# Patient Record
Sex: MALE | Race: WHITE | Employment: OTHER | URBAN - METROPOLITAN AREA
[De-identification: names, ages, dates, MRNs, and addresses within clinical notes are randomized per-mention and may not be internally consistent; named-entity substitution may affect disease eponyms.]

---

## 2017-01-04 ENCOUNTER — ALLIED HEALTH/NURSE VISIT (OUTPATIENT)
Dept: NURSING | Facility: CLINIC | Age: 77
End: 2017-01-04
Payer: MEDICARE

## 2017-01-04 DIAGNOSIS — L57.0 ACTINIC KERATOSIS: ICD-10-CM

## 2017-01-04 PROCEDURE — 96567 PDT DSTR PRMLG LES SKN: CPT

## 2017-01-04 NOTE — NURSING NOTE
Photodynamic Therapy Intake:    1.Close monitoring for more significant reaction, and avoid saran wrap if YES to any of the following:  New medications since seen by physician? No (If yes, contact supervising physician)  NSAIDs, ibuprofen, aspirin, naproxen, piroxicam: No  Antiarrhythmics (amiodarone, quinidine): No  Hydrochlorothiazide:  No    2.Will hold PDT for YES to any of the following:  Pregnancy/breastfeeding/unknown pregnancy: No  Sun burn: No  Tetracyclines such as doxycycline: No  Ciprofloxacin: No   Methotrexate: No    3.Will hold LEVULAN for YES to any of the following:   Treatment today is for acne:No     The sites to be treated were verified and discussed with patient, sites verified were scalp and forehead.   Expected symptoms and/or complications of redness, peeling, stinging, and burning were reviewed.  Comfort measures including moisturizer, cool compresses, and sun protection measures were also reviewed with patient.  After review, patient verbalized understanding of procedure and consent form signed by patient(as per MD documentation) and patient agrees to proceed with treatment.     Applied 1 Levulan stick to the site/s.  Yes saran wrap applied to forehead scalp    MEDICATION: Levulan  DOSE: 1.5 mL  ROUTE: Topical  : [employer]  LOCATION GIVEN: forehead and scalp  LOT NO: [id number]  EXP. DATE: 09/19  NDC: 80662-639-67    TREATMENT NUMBER(30 maximum treatments per site): 1      Photodynamic Therapy(PDT) Post Op Note    Patient successfully completed PDT treatment today. Patient tolerated treatment without complication.  Sites were cleansed with mild soap and water and SPF was applied after treatment.     Sun protection was reviewed with patient:  Patient brought personal hat    After care instructions were reviewed with patient. AVS printed with clinic contact information and given to patient. Patient verbalized understanding and had no further questions or concerns at  this time. Patient left clinic in good condition.

## 2017-01-04 NOTE — MR AVS SNAPSHOT
After Visit Summary   1/4/2017    Rigoberto Holguin    MRN: [id number]           Patient Information     Date Of Birth          1940        Visit Information        Provider Department      1/4/2017 10:00 AM NURSE ONLY formerly [facility]        Care Instructions    Photodynamic Therapy (PDT) Home Care Instructions  I will experience redness, swelling, pain and discomfort which will last 5-10 days. I may experience pinkness or redness that persists for 2-3 weeks but longer duration is possible in some individuals. Risks are infection, eye injury, blistering, reactivation of the cold sore virus, skin lightening, skin darkening or scarring. Multiple treatments may be required.   DAY OF TREATMENT  1) Wash treated area with mild cleanser.  Redness of the treated skin is expected and can vary significantly from person to person and treatment to treatment.  2) Apply SPF 50 before leaving your home/office and while driving to and from work.  3) Remain indoors if possible and avoid direct as well as indirect sunlight.  If your head or face were treated, wear a broad brimmed hat wile going to and from your car.  If your hands/arms were treated, were long sleeves and gloves.  4) Ice packs every 1-2 hours may be helpful as well.  5) In the evening, cleanse treated area gently.  Your skin will feel dry; keep treated area moisturized with a moisturizer.  DAY TWO  1) You may take a shower.  Men should NOT shave if their face was treated.  2) Cleanse treated area gently.  3) Apply SPF 50  4) Most discomfort usually subsides by Day 3.  5) You should avoid direct sunlight and try to remain indoors on Day 2.  The sensitivity to sunlight will significantly decrease after 48 hours.  6) You should soak the treated areas with as soft washcloth with cold water for 20 minutes every 4-6 hours.  Ice may be applied after the cold-water soaks, if this feels good.  The area should be patted dry and moisturized  following the cold-water soaks.  7) Follow evening routine as you did on Day 1    DAYS 3- 7   1) Try to avoid direct sunlight and minimize incidental exposure for one week.  NO BEACHES!  Continue using SPF 50.  This is very important in protecting your newly rejuvenated skin.  2) You may begin applying make-up once any crusting has healed.  The area may be slightly red for a few weeks.  3) The skin will feel dry and tightened.  Moisturize once to twice daily.  Please plan to be at your appointment for approximately 90 minutes.  Your treatments are scheduled as follows.  Your next PDT treatment  is scheduled for February 15, at 10 AM      Who should I call with questions?    [facility]: [phone]     [facility]: [phone]    For urgent needs outside of business hours call the [facility] at [phone] and ask for the dermatology resident on call                Follow-ups after your visit        Your next 10 appointments already scheduled     Jan 23, 2017  8:30 AM   Anticoagulation Visit with ALANA ANTI COAG   [facility]    [address] [facility] [address] [id number] [phone]            Feb 15, 2017 10:00 AM   Nurse Only with NURSE ONLY MG DERM   [facility])    [address] [place] [address]   [phone]            Mar 21, 2017 11:00 AM   Return Visit with Vickie Whalen MD   [facility])    [address] [place] [address]   [phone]            Mar 29, 2017 10:00 AM   Nurse Only with NURSE ONLY MG DERM   [facility])    [address] [place] [address]   [phone]            Jun 28, 2017  9:00 AM   Return Visit with Humble Cantu MD, SAMUEL CYSTO [facility]  [place]) [address]  [place] [address] [phone]              Who to contact     If you have questions or need follow up information about today's clinic visit or your schedule please contact [facility] directly at [phone].  Normal or non-critical lab and imaging results will be communicated to you by MyChart, letter or phone within 4 business days after the clinic has received the results. If you do not hear from us within 7 days, please contact the clinic through MyChart or phone. If you have a critical or abnormal lab result, we will notify you by phone as soon as possible.  Submit refill requests through [employer] or call your pharmacy and they will forward the refill request to us. Please allow 3 business days for your refill to be completed.          Additional Information About Your Visit        MyChart Information     [employer] gives you secure access to your electronic health record. If you see a primary care provider, you can also send messages to your care team and make appointments. If you have questions, please call your primary care clinic.  If you do not have a primary care provider, please call [phone] and they will assist you.      [employer] is an electronic gateway that provides easy, online access to your medical records. With [employer], you can request a clinic appointment, read your test results, renew a prescription or communicate with your care team.     To access your existing account, please contact your [facility] or call [phone] for assistance.        Care EveryWhere ID     This is your Care EveryWhere ID. This could be used by other organizations to access your [facility] medical records  [id number]         Blood Pressure from Last 3 Encounters:   11/15/16 121/75   08/16/16 122/67   08/09/16 136/70    Weight from Last 3 Encounters:   11/15/16 96.707 kg (213 lb 3.2 oz)   08/16/16 97.07 kg (214 lb)   08/09/16  97.523 kg (215 lb)              Today, you had the following     No orders found for display       Primary Care Provider Office Phone # Fax #    Paulo Jodi Vargas [phone]       [facility] [place] [address]  [place] [address]        Thank you!     Thank you for choosing [facility]  for your care. Our goal is always to provide you with excellent care. Hearing back from our patients is one way we can continue to improve our services. Please take a few minutes to complete the written survey that you may receive in the mail after your visit with us. Thank you!             Your Updated Medication List - Protect others around you: Learn how to safely use, store and throw away your medicines at www.disposemymeds.org.          This list is accurate as of: 1/4/17 11:33 AM.  Always use your most recent med list.                   Brand Name Dispense Instructions for use    cholecalciferol 1000 UNIT tablet    vitamin D    100 tablet    Take 1 tablet (1,000 Units) by mouth daily       DAILY MULTIVITAMIN PO      Take  by mouth daily.       JUICE PLUS FIBRE PO          levothyroxine 100 MCG tablet    SYNTHROID/LEVOTHROID    90 tablet    Take 1 tablet (100 mcg) by mouth every morning (before breakfast)       melatonin 5 MG tablet      Take 5 mg by mouth nightly as needed for sleep       metroNIDAZOLE 0.75 % cream    METROCREAM    60 g    every day, once daily to face       TUMS 500 MG chewable tablet   Generic drug:  calcium carbonate      Take 1 chew tab by mouth daily.       * warfarin 10 MG tablet    COUMADIN     Take by mouth daily 4 days/ week on Sun, Tues, Thur, Sat       * warfarin 5 MG tablet    COUMADIN    180 tablet    Take 1 tablet (5 mg) by mouth daily Or as directed by INR clinic. Current dose is 7.5 mg Mon,Wed, and Fri, 10 mg daily rest of week       * Notice:  This list has 2 medication(s) that are the same as other medications prescribed for you. Read the directions  carefully, and ask your doctor or other care provider to review them with you.

## 2017-01-04 NOTE — PATIENT INSTRUCTIONS
Photodynamic Therapy (PDT) Home Care Instructions  I will experience redness, swelling, pain and discomfort which will last 5-10 days. I may experience pinkness or redness that persists for 2-3 weeks but longer duration is possible in some individuals. Risks are infection, eye injury, blistering, reactivation of the cold sore virus, skin lightening, skin darkening or scarring. Multiple treatments may be required.   DAY OF TREATMENT  1) Wash treated area with mild cleanser.  Redness of the treated skin is expected and can vary significantly from person to person and treatment to treatment.  2) Apply SPF 50 before leaving your home/office and while driving to and from work.  3) Remain indoors if possible and avoid direct as well as indirect sunlight.  If your head or face were treated, wear a broad brimmed hat wile going to and from your car.  If your hands/arms were treated, were long sleeves and gloves.  4) Ice packs every 1-2 hours may be helpful as well.  5) In the evening, cleanse treated area gently.  Your skin will feel dry; keep treated area moisturized with a moisturizer.  DAY TWO  1) You may take a shower.  Men should NOT shave if their face was treated.  2) Cleanse treated area gently.  3) Apply SPF 50  4) Most discomfort usually subsides by Day 3.  5) You should avoid direct sunlight and try to remain indoors on Day 2.  The sensitivity to sunlight will significantly decrease after 48 hours.  6) You should soak the treated areas with as soft washcloth with cold water for 20 minutes every 4-6 hours.  Ice may be applied after the cold-water soaks, if this feels good.  The area should be patted dry and moisturized following the cold-water soaks.  7) Follow evening routine as you did on Day 1    DAYS 3- 7   1) Try to avoid direct sunlight and minimize incidental exposure for one week.  NO BEACHES!  Continue using SPF 50.  This is very important in protecting your newly rejuvenated skin.  2) You may begin  applying make-up once any crusting has healed.  The area may be slightly red for a few weeks.  3) The skin will feel dry and tightened.  Moisturize once to twice daily.  Please plan to be at your appointment for approximately 90 minutes.  Your treatments are scheduled as follows.  Your next PDT treatment  is scheduled for February 15, at 10 AM      Who should I call with questions?    [facility]: [phone]     [facility]: [phone]    For urgent needs outside of business hours call the [facility] at [phone] and ask for the dermatology resident on call

## 2017-01-23 ENCOUNTER — ANTICOAGULATION THERAPY VISIT (OUTPATIENT)
Dept: NURSING | Facility: CLINIC | Age: 77
End: 2017-01-23
Payer: MEDICARE

## 2017-01-23 DIAGNOSIS — M54.16 LUMBAR RADICULAR PAIN: Primary | ICD-10-CM

## 2017-01-23 DIAGNOSIS — Z79.01 LONG-TERM (CURRENT) USE OF ANTICOAGULANTS: Primary | ICD-10-CM

## 2017-01-23 LAB — INR POINT OF CARE: 2.6 (ref 0.86–1.14)

## 2017-01-23 PROCEDURE — 99207 ZZC NO CHARGE NURSE ONLY: CPT

## 2017-01-23 PROCEDURE — 85610 PROTHROMBIN TIME: CPT | Mod: QW

## 2017-01-23 PROCEDURE — 36416 COLLJ CAPILLARY BLOOD SPEC: CPT

## 2017-01-23 NOTE — PROGRESS NOTES
ANTICOAGULATION FOLLOW-UP CLINIC VISIT    Patient Name:  Rigoberto Holguin  Date:  1/23/2017  Contact Type:  Face to Face    SUBJECTIVE:     Patient Findings     Positives No Problem Findings           OBJECTIVE    INR PROTIME   Date Value Ref Range Status   01/23/2017 2.6* 0.86 - 1.14 Final       ASSESSMENT / PLAN  INR assessment THER    Recheck INR In: 6 WEEKS    INR Location Clinic      Anticoagulation Summary as of 1/23/2017     INR goal 2.0-3.0   Selected INR 2.6 (1/23/2017)   Maintenance plan 7.5 mg (5 mg x 1.5) on Mon, Wed, Fri; 10 mg (5 mg x 2) all other days   Full instructions 7.5 mg on Mon, Wed, Fri; 10 mg all other days   Weekly total 62.5 mg   No change documented Aparna Robison   Plan last modified Aaprna Robison (1/25/2016)   Next INR check 3/6/2017   Target end date     Indications   Long-term (current) use of anticoagulants [Z79.01] [Z79.01]         Anticoagulation Episode Summary     INR check location     Preferred lab     Send INR reminders to [facility]    Comments       Anticoagulation Care Providers     Provider Role Specialty Phone number    Paulo Vargas MD Responsible Internal Medicine [phone]            See the Encounter Report to view Anticoagulation Flowsheet and Dosing Calendar (Go to Encounters tab in chart review, and find the Anticoagulation Therapy Visit)    Patient aware will need to establish with new provider    APARNA ROBISON

## 2017-01-23 NOTE — MR AVS SNAPSHOT
Rigoberto LORENZO Holguin   1/23/2017 8:30 AM   Anticoagulation Therapy Visit    Description:  76 year old male   Provider:  ALANA ANTI COAG   Department:  [facility] Nurse           INR as of 1/23/2017     Selected INR 2.6 (1/23/2017)      Anticoagulation Summary as of 1/23/2017     INR goal 2.0-3.0   Selected INR 2.6 (1/23/2017)   Full instructions 7.5 mg on Mon, Wed, Fri; 10 mg all other days   Next INR check 3/6/2017    Indications   Long-term (current) use of anticoagulants [Z79.01] [Z79.01]         Your next Anticoagulation Clinic appointment(s)     Mar 06, 2017  8:30 AM   Anticoagulation Visit with ALANA ANTI GARRISON   [facility] [place] [facility])    [address]  [place] [address] [phone]              Contact Numbers     [facility]  Please call [phone] with any problems or questions regarding your therapy, or to cancel or reschedule your appointment.          January 2017 Details    Sun Mon Tue Wed Thu Fri Sat     1               2               3               4               5               6               7                 8               9               10               11               12               13               14                 15               16               17               18               19               20               21                 22               23      7.5 mg   See details      24      10 mg         25      7.5 mg         26      10 mg         27      7.5 mg         28      10 mg           29      10 mg         30      7.5 mg         31      10 mg              Date Details   01/23 This INR check               How to take your warfarin dose     To take:  7.5 mg Take 1.5 of the 5 mg tablets.    To take:  10 mg Take 2 of the 5 mg tablets.           February 2017 Details    Sun Mon Tue Wed Thu Fri Sat        1      7.5 mg         2      10 mg         3      7.5 mg         4      10 mg           5      10 mg         6      7.5 mg         7      10 mg          8      7.5 mg         9      10 mg         10      7.5 mg         11      10 mg           12      10 mg         13      7.5 mg         14      10 mg         15      7.5 mg         16      10 mg         17      7.5 mg         18      10 mg           19      10 mg         20      7.5 mg         21      10 mg         22      7.5 mg         23      10 mg         24      7.5 mg         25      10 mg           26      10 mg         27      7.5 mg         28      10 mg              Date Details   No additional details            How to take your warfarin dose     To take:  7.5 mg Take 1.5 of the 5 mg tablets.    To take:  10 mg Take 2 of the 5 mg tablets.           March 2017 Details    Sun Mon Tue Wed Thu Fri Sat        1      7.5 mg         2      10 mg         3      7.5 mg         4      10 mg           5      10 mg         6            7               8               9               10               11                 12               13               14               15               16               17               18                 19               20               21               22               23               24               25                 26               27               28               29               30               31                 Date Details   No additional details    Date of next INR:  3/6/2017         How to take your warfarin dose     To take:  7.5 mg Take 1.5 of the 5 mg tablets.    To take:  10 mg Take 2 of the 5 mg tablets.

## 2017-01-24 DIAGNOSIS — M54.16 LUMBAR RADICULOPATHY: Primary | ICD-10-CM

## 2017-02-09 ENCOUNTER — OFFICE VISIT (OUTPATIENT)
Dept: FAMILY MEDICINE | Facility: CLINIC | Age: 77
End: 2017-02-09
Payer: MEDICARE

## 2017-02-09 VITALS
SYSTOLIC BLOOD PRESSURE: 121 MMHG | TEMPERATURE: 97.7 F | DIASTOLIC BLOOD PRESSURE: 68 MMHG | BODY MASS INDEX: 30.54 KG/M2 | OXYGEN SATURATION: 98 % | WEIGHT: 200.8 LBS | HEART RATE: 60 BPM

## 2017-02-09 DIAGNOSIS — D68.51 FACTOR V LEIDEN MUTATION (H): ICD-10-CM

## 2017-02-09 DIAGNOSIS — Z79.01 LONG-TERM (CURRENT) USE OF ANTICOAGULANTS: ICD-10-CM

## 2017-02-09 DIAGNOSIS — E06.3 HYPOTHYROIDISM DUE TO HASHIMOTO'S THYROIDITIS: Primary | ICD-10-CM

## 2017-02-09 DIAGNOSIS — I87.009 POSTPHLEBITIC SYNDROME: ICD-10-CM

## 2017-02-09 PROCEDURE — 99214 OFFICE O/P EST MOD 30 MIN: CPT | Performed by: PHYSICIAN ASSISTANT

## 2017-02-09 NOTE — NURSING NOTE
"Chief Complaint   Patient presents with     Recheck Medication       Initial /68 mmHg  Pulse 60  Temp(Src) 97.7  F (36.5  C) (Oral)  Wt 200 lb 12.8 oz (91.082 kg)  SpO2 98% Estimated body mass index is 30.54 kg/(m^2) as calculated from the following:    Height as of 8/9/16: 5' 8\" (1.727 m).    Weight as of this encounter: 200 lb 12.8 oz (91.082 kg).  Medication Reconciliation: complete    "

## 2017-02-09 NOTE — MR AVS SNAPSHOT
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's thyroiditis    -  1     Factor V Leiden mutation (H)         Postphlebitic syndrome         Long-term (current) use of anticoagulants [Z79.01]            Follow-ups after your visit        Your next 10 appointments already scheduled     Feb 15, 2017 10:00 AM   Nurse Only with NURSE ONLY MG DERM   [facility]    [address] [place] [id number] [phone]            Feb 21, 2017 10:00 AM   (Arrive by 9:45 AM)   PAC Pharmacist with  Pac Pharmacist   [facility] [place] ([facility])    [address] [place] [address]  4th [facility] [address] [phone]            Feb 21, 2017 10:30 AM   (Arrive by 10:15 AM)   PAC RN ASSESSMENT with  Pac Rn   [facility] ([facility] [place])    [address] [address]   [phone]            Feb 21, 2017 11:00 AM   (Arrive by 10:45 AM)   PAC EVALUATION with  Pac Parish 1   [facility] [place] ([facility] [place])    [address] [facility]  4th [facility] [address]   [phone]            Feb 21, 2017 12:10 PM   (Arrive by 11:55 AM)   PAC Anesthesia Consult with  Pac Anesthesiologist   [facility] ([facility] [place])    [address] [place] [address]  4th [facility] [address]   [phone]            Feb 27, 2017  8:45 AM   Anticoagulation Visit with ALANA ANTI COAG   [place] [facility] ([facility] [place])    [address] [facility]  [place] [address]    [phone]            Mar 02, 2017   Procedure with Ricky Mclaughlin MD   [facility] [place], Same Day Surgery (--)    [address]  [place] [address] [phone]            Mar 06, 2017  8:30 AM   Anticoagulation Visit with BE ANTI COAG   [facility] [place] [facility]    [address] [facility]  [place] [address] [phone]            Mar 21, 2017 11:00 AM   Return Visit with Vickie Whalen MD   [facility] ([facility]    [address] [facility] [address] [phone]            Mar 29, 2017 10:00 AM   Nurse Only with NURSE ONLY MG DERM   [facility] ([facility])    [address] [facility] [address] [facility] [address] [phone]              Who to contact     Normal or non-critical lab and imaging results will be communicated to you by [employer], letter or phone within 4 business days after the clinic has received the results. If you do not hear from us within 7 days, please contact the clinic through [employer] or phone. If you have a critical or abnormal lab result, we will notify you by phone as soon as possible.  Submit refill requests through [employer] or call your pharmacy and they will forward the refill request to us. Please allow 3 business days for your refill to be completed.          If you need to speak with a  for additional information , please call: [phone]             Additional Information About Your Visit        [employer] Information     [employer] gives you secure access to your electronic health record. If you see a primary care provider, you can also send messages to your care team and make appointments. If you have questions, please call your primary care clinic.  If you do not have a primary care provider, please call [phone] and they will assist you.        Care EveryWhere ID     This is your Care EveryWhere ID. This could be used by other organizations to  access your [facility] medical records  [id number]        Your Vitals Were     Pulse Temperature Pulse Oximetry             60 97.7  F (36.5  C) (Oral) 98%          Blood Pressure from Last 3 Encounters:   02/09/17 121/68   11/15/16 121/75   08/16/16 122/67    Weight from Last 3 Encounters:   02/09/17 200 lb 12.8 oz (91.082 kg)   11/15/16 213 lb 3.2 oz (96.707 kg)   08/16/16 214 lb (97.07 kg)              Today, you had the following     No orders found for display       Primary Care Provider Office Phone # Fax #    Paulo Jodi Vargas [phone]       [facility] [place] [address] [place] [address]        Thank you!     Thank you for choosing [facility]  for your care. Our goal is always to provide you with excellent care. Hearing back from our patients is one way we can continue to improve our services. Please take a few minutes to complete the written survey that you may receive in the mail after your visit with us. Thank you!             Your Updated Medication List - Protect others around you: Learn how to safely use, store and throw away your medicines at www.disposemymeds.org.          This list is accurate as of: 2/9/17 10:16 AM.  Always use your most recent med list.                   Brand Name Dispense Instructions for use    cholecalciferol 1000 UNIT tablet    vitamin D    100 tablet    Take 1 tablet (1,000 Units) by mouth daily       DAILY MULTIVITAMIN PO      Take  by mouth daily.       JUICE PLUS FIBRE PO          levothyroxine 100 MCG tablet    SYNTHROID/LEVOTHROID    90 tablet    Take 1 tablet (100 mcg) by mouth every morning (before breakfast)       melatonin 5 MG tablet      Take 5 mg by mouth nightly as needed for sleep       metroNIDAZOLE 0.75 % cream    METROCREAM    60 g    every day, once daily to face       TUMS 500 MG chewable tablet   Generic drug:  calcium carbonate      Take 1 chew tab by mouth daily.       * warfarin 10 MG tablet    COUMADIN      Take by mouth daily 4 days/ week on Sun, Tues, Thur, Sat       * warfarin 5 MG tablet    COUMADIN    180 tablet    Take 1 tablet (5 mg) by mouth daily Or as directed by INR clinic. Current dose is 7.5 mg Mon,Wed, and Fri, 10 mg daily rest of week       * Notice:  This list has 2 medication(s) that are the same as other medications prescribed for you. Read the directions carefully, and ask your doctor or other care provider to review them with you.

## 2017-02-09 NOTE — PROGRESS NOTES
SUBJECTIVE:                                                    Rigoberto Holguin is a 76 year old male who presents to clinic today for the following health issues:      Medication Followup     Taking Medication as prescribed: yes    Side Effects:  None    Medication Helping Symptoms:  yes           Problem list and histories reviewed & adjusted, as indicated.  Additional history: as documented    Patient Active Problem List   Diagnosis     Encounter for long-term current use of medication     Malignant neoplasm of other specified sites of bladder     Hypothyroidism     Other and unspecified coagulation defects     Nonallopathic lesion of lumbar region     Stenosis, spinal, lumbar     CARDIOVASCULAR SCREENING; LDL GOAL LESS THAN 160     Advanced directives, counseling/discussion     Factor V Leiden mutation (H)     History of DVT of lower extremity     Postphlebitic syndrome     Chronic anticoagulation     Peripheral vascular disease (H)     Personal history of malignant neoplasm of bladder     Vitamin D deficiency     Long-term (current) use of anticoagulants [Z79.01]     Lumbar radicular pain     Past Surgical History   Procedure Laterality Date     Hc repair rotator cuff,acute Left 2007     Photodynamic therapy - (pdt)  2/13/2015     Surgical history of -   2003     Urinary Bladder Cancer treatment     Appendectomy       Surgical history of -        Gall Bladder       Social History   Substance Use Topics     Smoking status: Former Smoker     Quit date: 10/01/1970     Smokeless tobacco: Never Used     Alcohol Use: 0.0 oz/week     0 Standard drinks or equivalent per week      Comment: Rarely drink 1-2 beers per week     Family History   Problem Relation Age of Onset     CANCER Father      CANCER Brother      Neurologic Disorder Sister          Recent Labs   Lab Test  08/02/16   0740  10/20/15   0803   08/15/14   0632   10/04/12   1001   09/30/09   1056   LDL  99  110   --    --    --   155*   < >  157*   HDL  51  50    --    --    --   40   < >  46   TRIG  97  82   --    --    --   118   < >  110   ALT   --   24   --   48   --    --    --   23   CR  0.81  1.01   --    --    < >   --    --   0.98   GFRESTIMATED  >90  Non  GFR Calc    72   --    --    < >   --    --   76   GFRESTBLACK  >90   GFR Calc    87   --    --    < >   --    --   >90   POTASSIUM  4.4  4.3   --    --    < >   --    --   5.0   TSH  3.46  2.88   < >   --    < >  14.60*   < >  5.47*    < > = values in this interval not displayed.      BP Readings from Last 3 Encounters:   02/09/17 121/68   11/15/16 121/75   08/16/16 122/67    Wt Readings from Last 3 Encounters:   02/09/17 200 lb 12.8 oz (91.082 kg)   11/15/16 213 lb 3.2 oz (96.707 kg)   08/16/16 214 lb (97.07 kg)                    Social History   Substance Use Topics     Smoking status: Former Smoker     Quit date: 10/01/1970     Smokeless tobacco: Never Used     Alcohol Use: 0.0 oz/week     0 Standard drinks or equivalent per week      Comment: Rarely drink 1-2 beers per week     All other systems negative except as outline above  OBJECTIVE:  Eye exam - right eye normal lid, conjunctiva, cornea, pupil and fundus, left eye normal lid, conjunctiva, cornea, pupil and fundus.  ENT exam reveals - ENT exam normal, no neck nodes or sinus tenderness.  Thyroid not palpable, not enlarged, no nodules detected.  CHEST:chest clear to IPPA, no tachypnea, retractions or cyanosis and S1, S2 normal, no murmur, no gallop, rate regular.  Pulses slightly diminished at 1/4 bilaterally.        Rigoberto was seen today for recheck medication and establish care.    Diagnoses and all orders for this visit:    Hypothyroidism due to Hashimoto's thyroiditis    Factor V Leiden mutation (H)    Postphlebitic syndrome    Long-term (current) use of anticoagulants [Z79.01]      work on lifestyle modification

## 2017-02-15 ENCOUNTER — ALLIED HEALTH/NURSE VISIT (OUTPATIENT)
Dept: NURSING | Facility: CLINIC | Age: 77
End: 2017-02-15
Payer: MEDICARE

## 2017-02-15 DIAGNOSIS — L57.0 ACTINIC KERATOSIS: ICD-10-CM

## 2017-02-15 PROCEDURE — 96567 PDT DSTR PRMLG LES SKN: CPT

## 2017-02-15 NOTE — PATIENT INSTRUCTIONS
Photodynamic Therapy (PDT) Home Care Instructions  I will experience redness, swelling, pain and discomfort which will last 5-10 days. I may experience pinkness or redness that persists for 2-3 weeks but longer duration is possible in some individuals. Risks are infection, eye injury, blistering, reactivation of the cold sore virus, skin lightening, skin darkening or scarring. Multiple treatments may be required.   DAY OF TREATMENT  1) Wash treated area with mild cleanser.  Redness of the treated skin is expected and can vary significantly from person to person and treatment to treatment.  2) Apply SPF 50 before leaving your home/office and while driving to and from work.  3) Remain indoors if possible and avoid direct as well as indirect sunlight.  If your head or face were treated, wear a broad brimmed hat wile going to and from your car.  If your hands/arms were treated, were long sleeves and gloves.  4) Ice packs every 1-2 hours may be helpful as well.  5) In the evening, cleanse treated area gently.  Your skin will feel dry; keep treated area moisturized with a moisturizer.  DAY TWO  1) You may take a shower.  Men should NOT shave if their face was treated.  2) Cleanse treated area gently.  3) Apply SPF 50  4) Most discomfort usually subsides by Day 3.  5) You should avoid direct sunlight and try to remain indoors on Day 2.  The sensitivity to sunlight will significantly decrease after 48 hours.  6) You should soak the treated areas with as soft washcloth with cold water for 20 minutes every 4-6 hours.  Ice may be applied after the cold-water soaks, if this feels good.  The area should be patted dry and moisturized following the cold-water soaks.  7) Follow evening routine as you did on Day 1    DAYS 3- 7   1) Try to avoid direct sunlight and minimize incidental exposure for one week.  NO BEACHES!  Continue using SPF 50.  This is very important in protecting your newly rejuvenated skin.  2) You may begin  applying make-up once any crusting has healed.  The area may be slightly red for a few weeks.  3) The skin will feel dry and tightened.  Moisturize once to twice daily.  Please plan to be at your appointment for approximately 90 minutes.  Your treatments are scheduled as follows.      Who should I call with questions?    [facility]: [phone]     [facility]: [phone]    For urgent needs outside of business hours call the [facility] at [phone] and ask for the dermatology resident on call

## 2017-02-15 NOTE — NURSING NOTE
Photodynamic Therapy Intake:    1.Close monitoring for more significant reaction, and avoid saran wrap if YES to any of the following:  New medications since seen by physician? No (If yes, contact supervising physician)  NSAIDs, ibuprofen, aspirin, naproxen, piroxicam: No  Antiarrhythmics (amiodarone, quinidine): No  Hydrochlorothiazide:  No    2.Will hold PDT for YES to any of the following:  Pregnancy/breastfeeding/unknown pregnancy: No  Sun burn: No  Tetracyclines such as doxycycline: No  Ciprofloxacin: No   Methotrexate: No    3.Will hold LEVULAN for YES to any of the following:   Treatment today is for acne:No     The sites to be treated were verified and discussed with patient, sites verified were forehead and top of scalp.   Expected symptoms and/or complications of redness, peeling, stinging, and burning were reviewed.  Comfort measures including moisturizer, cool compresses, and sun protection measures were also reviewed with patient.  After review, patient verbalized understanding of procedure and consent form signed by patient(as per MD documentation) and patient agrees to proceed with treatment.     Applied 1 Levulan stick to the site/s.  Yes saran wrap applied to forehead and scalp    MEDICATION: Levulan  DOSE: 1.5 mL  ROUTE: Topical  : [employer]  LOCATION GIVEN: forehead and scalp  LOT NO: [id number]  EXP. DATE: 11/19  NDC: 79830-401-84    TREATMENT NUMBER(30 maximum treatments per site): 2      Photodynamic Therapy(PDT) Post Op Note    Patient successfully completed PDT treatment today. Patient tolerated treatment without complication.  Sites were cleansed with mild soap and water and SPF was applied after treatment.     Sun protection was reviewed with patient:  Patient brought personal hat    After care instructions were reviewed with patient. AVS printed with clinic contact information and given to patient. Patient verbalized understanding and had no further questions or  concerns at this time. Patient left clinic in good condition.

## 2017-02-15 NOTE — MR AVS SNAPSHOT
After Visit Summary   2/15/2017    Rigoberto Holguin    MRN: [id number]           Patient Information     Date Of Birth          1940        Visit Information        Provider Department      2/15/2017 10:00 AM NURSE ONLY [facility]        Care Instructions    Photodynamic Therapy (PDT) Home Care Instructions  I will experience redness, swelling, pain and discomfort which will last 5-10 days. I may experience pinkness or redness that persists for 2-3 weeks but longer duration is possible in some individuals. Risks are infection, eye injury, blistering, reactivation of the cold sore virus, skin lightening, skin darkening or scarring. Multiple treatments may be required.   DAY OF TREATMENT  1) Wash treated area with mild cleanser.  Redness of the treated skin is expected and can vary significantly from person to person and treatment to treatment.  2) Apply SPF 50 before leaving your home/office and while driving to and from work.  3) Remain indoors if possible and avoid direct as well as indirect sunlight.  If your head or face were treated, wear a broad brimmed hat wile going to and from your car.  If your hands/arms were treated, were long sleeves and gloves.  4) Ice packs every 1-2 hours may be helpful as well.  5) In the evening, cleanse treated area gently.  Your skin will feel dry; keep treated area moisturized with a moisturizer.  DAY TWO  1) You may take a shower.  Men should NOT shave if their face was treated.  2) Cleanse treated area gently.  3) Apply SPF 50  4) Most discomfort usually subsides by Day 3.  5) You should avoid direct sunlight and try to remain indoors on Day 2.  The sensitivity to sunlight will significantly decrease after 48 hours.  6) You should soak the treated areas with as soft washcloth with cold water for 20 minutes every 4-6 hours.  Ice may be applied after the cold-water soaks, if this feels good.  The area should be patted dry and moisturized  following the cold-water soaks.  7) Follow evening routine as you did on Day 1    DAYS 3- 7   1) Try to avoid direct sunlight and minimize incidental exposure for one week.  NO BEACHES!  Continue using SPF 50.  This is very important in protecting your newly rejuvenated skin.  2) You may begin applying make-up once any crusting has healed.  The area may be slightly red for a few weeks.  3) The skin will feel dry and tightened.  Moisturize once to twice daily.  Please plan to be at your appointment for approximately 90 minutes.  Your treatments are scheduled as follows.      Who should I call with questions?    [facility]: [phone]     [facility]: [phone]    For urgent needs outside of business hours call the [facility] at [phone] and ask for the dermatology resident on call                Follow-ups after your visit        Your next 10 appointments already scheduled     Feb 21, 2017 10:00 AM CST   (Arrive by 9:45 AM)   PAC Pharmacist with  Pac Pharmacist   [facility] ([facility])    [address] [place] [address]   [phone]            Feb 21, 2017 10:30 AM CST   (Arrive by 10:15 AM)   PAC RN ASSESSMENT with Francoise Pac Rn   [facility] ([facility])    [address] [place] [address]   [phone]            Feb 21, 2017 11:00 AM CST   (Arrive by 10:45 AM)   PAC EVALUATION with  Pac Parish 1   [facility] [place] ([facility])    [address] [place] [address]   [phone]            Feb 21, 2017 12:10 PM CST   (Arrive by 11:55 AM)   PAC Anesthesia Consult with Francoise Pac Anesthesiologist   [facility] [place] ([facility])    [address] [place] [address]  Floor  [facility] [address]   [phone]            Mar 01, 2017 10:30 AM CST   Anticoagulation Visit with BE ANTI COAG   [facility] [place] ([facility])    [address]  [place] [address]   [phone]            Mar 02, 2017   Procedure with Ricky Mclaughlin MD   [facility] [place], Same Day Surgery (--)    [address]  [place] [address]   [phone]            Mar 06, 2017  8:30 AM CST   Anticoagulation Visit with BE ANTI COAG   [facility] [place] ([facility] [place])    [address]  [place] [address]   [phone]            Mar 21, 2017 11:00 AM CDT   Return Visit with Vickie Whalen MD   [facility]    [address] [place] [id number] [phone]            Mar 29, 2017 10:00 AM CDT   Nurse Only with NURSE ONLY MG DERM   [facility] ([facility])    [address] [place] [id number] [phone]            Jun 28, 2017  9:00 AM CDT   Return Visit with Humble Cantu MD   [facility]    [address] [place] [address] [phone]              Who to contact     If you have questions or need follow up information about today's clinic visit or your schedule please contact [facility] directly at [phone].  Normal or non-critical lab and imaging results will be communicated to you by MyChart, letter or phone within 4 business days after the clinic has received the results. If you do not hear from us within 7 days, please contact the clinic through MyChart or phone. If you have a critical or abnormal lab result, we will notify you by phone as soon as possible.  Submit refill requests through [employer] or call your pharmacy and they will forward the refill request to us. Please allow 3 business days for your refill to be completed.          Additional  Information About Your Visit        [employer] Information     [employer] gives you secure access to your electronic health record. If you see a primary care provider, you can also send messages to your care team and make appointments. If you have questions, please call your primary care clinic.  If you do not have a primary care provider, please call [phone] and they will assist you.      [employer] is an electronic gateway that provides easy, online access to your medical records. With [employer], you can request a clinic appointment, read your test results, renew a prescription or communicate with your care team.     To access your existing account, please contact your [facility] or call [phone] for assistance.        Care EveryWhere ID     This is your Care EveryWhere ID. This could be used by other organizations to access your [facility] medical records  [id number]         Blood Pressure from Last 3 Encounters:   02/09/17 121/68   11/15/16 121/75   08/16/16 122/67    Weight from Last 3 Encounters:   02/09/17 91.1 kg (200 lb 12.8 oz)   11/15/16 96.7 kg (213 lb 3.2 oz)   08/16/16 97.1 kg (214 lb)              Today, you had the following     No orders found for display       Primary Care Provider Office Phone # Fax #    Paulo Jodi Vargas [phone]       [facility] [place] [address] [facility] [address]        Thank you!     Thank you for choosing [facility]  for your care. Our goal is always to provide you with excellent care. Hearing back from our patients is one way we can continue to improve our services. Please take a few minutes to complete the written survey that you may receive in the mail after your visit with us. Thank you!             Your Updated Medication List - Protect others around you: Learn how to safely use, store and throw away your medicines at www.disposemymeds.org.          This list is accurate as of: 2/15/17 11:36  AM.  Always use your most recent med list.                   Brand Name Dispense Instructions for use    cholecalciferol 1000 UNIT tablet    vitamin D    100 tablet    Take 1 tablet (1,000 Units) by mouth daily       DAILY MULTIVITAMIN PO      Take  by mouth daily.       JUICE PLUS FIBRE PO          levothyroxine 100 MCG tablet    SYNTHROID/LEVOTHROID    90 tablet    Take 1 tablet (100 mcg) by mouth every morning (before breakfast)       melatonin 5 MG tablet      Take 5 mg by mouth nightly as needed for sleep       metroNIDAZOLE 0.75 % cream    METROCREAM    60 g    every day, once daily to face       TUMS 500 MG chewable tablet   Generic drug:  calcium carbonate      Take 1 chew tab by mouth daily.       * warfarin 10 MG tablet    COUMADIN     Take by mouth daily 4 days/ week on Sun, Tues, Thur, Sat       * warfarin 5 MG tablet    COUMADIN    180 tablet    Take 1 tablet (5 mg) by mouth daily Or as directed by INR clinic. Current dose is 7.5 mg Mon,Wed, and Fri, 10 mg daily rest of week       * Notice:  This list has 2 medication(s) that are the same as other medications prescribed for you. Read the directions carefully, and ask your doctor or other care provider to review them with you.

## 2017-02-21 ENCOUNTER — ALLIED HEALTH/NURSE VISIT (OUTPATIENT)
Dept: SURGERY | Facility: CLINIC | Age: 77
End: 2017-02-21

## 2017-02-21 ENCOUNTER — ANESTHESIA EVENT (OUTPATIENT)
Dept: SURGERY | Facility: CLINIC | Age: 77
End: 2017-02-21
Payer: MEDICARE

## 2017-02-21 ENCOUNTER — OFFICE VISIT (OUTPATIENT)
Dept: SURGERY | Facility: CLINIC | Age: 77
End: 2017-02-21

## 2017-02-21 VITALS
SYSTOLIC BLOOD PRESSURE: 111 MMHG | TEMPERATURE: 98.4 F | HEART RATE: 54 BPM | DIASTOLIC BLOOD PRESSURE: 68 MMHG | OXYGEN SATURATION: 96 % | RESPIRATION RATE: 14 BRPM | HEIGHT: 68 IN | BODY MASS INDEX: 29.75 KG/M2 | WEIGHT: 196.3 LBS

## 2017-02-21 DIAGNOSIS — Z01.818 PREOP EXAMINATION: Primary | ICD-10-CM

## 2017-02-21 DIAGNOSIS — M54.16 LEFT LUMBAR RADICULOPATHY: ICD-10-CM

## 2017-02-21 LAB
ANION GAP SERPL CALCULATED.3IONS-SCNC: 9 MMOL/L (ref 3–14)
BUN SERPL-MCNC: 17 MG/DL (ref 7–30)
CALCIUM SERPL-MCNC: 9.1 MG/DL (ref 8.5–10.1)
CHLORIDE SERPL-SCNC: 105 MMOL/L (ref 94–109)
CO2 SERPL-SCNC: 26 MMOL/L (ref 20–32)
CREAT SERPL-MCNC: 1.02 MG/DL (ref 0.66–1.25)
ERYTHROCYTE [DISTWIDTH] IN BLOOD BY AUTOMATED COUNT: 13.6 % (ref 10–15)
GFR SERPL CREATININE-BSD FRML MDRD: 71 ML/MIN/1.7M2
GLUCOSE SERPL-MCNC: 90 MG/DL (ref 70–99)
HCT VFR BLD AUTO: 42.3 % (ref 40–53)
HGB BLD-MCNC: 14.3 G/DL (ref 13.3–17.7)
INR PPP: 3.09 (ref 0.86–1.14)
MCH RBC QN AUTO: 33.9 PG (ref 26.5–33)
MCHC RBC AUTO-ENTMCNC: 33.8 G/DL (ref 31.5–36.5)
MCV RBC AUTO: 100 FL (ref 78–100)
PLATELET # BLD AUTO: 145 10E9/L (ref 150–450)
POTASSIUM SERPL-SCNC: 4.4 MMOL/L (ref 3.4–5.3)
RBC # BLD AUTO: 4.22 10E12/L (ref 4.4–5.9)
SODIUM SERPL-SCNC: 140 MMOL/L (ref 133–144)
WBC # BLD AUTO: 6.3 10E9/L (ref 4–11)

## 2017-02-21 ASSESSMENT — LIFESTYLE VARIABLES: TOBACCO_USE: 1

## 2017-02-21 NOTE — ANESTHESIA PREPROCEDURE EVALUATION
Anesthesia Evaluation     . Pt has had prior anesthetic. Type: General    No history of anesthetic complications     ROS/MED HX    ENT/Pulmonary:     (+)ALEXA risk factors observed stopped breathing tobacco use, Past use , . .    Neurologic: Comment: Spinal stenosis, left radiculopathy    (+)other neuro Past history of polio, no residual    Cardiovascular:     (+) ----. Taking blood thinners Pt has received instructions: Instructions Given to patient: Planned hold for Coumadin, no bridge. . . :. . Previous cardiac testing date:results:date: results:ECG reviewed date:2/21/17 results:SB rate 55 date: results:          METS/Exercise Tolerance:  3 - Able to walk 1-2 blocks without stopping   Hematologic:     (+) History of blood clots pt is anticoagulated, History of Transfusion -      Musculoskeletal:   (+) , , other musculoskeletal- right hip pain      GI/Hepatic:  - neg GI/hepatic ROS   (+) GERD       Renal/Genitourinary:  - ROS Renal section negative       Endo:     (+) thyroid problem hypothyroidism, .      Psychiatric:  - neg psychiatric ROS       Infectious Disease:  - neg infectious disease ROS       Malignancy:   (+) Malignancy History of Skin and Other  Skin CA Remission status post Surgery, Other CA bladder Remission status post Surgery         Other:    (+) No chance of pregnancy C-spine cleared: N/A, H/O Chronic Pain,no other significant disability              Physical Exam      Airway   Mallampati: II  TM distance: >3 FB  Neck ROM: full    Dental   (+) partials, caps and implants  Comment: Left upper permanent bridge, right upper implant    Cardiovascular   Rhythm and rate: regular and normal      Pulmonary    breath sounds clear to auscultation    Other findings: For further details of assessment, testing, and physical exam please see H and P completed on same date.           PAC Discussion and Assessment    ASA Classification: 3  Case is suitable for: [place]  Anesthetic techniques and relevant risks  discussed: GA  Invasive monitoring and risk discussed: No  Types:   Possibility and Risk of blood transfusion discussed: No  NPO instructions given:   Additional anesthetic preparation and risks discussed:   Needs early admission to pre-op area:   Other:     PAC Resident/NP Anesthesia Assessment:  Rigoberto Holguin is a 76 year old male scheduled to undergo posterior lumbar foraminotomy on 3/2/17 by Dr. Mclaughlin. He has the following specific operative considerations:   - RCRI : No serious cardiac risks.    - Risk of PONV score =1.  If > 2, anti-emetic intervention recommended.    BMI 30. 89 kg. Last GA for fred 2 years ago. No history of problems with anesthesia.       --Lumbago with central canal stenosis and left radiculopathy. Above procedure now planned.   --Factor V Leiden with history of bilateral DVTs Anticoagulated on Coumadin. Last INR 3.09 today. Patient received instructions from PCP to hold Coumadin for 5 days prior to surgery with no bridging needed.      --No significant cardiac history, symptoms or meds. EKG above. Generally good activity tolerance.   --Former smoker. Quit in the 70s. No pulmonary symptoms. ALEXA RF 3/8. Intermediate risk.   --Hypothyroidism. Will take Synthroid on DOS.    --Past history of polio with no significant residual.      Patient was discussed with Dr Maldonado.        Reviewed and Signed by PAC Mid-Level Provider/Resident  Mid-Level Provider/Resident: TENA Hale, SOCORRO  Date: 2/21/17  Time: 4:48pm    Attending Anesthesiologist Anesthesia Assessment:        Anesthesiologist:   Date:   Time:   Pass/Fail:   Disposition:     PAC Pharmacist Assessment:        Pharmacist:   Date:   Time:      Anesthesia Plan      History & Physical Review  History and physical reviewed and following examination; no interval change.    ASA Status:  2 .    NPO Status:  > 8 hours    Plan for General and ETT with Intravenous and Propofol induction. Maintenance will be Inhalation and Balanced.    PONV  prophylaxis:  Ondansetron (or other 5HT-3) and Dexamethasone or Solumedrol  Additional equipment: 2nd IV I discussed the risks and benefits of general anesthesia with the patient.  Questions were sought and answered.      Len Doyle MD  Attending Anesthesiologist        Postoperative Care  Postoperative pain management:  IV analgesics and Oral pain medications.      Consents  Anesthetic plan, risks, benefits and alternatives discussed with:  Patient.  Use of blood products discussed: Yes.   Use of blood products discussed with Patient.  Consented to blood products.  .        ANESTHESIA PREOP EVALUATION    Procedure: Procedure(s):  Left Lumbar 4-5, Lumbar 5-Sacral 1 Foraminotomy  - Wound Class: I-Clean    HPI: Rigoberto Holguin is a 76 year old male with factor 5 Leiden on chronic warfarin for multiple DVTs, hypothyroidism, spinal stenosis, and history of Polio without residual deficits presenting for above procedure.    PMHx/PSHx/ROS:  Past Medical History   Diagnosis Date     Actinic keratosis      Basal cell carcinoma      Bladder cancer (H) 2003     DJD (degenerative joint disease), lumbar      S/P epidural x 2     DVT (deep venous thrombosis) (H) 2007     Factor V ( bilateral legs)      GERD (gastroesophageal reflux disease)      Hypothyroidism      Polio      Age 4, weakness of right arm and leg      S/P appendectomy        Past Surgical History   Procedure Laterality Date     Hc repair rotator cuff,acute Left 2007     Photodynamic therapy - (pdt)  2/13/2015     Surgical history of -   2003     Urinary Bladder Cancer treatment     Appendectomy       Surgical history of -        Gall Bladder         Past Anes Hx: No personal or family h/o anesthesia problems    Soc Hx:   Social History   Substance Use Topics     Smoking status: Former Smoker     Quit date: 10/1/1970     Smokeless tobacco: Never Used     Alcohol use 0.0 oz/week     0 Standard drinks or equivalent per week      Comment: Rarely drink 1-2 beers per  "week       Allergies: No Known Allergies    Meds:   Prescriptions Prior to Admission   Medication Sig Dispense Refill Last Dose     Multiple Vitamins-Minerals (EYE VITAMINS PO) Take 1 tablet by mouth daily   3/1/2017 at Unknown time     melatonin 5 MG tablet Take 10 mg by mouth At Bedtime Reported on 2/21/2017   3/1/2017 at 2200     levothyroxine (SYNTHROID,LEVOTHROID) 100 MCG tablet Take 1 tablet (100 mcg) by mouth every morning (before breakfast) 90 tablet 2 3/1/2017 at 0600     cholecalciferol (VITAMIN D) 1000 UNIT tablet Take 1 tablet (1,000 Units) by mouth daily 100 tablet 0 3/1/2017 at Unknown time     Nutritional Supplements (JUICE PLUS FIBRE PO)    3/1/2017 at Unknown time     calcium carbonate (TUMS) 500 MG chewable tablet Take 1 chew tab by mouth daily as needed    Past Month at Unknown time     Multiple Vitamin (DAILY MULTIVITAMIN PO) Take 1 chew tab by mouth daily    3/1/2017 at Unknown time     warfarin (COUMADIN) 5 MG tablet Take 1 tablet (5 mg) by mouth daily Or as directed by INR clinic. Current dose is 7.5 mg Mon,Wed, and Fri, 10 mg daily rest of week 180 tablet 0 2/24/2017     metroNIDAZOLE (METROCREAM) 0.75 % cream every day, once daily to face 60 g 3 2/26/2017       No current outpatient prescriptions on file.       Physical Exam:  Vitals: /71  Temp 36.9  C (98.4  F) (Oral)  Resp 14  Ht 1.727 m (5' 8\")  Wt 88.5 kg (195 lb 1.7 oz)  SpO2 99%  BMI 29.67 kg/m2  BMI= Body mass index is 29.67 kg/(m^2).      Labs:  UPT: No results found for: HCGQUANT      BMP:  Recent Labs   Lab Test  02/21/17   1211   NA  140   POTASSIUM  4.4   CHLORIDE  105   CO2  26   BUN  17   CR  1.02   GLC  90   NICK  9.1     CBC:   Recent Labs   Lab Test  02/21/17   1211   WBC  6.3   RBC  4.22*   HGB  14.3   HCT  42.3   MCV  100   MCH  33.9*   MCHC  33.8   RDW  13.6   PLT  145*     Coags:  Recent Labs   Lab Test 03/01/17   10/07/09   1250   INR  1.2*   < >  1.36*   PTT   --    --   28    < > = values in this interval not " displayed.       Assessment/Plan:  - ASA 2  - GETA with standard ASA monitors, IV induction, balanced anesthetic  - Pre-induction:   - Versed 0-1 mg   - PIVx2   - No arterial line   - No central line   - Antibiotics per surgeon  - Induction:   - Mac 4 blade   - ETT 7.5   - Propofol, Rocuronium, Fentanyl, Lidocaine  - Maintenance:   - Sevoflurane   - Analgesia: Fentanyl   - Fluids: LR 1 mL/kg/hr maint  - Emergence:   - Reversal: Sugammadex   - PONV prophylaxis: Eliseo Alston Jr., MD  Anesthesia Resident - [facility]    3/1/2017  3:34 PM

## 2017-02-21 NOTE — PROGRESS NOTES
Preoperative Assessment Center medication history for February 21, 2017 is complete.  See Epic admission navigator for allergy information, pharmacy, prior to admission medications and immunization status.    Operating room staff will still need to confirm medications and last dose information on day of surgery.     Medication history interview sources:  patient    Changes made to PTA medication list (reason)  Added: eye vitamin - per patient  Deleted: duplicate of warfarin.   Changed: tums changed to PRN per pt.   Melatonin changed to 10 mg qHS per pt.     Additional medication history information (including reliability of information, actions taken by pharmacist):None      Prior to Admission medications    Medication Sig Last Dose Taking? Auth Provider   warfarin (COUMADIN) 5 MG tablet Take 1 tablet (5 mg) by mouth daily Or as directed by INR clinic. Current dose is 7.5 mg Mon,Wed, and Fri, 10 mg daily rest of week Taking  Paulo Vargas MD   melatonin 5 MG tablet Take 5 mg by mouth nightly as needed for sleep Taking  Reported, Patient   levothyroxine (SYNTHROID,LEVOTHROID) 100 MCG tablet Take 1 tablet (100 mcg) by mouth every morning (before breakfast) Taking  Paulo Vargas MD   metroNIDAZOLE (METROCREAM) 0.75 % cream every day, once daily to face Taking  Vickie Whalen MD   cholecalciferol (VITAMIN D) 1000 UNIT tablet Take 1 tablet (1,000 Units) by mouth daily Taking  Paulo Vargas MD   Nutritional Supplements (JUICE PLUS FIBRE PO)  Taking  Reported, Patient   warfarin (COUMADIN) 10 MG tablet Take by mouth daily 4 days/ week on Sun, Tues, Thur, Sat Taking  Reported, Patient   calcium carbonate (TUMS) 500 MG chewable tablet Take 1 chew tab by mouth daily. Taking  Reported, Patient   Multiple Vitamin (DAILY MULTIVITAMIN PO) Take  by mouth daily. Taking  Reported, Patient         Medication history completed by: Adolph Yu [facility]

## 2017-02-21 NOTE — MR AVS SNAPSHOT
After Visit Summary   2017    Rigoberto Holguin    MRN: [id number]           Patient Information     Date Of Birth          1940        Visit Information        Provider Department      2017 10:30 AM Rn, [facility]        Care Instructions    Preparing for Your Surgery      Name:  Rigoberto Holguin   MRN:  [id number]   :  1940   Today's Date:  2017     Arriving for surgery:  Surgery date:  3/2  Surgery time:  7:30AM  Arrival time:  5:30AM  Please come to:       [facility] [address]  [address] [place]  [address]    -   parking is available in front of the hospital from 5:15 am to 8:00 pm    -  Stop at the Information Desk in the lobby    -   Inform the information person that you are here for surgery. An escort to 3c will be provided. If you would not like an escort, please proceed to 3C on the 3rd floor. [phone]     What can I eat or drink?  -  You may have solid food or milk products until 8 hours prior to your surgery--so not eat after 11:30PM on the night before surgery   -  You may have water, apple juice or 7up/Sprite until 2 hours prior to your surgery--OK to have CLEAR liquids until 5:30AM on the day of surgery     Which medicines can I take?  -  Do NOT take these medications in the morning, the day of surgery:    Per PCP coumadin will be stopped 5 days pre op      -  Please take these medications the day of surgery:     levothyroxine        How do I prepare myself?  -  Take two showers: one the night before surgery; and one the morning of surgery.         Use Scrubcare or Hibiclens to wash from neck down.  You may use your own shampoo and conditioner. No other hair products.   -  Do NOT use lotion, powder, deodorant, or antiperspirant the day of your surgery.  -  Do NOT wear any makeup, fingernail polish or jewelry.  -Do not bring your own medications to the hospital, except for  inhalers and eye drops.  -  Bring your ID and insurance card.    Questions or Concerns:  If you have questions or concerns, please call the  Preoperative Assessment Center, Monday-Friday 7AM-7PM:  [phone]            AFTER YOUR SURGERY  Breathing exercises   Breathing exercises help you recover faster. Take deep breaths and let the air out slowly. This will:     Help you wake up after surgery.    Help prevent complications like pneumonia.  Preventing complications will help you go home sooner.   Nausea and vomiting   You may feel sick to your stomach after surgery; if so, let your nurse know.    Pain control:  After surgery, you may have pain. Our goal is to help you manage your pain. Pain medicine will help you feel comfortable enough to do activities that will help you heal.  These activities may include breathing exercises, walking and physical therapy.   To help your health care team treat your pain we will ask: 1) If you have pain  2) where it is located 3) describe your pain in your words  Methods of pain control include medications given by mouth, vein or by nerve block for some surgeries.  We may give you a pain control pump that will:  1) Deliver the medicine through a tube placed in your vein  2) Control the amount of medicine you receive  3) Allow you to push a button to deliver a dose of pain medicine  Sequential Compression Device (SCD) or Pneumo Boots:  You may need to wear SCD S on your legs or feet. These are wraps connected to a machine that pumps in air and releases it. The repeated pumping helps prevent blood clots from forming.         Follow-ups after your visit        Your next 10 appointments already scheduled     Feb 21, 2017 10:30 AM CST   (Arrive by 10:15 AM)   PAC RN ASSESSMENT with Francoise Pac Rn   [facility] Preoperative Assessment [facility] ([facility])    [address] [place] [address] [phone]            Feb 21, 2017 11:00 AM CST    (Arrive by 10:45 AM)   PAC EVALUATION with  Pac [place] 1   [facility] Preoperative Assessment [facility] ([facility])    [address]  4th [address]  [place] [address]   [phone]            Feb 21, 2017 12:10 PM CST   (Arrive by 11:55 AM)   PAC Anesthesia Consult with  Pac Anesthesiologist   [facility] Preoperative Assessment Center ([facility])    [address]  [address] [facility] [address]   [phone]            Feb 21, 2017 12:30 PM CST   LAB with  LAB   [facility] Lab ([facility])    [address]  [address] [facility] [address]   [phone]           Patient must bring picture ID.  Patient should be prepared to give a urine specimen  Please do not eat 10-12 hours before your appointment if you are coming in fasting for labs on lipids, cholesterol, or glucose (sugar).  Pregnant women should follow their Care Team instructions. Water with medications is okay. Do not drink coffee or other fluids.   If you have concerns about taking  your medications, please ask at office or if scheduling via [employer], send a message by clicking on Secure Messaging, Message Your Care Team.            Mar 01, 2017 10:30 AM CST   Anticoagulation Visit with BE ANTI COAG   [facility] ([facility] [place])    [address]  [place] [address]   [phone]            Mar 02, 2017   Procedure with Ricky Mclaughlin MD   [facility] [place] Same Day [facility] (--)    [address] [address]   [phone]            Mar 06, 2017  8:30 AM CST   Anticoagulation Visit with BE ANTI COAG   [facility] ([facility] [place])    [address]  [place] [address]   [phone]            Mar 21, 2017 11:00 AM CDT   Return Visit with Vickie Whalen MD   [facility] ([facility])    [address] [place] [address]    [phone]            Mar 29, 2017 10:00 AM CDT   Nurse Only with NURSE ONLY MG DERM   [facility] ([facility])    [address] [place] [address] [phone]            Jun 28, 2017  9:00 AM CDT   Return Visit with Humble Cantu MD   [facility] ([facility])    [address] [place] [address] [phone]              Future tests that were ordered for you today     Open Future Orders        Priority Expected Expires Ordered    ABO/Rh type and screen Routine 2/21/2017 3/23/2017 2/21/2017    Basic metabolic panel Routine 2/21/2017 3/23/2017 2/21/2017    CBC with platelets Routine 2/21/2017 3/23/2017 2/21/2017    INR Routine 2/21/2017 3/23/2017 2/21/2017            Who to contact     Please call your clinic at [phone] to:    Ask questions about your health    Make or cancel appointments    Discuss your medicines    Learn about your test results    Speak to your doctor   If you have compliments or concerns about an experience at your clinic, or if you wish to file a complaint, please contact [facility] Patient Relations at [phone] or email us at [email or web address]         Additional Information About Your Visit        [employer] Information     [employer] gives you secure access to your electronic health record. If you see a primary care provider, you can also send messages to your care team and make appointments. If you have questions, please call your primary care clinic.  If you do not have a primary care provider, please call [phone] and they will assist you.      [employer] is an electronic gateway that provides easy, online access to your medical records. With [employer], you can request a clinic appointment, read your test results, renew a prescription or communicate with your care team.     To access your existing account, please contact your [facility] or call [phone] for assistance.        Care EveryWhere ID     This is your Care EveryWhere ID. This could be used by other organizations to access your [place] medical records  [id number]         Blood Pressure from Last 3 Encounters:   02/09/17 121/68   11/15/16 121/75   08/16/16 122/67    Weight from Last 3 Encounters:   02/09/17 91.1 kg (200 lb 12.8 oz)   11/15/16 96.7 kg (213 lb 3.2 oz)   08/16/16 97.1 kg (214 lb)              Today, you had the following     No orders found for display       Primary Care Provider Office Phone # Fax #    Paulo Jodi Vargas [phone]       [facility] [place] [address] [place] [address]        Thank you!     Thank you for choosing [facility] PREOPERATIVE ASSESSMENT [place]  for your care. Our goal is always to provide you with excellent care. Hearing back from our patients is one way we can continue to improve our services. Please take a few minutes to complete the written survey that you may receive in the mail after your visit with us. Thank you!             Your Updated Medication List - Protect others around you: Learn how to safely use, store and throw away your medicines at www.disposemymeds.org.          This list is accurate as of: 2/21/17 10:20 AM.  Always use your most recent med list.                   Brand Name Dispense Instructions for use    cholecalciferol 1000 UNIT tablet    vitamin D    100 tablet    Take 1 tablet (1,000 Units) by mouth daily       DAILY MULTIVITAMIN PO      Take  by mouth daily.       JUICE PLUS FIBRE PO          levothyroxine 100 MCG tablet    SYNTHROID/LEVOTHROID    90 tablet    Take 1 tablet (100 mcg) by mouth every morning (before breakfast)       melatonin 5 MG tablet      Take 5 mg by mouth nightly as needed for sleep       metroNIDAZOLE 0.75 % cream    METROCREAM    60 g    every day, once daily to face       TUMS 500 MG chewable tablet   Generic drug:  calcium carbonate      Take 1 chew  tab by mouth daily.       * warfarin 10 MG tablet    COUMADIN     Take by mouth daily 4 days/ week on Sun, Tues, Thur, Sat       * warfarin 5 MG tablet    COUMADIN    180 tablet    Take 1 tablet (5 mg) by mouth daily Or as directed by INR clinic. Current dose is 7.5 mg Mon,Wed, and Fri, 10 mg daily rest of week       * Notice:  This list has 2 medication(s) that are the same as other medications prescribed for you. Read the directions carefully, and ask your doctor or other care provider to review them with you.

## 2017-02-21 NOTE — MR AVS SNAPSHOT
After Visit Summary   2/21/2017    Rigoberto Holguin    MRN: [id number]           Patient Information     Date Of Birth          1940        Visit Information        Provider Department      2/21/2017 10:00 AM Pharmacist, Francoise Vallejo [facility] Assessment [place]        Today's Diagnoses     Preop examination    -  1       Follow-ups after your visit        Your next 10 appointments already scheduled     Feb 21, 2017 12:10 PM CST   (Arrive by 11:55 AM)   PAC Anesthesia Consult with  Pac Anesthesiologist   [facility] Preoperative Assessment Center ([facility])    [address]  4th Floor  [facility] [address] [phone]            Feb 21, 2017 12:30 PM CST   LAB with  LAB   [facility] Lab ([facility])    [address] [place] [address]  [address] [facility] [id number] [phone]           Patient must bring picture ID.  Patient should be prepared to give a urine specimen  Please do not eat 10-12 hours before your appointment if you are coming in fasting for labs on lipids, cholesterol, or glucose (sugar).  Pregnant women should follow their Care Team instructions. Water with medications is okay. Do not drink coffee or other fluids.   If you have concerns about taking  your medications, please ask at office or if scheduling via Fork[employer], send a message by clicking on Secure Messaging, Message Your Care Team.            Mar 01, 2017 10:30 AM CST   Anticoagulation Visit with BE ANTI COAG   [facility]    [address] [facility] [place] [address]   [phone]            Mar 02, 2017   Procedure with Ricky Mclaughlin MD   [facility] [place], Same Day Surgery (--)    [address] [address]   [phone]            Mar 06, 2017  8:30 AM CST   Anticoagulation Visit with BE ANTI COAG   [facility] ([facility] [place])    [address] [facility] [place] [address]    [phone]            Mar 21, 2017 11:00 AM CDT   Return Visit with Vickie Whalen MD   [facility]    [address] [facility] [id number] [phone]            Mar 29, 2017 10:00 AM CDT   Nurse Only with NURSE ONLY MG DERM   [facility] ([facility])    [address] [facility] [address] [phone]            Jun 28, 2017  9:00 AM CDT   Return Visit with Humble Cantu MD, SAMARIAY CYSTO PROC ROOM   [facility] ([address] [address] [phone]              Future tests that were ordered for you today     Open Future Orders        Priority Expected Expires Ordered    ABO/Rh type and screen Routine 2/21/2017 3/23/2017 2/21/2017    Basic metabolic panel Routine 2/21/2017 3/23/2017 2/21/2017    CBC with platelets Routine 2/21/2017 3/23/2017 2/21/2017    INR Routine 2/21/2017 3/23/2017 2/21/2017            Who to contact     Please call your clinic at [phone] to:    Ask questions about your health    Make or cancel appointments    Discuss your medicines    Learn about your test results    Speak to your doctor   If you have compliments or concerns about an experience at your clinic, or if you wish to file a complaint, please contact [facility] Patient Relations at [phone] or email us at [email or web address]         Additional Information About Your Visit        BlackDuckhart Information     Purfresht gives you secure access to your electronic health record. If you see a primary care provider, you can also send messages to your care team and make appointments. If you have questions, please call your primary care clinic.  If you do not have a primary care provider, please call [phone] and they will assist you.      [employer]way is an electronic gateway that provides easy, online access to your medical  records. With Conduit, you can request a clinic appointment, read your test results, renew a prescription or communicate with your care team.     To access your existing account, please contact your [facility] or call [phone] for assistance.        Care EveryWhere ID     This is your Care EveryWhere ID. This could be used by other organizations to access your [place] medical records  [id number]         Blood Pressure from Last 3 Encounters:   02/21/17 111/68   02/09/17 121/68   11/15/16 121/75    Weight from Last 3 Encounters:   02/21/17 89 kg (196 lb 4.8 oz)   02/09/17 91.1 kg (200 lb 12.8 oz)   11/15/16 96.7 kg (213 lb 3.2 oz)              Today, you had the following     No orders found for display         Today's Medication Changes          These changes are accurate as of: 2/21/17 11:26 AM.  If you have any questions, ask your nurse or doctor.               These medicines have changed or have updated prescriptions.        Dose/Directions    warfarin 5 MG tablet   Commonly known as:  COUMADIN   This may have changed:  Another medication with the same name was removed. Continue taking this medication, and follow the directions you see here.   Used for:  History of DVT of lower extremity, Chronic anticoagulation   Changed by:  Paulo Vargas MD        Dose:  5 mg   Take 1 tablet (5 mg) by mouth daily Or as directed by INR clinic. Current dose is 7.5 mg Mon,Wed, and Fri, 10 mg daily rest of week   Quantity:  180 tablet   Refills:  0                Primary Care Provider Office Phone # Fax #    Paulo Vargas [phone]       [facility] [place] [address]  [place] [address]        Thank you!     Thank you for choosing [facility] PREOPERATIVE ASSESSMENT CENTER  for your care. Our goal is always to provide you with excellent care. Hearing back from our patients is one way we can continue to improve our services. Please take a few minutes to complete  the written survey that you may receive in the mail after your visit with us. Thank you!             Your Updated Medication List - Protect others around you: Learn how to safely use, store and throw away your medicines at www.disposemymeds.org.          This list is accurate as of: 2/21/17 11:26 AM.  Always use your most recent med list.                   Brand Name Dispense Instructions for use    cholecalciferol 1000 UNIT tablet    vitamin D    100 tablet    Take 1 tablet (1,000 Units) by mouth daily       EYE VITAMINS PO      Take 1 tablet by mouth daily       DAILY MULTIVITAMIN PO      Take 1 chew tab by mouth daily       JUICE PLUS FIBRE PO          levothyroxine 100 MCG tablet    SYNTHROID/LEVOTHROID    90 tablet    Take 1 tablet (100 mcg) by mouth every morning (before breakfast)       melatonin 5 MG tablet      Take 10 mg by mouth At Bedtime Reported on 2/21/2017       metroNIDAZOLE 0.75 % cream    METROCREAM    60 g    every day, once daily to face       TUMS 500 MG chewable tablet   Generic drug:  calcium carbonate      Take 1 chew tab by mouth daily as needed       warfarin 5 MG tablet    COUMADIN    180 tablet    Take 1 tablet (5 mg) by mouth daily Or as directed by INR clinic. Current dose is 7.5 mg Mon,Wed, and Fri, 10 mg daily rest of week

## 2017-02-21 NOTE — PHARMACY - PREOPERATIVE ASSESSMENT CENTER
ANTICOAGULATION DOCUMENTATION - PAC Pharmacist   Patient seen and interviewed during time of PAC Clinic appt February 21, 2017.     Based on profile review and patient interview Rigoberto Holguin has been on warfarin for treatment of DVT and history of factor V leiden since 1996. His current dose is 7.5 mg Monday/Wednesday/Friday and 10 mg on Tuesday/Thursday/Saturday/Sunday. He has been on this stable dose for years with goal INR 2-3.     It is prescribed by SHANELL Rausch.  The expected duration of therapy is lifelong.    Current medications that may interact with this include levothyroxine, MVI.  There has not been a recent change in oral intake/nutrition.      Rigoberto Holguin is scheduled for surgery on 3/2/17 and the perioperative anticoagulation plan outlined by SHANELL Rausch (per verbal report from the patient) is hold warfarin 5 days before the procedure with no need for bridging therapy.  Patient has been off warfarin for multiple procedures in the past without bridging and has had no issues.  Resumption will be per his surgeon after the procedure.   This plan may require re-assessment and modification by his primary team in the perioperative setting depending on patients clinical situation.        Adolph Yu RPH  February 21, 2017  11:27 AM

## 2017-02-21 NOTE — H&P
Pre-Operative H & P     CC:  Preoperative exam to assess for increased cardiopulmonary risk while undergoing surgery and anesthesia.    Date of Encounter: 2/21/2017  Primary Care Physician:  Paulo Vargas  Rigoberto Holguin is a 76 year old male who presents for pre-operative H & P in preparation for posterior lumbar foraminotomy, 2 levels  with Dr. Mclaughlin on 3/2/17 at [facility]. History is obtained from the patient.     Patient who presented to Dr. Mclaughlin with complaints of lower back pain and left calf weakness. His back pain has been present for many years and has been managed with injections, but has started getting worse with long distance walking. Most concerning is that he has started to experience left foot weakness. Imaging was reviewed by Dr. Mclaughlin with findings of diffuse degenerative disease with multiple areas of herniated disks and stenosis of the spinal canal. Above procedure now planned.  Patient's history is otherwise complicated by Factor V Leiden with bilateral DVTs, anticoagulated on Warfarin. He is followed by Dr. Paulo Vargas MD.  Past Medical History  Past Medical History   Diagnosis Date     Actinic keratosis      Basal cell carcinoma      Bladder cancer (H) 2003     DJD (degenerative joint disease), lumbar      S/P epidural x 2     DVT (deep venous thrombosis) (H) 2007     Factor V ( bilateral legs)      GERD (gastroesophageal reflux disease)      Hypothyroidism      Polio      Age 4, weakness of right arm and leg      S/P appendectomy        Past Surgical History  Past Surgical History   Procedure Laterality Date     Hc repair rotator cuff,acute Left 2007     Photodynamic therapy - (pdt)  2/13/2015     Surgical history of -   2003     Urinary Bladder Cancer treatment     Appendectomy       Surgical history of -        Gall Bladder       Hx of Blood transfusions/reactions: Denies.     Hx of abnormal bleeding or anti-platelet use: Anticoagulated  with Coumadin    Menstrual history: No LMP for male patient.    Steroid use in the last year: Denies.    Personal or FH with difficulty with Anesthesia:  Denies.    Prior to Admission Medications  Current Outpatient Prescriptions   Medication Sig Dispense Refill     Multiple Vitamins-Minerals (EYE VITAMINS PO) Take 1 tablet by mouth daily       warfarin (COUMADIN) 5 MG tablet Take 1 tablet (5 mg) by mouth daily Or as directed by INR clinic. Current dose is 7.5 mg Mon,Wed, and Fri, 10 mg daily rest of week 180 tablet 0     melatonin 5 MG tablet Take 10 mg by mouth At Bedtime Reported on 2/21/2017       levothyroxine (SYNTHROID,LEVOTHROID) 100 MCG tablet Take 1 tablet (100 mcg) by mouth every morning (before breakfast) 90 tablet 2     metroNIDAZOLE (METROCREAM) 0.75 % cream every day, once daily to face 60 g 3     cholecalciferol (VITAMIN D) 1000 UNIT tablet Take 1 tablet (1,000 Units) by mouth daily 100 tablet 0     Nutritional Supplements (JUICE PLUS FIBRE PO)        [DISCONTINUED] warfarin (COUMADIN) 10 MG tablet Take by mouth daily 4 days/ week on Sun, Tues, Thur, Sat       calcium carbonate (TUMS) 500 MG chewable tablet Take 1 chew tab by mouth daily as needed        Multiple Vitamin (DAILY MULTIVITAMIN PO) Take 1 chew tab by mouth daily          Allergies  No Known Allergies    Social History  Social History     Social History     Marital status:      Spouse name: Nakia     Number of children: 3     Years of education: 16     Occupational History     Retired [employer]     Ex , retired in 2000     Social History Main Topics     Smoking status: Former Smoker     Quit date: 10/1/1970     Smokeless tobacco: Never Used     Alcohol use 0.0 oz/week     0 Standard drinks or equivalent per week      Comment: Rarely drink 1-2 beers per week     Drug use: No     Sexual activity: Yes     Partners: Female     Other Topics Concern     Parent/Sibling W/ Cabg, Mi Or Angioplasty Before 65f 55m? No     Social  "History Narrative       Family History  Family History   Problem Relation Age of Onset     CANCER Father      lung cancer     CANCER Brother      lymphoma     Neurologic Disorder Sister      parkinsonism       Review of Systems  The complete review of systems is negative other than noted in the HPI or here.   Constitutional: Denies fever, chills. Recent intentional weight loss 15# since first of year.  HEENT: Wears glasses for vision.  Respiratory: Denies cough or shortness of breath. Sleeps with HOB elevated slightly.  CV: Denies chest pain or irregular HR. Activity somewhat limited. Back pain starts after ~2 blocks.  GI: Denies abdominal pain or bowel issues.  : Denies dysuria.  M/S: Right hip pain with some \"catching\" at times.  Neuro: Past history of polio. Denies residual.    Temp: 98.4  F (36.9  C) Temp src: Oral BP: 111/68 Pulse: 54   Resp: 14 SpO2: 96 %         196 lbs 4.8 oz  5' 8\"   Body mass index is 29.85 kg/(m^2).       Physical Exam  Constitutional: Awake, alert, cooperative, no apparent distress, and appears stated age.  Eyes: Pupils equal, round and reactive to light, extra ocular muscles intact, sclera clear, conjunctiva normal. Glasses on.  HENT: Normocephalic, oral pharynx with moist mucus membranes, good dentition. No goiter appreciated.   Respiratory: Clear to auscultation bilaterally, no crackles or wheezing.  Cardiovascular: Regular rate and rhythm, normal S1 and S2, and no murmur noted. Carotids, no bruits. No edema. Palpable pulses to radial  DP and PT arteries.   GI: Normal bowel sounds, soft, non-distended, non-tender, no masses palpated.  Lymph/Hematologic: Right cervical slightly enlarged, mobile LN, nontender. Had patient feels so that he could self monitor and report to PCP if needed. No supraclavicular lymphadenopathy.  Genitourinary: Deferred.  Skin: Warm and dry.  No rashes at anticipated surgical site.   Musculoskeletal: Full ROM of neck. There is no redness, warmth, or swelling " of the joints. Gross motor strength is normal.    Neurologic: Awake, alert, oriented to name, place and time. Cranial nerves II-XII are grossly intact. Gait is normal.   Neuropsychiatric: Calm, cooperative. Normal affect.     Labs: (personally reviewed)  Lab Results   Component Value Date    WBC 6.3 02/21/2017     Lab Results   Component Value Date    RBC 4.22 02/21/2017     Lab Results   Component Value Date    HGB 14.3 02/21/2017     Lab Results   Component Value Date    HCT 42.3 02/21/2017     Lab Results   Component Value Date     02/21/2017     Lab Results   Component Value Date    MCH 33.9 02/21/2017     Lab Results   Component Value Date    MCHC 33.8 02/21/2017     Lab Results   Component Value Date    RDW 13.6 02/21/2017     Lab Results   Component Value Date     02/21/2017     Last Basic Metabolic Panel:  Lab Results   Component Value Date     02/21/2017      Lab Results   Component Value Date    POTASSIUM 4.4 02/21/2017     Lab Results   Component Value Date    CHLORIDE 105 02/21/2017     Lab Results   Component Value Date    NICK 9.1 02/21/2017     Lab Results   Component Value Date    CO2 26 02/21/2017     Lab Results   Component Value Date    BUN 17 02/21/2017     Lab Results   Component Value Date    CR 1.02 02/21/2017     Lab Results   Component Value Date    GLC 90 02/21/2017 2/21/17 INR 3.09  8/2/16 TSH 3.46  EKG: Personally reviewed but formal cardiology read pending: Sinus bradycardia, rate 55.  MR Cervical spine 8/4/16  IMPRESSION:  1. Multilevel degenerative change as described resulting in mild  central stenosis at several levels.  2. Foraminal stenosis at several levels bilaterally.  3. No intrinsic cord abnormality through T2.  MRI brain 7/21/16  IMPRESSION:   1. No acute abnormality.  2. Mild atrophy of the brain.  3. Minimal periventricular white matter T2 hyperintensities consistent  with sequelae of small vessel ischemic disease.   MR Lumbar spine  7/21/16  IMPRESSION:   1. Multilevel degenerative disc disease and degenerative facet  arthropathy as described above.  2. L4-L5 severe spinal canal stenosis.  3. Reversal of lordosis and scoliosis.  4. No change.    ASSESSMENT and PLAN  Rigoberto Holguin is a 76 year old male scheduled to undergo posterior lumbar foraminotomy on 3/2/17 by Dr. Mclaughlin. He has the following specific operative considerations:   - RCRI : No serious cardiac risks.    - Anesthesia considerations:  Refer to PAC assessment in anesthesia records  - Risk of PONV score =1.  If > 2, anti-emetic intervention recommended.     --Lumbago with central canal stenosis and left radiculopathy. Above procedure now planned.   --Factor V Leiden with history of bilateral DVTs Anticoagulated on Coumadin. Last INR 3.09 today. Patient received instructions from PCP to hold Coumadin for 5 days prior to surgery with no bridging needed.      --No significant cardiac history, symptoms or meds. EKG above. Generally good activity tolerance.   --Former smoker. Quit in the 70s. No pulmonary symptoms. ALEXA RF 3/8. Intermediate risk.   --Hypothyroidism. Will take Synthroid on DOS.    --Past history of polio with no significant residual.  Arrival time, NPO, shower and medication instructions provided by nursing staff today. Preparing For Your Surgery handout given.    Patient was discussed with Dr Maldonado.    TENA Lewis CNS  Preoperative Assessment Center  [facility]  Clinic and Surgery Center  Phone: [phone]  Fax: [phone]

## 2017-02-21 NOTE — PATIENT INSTRUCTIONS
Preparing for Your Surgery      Name:  Rigoberto Holguin   MRN:  [id number]   :  1940   Today's Date:  2017     Arriving for surgery:  Surgery date:  3/2  Surgery time:  7:30AM  Arrival time:  5:30AM  Please come to:       [facility] [address]  [address] [place]  [address]    -   parking is available in front of the hospital from 5:15 am to 8:00 pm    -  Stop at the Information Desk in the lobby    -   Inform the information person that you are here for surgery. An escort to 3c will be provided. If you would not like an escort, please proceed to 3C on the 3rd floor. [phone]     What can I eat or drink?  -  You may have solid food or milk products until 8 hours prior to your surgery--so not eat after 11:30PM on the night before surgery   -  You may have water, apple juice or 7up/Sprite until 2 hours prior to your surgery--OK to have CLEAR liquids until 5:30AM on the day of surgery     Which medicines can I take?  -  Do NOT take these medications in the morning, the day of surgery:    Per PCP coumadin will be stopped 5 days pre op      -  Please take these medications the day of surgery:     levothyroxine        How do I prepare myself?  -  Take two showers: one the night before surgery; and one the morning of surgery.         Use Scrubcare or Hibiclens to wash from neck down.  You may use your own shampoo and conditioner. No other hair products.   -  Do NOT use lotion, powder, deodorant, or antiperspirant the day of your surgery.  -  Do NOT wear any makeup, fingernail polish or jewelry.  -Do not bring your own medications to the hospital, except for inhalers and eye drops.  -  Bring your ID and insurance card.    Questions or Concerns:  If you have questions or concerns, please call the  Preoperative Assessment Center, Monday-Friday 7AM-7PM:  [phone]            AFTER YOUR SURGERY  Breathing exercises   Breathing exercises help you recover faster.  Take deep breaths and let the air out slowly. This will:     Help you wake up after surgery.    Help prevent complications like pneumonia.  Preventing complications will help you go home sooner.   Nausea and vomiting   You may feel sick to your stomach after surgery; if so, let your nurse know.    Pain control:  After surgery, you may have pain. Our goal is to help you manage your pain. Pain medicine will help you feel comfortable enough to do activities that will help you heal.  These activities may include breathing exercises, walking and physical therapy.   To help your health care team treat your pain we will ask: 1) If you have pain  2) where it is located 3) describe your pain in your words  Methods of pain control include medications given by mouth, vein or by nerve block for some surgeries.  We may give you a pain control pump that will:  1) Deliver the medicine through a tube placed in your vein  2) Control the amount of medicine you receive  3) Allow you to push a button to deliver a dose of pain medicine  Sequential Compression Device (SCD) or Pneumo Boots:  You may need to wear SCD S on your legs or feet. These are wraps connected to a machine that pumps in air and releases it. The repeated pumping helps prevent blood clots from forming.

## 2017-02-22 LAB — INTERPRETATION ECG - MUSE: NORMAL

## 2017-03-01 ENCOUNTER — ANTICOAGULATION THERAPY VISIT (OUTPATIENT)
Dept: NURSING | Facility: CLINIC | Age: 77
End: 2017-03-01
Payer: MEDICARE

## 2017-03-01 DIAGNOSIS — Z79.01 LONG-TERM (CURRENT) USE OF ANTICOAGULANTS: ICD-10-CM

## 2017-03-01 LAB — INR POINT OF CARE: 1.2 (ref 0.86–1.14)

## 2017-03-01 PROCEDURE — 85610 PROTHROMBIN TIME: CPT | Mod: QW

## 2017-03-01 PROCEDURE — 36416 COLLJ CAPILLARY BLOOD SPEC: CPT

## 2017-03-01 PROCEDURE — 99207 ZZC NO CHARGE NURSE ONLY: CPT

## 2017-03-01 NOTE — MR AVS SNAPSHOT
Rigoberto Holguin   3/1/2017 10:30 AM   Anticoagulation Therapy Visit    Description:  76 year old male   Provider:  ALANA ANTI COAG   Department:  Be Nurse           INR as of 3/1/2017     Today's INR 1.2!      Anticoagulation Summary as of 3/1/2017     INR goal 2.0-3.0   Today's INR 1.2!   Full instructions 7.5 mg on Mon, Wed, Fri; 10 mg all other days   Next INR check 3/13/2017    Indications   Long-term (current) use of anticoagulants [Z79.01] [Z79.01]         Your next Anticoagulation Clinic appointment(s)     Mar 13, 2017  9:00 AM CDT   Anticoagulation Visit with BE ANTI COAG   [facility] [place] ([facility])    [address]  [place] [address] [phone]              Contact Numbers     [facility]  Please call [phone] with any problems or questions regarding your therapy, or to cancel or reschedule your appointment.          March 2017 Details    Sun Mon Tue Wed Thu Fri Sat        1      7.5 mg   See details      2      10 mg         3      7.5 mg         4      10 mg           5      10 mg         6      7.5 mg         7      10 mg         8      7.5 mg         9      10 mg         10      7.5 mg         11      10 mg           12      10 mg         13            14               15               16               17               18                 19               20               21               22               23               24               25                 26               27               28               29               30               31                 Date Details   03/01 This INR check       Date of next INR:  3/13/2017         How to take your warfarin dose     To take:  7.5 mg Take 1.5 of the 5 mg tablets.    To take:  10 mg Take 2 of the 5 mg tablets.

## 2017-03-01 NOTE — PROGRESS NOTES
ANTICOAGULATION FOLLOW-UP CLINIC VISIT    Patient Name:  Rigoberto Holguin  Date:  3/1/2017  Contact Type:  Face to Face    SUBJECTIVE:     Patient Findings     Positives Intentional hold of therapy    Comments Pt coumadin on hold last 5 days due to having surgery tomorrow.            OBJECTIVE    INR Protime   Date Value Ref Range Status   03/01/2017 1.2 (A) 0.86 - 1.14 Final       ASSESSMENT / PLAN  INR assessment SUB Coumadin on hold- surgery   Recheck INR In: 10 DAYS    INR Location Clinic      Anticoagulation Summary as of 3/1/2017     INR goal 2.0-3.0   Today's INR 1.2!   Maintenance plan 7.5 mg (5 mg x 1.5) on Mon, Wed, Fri; 10 mg (5 mg x 2) all other days   Full instructions 7.5 mg on Mon, Wed, Fri; 10 mg all other days   Weekly total 62.5 mg   No change documented Shanna Mancia RN   Plan last modified Aparna Vicente (1/25/2016)   Next INR check 3/13/2017   Target end date     Indications   Long-term (current) use of anticoagulants [Z79.01] [Z79.01]         Anticoagulation Episode Summary     INR check location     Preferred lab     Send INR reminders to [facility]    Comments       Anticoagulation Care Providers     Provider Role Specialty Phone number    Paulo Vargas MD [facility] Internal Medicine [phone]            See the Encounter Report to view Anticoagulation Flowsheet and Dosing Calendar (Go to Encounters tab in chart review, and find the Anticoagulation Therapy Visit)        Shanna Mancia RN

## 2017-03-02 ENCOUNTER — APPOINTMENT (OUTPATIENT)
Dept: GENERAL RADIOLOGY | Facility: CLINIC | Age: 77
End: 2017-03-02
Attending: NEUROLOGICAL SURGERY
Payer: MEDICARE

## 2017-03-02 ENCOUNTER — SURGERY (OUTPATIENT)
Age: 77
End: 2017-03-02

## 2017-03-02 ENCOUNTER — ANESTHESIA (OUTPATIENT)
Dept: SURGERY | Facility: CLINIC | Age: 77
End: 2017-03-02
Payer: MEDICARE

## 2017-03-02 ENCOUNTER — HOSPITAL ENCOUNTER (OUTPATIENT)
Facility: CLINIC | Age: 77
Discharge: HOME | End: 2017-03-02
Attending: NEUROLOGICAL SURGERY | Admitting: NEUROLOGICAL SURGERY
Payer: MEDICARE

## 2017-03-02 VITALS
OXYGEN SATURATION: 98 % | TEMPERATURE: 97.9 F | BODY MASS INDEX: 29.57 KG/M2 | RESPIRATION RATE: 16 BRPM | DIASTOLIC BLOOD PRESSURE: 80 MMHG | SYSTOLIC BLOOD PRESSURE: 117 MMHG | WEIGHT: 195.11 LBS | HEIGHT: 68 IN

## 2017-03-02 DIAGNOSIS — M54.16 LUMBAR RADICULAR PAIN: Primary | ICD-10-CM

## 2017-03-02 DIAGNOSIS — Z86.718 HISTORY OF DVT OF LOWER EXTREMITY: ICD-10-CM

## 2017-03-02 DIAGNOSIS — Z79.01 CHRONIC ANTICOAGULATION: ICD-10-CM

## 2017-03-02 LAB
ABO + RH BLD: NORMAL
ABO + RH BLD: NORMAL
BLD GP AB SCN SERPL QL: NORMAL
BLOOD BANK CMNT PATIENT-IMP: NORMAL
SPECIMEN EXP DATE BLD: NORMAL

## 2017-03-02 PROCEDURE — 25000125 ZZHC RX 250: Performed by: NEUROLOGICAL SURGERY

## 2017-03-02 PROCEDURE — 86850 RBC ANTIBODY SCREEN: CPT | Performed by: ANESTHESIOLOGY

## 2017-03-02 PROCEDURE — 27210794 ZZH OR GENERAL SUPPLY STERILE: Performed by: NEUROLOGICAL SURGERY

## 2017-03-02 PROCEDURE — 40000277 XR SURGERY CARM FLUORO LESS THAN 5 MIN W STILLS: Mod: TC

## 2017-03-02 PROCEDURE — 36415 COLL VENOUS BLD VENIPUNCTURE: CPT | Performed by: ANESTHESIOLOGY

## 2017-03-02 PROCEDURE — 25000128 H RX IP 250 OP 636: Performed by: NEUROLOGICAL SURGERY

## 2017-03-02 PROCEDURE — 40000170 ZZH STATISTIC PRE-PROCEDURE ASSESSMENT II: Performed by: NEUROLOGICAL SURGERY

## 2017-03-02 PROCEDURE — 71000027 ZZH RECOVERY PHASE 2 EACH 15 MINS: Performed by: NEUROLOGICAL SURGERY

## 2017-03-02 PROCEDURE — 36000066 ZZH SURGERY LEVEL 4 W FLUORO 1ST 30 MIN - UMMC: Performed by: NEUROLOGICAL SURGERY

## 2017-03-02 PROCEDURE — 25000128 H RX IP 250 OP 636: Performed by: STUDENT IN AN ORGANIZED HEALTH CARE EDUCATION/TRAINING PROGRAM

## 2017-03-02 PROCEDURE — 71000014 ZZH RECOVERY PHASE 1 LEVEL 2 FIRST HR: Performed by: NEUROLOGICAL SURGERY

## 2017-03-02 PROCEDURE — 36000064 ZZH SURGERY LEVEL 4 EA 15 ADDTL MIN - UMMC: Performed by: NEUROLOGICAL SURGERY

## 2017-03-02 PROCEDURE — 25800025 ZZH RX 258: Performed by: STUDENT IN AN ORGANIZED HEALTH CARE EDUCATION/TRAINING PROGRAM

## 2017-03-02 PROCEDURE — 37000008 ZZH ANESTHESIA TECHNICAL FEE, 1ST 30 MIN: Performed by: NEUROLOGICAL SURGERY

## 2017-03-02 PROCEDURE — 27210995 ZZH RX 272: Performed by: NEUROLOGICAL SURGERY

## 2017-03-02 PROCEDURE — C9399 UNCLASSIFIED DRUGS OR BIOLOG: HCPCS | Performed by: STUDENT IN AN ORGANIZED HEALTH CARE EDUCATION/TRAINING PROGRAM

## 2017-03-02 PROCEDURE — 25000566 ZZH SEVOFLURANE, EA 15 MIN: Performed by: NEUROLOGICAL SURGERY

## 2017-03-02 PROCEDURE — 86900 BLOOD TYPING SEROLOGIC ABO: CPT | Performed by: ANESTHESIOLOGY

## 2017-03-02 PROCEDURE — 37000009 ZZH ANESTHESIA TECHNICAL FEE, EACH ADDTL 15 MIN: Performed by: NEUROLOGICAL SURGERY

## 2017-03-02 PROCEDURE — 25000125 ZZHC RX 250: Performed by: STUDENT IN AN ORGANIZED HEALTH CARE EDUCATION/TRAINING PROGRAM

## 2017-03-02 PROCEDURE — 86901 BLOOD TYPING SEROLOGIC RH(D): CPT | Performed by: ANESTHESIOLOGY

## 2017-03-02 RX ORDER — GLYCOPYRROLATE 0.2 MG/ML
INJECTION, SOLUTION INTRAMUSCULAR; INTRAVENOUS PRN
Status: DISCONTINUED | OUTPATIENT
Start: 2017-03-02 | End: 2017-03-02

## 2017-03-02 RX ORDER — OXYCODONE HYDROCHLORIDE 5 MG/1
5 TABLET ORAL EVERY 4 HOURS PRN
Qty: 80 TABLET | Refills: 0 | Status: SHIPPED | OUTPATIENT
Start: 2017-03-02 | End: 2017-03-21

## 2017-03-02 RX ORDER — FENTANYL CITRATE 50 UG/ML
INJECTION, SOLUTION INTRAMUSCULAR; INTRAVENOUS PRN
Status: DISCONTINUED | OUTPATIENT
Start: 2017-03-02 | End: 2017-03-02

## 2017-03-02 RX ORDER — ONDANSETRON 4 MG/1
4 TABLET, ORALLY DISINTEGRATING ORAL EVERY 30 MIN PRN
Status: DISCONTINUED | OUTPATIENT
Start: 2017-03-02 | End: 2017-03-02 | Stop reason: HOSPADM

## 2017-03-02 RX ORDER — WARFARIN SODIUM 5 MG/1
5 TABLET ORAL DAILY
Qty: 180 TABLET | Refills: 0
Start: 2017-03-05 | End: 2017-04-13

## 2017-03-02 RX ORDER — DEXAMETHASONE SODIUM PHOSPHATE 4 MG/ML
INJECTION, SOLUTION INTRA-ARTICULAR; INTRALESIONAL; INTRAMUSCULAR; INTRAVENOUS; SOFT TISSUE PRN
Status: DISCONTINUED | OUTPATIENT
Start: 2017-03-02 | End: 2017-03-02

## 2017-03-02 RX ORDER — NALOXONE HYDROCHLORIDE 0.4 MG/ML
.1-.4 INJECTION, SOLUTION INTRAMUSCULAR; INTRAVENOUS; SUBCUTANEOUS
Status: DISCONTINUED | OUTPATIENT
Start: 2017-03-02 | End: 2017-03-02 | Stop reason: HOSPADM

## 2017-03-02 RX ORDER — FENTANYL CITRATE 50 UG/ML
25-50 INJECTION, SOLUTION INTRAMUSCULAR; INTRAVENOUS
Status: DISCONTINUED | OUTPATIENT
Start: 2017-03-02 | End: 2017-03-02 | Stop reason: HOSPADM

## 2017-03-02 RX ORDER — LIDOCAINE HYDROCHLORIDE 20 MG/ML
INJECTION, SOLUTION INFILTRATION; PERINEURAL PRN
Status: DISCONTINUED | OUTPATIENT
Start: 2017-03-02 | End: 2017-03-02

## 2017-03-02 RX ORDER — ALBUTEROL SULFATE 0.83 MG/ML
2.5 SOLUTION RESPIRATORY (INHALATION) EVERY 4 HOURS PRN
Status: DISCONTINUED | OUTPATIENT
Start: 2017-03-02 | End: 2017-03-02 | Stop reason: HOSPADM

## 2017-03-02 RX ORDER — HYDROMORPHONE HYDROCHLORIDE 1 MG/ML
.3-.5 INJECTION, SOLUTION INTRAMUSCULAR; INTRAVENOUS; SUBCUTANEOUS EVERY 10 MIN PRN
Status: DISCONTINUED | OUTPATIENT
Start: 2017-03-02 | End: 2017-03-02 | Stop reason: HOSPADM

## 2017-03-02 RX ORDER — BUPIVACAINE HYDROCHLORIDE AND EPINEPHRINE 2.5; 5 MG/ML; UG/ML
INJECTION, SOLUTION INFILTRATION; PERINEURAL PRN
Status: DISCONTINUED | OUTPATIENT
Start: 2017-03-02 | End: 2017-03-02 | Stop reason: HOSPADM

## 2017-03-02 RX ORDER — EPHEDRINE SULFATE 50 MG/ML
INJECTION, SOLUTION INTRAMUSCULAR; INTRAVENOUS; SUBCUTANEOUS PRN
Status: DISCONTINUED | OUTPATIENT
Start: 2017-03-02 | End: 2017-03-02

## 2017-03-02 RX ORDER — LABETALOL HYDROCHLORIDE 5 MG/ML
10 INJECTION, SOLUTION INTRAVENOUS
Status: DISCONTINUED | OUTPATIENT
Start: 2017-03-02 | End: 2017-03-02 | Stop reason: HOSPADM

## 2017-03-02 RX ORDER — PROPOFOL 10 MG/ML
INJECTION, EMULSION INTRAVENOUS PRN
Status: DISCONTINUED | OUTPATIENT
Start: 2017-03-02 | End: 2017-03-02

## 2017-03-02 RX ORDER — SODIUM CHLORIDE, SODIUM LACTATE, POTASSIUM CHLORIDE, CALCIUM CHLORIDE 600; 310; 30; 20 MG/100ML; MG/100ML; MG/100ML; MG/100ML
INJECTION, SOLUTION INTRAVENOUS CONTINUOUS
Status: DISCONTINUED | OUTPATIENT
Start: 2017-03-02 | End: 2017-03-02 | Stop reason: HOSPADM

## 2017-03-02 RX ORDER — ONDANSETRON 2 MG/ML
4 INJECTION INTRAMUSCULAR; INTRAVENOUS EVERY 30 MIN PRN
Status: DISCONTINUED | OUTPATIENT
Start: 2017-03-02 | End: 2017-03-02 | Stop reason: HOSPADM

## 2017-03-02 RX ORDER — MEPERIDINE HYDROCHLORIDE 25 MG/ML
12.5 INJECTION INTRAMUSCULAR; INTRAVENOUS; SUBCUTANEOUS
Status: DISCONTINUED | OUTPATIENT
Start: 2017-03-02 | End: 2017-03-02 | Stop reason: HOSPADM

## 2017-03-02 RX ORDER — CYCLOBENZAPRINE HCL 5 MG
5 TABLET ORAL 3 TIMES DAILY PRN
Qty: 80 TABLET | Refills: 0 | Status: SHIPPED | OUTPATIENT
Start: 2017-03-02 | End: 2017-03-21

## 2017-03-02 RX ORDER — CEFAZOLIN SODIUM 2 G/100ML
2 INJECTION, SOLUTION INTRAVENOUS
Status: COMPLETED | OUTPATIENT
Start: 2017-03-02 | End: 2017-03-02

## 2017-03-02 RX ORDER — CEFAZOLIN SODIUM 1 G/3ML
1 INJECTION, POWDER, FOR SOLUTION INTRAMUSCULAR; INTRAVENOUS SEE ADMIN INSTRUCTIONS
Status: DISCONTINUED | OUTPATIENT
Start: 2017-03-02 | End: 2017-03-02 | Stop reason: HOSPADM

## 2017-03-02 RX ORDER — ONDANSETRON 2 MG/ML
INJECTION INTRAMUSCULAR; INTRAVENOUS PRN
Status: DISCONTINUED | OUTPATIENT
Start: 2017-03-02 | End: 2017-03-02

## 2017-03-02 RX ORDER — SODIUM CHLORIDE, SODIUM LACTATE, POTASSIUM CHLORIDE, CALCIUM CHLORIDE 600; 310; 30; 20 MG/100ML; MG/100ML; MG/100ML; MG/100ML
INJECTION, SOLUTION INTRAVENOUS CONTINUOUS PRN
Status: DISCONTINUED | OUTPATIENT
Start: 2017-03-02 | End: 2017-03-02

## 2017-03-02 RX ADMIN — Medication 5 MG: at 08:31

## 2017-03-02 RX ADMIN — CEFAZOLIN SODIUM 2 G: 2 INJECTION, SOLUTION INTRAVENOUS at 07:53

## 2017-03-02 RX ADMIN — BUPIVACAINE HYDROCHLORIDE AND EPINEPHRINE BITARTRATE 10 ML: 2.5; .005 INJECTION, SOLUTION INFILTRATION; PERINEURAL at 08:30

## 2017-03-02 RX ADMIN — GLYCOPYRROLATE 0.2 MG: 0.2 INJECTION, SOLUTION INTRAMUSCULAR; INTRAVENOUS at 08:24

## 2017-03-02 RX ADMIN — DEXAMETHASONE SODIUM PHOSPHATE 4 MG: 4 INJECTION, SOLUTION INTRAMUSCULAR; INTRAVENOUS at 07:34

## 2017-03-02 RX ADMIN — THROMBIN, TOPICAL (BOVINE) 5000 UNITS: KIT at 08:30

## 2017-03-02 RX ADMIN — CEFAZOLIN 1 G: 1 INJECTION, POWDER, FOR SOLUTION INTRAMUSCULAR; INTRAVENOUS at 09:53

## 2017-03-02 RX ADMIN — SUGAMMADEX 200 MG: 100 INJECTION, SOLUTION INTRAVENOUS at 10:07

## 2017-03-02 RX ADMIN — FENTANYL CITRATE 100 MCG: 50 INJECTION, SOLUTION INTRAMUSCULAR; INTRAVENOUS at 07:34

## 2017-03-02 RX ADMIN — PROPOFOL 150 MG: 10 INJECTION, EMULSION INTRAVENOUS at 07:37

## 2017-03-02 RX ADMIN — Medication 1 TABLET: at 12:13

## 2017-03-02 RX ADMIN — HYDROMORPHONE HYDROCHLORIDE 0.3 MG: 10 INJECTION, SOLUTION INTRAMUSCULAR; INTRAVENOUS; SUBCUTANEOUS at 10:57

## 2017-03-02 RX ADMIN — FENTANYL CITRATE 100 MCG: 50 INJECTION, SOLUTION INTRAMUSCULAR; INTRAVENOUS at 08:08

## 2017-03-02 RX ADMIN — SODIUM CHLORIDE, POTASSIUM CHLORIDE, SODIUM LACTATE AND CALCIUM CHLORIDE: 600; 310; 30; 20 INJECTION, SOLUTION INTRAVENOUS at 06:42

## 2017-03-02 RX ADMIN — Medication 1 KIT: at 08:30

## 2017-03-02 RX ADMIN — HYDROMORPHONE HYDROCHLORIDE 0.5 MG: 10 INJECTION, SOLUTION INTRAMUSCULAR; INTRAVENOUS; SUBCUTANEOUS at 10:45

## 2017-03-02 RX ADMIN — BUPIVACAINE HYDROCHLORIDE AND EPINEPHRINE BITARTRATE 30 ML: 2.5; .005 INJECTION, SOLUTION INFILTRATION; PERINEURAL at 09:47

## 2017-03-02 RX ADMIN — HYDROMORPHONE HYDROCHLORIDE 0.4 MG: 1 INJECTION, SOLUTION INTRAMUSCULAR; INTRAVENOUS; SUBCUTANEOUS at 09:22

## 2017-03-02 RX ADMIN — SODIUM CHLORIDE 400 ML: 900 IRRIGANT IRRIGATION at 09:51

## 2017-03-02 RX ADMIN — LIDOCAINE HYDROCHLORIDE 100 MG: 20 INJECTION, SOLUTION INFILTRATION; PERINEURAL at 07:34

## 2017-03-02 RX ADMIN — ONDANSETRON 4 MG: 2 INJECTION INTRAMUSCULAR; INTRAVENOUS at 09:48

## 2017-03-02 RX ADMIN — ROCURONIUM BROMIDE 50 MG: 10 INJECTION INTRAVENOUS at 07:38

## 2017-03-02 RX ADMIN — ROCURONIUM BROMIDE 20 MG: 10 INJECTION INTRAVENOUS at 09:08

## 2017-03-02 ASSESSMENT — PAIN DESCRIPTION - DESCRIPTORS
DESCRIPTORS: SORE

## 2017-03-02 NOTE — ANESTHESIA CARE TRANSFER NOTE
Patient: Rigoberto Holguin    Procedure(s):  Left Lumbar 4-5, Lumbar 5-Sacral 1 Foraminotomy  - Wound Class: I-Clean    Diagnosis: Lumbar Radicular Pain   Diagnosis Additional Information: No value filed.    Anesthesia Type:   General, ETT     Note:  Airway :Face Mask  Patient transferred to:PACU  Comments: Airway :Face Mask  Patient transferred to:PACU  Comments: Prior to extubation, patient was breathing spontaneously with appropriate respiratory rate and tidal volume. The patient was following commands, warm and demonstrated adequate strength. Patient was suctioned and extubated without complication.   Transported to PACU on 6L O2 via face mask.   VSS upon arrival to PACU.  Patient denies nausea or pain at this time.   Care transfer plan communicated and patient care transferred to PACU RN     Robb Treviño Jr., MD  Anesthesia Resident - [facility]    3/2/2017  12:17 PM        Vitals: (Last set prior to Anesthesia Care Transfer)    CRNA VITALS  3/2/2017 0944 - 3/2/2017 1044      3/2/2017             Pulse: 87    SpO2: 99 %    Resp Rate (observed): 10                Electronically Signed By: Robb Treviño MD  March 2, 2017  12:16 PM

## 2017-03-02 NOTE — IP AVS SNAPSHOT
MRN:[id number]                      After Visit Summary   3/2/2017    Rigoberto Holguin    MRN: [id number]           Thank you!     Thank you for choosing [facility] for your care. Our goal is always to provide you with excellent care. Hearing back from our patients is one way we can continue to improve our services. Please take a few minutes to complete the written survey that you may receive in the mail after you visit with us. Thank you!        Patient Information     Date Of Birth          1940        About your hospital stay     You were admitted on:  March 2, 2017 You last received care in the:  Post Anesthesia Care Unit [facility]    You were discharged on:  March 2, 2017        Reason for your hospital stay       Left L4-5, 5S1 framinotomy                  Who to Call     For medical emergencies, please call 911.  For non-urgent questions about your medical care, please call your primary care provider or clinic, [phone]  For questions related to your surgery, please call your surgery clinic        Attending Provider     Provider Specialty    Ricky Mclaughlin MD Neurosurgery       Primary Care Provider Office Phone # Fax #    Paulo Jodi Vargas [phone]       [facility] [place] [address]  [place] [address]        After Care Instructions     Activity       As described above            Diet       Follow this diet upon discharge: None                  Follow-up Appointments     Adult [facility] Follow-up and recommended labs and tests       You underwent surgery on your lumbar spine by Dr. Mclaughlin You will have follow in 2 weeks with either our nurse practitioner or your  primary care provider for a wound check, then at 6 weeks with your surgeon.    Your sutures are absorbable; OK to restart coumadin on Sunday 03/05.     -please call our clinic at [phone] to schedule an appointment with the Neurosurgery teams.     After discharge, your activity  restrictions are:   -We encourage short frequent walks, increasing as tolerated.  - Avoid housework, vacuuming, laundry, leaf raking, lawn mowing and snow removal.   - No driving until you are seen in clinic and cleared by your neurosurgeon. You should not drive while on narcotic pain medication.   - No strenuous activity.  - No lifting more than 10 pounds until you are seen in clinic (a gallon of milk weighs approximately 8 pounds)  - You are ok to shower, but do not soak your incisions. Pat them dry if they get damp.   - Avoid coloring your hair or permanent styling until cleared by your surgeon  - No baths, hot tubs or pools for 4-6 weeks after surgery.     Wound cares after surgery  - You are ok to shower, but do not soak your incisions. Pat them dry if they get damp.   - Avoid coloring your hair or permanent styling until cleared by your surgeon  - No baths, hot tubs or pools for 4-6 weeks after surgery.       Call if you have any of the followin. Temperature greater than 101.5 F.   2. Any redness, swelling or discharge from the wound.   3. Any new weakness, numbness or altered mental status.  4. Worsening pain that is not improving with the pain medications you were prescribed.     Call [phone] or after 5:00 pm or on weekends call [phone] and ask for the neurosurgery resident on call. Thank You.                  Your next 10 appointments already scheduled     Mar 13, 2017  9:00 AM CDT   Anticoagulation Visit with BE ANTI COAG   [facility]    [address] [address] [phone]            Mar 21, 2017 11:00 AM CDT   Return Visit with Vickie Whalen MD   [facility])    [id number] [place] [address] [phone]            Mar 29, 2017 10:00 AM CDT   Nurse Only with NURSE ONLY MG MARTINEZ   [facility]    [address] [place] [address]  [address]  [place] [address]   [phone]            Jun 28, 2017  9:00 AM CDT   Return Visit with Humble Cantu MD, [place] [facility]   [facility] ([address]  [place] [address]   [phone]              Further instructions from your care team              Tips for taking pain medications  To get the best pain relief possible , remember these points:      Take pain medications as directed, before pain becomes severe      Pain medication can upset your stomach: taking it with food may help      Constipation is a common side effect of pain medication. Drink plenty of  Fluids      Eat foods high in fiber. Take a stool softener  if recommended by your doctor or  Pharmacist.        Do not drink alcohol, drive or operate machinery while taking pain medications.      Ask about other ways to control pain, such as with heat, ice or relaxation.    [facility]  Same-Day Surgery   Adult Discharge Orders & Instructions     For 24 hours after surgery    1. Get plenty of rest.  A responsible adult must stay with you for at least 24 hours after you leave the hospital.   2. Do not drive or use heavy equipment.  If you have weakness or tingling, don't drive or use heavy equipment until this feeling goes away.  3. Do not drink alcohol.  4. Avoid strenuous or risky activities.  Ask for help when climbing stairs.   5. You may feel lightheaded.  IF so, sit for a few minutes before standing.  Have someone help you get up.   6. If you have nausea (feel sick to your stomach): Drink only clear liquids such as apple juice, ginger ale, broth or 7-Up.  Rest may also help.  Be sure to drink enough fluids.  Move to a regular diet as you feel able.  7. You may have a slight fever. Call the doctor if your fever is over 100 F (37.7 C) (taken under the tongue) or lasts longer than 24 hours.  8. You may have a dry mouth, a sore throat, muscle aches  or trouble sleeping.  These should go away after 24 hours.  9. Do not make important or legal decisions.   Call your doctor for any of the followin.  Signs of infection (fever, growing tenderness at the surgery site, a large amount of drainage or bleeding, severe pain, foul-smelling drainage, redness, swelling).    2. It has been over 8 to 10 hours since surgery and you are still not able to urinate (pass water).    3.  Headache for over 24 hours.    4.  Numbness, tingling or weakness the day after surgery (if you had spinal anesthesia).  To contact a doctor, call Dr Mclaughlin's office @ [phone] or:        [phone] and ask for the resident on call for          Neurosurgery  (answered 24 hours a day)- at night or on the weekend      Emergency Department:    [facility]: [phone]       (TTY for hearing impaired: [phone])            Warfarin Instruction     You have started taking a medicine called warfarin. This is a blood-thinning medicine (anticoagulant). It helps prevent and treat blood clots.      Before leaving the hospital, make sure you know how much to take and how long to take it.      You will need regular blood tests to make sure your blood is clotting safely. It is very important to see your doctor for regular blood tests.    Talk to your doctor before taking any new medicine (this includes over-the-counter drugs and herbal products). Many medicines can interact with warfarin. This may cause more bleeding or too much clotting.     Eating a lot of vitamin K--found in green, leafy vegetables--can change the way warfarin works in your body. Do NOT avoid these foods. Instead, try to eat the same amount each day.     Bleeding is the most common side-effect of warfarin. You may notice bleeding gums, a bloody nose, bruises and bleeding longer when you cut yourself. See a doctor at once if:   o You cough up blood  o You find blood in your stool (poop)  o You have a deep cut, or a cut that  "bleeds longer than 10 minutes   o You have a bad cut, hard fall, accident or hit your head (go to urgent care or the emergency room).    For women who can get pregnant: This medicine can harm an unborn baby. Be very careful not to get pregnant while taking this medicine. If you think you might be pregnant, call your doctor right away.    For more information, read \"Guide to Warfarin Therapy,  the booklet you received in the hospital.        Pending Results     No orders found from 2/28/2017 to 3/3/2017.            Statement of Approval     Ordered          03/02/17 1130  I have reviewed and agree with all the recommendations and orders detailed in this document.  EFFECTIVE NOW     Approved and electronically signed by:  Leschke, John M, MD             Admission Information     Date & Time Provider Department Dept. Phone    3/2/2017 Ricky Mclaughlin MD Post Anesthesia Care Unit [facility] [phone]      Your Vitals Were     Blood Pressure Temperature Respirations Height Weight Pulse Oximetry    102/87 97.8  F (36.6  C) (Axillary) 14 1.727 m (5' 8\") 88.5 kg (195 lb 1.7 oz) 94%    BMI (Body Mass Index)                   29.67 kg/m2           [employer] Information     [employer] gives you secure access to your electronic health record. If you see a primary care provider, you can also send messages to your care team and make appointments. If you have questions, please call your primary care clinic.  If you do not have a primary care provider, please call [phone] and they will assist you.        Care EveryWhere ID     This is your Care EveryWhere ID. This could be used by other organizations to access your [facility] medical records  [id number]           Review of your medicines      START taking        Dose / Directions    cyclobenzaprine 5 MG tablet   Commonly known as:  FLEXERIL   Used for:  Lumbar radicular pain        Dose:  5 mg   Take 1 tablet (5 mg) by mouth 3 times daily as needed for muscle spasms "   Quantity:  80 tablet   Refills:  0       oxyCODONE 5 MG IR tablet   Commonly known as:  ROXICODONE   Used for:  Lumbar radicular pain        Dose:  5 mg   Take 1 tablet (5 mg) by mouth every 4 hours as needed for pain maximum 6 tablet(s) per day   Quantity:  80 tablet   Refills:  0         CONTINUE these medicines which have NOT CHANGED        Dose / Directions    cholecalciferol 1000 UNIT tablet   Commonly known as:  vitamin D   Used for:  Vitamin D deficiency        Dose:  1000 Units   Take 1 tablet (1,000 Units) by mouth daily   Quantity:  100 tablet   Refills:  0       EYE VITAMINS PO        Dose:  1 tablet   Take 1 tablet by mouth daily   Refills:  0       DAILY MULTIVITAMIN PO        Dose:  1 chew tab   Take 1 chew tab by mouth daily   Refills:  0       JUICE PLUS FIBRE PO        Refills:  0       levothyroxine 100 MCG tablet   Commonly known as:  SYNTHROID/LEVOTHROID   Used for:  Hypothyroidism        Dose:  100 mcg   Take 1 tablet (100 mcg) by mouth every morning (before breakfast)   Quantity:  90 tablet   Refills:  2       melatonin 5 MG tablet        Dose:  10 mg   Take 10 mg by mouth At Bedtime Reported on 2/21/2017   Refills:  0       metroNIDAZOLE 0.75 % cream   Commonly known as:  METROCREAM   Used for:  Rosacea        every day, once daily to face   Quantity:  60 g   Refills:  3       TUMS 500 MG chewable tablet   Generic drug:  calcium carbonate        Dose:  1 chew tab   Take 1 chew tab by mouth daily as needed   Refills:  0       warfarin 5 MG tablet   Commonly known as:  COUMADIN   Used for:  History of DVT of lower extremity, Chronic anticoagulation        Dose:  5 mg   Start taking on:  3/5/2017   Take 1 tablet (5 mg) by mouth daily Or as directed by INR clinic. Current dose is 7.5 mg Mon,Wed, and Fri, 10 mg daily rest of week   Quantity:  180 tablet   Refills:  0            Where to get your medicines      These medications were sent to [facility] - [place], [place] - [address]  [address] [address]     Phone:  [phone]     cyclobenzaprine 5 MG tablet         Some of these will need a paper prescription and others can be bought over the counter. Ask your nurse if you have questions.     Bring a paper prescription for each of these medications     oxyCODONE 5 MG IR tablet       You don't need a prescription for these medications     warfarin 5 MG tablet                Protect others around you: Learn how to safely use, store and throw away your medicines at www.disposemymeds.org.             Medication List: This is a list of all your medications and when to take them. Check marks below indicate your daily home schedule. Keep this list as a reference.      Medications           Morning Afternoon Evening Bedtime As Needed    cholecalciferol 1000 UNIT tablet   Commonly known as:  vitamin D   Take 1 tablet (1,000 Units) by mouth daily                                cyclobenzaprine 5 MG tablet   Commonly known as:  FLEXERIL   Take 1 tablet (5 mg) by mouth 3 times daily as needed for muscle spasms                                EYE VITAMINS PO   Take 1 tablet by mouth daily                                DAILY MULTIVITAMIN PO   Take 1 chew tab by mouth daily                                JUICE PLUS FIBRE PO                                levothyroxine 100 MCG tablet   Commonly known as:  SYNTHROID/LEVOTHROID   Take 1 tablet (100 mcg) by mouth every morning (before breakfast)                                melatonin 5 MG tablet   Take 10 mg by mouth At Bedtime Reported on 2/21/2017                                metroNIDAZOLE 0.75 % cream   Commonly known as:  METROCREAM   every day, once daily to face                                oxyCODONE 5 MG IR tablet   Commonly known as:  ROXICODONE   Take 1 tablet (5 mg) by mouth every 4 hours as needed for pain maximum 6 tablet(s) per day                                TUMS 500 MG chewable tablet   Take 1 chew tab by  mouth daily as needed   Generic drug:  calcium carbonate                                warfarin 5 MG tablet   Commonly known as:  COUMADIN   Take 1 tablet (5 mg) by mouth daily Or as directed by INR clinic. Current dose is 7.5 mg Mon,Wed, and Fri, 10 mg daily rest of week   Start taking on:  3/5/2017

## 2017-03-02 NOTE — DISCHARGE INSTRUCTIONS
Tips for taking pain medications  To get the best pain relief possible , remember these points:      Take pain medications as directed, before pain becomes severe      Pain medication can upset your stomach: taking it with food may help      Constipation is a common side effect of pain medication. Drink plenty of  Fluids      Eat foods high in fiber. Take a stool softener  if recommended by your doctor or  Pharmacist.        Do not drink alcohol, drive or operate machinery while taking pain medications.      Ask about other ways to control pain, such as with heat, ice or relaxation.    [facility]  Same-Day Surgery   Adult Discharge Orders & Instructions     For 24 hours after surgery    1. Get plenty of rest.  A responsible adult must stay with you for at least 24 hours after you leave the hospital.   2. Do not drive or use heavy equipment.  If you have weakness or tingling, don't drive or use heavy equipment until this feeling goes away.  3. Do not drink alcohol.  4. Avoid strenuous or risky activities.  Ask for help when climbing stairs.   5. You may feel lightheaded.  IF so, sit for a few minutes before standing.  Have someone help you get up.   6. If you have nausea (feel sick to your stomach): Drink only clear liquids such as apple juice, ginger ale, broth or 7-Up.  Rest may also help.  Be sure to drink enough fluids.  Move to a regular diet as you feel able.  7. You may have a slight fever. Call the doctor if your fever is over 100 F (37.7 C) (taken under the tongue) or lasts longer than 24 hours.  8. You may have a dry mouth, a sore throat, muscle aches or trouble sleeping.  These should go away after 24 hours.  9. Do not make important or legal decisions.   Call your doctor for any of the followin.  Signs of infection (fever, growing tenderness at the surgery site, a large amount of drainage or bleeding, severe pain, foul-smelling drainage, redness,  swelling).    2. It has been over 8 to 10 hours since surgery and you are still not able to urinate (pass water).    3.  Headache for over 24 hours.    4.  Numbness, tingling or weakness the day after surgery (if you had spinal anesthesia).  To contact a doctor, call Dr Mclaughlin's office @ [phone] or:        [phone] and ask for the resident on call for          Neurosurgery  (answered 24 hours a day)- at night or on the weekend      Emergency Department:    [facility]: [phone]       (TTY for hearing impaired: [phone])

## 2017-03-02 NOTE — ANESTHESIA POSTPROCEDURE EVALUATION
Patient: Rigoberto Holguin    Procedure(s):  Left Lumbar 4-5, Lumbar 5-Sacral 1 Foraminotomy  - Wound Class: I-Clean    Diagnosis:Lumbar Radicular Pain   Diagnosis Additional Information: No value filed.    Anesthesia Type:  General, ETT    Note:  Anesthesia Post Evaluation    Patient location during evaluation: PACU  Patient participation: Able to fully participate in evaluation  Level of consciousness: awake  Pain management: satisfactory to patient  Airway patency: patent  Cardiovascular status: acceptable  Respiratory status: acceptable  Hydration status: acceptable  PONV: none     Anesthetic complications: None    Comments: No noted anesthetic complications.  Patient satisfied with anesthetic.          Last vitals:  Vitals:    03/02/17 1215 03/02/17 1230 03/02/17 1235   BP: 127/71 120/72 120/72   Resp: 16 17 17   Temp:   36.6  C (97.9  F)   SpO2: 95%  99%         Electronically Signed By: Len Doyle MD  March 2, 2017  12:42 PM

## 2017-03-02 NOTE — BRIEF OP NOTE
[facility] [place]    Brief Operative Note    Pre-operative diagnosis: Lumbar Radicular Pain L5 left side   Post-operative diagnosis Same  Procedure: Procedure(s):  Left Lumbar 4-5, Lumbar 5-Sacral 1 Foraminotomy  - Wound Class: I-Clean  Surgeon: Surgeon(s) and Role:     * Ricky Mclaughlin MD - Primary     * Deep Cheung MD - Resident - Assisting  Anesthesia: General   Estimated blood loss: 20 ml  Drains: None  Specimens: * No specimens in log *  Findings:   Signficant stenosis of left L5 nerve. Decompressed well..  Complications: None.  Implants: None.  Plan:  PACU --> D/c to home today

## 2017-03-02 NOTE — OP NOTE
PREOPERATIVE DIAGNOSIS:  Left-sided L5 radiculopathy.      POSTOPERATIVE DIAGNOSIS:  Left-sided L5 radiculopathy.      PROCEDURES PERFORMED:   1.  Left-sided L4-5 hemilaminectomy and foraminotomy.  2.  Left-sided L5-S1 hemilaminectomy and foraminotomy.   3.  Use of intraoperative C-arm.     4.  Use of intraoperative microscopy.      SURGEON:  Ricky Mclaughlin MD      ASSISTANT:  Deep Cheung.      ANESTHESIA:  General endotracheal anesthesia.      INDICATIONS  FOR PROCEDURE:  Mr. Rigoberto Holguin is a 76-year-old gentleman who presented to the Neurosurgery Clinic at the [facility] with concerns of low back pain with radicular symptoms primarily which was thought to be an L5 distribution.  On imaging, he was found to have significant stenosis at the L4-5 and L5-S1 foramen, for which the above-mentioned procedure was recommended.  He gave informed written consent for the same after understanding the risks, benefits and alternatives of the procedure in detail.      SUMMARY:  Mr. Holguin was brought to the operating room where Anesthesia placed.  He underwent general endotracheal anesthesia and was then positioned prone on the operating room bed on a Alex frame.  All the pressure points were appropriately padded and his midline of the lumbar spine was marked.  Sterile prepping and draping was completed and intraoperative C-arm images taken were brought to the surgical field.  The C-arm was used to plan the level of the incision and 10 mL of local anesthetic was injected along the planned incision site which measured about 3 cm in length.      After about 3 minutes, a #10 blade was used to open the midline incision.  Dissection was performed with the above monopolar cautery.  The spinous process of what were thought to be the L4 and L5 were identified and dissection through the fascia was performed with the monopolar cautery.  Dissection along the spinous processes as well as the lamina of L4 and L5 was performed on  the left side in subperiosteal manner and the paraspinal muscles were mobilized laterally.  Oren retractors were placed.  An intraoperative x-ray was once again taken by placing an instrument  Each at the L4-L5 and L5-S1 foramen.  Once spinal timeout was complete, then an intraoperative microscope which was sterilely draped was brought into the surgical field. Under microscopic vision a Midas drill was used to perform hemilaminectomy on the left side between inferior portion of L4 and medial facet of L4-L5.  The ligamentum flavum was identified here.  Similarly hemilaminectomy and identification of the ligamentum flavum was performed at L5-S1 region.  Then, using a combination of Kerrison 3 and 4 punches along with foraminotomy Kerrison punches, foraminotomy was performed at L4-L5, left side as well as L5-S1.  Good decompression was achieved from pedicle to pedicle at both places. The left L5 nerve root was decompressed at the traversing site near the thecal sac as well as at the L5-S1 foramen.  The S1 nerve root was also noted to be decompressed well near the thecal sac. The L4-5 fopramen was also decompressed and confirmed with a blunt needle instrument.      Then irrigation was performed and the retractors removed.  The fascia was brought together with 4-0 Vicryl stitches in an interrupted inverted manner.  The subcutaneous tissue was closed with 2-0 Vicryl in an inverted manner and the skin was closed with 4-0  Monocryl running subcuticular manner.  The wet and dry dressing was applied.  ChloraPrep was used to clean the incisions.  Dermabond was placed on top along with island dressing.  Needle and sponge count was correct at the end of the procedure.      Dr. Evelyn Clinton was present and scrubbed in for the critical portion of the procedure and immediately available for the rest of it.         EVELYN CLINTON MD       As dictated by ALEX THOMPSON MD            D: 03/02/2017 10:19   T: 03/02/2017  10:52   MT:       Name:     SAVANNAH GARZA   MRN:      [id number]        Account:        [id number]   :      1940           Procedure Date: 2017      Document: [id number]

## 2017-03-06 ENCOUNTER — TELEPHONE (OUTPATIENT)
Dept: FAMILY MEDICINE | Facility: CLINIC | Age: 77
End: 2017-03-06

## 2017-03-06 NOTE — TELEPHONE ENCOUNTER
Patient is calling to speak with nurse or pcp about the conspitation he is having since taking medication for back surgery stated that all the OTC medication hasn't been effected please call to discuss  Thank you

## 2017-03-06 NOTE — TELEPHONE ENCOUNTER
Spoke with patient.  Patient had surgery on 3-2-17 and is taking oxycodone for pain.  Last BM per patient was 6 days ago.  Patient has tried an enema, stool softeners, prune juice, Miralax and metamucil. No BM.  Abdomin uncomfortable and tight.  Will send to PCP and Provider Pool (PCP out of office today.)

## 2017-03-07 NOTE — TELEPHONE ENCOUNTER
Terrific. As long as he continues taking his narcotic pain meds, have him take a stool softener like colace and take miralax every other day to prevent this becoming and issue again.

## 2017-03-07 NOTE — TELEPHONE ENCOUNTER
Spoke with patient and informed him per Jamie Meraz to take a stool softener/colace and take miralax every other day while on narcotic pain med's to prevent constipation again.  Patient verbalized understanding.

## 2017-03-13 ENCOUNTER — ANTICOAGULATION THERAPY VISIT (OUTPATIENT)
Dept: NURSING | Facility: CLINIC | Age: 77
End: 2017-03-13
Payer: MEDICARE

## 2017-03-13 DIAGNOSIS — Z79.01 LONG-TERM (CURRENT) USE OF ANTICOAGULANTS: ICD-10-CM

## 2017-03-13 LAB — INR POINT OF CARE: 1.9 (ref 0.86–1.14)

## 2017-03-13 PROCEDURE — 36416 COLLJ CAPILLARY BLOOD SPEC: CPT

## 2017-03-13 PROCEDURE — 85610 PROTHROMBIN TIME: CPT | Mod: QW

## 2017-03-13 PROCEDURE — 99207 ZZC NO CHARGE NURSE ONLY: CPT

## 2017-03-13 NOTE — PROGRESS NOTES
ANTICOAGULATION FOLLOW-UP CLINIC VISIT    Patient Name:  Rigoberto Holguin  Date:  3/13/2017  Contact Type:  Face to Face    SUBJECTIVE:     Patient Findings     Positives Intentional hold of therapy, No Problem Findings           OBJECTIVE    INR Protime   Date Value Ref Range Status   03/13/2017 1.9 (A) 0.86 - 1.14 Final       ASSESSMENT / PLAN  INR assessment THER    Recheck INR In: 2 WEEKS intentional hold back on coumadin x 8 days   INR Location Clinic      Anticoagulation Summary as of 3/13/2017     INR goal 2.0-3.0   Today's INR 1.9!   Maintenance plan 7.5 mg (5 mg x 1.5) on Mon, Wed, Fri; 10 mg (5 mg x 2) all other days   Full instructions 7.5 mg on Mon, Wed, Fri; 10 mg all other days   Weekly total 62.5 mg   No change documented Aparna Robison   Plan last modified Aparna Robison (1/25/2016)   Next INR check 3/27/2017   Target end date     Indications   Long-term (current) use of anticoagulants [Z79.01] [Z79.01]         Anticoagulation Episode Summary     INR check location     Preferred lab     Send INR reminders to [facility]    Comments       Anticoagulation Care Providers     Provider Role Specialty Phone number    Paulo Vargas MD [facility] Internal Medicine [phone]            See the Encounter Report to view Anticoagulation Flowsheet and Dosing Calendar (Go to Encounters tab in chart review, and find the Anticoagulation Therapy Visit)        APARNA ROBISON

## 2017-03-13 NOTE — MR AVS SNAPSHOT
Rigoberto Holguin   3/13/2017 9:00 AM   Anticoagulation Therapy Visit    Description:  76 year old male   Provider:  ALANA ANTI COAG   Department:  [facility] Nurse           INR as of 3/13/2017     Today's INR 1.9!      Anticoagulation Summary as of 3/13/2017     INR goal 2.0-3.0   Today's INR 1.9!   Full instructions 7.5 mg on Mon, Wed, Fri; 10 mg all other days   Next INR check 3/27/2017    Indications   Long-term (current) use of anticoagulants [Z79.01] [Z79.01]         Your next Anticoagulation Clinic appointment(s)     Mar 27, 2017  8:30 AM CDT   Anticoagulation Visit with [facility]   [facility] [place] ([facility] [place])    [address]  [place] [address] [phone]              Contact Numbers     [facility]  Please call [phone] with any problems or questions regarding your therapy, or to cancel or reschedule your appointment.          March 2017 Details    Sun Mon Tue Wed Thu Fri Sat        1               2               3               4                 5               6               7               8               9               10               11                 12               13      7.5 mg   See details      14      10 mg         15      7.5 mg         16      10 mg         17      7.5 mg         18      10 mg           19      10 mg         20      7.5 mg         21      10 mg         22      7.5 mg         23      10 mg         24      7.5 mg         25      10 mg           26      10 mg         27            28               29               30               31                 Date Details   03/13 This INR check       Date of next INR:  3/27/2017         How to take your warfarin dose     To take:  7.5 mg Take 1.5 of the 5 mg tablets.    To take:  10 mg Take 2 of the 5 mg tablets.

## 2017-03-16 ENCOUNTER — OFFICE VISIT (OUTPATIENT)
Dept: NEUROSURGERY | Facility: CLINIC | Age: 77
End: 2017-03-16

## 2017-03-16 VITALS
WEIGHT: 197 LBS | HEART RATE: 70 BPM | OXYGEN SATURATION: 98 % | SYSTOLIC BLOOD PRESSURE: 110 MMHG | BODY MASS INDEX: 29.86 KG/M2 | DIASTOLIC BLOOD PRESSURE: 62 MMHG | HEIGHT: 68 IN | RESPIRATION RATE: 20 BRPM | TEMPERATURE: 98.6 F

## 2017-03-16 DIAGNOSIS — M47.26 OSTEOARTHRITIS OF SPINE WITH RADICULOPATHY, LUMBAR REGION: ICD-10-CM

## 2017-03-16 DIAGNOSIS — M54.16 LUMBAR RADICULOPATHY: Primary | ICD-10-CM

## 2017-03-16 ASSESSMENT — PAIN SCALES - GENERAL: PAINLEVEL: NO PAIN (0)

## 2017-03-16 NOTE — PROGRESS NOTES
NEUROSURGERY POST OP CHECK  DATE OF VISIT:  3/16/17      Date of Surgery 3/2/17  DIAGNOSIS: Left-sided L5 radiculopathy. .     PROCEDURES PERFORMED:   1. Left-sided L4-5 hemilaminectomy and foraminotomy.  2. Left-sided L5-S1 hemilaminectomy and foraminotomy..     SURGEON: Ricky Mclaughlin MD       INDICATIONS FOR PROCEDURE: Mr. Rigoberto Holguin is a 76-year-old gentleman who presented to the Neurosurgery Clinic at the [facility] with concerns of low back pain with radicular symptoms primarily which was thought to be an L5 distribution. On imaging, he was found to have significant stenosis at the L4-5 and L5-S1 foramen, for which the above-mentioned procedure was recommended. He gave informed written consent for the same after understanding the risks, benefits and alternatives of the procedure in detail.   The procedure itself was without incident.  He recovered well and was discharged home on in good condition.  Since then his  Back pain has diminished.  He never had much leg pain, only weakness, he hasn't noticed much improvement there..  SHe presents for routine wound check.    STATUS REPORT  Patient Supplied Answers To the [facility] Pain Questionnaire  [facility] Pain -  Patient Entered Questionnaire/Answers 3/16/2017   What number best describes your pain right now:  0 = No pain  to  10 = Worst pain imaginable 3   How would you describe the pain? cramping   Which of the following worsen your pain? standing, walking   Which of the following improve or reduce your pain?  lying down, sitting   What number best describes your average pain for the past week:  0 = No pain  to  10 = Worst pain imaginable 3   What number best describes your LOWEST pain in past 24 hours:  0 = No pain  to  10 = Worst pain imaginable 0   What number best describes your WORST pain in past 24 hours:  0 = No pain  to  10 = Worst pain imaginable 5   When is your pain worst? AM, PM   What non-medicine treatments have you already had for your pain?  "spine injections (shots), surgery   Have you tried treating your pain with medication?  No   Are you currently taking medications for your pain? No       Current Outpatient Prescriptions:      warfarin (COUMADIN) 5 MG tablet, Take 1 tablet (5 mg) by mouth daily Or as directed by INR clinic. Current dose is 7.5 mg Mon,Wed, and Fri, 10 mg daily rest of week, Disp: 180 tablet, Rfl: 0     oxyCODONE (ROXICODONE) 5 MG IR tablet, Take 1 tablet (5 mg) by mouth every 4 hours as needed for pain maximum 6 tablet(s) per day, Disp: 80 tablet, Rfl: 0     cyclobenzaprine (FLEXERIL) 5 MG tablet, Take 1 tablet (5 mg) by mouth 3 times daily as needed for muscle spasms, Disp: 80 tablet, Rfl: 0     Multiple Vitamins-Minerals (EYE VITAMINS PO), Take 1 tablet by mouth daily, Disp: , Rfl:      melatonin 5 MG tablet, Take 10 mg by mouth At Bedtime Reported on 2/21/2017, Disp: , Rfl:      levothyroxine (SYNTHROID,LEVOTHROID) 100 MCG tablet, Take 1 tablet (100 mcg) by mouth every morning (before breakfast), Disp: 90 tablet, Rfl: 2     metroNIDAZOLE (METROCREAM) 0.75 % cream, every day, once daily to face, Disp: 60 g, Rfl: 3     cholecalciferol (VITAMIN D) 1000 UNIT tablet, Take 1 tablet (1,000 Units) by mouth daily, Disp: 100 tablet, Rfl: 0     Nutritional Supplements (JUICE PLUS FIBRE PO), , Disp: , Rfl:      calcium carbonate (TUMS) 500 MG chewable tablet, Take 1 chew tab by mouth daily as needed , Disp: , Rfl:      Multiple Vitamin (DAILY MULTIVITAMIN PO), Take 1 chew tab by mouth daily , Disp: , Rfl:   No Known Allergies  PMH, SOC HIST, FAM HIST, PROBLEM LIST:  All reviewed in EPIC.    OBJECTIVE:  /62 (BP Location: Right arm, Patient Position: Chair, Cuff Size: Adult Large)  Pulse 70  Temp 98.6  F (37  C) (Oral)  Resp 20  Ht 1.727 m (5' 8\")  Wt 89.4 kg (197 lb)  SpO2 98%  BMI 29.95 kg/m2    Imaging:  None new.    EXAM:  Well developed well nourished male found seated comfortably in exam chair.  No apparent distress. He is " accompanied by wife..   A&O X3.  Mood and affect WNL. Language and fund of knowledge intact.  Is able to sit and rise independently. He is able to rise up to tip-toe with both feet, but unable to sustain with just the left. I'd grade his gastroc at 4/5 SHe has a nicely healed incision. .    Assessment:  1. Lumbar radiculopathy    2. Osteoarthritis of spine with radiculopathy, lumbar region      Rigoberto Holguin is doing well two weeks post decompression with objective improvement in left calf muscle.  The wound is healthy.     PLAN:  We discussed wound care..  *  May swim or bathe.  We discussed medication.    *  FYI: In general we prescribe narcotics for pain management in the post operative recovery phase generally 2-6 weeks post op, depending on the surgery performed.  Pre-op our patients are advised that by 6 weeks post op we anticipate they will no longer require narcotic.   *  Medications prescribed today:   none  We discussed activities and return to work.  *    May use the recumbent elliptical in two weeks  *   We recommend he return to normal activities as able in 2 weeks  *   He can return to driving if: he is off narcotic  We discussed follow up    *  All the patient's questions have been answered and they demonstrate good understanding of the above.    *  Return to clinic in 4-6 weeks.   Imaging for that visit: none  *   The patient has our contact information and is aware that he should call if he has questions comments or concerns.     We appreciate the opportunity to be of service in the care of this pleasant patient.  Please do call if there is anything more we can do    Anna Marie Cruz PA-C  [facility]  Department of Neurosurgery  Phone: [phone]  Fax: [phone]

## 2017-03-16 NOTE — NURSING NOTE
Chief Complaint   Patient presents with     RECHECK     UMP- 2 WEEKS POST-OP, SUTURE REMOVAL AND WOUND CHECK

## 2017-03-16 NOTE — LETTER
3/16/2017       RE: Rigoberto Holguin  [address] [address]     Dear Colleague,    Thank you for referring your patient, Rigoberto Holguin, to the [facility] NEUROSURGERY at [facility]. Please see a copy of my visit note below.      NEUROSURGERY POST OP CHECK  DATE OF VISIT:  3/16/17      Date of Surgery 3/2/17  DIAGNOSIS: Left-sided L5 radiculopathy. .     PROCEDURES PERFORMED:   1. Left-sided L4-5 hemilaminectomy and foraminotomy.  2. Left-sided L5-S1 hemilaminectomy and foraminotomy..     SURGEON: Ricky Mclaughlin MD       INDICATIONS FOR PROCEDURE: Mr. Rigoberto Holguin is a 76-year-old gentleman who presented to the Neurosurgery Clinic at the [facility] with concerns of low back pain with radicular symptoms primarily which was thought to be an L5 distribution. On imaging, he was found to have significant stenosis at the L4-5 and L5-S1 foramen, for which the above-mentioned procedure was recommended. He gave informed written consent for the same after understanding the risks, benefits and alternatives of the procedure in detail.   The procedure itself was without incident.  He recovered well and was discharged home on in good condition.  Since then his  Back pain has diminished.  He never had much leg pain, only weakness, he hasn't noticed much improvement there..  SHe presents for routine wound check.    STATUS REPORT  Patient Supplied Answers To the [facility] Pain Questionnaire  [facility] Pain -  Patient Entered Questionnaire/Answers 3/16/2017   What number best describes your pain right now:  0 = No pain  to  10 = Worst pain imaginable 3   How would you describe the pain? cramping   Which of the following worsen your pain? standing, walking   Which of the following improve or reduce your pain?  lying down, sitting   What number best describes your average pain for the past week:  0 = No pain  to  10 = Worst pain imaginable 3   What number best describes your LOWEST pain in  past 24 hours:  0 = No pain  to  10 = Worst pain imaginable 0   What number best describes your WORST pain in past 24 hours:  0 = No pain  to  10 = Worst pain imaginable 5   When is your pain worst? AM, PM   What non-medicine treatments have you already had for your pain? spine injections (shots), surgery   Have you tried treating your pain with medication?  No   Are you currently taking medications for your pain? No       Current Outpatient Prescriptions:      warfarin (COUMADIN) 5 MG tablet, Take 1 tablet (5 mg) by mouth daily Or as directed by INR clinic. Current dose is 7.5 mg Mon,Wed, and Fri, 10 mg daily rest of week, Disp: 180 tablet, Rfl: 0     oxyCODONE (ROXICODONE) 5 MG IR tablet, Take 1 tablet (5 mg) by mouth every 4 hours as needed for pain maximum 6 tablet(s) per day, Disp: 80 tablet, Rfl: 0     cyclobenzaprine (FLEXERIL) 5 MG tablet, Take 1 tablet (5 mg) by mouth 3 times daily as needed for muscle spasms, Disp: 80 tablet, Rfl: 0     Multiple Vitamins-Minerals (EYE VITAMINS PO), Take 1 tablet by mouth daily, Disp: , Rfl:      melatonin 5 MG tablet, Take 10 mg by mouth At Bedtime Reported on 2/21/2017, Disp: , Rfl:      levothyroxine (SYNTHROID,LEVOTHROID) 100 MCG tablet, Take 1 tablet (100 mcg) by mouth every morning (before breakfast), Disp: 90 tablet, Rfl: 2     metroNIDAZOLE (METROCREAM) 0.75 % cream, every day, once daily to face, Disp: 60 g, Rfl: 3     cholecalciferol (VITAMIN D) 1000 UNIT tablet, Take 1 tablet (1,000 Units) by mouth daily, Disp: 100 tablet, Rfl: 0     Nutritional Supplements (JUICE PLUS FIBRE PO), , Disp: , Rfl:      calcium carbonate (TUMS) 500 MG chewable tablet, Take 1 chew tab by mouth daily as needed , Disp: , Rfl:      Multiple Vitamin (DAILY MULTIVITAMIN PO), Take 1 chew tab by mouth daily , Disp: , Rfl:   No Known Allergies  PMH, SOC HIST, FAM HIST, PROBLEM LIST:  All reviewed in EPIC.    OBJECTIVE:  /62 (BP Location: Right arm, Patient Position: Chair, Cuff Size:  "Adult Large)  Pulse 70  Temp 98.6  F (37  C) (Oral)  Resp 20  Ht 1.727 m (5' 8\")  Wt 89.4 kg (197 lb)  SpO2 98%  BMI 29.95 kg/m2    Imaging:  None new.    EXAM:  Well developed well nourished male found seated comfortably in exam chair.  No apparent distress. He is accompanied by wife..   A&O X3.  Mood and affect WNL. Language and fund of knowledge intact.  Is able to sit and rise independently. He is able to rise up to tip-toe with both feet, but unable to sustain with just the left. I'd grade his gastroc at 4/5 SHe has a nicely healed incision. .    Assessment:  1. Lumbar radiculopathy    2. Osteoarthritis of spine with radiculopathy, lumbar region      Rigoberto Holguin is doing well two weeks post decompression with objective improvement in left calf muscle.  The wound is healthy.     PLAN:  We discussed wound care..  *  May swim or bathe.  We discussed medication.    *  FYI: In general we prescribe narcotics for pain management in the post operative recovery phase generally 2-6 weeks post op, depending on the surgery performed.  Pre-op our patients are advised that by 6 weeks post op we anticipate they will no longer require narcotic.   *  Medications prescribed today:   none  We discussed activities and return to work.  *    May use the recumbent elliptical in two weeks  *   We recommend he return to normal activities as able in 2 weeks  *   He can return to driving if: he is off narcotic  We discussed follow up    *  All the patient's questions have been answered and they demonstrate good understanding of the above.    *  Return to clinic in 4-6 weeks.   Imaging for that visit: none  *   The patient has our contact information and is aware that he should call if he has questions comments or concerns.     We appreciate the opportunity to be of service in the care of this pleasant patient.  Please do call if there is anything more we can do    Anna Marie Cruz PA-C  [facility]  Department of " Neurosurgery  Phone: [phone]  Fax: [phone]

## 2017-03-16 NOTE — MR AVS SNAPSHOT
After Visit Summary   3/16/2017    Rigoberto Holguin    MRN: [id number]           Patient Information     Date Of Birth          1940        Visit Information        Provider Department      3/16/2017 3:00 PM Anna Marie Cruz PA-C M [facility] Neurosurgery        Today's Diagnoses     Lumbar radiculopathy    -  1    Osteoarthritis of spine with radiculopathy, lumbar region           Follow-ups after your visit        Your next 10 appointments already scheduled     Mar 21, 2017 11:00 AM CDT   [facility] Visit with Vickie Whalen MD   [facility]    [id number] [place] [id number]   [phone]            Mar 27, 2017  8:30 AM CDT   Anticoagulation Visit with BE ANTI COAG   [facility] ([facility]    [address] [place] [id number] [phone]            Mar 29, 2017 10:00 AM CDT   Nurse Only with NURSE ONLY MG DERM   [facility]    [address] [place] [address]   [phone]            Apr 14, 2017  2:30 PM CDT   (Arrive by 2:15 PM)   Return Visit with DENAE Arnold [facility] Neurosurgery ([facility])    [address] [place] [address]   [phone]            Apr 20, 2017  2:30 PM CDT   (Arrive by 2:15 PM)   Return Visit with DENAE Arnold [facility] Neurosurgery ([facility])    [address] [place] [address]   [phone]            Jun 12, 2017 11:15 AM CDT   (Arrive by 11:00 AM)   Return Visit with Ricky Mclaughlin MD   [facility] Neurosurgery ([facility])    [address] [place] [address]   [phone]            Jun 28, 2017  9:00 AM CDT   Return Visit with Humble Cantu MD, [facility] CYSTO PROC ROOM   [facility] ([facility]    [address] [facility] [address] [facility] [address]  [place] [address] [phone]              Who to contact     Please call your clinic at [phone] to:    Ask questions about your health    Make or cancel appointments    Discuss your medicines    Learn about your test results    Speak to your doctor   If you have compliments or concerns about an experience at your clinic, or if you wish to file a complaint, please contact [facility] Patient Relations at [phone] or email us at [email or web address]         Additional Information About Your Visit        [facility] Information     [employer] gives you secure access to your electronic health record. If you see a primary care provider, you can also send messages to your care team and make appointments. If you have questions, please call your primary care clinic.  If you do not have a primary care provider, please call [phone] and they will assist you.      [employer] is an electronic gateway that provides easy, online access to your medical records. With [employer], you can request a clinic appointment, read your test results, renew a prescription or communicate with your care team.     To access your existing account, please contact your [facility] or call [phone] for assistance.        Care EveryWhere ID     This is your Care EveryWhere ID. This could be used by other organizations to access your [facility] medical records  [id number]        Your Vitals Were     Pulse Temperature Respirations Height Pulse Oximetry BMI (Body Mass Index)    70 98.6  F (37  C) (Oral) 20 1.727 m (5' 8\") 98% 29.95 kg/m2       Blood Pressure from Last 3 Encounters:   03/16/17 110/62   03/02/17 117/80   02/21/17 111/68    Weight from Last 3 Encounters:   03/16/17 89.4 kg (197 lb)   03/02/17 88.5 kg (195 lb 1.7 oz)   02/21/17 89 kg (196 lb 4.8 oz)              Today, you had the following     No orders found for display       Primary Care " Provider Office Phone # Fax #    Jamie Alice Meraz PA-C [phone]       [facility] [place] [address]  [place] [address]        Thank you!     Thank you for choosing [facility]  for your care. Our goal is always to provide you with excellent care. Hearing back from our patients is one way we can continue to improve our services. Please take a few minutes to complete the written survey that you may receive in the mail after your visit with us. Thank you!             Your Updated Medication List - Protect others around you: Learn how to safely use, store and throw away your medicines at www.disposemymeds.org.          This list is accurate as of: 3/16/17  3:48 PM.  Always use your most recent med list.                   Brand Name Dispense Instructions for use    cholecalciferol 1000 UNIT tablet    vitamin D    100 tablet    Take 1 tablet (1,000 Units) by mouth daily       cyclobenzaprine 5 MG tablet    FLEXERIL    80 tablet    Take 1 tablet (5 mg) by mouth 3 times daily as needed for muscle spasms       EYE VITAMINS PO      Take 1 tablet by mouth daily       DAILY MULTIVITAMIN PO      Take 1 chew tab by mouth daily       JUICE PLUS FIBRE PO          levothyroxine 100 MCG tablet    SYNTHROID/LEVOTHROID    90 tablet    Take 1 tablet (100 mcg) by mouth every morning (before breakfast)       melatonin 5 MG tablet      Take 10 mg by mouth At Bedtime Reported on 2/21/2017       metroNIDAZOLE 0.75 % cream    METROCREAM    60 g    every day, once daily to face       oxyCODONE 5 MG IR tablet    ROXICODONE    80 tablet    Take 1 tablet (5 mg) by mouth every 4 hours as needed for pain maximum 6 tablet(s) per day       TUMS 500 MG chewable tablet   Generic drug:  calcium carbonate      Take 1 chew tab by mouth daily as needed       warfarin 5 MG tablet    COUMADIN    180 tablet    Take 1 tablet (5 mg) by mouth daily Or as directed by INR clinic. Current dose is 7.5 mg Mon,Wed, and Fri, 10 mg  daily rest of week

## 2017-03-21 ENCOUNTER — OFFICE VISIT (OUTPATIENT)
Dept: DERMATOLOGY | Facility: CLINIC | Age: 77
End: 2017-03-21
Payer: MEDICARE

## 2017-03-21 DIAGNOSIS — L57.0 ACTINIC KERATOSIS: ICD-10-CM

## 2017-03-21 DIAGNOSIS — D48.5 NEOPLASM OF UNCERTAIN BEHAVIOR OF SKIN: ICD-10-CM

## 2017-03-21 DIAGNOSIS — L71.9 ROSACEA: ICD-10-CM

## 2017-03-21 DIAGNOSIS — Z85.828 HISTORY OF NONMELANOMA SKIN CANCER: Primary | ICD-10-CM

## 2017-03-21 PROCEDURE — 17003 DESTRUCT PREMALG LES 2-14: CPT | Performed by: DERMATOLOGY

## 2017-03-21 PROCEDURE — 88305 TISSUE EXAM BY PATHOLOGIST: CPT | Performed by: DERMATOLOGY

## 2017-03-21 PROCEDURE — 17000 DESTRUCT PREMALG LESION: CPT | Performed by: DERMATOLOGY

## 2017-03-21 PROCEDURE — 99213 OFFICE O/P EST LOW 20 MIN: CPT | Mod: 25 | Performed by: DERMATOLOGY

## 2017-03-21 PROCEDURE — 11100 HC DESTRUCT PREMALIGNANT LESION, FIRST: CPT | Mod: 59 | Performed by: DERMATOLOGY

## 2017-03-21 NOTE — NURSING NOTE
Dermatology Rooming Note    Rigoberto Holguin's goals for this visit include:   Chief Complaint   Patient presents with     Derm Problem      skin check hx of  AK and NMSC       Is a scribe okay for this visit:YES    Are records needed for this visit(If yes, obtain release of information): Not applicable     Vitals: There were no vitals taken for this visit.    Referring Provider:  No referring provider defined for this encounter.

## 2017-03-21 NOTE — PROGRESS NOTES
[facility] Dermatology Note      Dermatology Problem List:  1. MNSC  -SCC, left dorsal forearm, s/p ED&C 6/2/2016  -SCC, left nasal side wall, s/p Mohs 3/10/2016   -BCC left malar cheek, s/p Mohs 3/2013 per records from [facility]  2. Actinic keratoses  -Previous Tx: script for efudex initiated but cost prohibitive, cryotherapy, PDT (X2 forehead/scalp early 2017) (X2 sessions 2015 with improvement) (1-2 treatments at outside facility)  3.Lesion, left ear, records from [facility] were reviewed  -s/p biopsy 06/2014 demonstrating ulceration with inflammation  -s/p biopsy 07/2014 demonstrating erosion and ulcer with hemorrhagic scale crust, impetiginized, [facility] was queried  4. Rosacea  -Current Tx: Metrocream with improvement.     Encounter Date: Mar 21, 2017    CC:  Chief Complaint   Patient presents with     Derm Problem      skin check hx of  AK and NMSC         History of Present Illness:  Mr. Rigoberto Holguin is a 76 year old male presents as a follow up for history of NMSC. The patient was last seen 12/20/2016 when 15 AKs were treated with cryotherapy and PDT planned for AK on the forehead/scalp. Today, the patient reports history of lesion behind the right ear that has since resolved. The patient reports no other lesions of concern.    Past Medical History:   Patient Active Problem List   Diagnosis     Encounter for long-term current use of medication     Malignant neoplasm of other specified sites of bladder     Other and unspecified coagulation defects     Nonallopathic lesion of lumbar region     Stenosis, spinal, lumbar     CARDIOVASCULAR SCREENING; LDL GOAL LESS THAN 160     Advanced directives, counseling/discussion     Factor V Leiden mutation (H)     History of DVT of lower extremity     Postphlebitic syndrome     Chronic anticoagulation     Peripheral vascular disease (H)     Personal history of malignant neoplasm of bladder     Vitamin D deficiency      Long-term (current) use of anticoagulants [Z79.01]     Lumbar radicular pain     Hypothyroidism due to Hashimoto's thyroiditis     Past Medical History   Diagnosis Date     Actinic keratosis      Basal cell carcinoma      Bladder cancer (H) 2003     DJD (degenerative joint disease), lumbar      S/P epidural x 2     DVT (deep venous thrombosis) (H) 2007     Factor V ( bilateral legs)      GERD (gastroesophageal reflux disease)      Hypothyroidism      Polio      Age 4, weakness of right arm and leg      S/P appendectomy      Past Surgical History   Procedure Laterality Date     Hc repair rotator cuff,acute Left 2007     Photodynamic therapy - (pdt)  2/13/2015     Surgical history of -   2003     Urinary Bladder Cancer treatment     Appendectomy       Surgical history of -        Gall Bladder     Foraminotomy lumbar posterior two levels Left 3/2/2017     Procedure: FORAMINOTOMY LUMBAR POSTERIOR TWO LEVELS;  Surgeon: Ricky Mclaughlin MD;  Location:  OR     Social History:  The patient is retired. He was a . He drinks 1-2 glasses of alcohol per week.    Family History:  There is no family history of skin cancer.    Medications:  Current Outpatient Prescriptions   Medication Sig Dispense Refill     warfarin (COUMADIN) 5 MG tablet Take 1 tablet (5 mg) by mouth daily Or as directed by INR clinic. Current dose is 7.5 mg Mon,Wed, and Fri, 10 mg daily rest of week 180 tablet 0     Multiple Vitamins-Minerals (EYE VITAMINS PO) Take 1 tablet by mouth daily       melatonin 5 MG tablet Take 10 mg by mouth At Bedtime Reported on 2/21/2017       levothyroxine (SYNTHROID,LEVOTHROID) 100 MCG tablet Take 1 tablet (100 mcg) by mouth every morning (before breakfast) 90 tablet 2     metroNIDAZOLE (METROCREAM) 0.75 % cream every day, once daily to face 60 g 3     cholecalciferol (VITAMIN D) 1000 UNIT tablet Take 1 tablet (1,000 Units) by mouth daily 100 tablet 0     Nutritional Supplements (JUICE PLUS FIBRE PO)        calcium  carbonate (TUMS) 500 MG chewable tablet Take 1 chew tab by mouth daily as needed        Multiple Vitamin (DAILY MULTIVITAMIN PO) Take 1 chew tab by mouth daily         No Known Allergies    Review of Systems:  -Musck: Reports back surgery that he had 2 weeks ago, is doing well.   -Const: Denies fevers, chills or unintended changes in weight.   -Skin: As above in HPI. No additional skin concerns..     Physical exam:  There were no vitals taken for this visit.  -This is a well developed, well-nourished male in no acute distress, in a pleasant mood.    SKIN: Total skin excluding the undergarment areas was performed. The exam included the head/face, neck, both arms, chest, back, abdomen, both legs, digits and/or nails. Declines further exam.   -There are well healed surgical scars without erythema, nodularity or telangiectasias on the left dorsal forearm, left nasal side wall and left malar cheek.   -There are erythematous macules with overyling adherent scale on the scalp.   -Telangiectasias on the face.   -4mm cutaneous horn like lesion on the right upper arm.  -No other lesions of concern on areas examined.     Impression/Plan:  1. History of nonmelanoma skin cancer, no clincial evidence of recurrence:    Sun protection reviewed  2. Actinic keratosis, s/p PDT X2 with improvement, persistent lesions on the scalp    Cryotherapy procedure note: After verbal consent and discussion of risks and benefits including but no limited to dyspigmentation/scar, blister, and pain, 5 were treated with 1-2mm freeze border for 1 cycle with liquid nitrogen. Post cryotherapy instructions were provided.  3. Rosacea, face. Well controlled on Metrocream    Continue Metrocream 0.75%. Apply daily to the face. Patient will call if he needs refill of medication.   4. 4mm cutaneous horn like lesion, right upper arm. Neoplasm of uncertain behavior. Differential diagnosis includes SCC versus SK versus other    Biopsy:  After discussion of  benefits and risks including but not limited to bleeding/bruising, pain/swelling, infection, scar, incomplete removal, nerve damage/numbness, recurrence, and non-diagnostic biopsy, written consent, verbal consent and photographs were obtained. Time-out was performed. The area was cleaned with isopropyl alcohol and lesion fell off with prep and was placed in jar.  Vaseline and a sterile dressing were applied. The patient tolerated the procedure and no complications were noted. The patient was provided with verbal and written post care instructions.     Follow up in 9 months, earlier for new or changing lesions.     Staff Involved:  Scribe/Staff    Scribe Disclosure:   I, Janeth Means, am serving as a scribe to document services personally performed by Dr. Vickie Whalen, based on data collection and the provider's statements to me.     Provider Disclosure:   I agree with above History, Review of Systems, Physical exam and Plan. I have reviewed the content of the documentation and have edited it as needed. I have personally performed the services documented here and the documentation accurately represents those services and the decisions I have made.     Vickie Whalen MD    Department of Dermatology  [facility]: Phone: [phone], Fax:[phone]  [facility]: Phone: [phone], Fax: [phone]

## 2017-03-21 NOTE — PATIENT INSTRUCTIONS
Cryotherapy    What is it?    Use of a very cold liquid, such as liquid nitrogen, to freeze and destroy abnormal skin cells that need to be removed    What should I expect?    Tenderness and redness    A small blister that might grow and fill with dark purple blood. There may be crusting.    More than one treatment may be needed if the lesions do not go away.    How do I care for the treated area?    Gently wash the area with your hands when bathing.    Use a thin layer of Vaseline to help with healing. You may use a Band-Aid.     The area should heal within 7-10 days and may leave behind a pink or lighter color.     Do not use an antibiotic or Neosporin ointment.     You may take acetaminophen (Tylenol) for pain.     Call your Doctor if you have:    Severe pain    Signs of infection (warmth, redness, cloudy yellow drainage, and or a bad smell)    Questions or concerns    Who should I call with questions?       [facility]: [phone]       [facility]: [phone]       For urgent needs outside of business hours call the [facility] at [phone]        and ask for the dermatology resident on call      Wound Care After a Biopsy    What is a skin biopsy?  A skin biopsy allows the doctor to examine a very small piece of tissue under the microscope to determine the diagnosis and the best treatment for the skin condition. A local anesthetic (numbing medicine)  is injected with a very small needle into the skin area to be tested. A small piece of skin is taken from the area. Sometimes a suture (stitch) is used.     What are the risks of a skin biopsy?  I will experience scar, bleeding, swelling, pain, crusting and redness. I may experience incomplete removal or recurrence. Risks of this procedure are excessive bleeding, bruising, infection, nerve damage, numbness, thick (hypertrophic or keloidal) scar and non-diagnostic biopsy.    How should I care  for my wound for the first 24 hours?    Keep the wound dry and covered for 24 hours    If it bleeds, hold direct pressure on the area for 15 minutes. If bleeding does not stop then go to the emergency room    Avoid strenuous exercise the first 1-2 days or as your doctor instructs you    How should I care for the wound after 24 hours?    After 24 hours, remove the bandage    You may bathe or shower as normal    If you had a scalp biopsy, you can shampoo as usual and can use shower water to clean the biopsy site daily    Clean the wound twice a day with gentle soap and water    Do not scrub, be gentle    Apply white petroleum/Vaseline after cleaning the wound with a cotton swab or a clean finger, and keep the site covered with a Bandaid /bandage. Bandages are not necessary with a scalp biopsy    If you are unable to cover the site with a Bandaid /bandage, re-apply ointment 2-3 times a day to keep the site moist. Moisture will help with healing    Avoid strenuous activity for first 1-2 days    Avoid lakes, rivers, pools, and oceans until the stitches are removed or the site is healed    How do I clean my wound?    Wash hands for 15 minutes with soap or use hand  before all wound care    Clean the wound with gentle soap and water    Apply white petroleum/Vaseline  to wound after it is clean    Replace the Bandaid /bandage to keep the wound covered for the first few days or as instructed by your doctor    If you had a scalp biopsy, warm shower water to the area on a daily basis should suffice    What should I use to clean my wound?     Cotton-tipped applicators (Qtips )    White petroleum jelly (Vaseline ). Use a clean new container and use Q-tips to apply.    Bandaids   as needed    Gentle soap     How should I care for my wound long term?    Do not get your wound dirty    Keep up with wound care for one week or until the area is healed.    A small scab will form and fall off by itself when the area is  completely healed. The area will be red and will become pink in color as it heals. Sun protection is very important for how your scar will turn out. Sunscreen with an SPF 30 or greater is recommended once the area is healed.    You should have some soreness but it should be mild and slowly go away over several days. Talk to your doctor about using tylenol for pain,    When should I call my doctor?  If you have increased:     Pain or swelling    Pus or drainage (clear or slightly yellow drainage is ok)    Temperature over 100F    Spreading redness or warmth around wound    When will I hear about my results?  The biopsy results can take 2-3 weeks to come back. The clinic will call you with the results, send you a [employer] message, or have you schedule a follow-up clinic or phone time to discuss the results. Contact our clinics if you do not hear from us in 3 weeks.     Who should I call with questions?    [facility]: [phone]     [facility]: [phone]    For urgent needs outside of business hours call the [facility] at [phone] and ask for the dermatology resident on call

## 2017-03-21 NOTE — MR AVS SNAPSHOT
After Visit Summary   3/21/2017    Rigoberto Holguin    MRN: [id number]           Patient Information     Date Of Birth          1940        Visit Information        Provider Department      3/21/2017 11:00 AM Vickie Whalen MD [facility]        Today's Diagnoses     History of nonmelanoma skin cancer    -  1    Rosacea        Actinic keratosis        Neoplasm of uncertain behavior of skin          Care Instructions    Cryotherapy    What is it?    Use of a very cold liquid, such as liquid nitrogen, to freeze and destroy abnormal skin cells that need to be removed    What should I expect?    Tenderness and redness    A small blister that might grow and fill with dark purple blood. There may be crusting.    More than one treatment may be needed if the lesions do not go away.    How do I care for the treated area?    Gently wash the area with your hands when bathing.    Use a thin layer of Vaseline to help with healing. You may use a Band-Aid.     The area should heal within 7-10 days and may leave behind a pink or lighter color.     Do not use an antibiotic or Neosporin ointment.     You may take acetaminophen (Tylenol) for pain.     Call your Doctor if you have:    Severe pain    Signs of infection (warmth, redness, cloudy yellow drainage, and or a bad smell)    Questions or concerns    Who should I call with questions?       [facility]: [phone]       [facility]: [phone]       For urgent needs outside of business hours call the [facility] at [phone]        and ask for the dermatology resident on call      Wound Care After a Biopsy    What is a skin biopsy?  A skin biopsy allows the doctor to examine a very small piece of tissue under the microscope to determine the diagnosis and the best treatment for the skin condition. A local anesthetic (numbing medicine)  is injected with a very small needle into  the skin area to be tested. A small piece of skin is taken from the area. Sometimes a suture (stitch) is used.     What are the risks of a skin biopsy?  I will experience scar, bleeding, swelling, pain, crusting and redness. I may experience incomplete removal or recurrence. Risks of this procedure are excessive bleeding, bruising, infection, nerve damage, numbness, thick (hypertrophic or keloidal) scar and non-diagnostic biopsy.    How should I care for my wound for the first 24 hours?    Keep the wound dry and covered for 24 hours    If it bleeds, hold direct pressure on the area for 15 minutes. If bleeding does not stop then go to the emergency room    Avoid strenuous exercise the first 1-2 days or as your doctor instructs you    How should I care for the wound after 24 hours?    After 24 hours, remove the bandage    You may bathe or shower as normal    If you had a scalp biopsy, you can shampoo as usual and can use shower water to clean the biopsy site daily    Clean the wound twice a day with gentle soap and water    Do not scrub, be gentle    Apply white petroleum/Vaseline after cleaning the wound with a cotton swab or a clean finger, and keep the site covered with a Bandaid /bandage. Bandages are not necessary with a scalp biopsy    If you are unable to cover the site with a Bandaid /bandage, re-apply ointment 2-3 times a day to keep the site moist. Moisture will help with healing    Avoid strenuous activity for first 1-2 days    Avoid lakes, rivers, pools, and oceans until the stitches are removed or the site is healed    How do I clean my wound?    Wash hands for 15 minutes with soap or use hand  before all wound care    Clean the wound with gentle soap and water    Apply white petroleum/Vaseline  to wound after it is clean    Replace the Bandaid /bandage to keep the wound covered for the first few days or as instructed by your doctor    If you had a scalp biopsy, warm shower water to the area on a  daily basis should suffice    What should I use to clean my wound?     Cotton-tipped applicators (Qtips )    White petroleum jelly (Vaseline ). Use a clean new container and use Q-tips to apply.    Bandaids   as needed    Gentle soap     How should I care for my wound long term?    Do not get your wound dirty    Keep up with wound care for one week or until the area is healed.    A small scab will form and fall off by itself when the area is completely healed. The area will be red and will become pink in color as it heals. Sun protection is very important for how your scar will turn out. Sunscreen with an SPF 30 or greater is recommended once the area is healed.    You should have some soreness but it should be mild and slowly go away over several days. Talk to your doctor about using tylenol for pain,    When should I call my doctor?  If you have increased:     Pain or swelling    Pus or drainage (clear or slightly yellow drainage is ok)    Temperature over 100F    Spreading redness or warmth around wound    When will I hear about my results?  The biopsy results can take 2-3 weeks to come back. The clinic will call you with the results, send you a [employer] message, or have you schedule a follow-up clinic or phone time to discuss the results. Contact our clinics if you do not hear from us in 3 weeks.     Who should I call with questions?    [facility]: [phone]     [facility]: [phone]    For urgent needs outside of business hours call the [facility] at [phone] and ask for the dermatology resident on call            Follow-ups after your visit        Your next 10 appointments already scheduled     Mar 27, 2017  8:30 AM CDT   Anticoagulation Visit with BE ANTI COAG   [facility] [place] ([facility] [place])    [address]  [place] [id number] [phone]            Mar 29, 2017 10:00 AM CDT   Nurse Only with  NURSE ONLY MG DERM   [facility]    [address] [place] [id number] [phone]            Apr 20, 2017  2:30 PM CDT   (Arrive by 2:15 PM)   Return Visit with Anna Marie Cruz PA-C   [facility] ([facility])    [address]  [address] [facility] [id number] [phone]            Jun 12, 2017 11:15 AM CDT   (Arrive by 11:00 AM)   Return Visit with Ricky Mclaughlin MD   [facility] ([facility])    [address] [id number] [phone]            Jun 28, 2017  9:00 AM CDT   Return Visit with Humble Cantu MD, [facility] CYSTO PROC ROOM   [facility] ([address] [phone]              Who to contact     If you have questions or need follow up information about today's clinic visit or your schedule please contact [facility] directly at [phone].  Normal or non-critical lab and imaging results will be communicated to you by [employer], letter or phone within 4 business days after the clinic has received the results. If you do not hear from us within 7 days, please contact the clinic through [employer] or phone. If you have a critical or abnormal lab result, we will notify you by phone as soon as possible.  Submit refill requests through [employer] or call your pharmacy and they will forward the refill request to us. Please allow 3 business days for your refill to be completed.          Additional Information About Your Visit        [employer] Information     [employer] gives you secure access to your electronic health record. If you see a primary care provider, you can also send messages to your care team and make appointments. If you have questions, please call your primary care clinic.  If you do not have a primary care provider, please call  [phone] and they will assist you.      [employer] is an electronic gateway that provides easy, online access to your medical records. With [employer], you can request a clinic appointment, read your test results, renew a prescription or communicate with your care team.     To access your existing account, please contact your [facility] or call [phone] for assistance.        Care EveryWhere ID     This is your Care EveryWhere ID. This could be used by other organizations to access your [place] medical records  [id number]         Blood Pressure from Last 3 Encounters:   03/16/17 110/62   03/02/17 117/80   02/21/17 111/68    Weight from Last 3 Encounters:   03/16/17 89.4 kg (197 lb)   03/02/17 88.5 kg (195 lb 1.7 oz)   02/21/17 89 kg (196 lb 4.8 oz)              We Performed the Following     DESTRUCT PREMALIGNANT LESION, 2-14     DESTRUCT PREMALIGNANT LESION, FIRST     Surgical pathology exam          Where to get your medicines      These medications were sent to [facility]les - [address]  [address] [address]     Phone:  [phone]     metroNIDAZOLE 0.75 % cream          Primary Care Provider Office Phone # Fax #    Jamie Meraz PA-C [phone]       [facility] [place] [address]  [place] [address]        Thank you!     Thank you for choosing [facility]  for your care. Our goal is always to provide you with excellent care. Hearing back from our patients is one way we can continue to improve our services. Please take a few minutes to complete the written survey that you may receive in the mail after your visit with us. Thank you!             Your Updated Medication List - Protect others around you: Learn how to safely use, store and throw away your medicines at www.disposemymeds.org.          This list is accurate as of: 3/21/17 11:43 AM.  Always use your most recent med list.                    Brand Name Dispense Instructions for use    cholecalciferol 1000 UNIT tablet    vitamin D    100 tablet    Take 1 tablet (1,000 Units) by mouth daily       EYE VITAMINS PO      Take 1 tablet by mouth daily       DAILY MULTIVITAMIN PO      Take 1 chew tab by mouth daily       JUICE PLUS FIBRE PO          levothyroxine 100 MCG tablet    SYNTHROID/LEVOTHROID    90 tablet    Take 1 tablet (100 mcg) by mouth every morning (before breakfast)       melatonin 5 MG tablet      Take 10 mg by mouth At Bedtime Reported on 2/21/2017       metroNIDAZOLE 0.75 % cream    METROCREAM    60 g    every day, once daily to face       TUMS 500 MG chewable tablet   Generic drug:  calcium carbonate      Take 1 chew tab by mouth daily as needed       warfarin 5 MG tablet    COUMADIN    180 tablet    Take 1 tablet (5 mg) by mouth daily Or as directed by INR clinic. Current dose is 7.5 mg Mon,Wed, and Fri, 10 mg daily rest of week

## 2017-03-27 ENCOUNTER — ANTICOAGULATION THERAPY VISIT (OUTPATIENT)
Dept: NURSING | Facility: CLINIC | Age: 77
End: 2017-03-27
Payer: MEDICARE

## 2017-03-27 DIAGNOSIS — Z79.01 LONG-TERM (CURRENT) USE OF ANTICOAGULANTS: ICD-10-CM

## 2017-03-27 LAB — INR POINT OF CARE: 2.8 (ref 0.86–1.14)

## 2017-03-27 PROCEDURE — 99207 ZZC NO CHARGE NURSE ONLY: CPT

## 2017-03-27 PROCEDURE — 36416 COLLJ CAPILLARY BLOOD SPEC: CPT

## 2017-03-27 PROCEDURE — 85610 PROTHROMBIN TIME: CPT | Mod: QW

## 2017-03-27 NOTE — PROGRESS NOTES
ANTICOAGULATION FOLLOW-UP CLINIC VISIT    Patient Name:  Rigoberto Holguin  Date:  3/27/2017  Contact Type:  Face to Face    SUBJECTIVE:     Patient Findings     Positives No Problem Findings           OBJECTIVE    INR Protime   Date Value Ref Range Status   03/27/2017 2.8 (A) 0.86 - 1.14 Final       ASSESSMENT / PLAN  INR assessment THER    Recheck INR In: 6 WEEKS    INR Location Clinic      Anticoagulation Summary as of 3/27/2017     INR goal 2.0-3.0   Today's INR 2.8   Maintenance plan 7.5 mg (5 mg x 1.5) on Mon, Wed, Fri; 10 mg (5 mg x 2) all other days   Full instructions 7.5 mg on Mon, Wed, Fri; 10 mg all other days   Weekly total 62.5 mg   No change documented Aparna Robison   Plan last modified Aparna Robison (1/25/2016)   Next INR check 5/8/2017   Target end date     Indications   Long-term (current) use of anticoagulants [Z79.01] [Z79.01]         Anticoagulation Episode Summary     INR check location     Preferred lab     Send INR reminders to [facility]    Comments       Anticoagulation Care Providers     Provider Role Specialty Phone number    Jamie Meraz PA-C Responsible Physician Assistant [phone]            See the Encounter Report to view Anticoagulation Flowsheet and Dosing Calendar (Go to Encounters tab in chart review, and find the Anticoagulation Therapy Visit)        APARNA ROBISON

## 2017-03-27 NOTE — MR AVS SNAPSHOT
Rigoberto LORENZO Holguin   3/27/2017 8:30 AM   Anticoagulation Therapy Visit    Description:  76 year old male   Provider:  ALANA ANTI COAG   Department:  [facility] Nurse           INR as of 3/27/2017     Today's INR 2.8      Anticoagulation Summary as of 3/27/2017     INR goal 2.0-3.0   Today's INR 2.8   Full instructions 7.5 mg on Mon, Wed, Fri; 10 mg all other days   Next INR check 5/8/2017    Indications   Long-term (current) use of anticoagulants [Z79.01] [Z79.01]         Your next Anticoagulation Clinic appointment(s)     Mar 27, 2017  8:30 AM CDT   Anticoagulation Visit with BE ANTI COAG   [facility] ([facility])    [address]  [place] [phone]            May 08, 2017  8:30 AM CDT   Anticoagulation Visit with BE ANTI COAG   [facility] [place] ([facility])    [address]  [place] [phone]              Contact Numbers     [facility]  Please call [phone] with any problems or questions regarding your therapy, or to cancel or reschedule your appointment.          March 2017 Details    Sun Mon Tue Wed Thu Fri Sat        1               2               3               4                 5               6               7               8               9               10               11                 12               13               14               15               16               17               18                 19               20               21               22               23               24               25                 26               27      7.5 mg   See details      28      10 mg         29      7.5 mg         30      10 mg         31      7.5 mg           Date Details   03/27 This INR check               How to take your warfarin dose     To take:  7.5 mg Take 1.5 of the 5 mg tablets.    To take:  10 mg Take 2 of the 5 mg tablets.           April 2017 Details    Sun Mon Tue Wed Thu Fri Sat            1      10 mg           2      10 mg         3      7.5 mg         4      10 mg         5      7.5 mg         6      10 mg         7      7.5 mg         8      10 mg           9      10 mg         10      7.5 mg         11      10 mg         12      7.5 mg         13      10 mg         14      7.5 mg         15      10 mg           16      10 mg         17      7.5 mg         18      10 mg         19      7.5 mg         20      10 mg         21      7.5 mg         22      10 mg           23      10 mg         24      7.5 mg         25      10 mg         26      7.5 mg         27      10 mg         28      7.5 mg         29      10 mg           30      10 mg                Date Details   No additional details            How to take your warfarin dose     To take:  7.5 mg Take 1.5 of the 5 mg tablets.    To take:  10 mg Take 2 of the 5 mg tablets.           May 2017 Details    Sun Mon Tue Wed Thu Fri Sat      1      7.5 mg         2      10 mg         3      7.5 mg         4      10 mg         5      7.5 mg         6      10 mg           7      10 mg         8            9               10               11               12               13                 14               15               16               17               18               19               20                 21               22               23               24               25               26               27                 28               29               30               31                   Date Details   No additional details    Date of next INR:  5/8/2017         How to take your warfarin dose     To take:  7.5 mg Take 1.5 of the 5 mg tablets.    To take:  10 mg Take 2 of the 5 mg tablets.

## 2017-03-29 LAB — COPATH REPORT: NORMAL

## 2017-03-31 ENCOUNTER — TELEPHONE (OUTPATIENT)
Dept: DERMATOLOGY | Facility: CLINIC | Age: 77
End: 2017-03-31

## 2017-03-31 NOTE — TELEPHONE ENCOUNTER
Left message for patient to call  [facility] at [phone].    Message  Received: Today       Dereck Vanessa MD  [facility]                   Call patient. No skin cancer seen but we should see him in 3 months as I Reviewed photo and want to make sure this spot is gone              Surgical pathology exam   Status:  Final result   Visible to patient:  No (Not Released) Dx:  Neoplasm of uncertain behavior of skin Order: [id number]       Notes Recorded by Dereck Vanessa MD on 3/31/2017 at 8:41 AM  Call patient. No skin cancer seen but we should see him in 3 months as I Reviewed photo and want to make sure this spot is gone        10d ago       Copath Report Patient Name: SAVANNAH GARZA   MR#: [id number]   Specimen #: [id number]   Collected: 3/21/2017   Received: 3/22/2017   Reported: 3/29/2017 10:23   Ordering Phy(s): DERECK VANESSA     For improved result formatting, select 'View Enhanced Report Format'   under Linked Documents section.     SPECIMEN(S):   Skin, right upper arm     FINAL DIAGNOSIS:   Skin, arm, right upper:   - Abundant fragments of compact orthokeratosis - (see comment)                Aby Palma LPN

## 2017-04-03 ENCOUNTER — TELEPHONE (OUTPATIENT)
Dept: NEUROSURGERY | Facility: CLINIC | Age: 77
End: 2017-04-03

## 2017-04-03 NOTE — TELEPHONE ENCOUNTER
Pt called with question. I called him back.    A little over 4 weeks post lumbar decompression  Wants to know if he can golf.    Yes but should stop if he gets uncomfortable.  db

## 2017-04-04 NOTE — TELEPHONE ENCOUNTER
Pt called, No answer.  does not identify pt. Left general message for pt to call the [facility] clinic back at [phone]....Corina Duval RN

## 2017-04-05 NOTE — TELEPHONE ENCOUNTER
I left a message for patient to call [facility].  Rhonda Rodrigues RN

## 2017-04-07 NOTE — TELEPHONE ENCOUNTER
Results reviewed with patient.  He verbalized understanding and agreed with the plan.  3 month follow up scheduled.    Rhonda Rodrigues RN

## 2017-04-13 ENCOUNTER — TELEPHONE (OUTPATIENT)
Dept: FAMILY MEDICINE | Facility: CLINIC | Age: 77
End: 2017-04-13

## 2017-04-13 DIAGNOSIS — Z79.01 CHRONIC ANTICOAGULATION: ICD-10-CM

## 2017-04-13 DIAGNOSIS — Z86.718 HISTORY OF DVT OF LOWER EXTREMITY: ICD-10-CM

## 2017-04-13 RX ORDER — WARFARIN SODIUM 5 MG/1
5 TABLET ORAL DAILY
Qty: 180 TABLET | Refills: 0 | Status: SHIPPED | OUTPATIENT
Start: 2017-04-13 | End: 2017-04-20

## 2017-04-18 ENCOUNTER — ALLIED HEALTH/NURSE VISIT (OUTPATIENT)
Dept: NURSING | Facility: CLINIC | Age: 77
End: 2017-04-18
Payer: MEDICARE

## 2017-04-18 DIAGNOSIS — L57.0 ACTINIC KERATOSIS: ICD-10-CM

## 2017-04-18 PROCEDURE — 96567 PDT DSTR PRMLG LES SKN: CPT

## 2017-04-18 NOTE — MR AVS SNAPSHOT
After Visit Summary   4/18/2017    Rigoberto Holguin    MRN: [id number]           Patient Information     Date Of Birth          1940        Visit Information        Provider Department      4/18/2017 9:00 AM NURSE ONLY [facility]ER[facility] M [facility]        Today's Diagnoses     Actinic keratosis          Care Instructions    Photodynamic Therapy (PDT) Home Care Instructions  I will experience redness, swelling, pain and discomfort which will last 5-10 days. I may experience pinkness or redness that persists for 2-3 weeks but longer duration is possible in some individuals. Risks are infection, eye injury, blistering, reactivation of the cold sore virus, skin lightening, skin darkening or scarring. Multiple treatments may be required.   DAY OF TREATMENT  1) Wash treated area with mild cleanser.  Redness of the treated skin is expected and can vary significantly from person to person and treatment to treatment.  2) Apply SPF 50 before leaving your home/office and while driving to and from work.  3) Remain indoors if possible and avoid direct as well as indirect sunlight.  If your head or face were treated, wear a broad brimmed hat wile going to and from your car.  If your hands/arms were treated, were long sleeves and gloves.  4) Ice packs every 1-2 hours may be helpful as well.  5) In the evening, cleanse treated area gently.  Your skin will feel dry; keep treated area moisturized with a moisturizer.  6) If you have a history of cold sores, take (1) Valtrex 500mg tablet before bedtime, which will be prescribed by your physician.  DAY TWO  1) If you have a history of cold sores, take (1) Valtrex 500 mg tablets in the morning and in the evening on days 2 and 3.  2) You may take a shower.  Men should NOT shave if their face was treated.  3) Cleanse treated area gently.  4) Apply SPF 50  5) Most discomfort usually subsides by Day 3.  6) You should avoid direct sunlight and try to remain indoors on  Day 2.  The sensitivity to sunlight will significantly decrease after 48 hours.  7) You should soak the treated areas with as soft washcloth with cold water for 20 minutes every 4-6 hours.  Ice may be applied after the cold-water soaks, if this feels good.  The area should be patted dry and moisturized following the cold-water soaks.  8) Follow evening routine as you did on Day 1    DAYS 3- 7   1) Try to avoid direct sunlight and minimize incidental exposure for one week.  NO BEACHES!  Continue using SPF 50.  This is very important in protecting your newly rejuvenated skin.  2) You may begin applying make-up once any crusting has healed.  The area may be slightly red for a few weeks.  3) The skin will feel dry and tightened.  Moisturize once to twice daily.    Who should I call with questions?    [facility]: [phone]     [facility]: [phone]    For urgent needs outside of business hours call the [facility] at [phone] and ask for the dermatology resident on call                Follow-ups after your visit        Your next 10 appointments already scheduled     Apr 20, 2017  2:30 PM CDT   (Arrive by 2:15 PM)   Return Visit with Anna Marie Cruz PA-C   [facility] [place])    [address] [place] [id number] [phone]            May 08, 2017  8:30 AM CDT   Anticoagulation Visit with BE ANTI COAG   [facility] ([facility] [place])    [address] [facility] [facility] [id number] [phone]            Jun 12, 2017 11:15 AM CDT   (Arrive by 11:00 AM)   Return Visit with Ricky Mclaughlin MD   [facility] Neurosurgery ([facility] [place])    [address] [place] [id number] [phone]            Jun 27, 2017  9:00 AM CDT   [facility]  [place] ([facility])    [address]  [place] [address]   [phone]            Jul 11, 2017 10:30 AM CDT   Return Visit with Vickie Whalen MD   [facility] ([facility])    [address] [place] [address] [phone]            Dec 19, 2017 10:45 AM CST   Return Visit with Vickie Whalen MD   [facility] ([facility])    [address] [place] [address] [phone]              Who to contact     If you have questions or need follow up information about today's clinic visit or your schedule please contact [facility] directly at [phone].  Normal or non-critical lab and imaging results will be communicated to you by Kelanhart, letter or phone within 4 business days after the clinic has received the results. If you do not hear from us within 7 days, please contact the clinic through Kelanhart or phone. If you have a critical or abnormal lab result, we will notify you by phone as soon as possible.  Submit refill requests through Cardio control or call your pharmacy and they will forward the refill request to us. Please allow 3 business days for your refill to be completed.          Additional Information About Your Visit        MyChart Information     Cardio control gives you secure access to your electronic health record. If you see a primary care provider, you can also send messages to your care team and make appointments. If you have questions, please call your primary care clinic.  If you do not have a primary care provider, please call [phone] and they will assist you.      Cardio control is an electronic gateway that provides easy, online access to your medical records. With Cardio control, you can request a clinic appointment, read your test results, renew a prescription or communicate with your care team.     To access your existing account, please contact your [facility] or call [phone] for assistance.        Care EveryWhere ID     This is your Care EveryWhere ID. This could be used by other organizations to access your [facility] medical records  [id number]         Blood Pressure from Last 3 Encounters:   03/16/17 110/62   03/02/17 117/80   02/21/17 111/68    Weight from Last 3 Encounters:   03/16/17 89.4 kg (197 lb)   03/02/17 88.5 kg (195 lb 1.7 oz)   02/21/17 89 kg (196 lb 4.8 oz)              We Performed the Following     C AMINOLEVULINIC ACID HCL TOP, 354 MG     PHOTODYNAMIC THERAPY - (PDT)        Primary Care Provider Office Phone # Fax #    Jamie Meraz PA-C [phone]       [facility] [place] [address] [place] [address]        Thank you!     Thank you for choosing [facility]  for your care. Our goal is always to provide you with excellent care. Hearing back from our patients is one way we can continue to improve our services. Please take a few minutes to complete the written survey that you may receive in the mail after your visit with us. Thank you!             Your Updated Medication List - Protect others around you: Learn how to safely use, store and throw away your medicines at www.disposemymeds.org.          This list is accurate as of: 4/18/17 10:21 AM.  Always use your most recent med list.                   Brand Name Dispense Instructions for use    cholecalciferol 1000 UNIT tablet    vitamin D    100 tablet    Take 1 tablet (1,000 Units) by mouth daily       EYE VITAMINS PO      Take 1 tablet by mouth daily       DAILY MULTIVITAMIN PO      Take 1 chew tab by mouth daily       JUICE PLUS FIBRE PO          levothyroxine 100 MCG tablet    SYNTHROID/LEVOTHROID    90 tablet    Take 1 tablet (100 mcg) by mouth every morning (before breakfast)       melatonin 5 MG tablet      Take 10 mg by mouth At Bedtime Reported on 2/21/2017       metroNIDAZOLE 0.75 % cream    METROCREAM    60 g    every day, once  daily to face       TUMS 500 MG chewable tablet   Generic drug:  calcium carbonate      Take 1 chew tab by mouth daily as needed Reported on 4/18/2017       warfarin 5 MG tablet    COUMADIN    180 tablet    Take 1 tablet (5 mg) by mouth daily Or as directed by INR clinic. Current dose is 7.5 mg Mon,Wed, and Fri, 10 mg daily rest of week

## 2017-04-18 NOTE — NURSING NOTE
Photodynamic Therapy Intake:    1.Close monitoring for more significant reaction, and avoid saran wrap if YES to any of the following:  New medications since seen by physician? No (If yes, contact supervising physician)  NSAIDs, ibuprofen, aspirin, naproxen, piroxicam: No  Antiarrhythmics (amiodarone, quinidine): No  Hydrochlorothiazide:  No    2.Will hold PDT for YES to any of the following:  Pregnancy/breastfeeding/unknown pregnancy: N/A  Sun burn: No  Tetracyclines such as doxycycline: No  Ciprofloxacin: No   Methotrexate: No    3.Will hold LEVULAN for YES to any of the following:   Treatment today is for acne:No     The sites to be treated were verified and discussed with patient, sites verified were forehead and scalp.   Expected symptoms and/or complications of redness, peeling, stinging, and burning were reviewed.  Comfort measures including moisturizer, cool compresses, and sun protection measures were also reviewed with patient.  After review, patient verbalized understanding of procedure and consent form signed by patient(as per MD documentation) and patient agrees to proceed with treatment.   Treated area cleansed with medical grade acetone and Electrode Skin Prep Pad.  Applied 1 Levulan stick to the site/s.  Yes saran wrap applied to forehead and scalp    MEDICATION: Levulan  DOSE: 1.5 mL  ROUTE: Topical  : [employer]  LOCATION GIVEN: scalp and forehead  LOT NO: [id number]  EXP. DATE: 12/2019  NDC: 08145-580-47    TREATMENT NUMBER(30 maximum treatments per site): 3      Photodynamic Therapy(PDT) Post Op Note    Patient successfully completed PDT treatment today. Patient tolerated treatment without complication.  Sites were cleansed with mild soap and water and SPF was applied after treatment.     Sun protection was reviewed with patient:  Patient brought personal hat    After care instructions were reviewed with patient. AVS printed with clinic contact information and given to  patient. Patient verbalized understanding and had no further questions or concerns at this time. Patient left clinic in good condition.          Rigoberto Holguin comes into clinic today at the request of Dr. Whalen Ordering Provider for PDT.    This service provided today was under the supervising provider of the day Dr. Ferrell, who was available if needed.    Thao Moore LPN

## 2017-04-20 ENCOUNTER — TELEPHONE (OUTPATIENT)
Dept: FAMILY MEDICINE | Facility: CLINIC | Age: 77
End: 2017-04-20

## 2017-04-20 ENCOUNTER — OFFICE VISIT (OUTPATIENT)
Dept: NEUROSURGERY | Facility: CLINIC | Age: 77
End: 2017-04-20

## 2017-04-20 VITALS
BODY MASS INDEX: 30.1 KG/M2 | SYSTOLIC BLOOD PRESSURE: 116 MMHG | DIASTOLIC BLOOD PRESSURE: 51 MMHG | WEIGHT: 198.6 LBS | HEART RATE: 69 BPM | HEIGHT: 68 IN | TEMPERATURE: 98.5 F

## 2017-04-20 DIAGNOSIS — Z79.01 CHRONIC ANTICOAGULATION: ICD-10-CM

## 2017-04-20 DIAGNOSIS — Z86.718 HISTORY OF DVT OF LOWER EXTREMITY: ICD-10-CM

## 2017-04-20 DIAGNOSIS — M54.16 LUMBAR RADICULAR PAIN: ICD-10-CM

## 2017-04-20 DIAGNOSIS — M47.26 OSTEOARTHRITIS OF SPINE WITH RADICULOPATHY, LUMBAR REGION: Primary | ICD-10-CM

## 2017-04-20 DIAGNOSIS — Z98.890 POST-OPERATIVE STATE: ICD-10-CM

## 2017-04-20 RX ORDER — WARFARIN SODIUM 5 MG/1
5 TABLET ORAL DAILY
Qty: 180 TABLET | Refills: 0 | Status: SHIPPED | OUTPATIENT
Start: 2017-04-20 | End: 2017-04-24

## 2017-04-20 RX ORDER — WARFARIN SODIUM 5 MG/1
5 TABLET ORAL DAILY
Qty: 180 TABLET | Refills: 0 | Status: CANCELLED | OUTPATIENT
Start: 2017-04-20

## 2017-04-20 ASSESSMENT — PAIN SCALES - GENERAL: PAINLEVEL: NO PAIN (0)

## 2017-04-20 NOTE — TELEPHONE ENCOUNTER
Call to pt , explained to pt it was sent by me 4-13 , he states Jayne didn't receive it now he is out , will send to clinic pharmacy for him to  today

## 2017-04-20 NOTE — TELEPHONE ENCOUNTER
WARFARIN    Last Written Prescription Date: ?  Last Fill Qty: ?, # refills: 0  Last Office Visit with [facility], P or [facility] prescribing provider: 2-9-17  Next 5 appointments (look out 90 days)     Jun 27, 2017  9:00 AM CDT   Return Visit with Vincent Rucker MD, [facility] [address]   [phone]            Jul 11, 2017 10:30 AM CDT   Return Visit with Vickie Whalen MD   [facility] ([facility])    [address] [place] [address] [phone]                   Date and Result of Last PT/INR:   Lab Results   Component Value Date    INR 2.8 03/27/2017    INR 1.9 03/13/2017    INR 3.09 02/21/2017    INR 2.10 12/01/2009

## 2017-04-20 NOTE — LETTER
4/20/2017       RE: Rigoberto Holguin  [address] [address] [facility] [address]     Dear Colleague,    Thank you for referring your patient, Rigoberto Holguin, to the [facility] NEUROSURGERY at [facility]. Please see a copy of my visit note below.    NEUROSURGERY POST OP CHECK  DATE OF VISIT:  4/20/2017    Date of Surgery 3/2/17  Dr Mclaughlin  DIAGNOSIS: Left-sided L5 radiculopathy. .     PROCEDURES PERFORMED:   1. Left-sided L4-5 hemilaminectomy and foraminotomy.  2. Left-sided L5-S1 hemilaminectomy and foraminotomy.       INDICATIONS FOR PROCEDURE and HPI   Mr. Rigoberto Holguin is a 76-year-old gentleman who presented to the Neurosurgery Clinic at the [facility] with concerns of low back pain with radicular symptoms primarily which was thought to be an L5 distribution. On imaging, he was found to have significant stenosis at the L4-5 and L5-S1 foramen, for which the above-mentioned procedure was recommended. The procedure itself was without incident.  He recovered well and was discharged home on in good condition.  Since then his back pain has diminished.  He gets pretty sore if he's up doing activities.  Muscles tighten up if he's doing something slightly bent forward.  He's not exercising. He never had much leg pain, only weakness, and he hasn't noticed much improvement there, still can't stand on his toes.  He understands that may or may not improve.  He presents for routine 6 week visit.    STATUS REPORT  Patient Supplied Answers To the [facility] Pain Questionnaire  [facility] Pain -  Patient Entered Questionnaire/Answers 4/20/2017   What number best describes your pain right now:  0 = No pain  to  10 = Worst pain imaginable 0   How would you describe the pain? dull, aching   Which of the following worsen your pain? walking   Which of the following improve or reduce your pain?  sitting   What number best describes your average pain for the past week:  0 = No pain  to  10 = Worst pain imaginable 4    What number best describes your LOWEST pain in past 24 hours:  0 = No pain  to  10 = Worst pain imaginable 0   What number best describes your WORST pain in past 24 hours:  0 = No pain  to  10 = Worst pain imaginable 6   When is your pain worst? Constant   What non-medicine treatments have you already had for your pain? none   Have you tried treating your pain with medication?  No   Are you currently taking medications for your pain? No       Current Outpatient Prescriptions:      warfarin (COUMADIN) 5 MG tablet, Take 1 tablet (5 mg) by mouth daily Or as directed by INR clinic. Current dose is 7.5 mg Mon,Wed, and Fri, 10 mg daily rest of week, Disp: 180 tablet, Rfl: 0     metroNIDAZOLE (METROCREAM) 0.75 % cream, every day, once daily to face, Disp: 60 g, Rfl: 3     Multiple Vitamins-Minerals (EYE VITAMINS PO), Take 1 tablet by mouth daily, Disp: , Rfl:      melatonin 5 MG tablet, Take 10 mg by mouth At Bedtime Reported on 2/21/2017, Disp: , Rfl:      levothyroxine (SYNTHROID,LEVOTHROID) 100 MCG tablet, Take 1 tablet (100 mcg) by mouth every morning (before breakfast), Disp: 90 tablet, Rfl: 2     cholecalciferol (VITAMIN D) 1000 UNIT tablet, Take 1 tablet (1,000 Units) by mouth daily, Disp: 100 tablet, Rfl: 0     Nutritional Supplements (JUICE PLUS FIBRE PO), , Disp: , Rfl:      calcium carbonate (TUMS) 500 MG chewable tablet, Take 1 chew tab by mouth daily as needed Reported on 4/18/2017, Disp: , Rfl:      Multiple Vitamin (DAILY MULTIVITAMIN PO), Take 1 chew tab by mouth daily , Disp: , Rfl:      [DISCONTINUED] warfarin (COUMADIN) 5 MG tablet, Take 1 tablet (5 mg) by mouth daily Or as directed by INR clinic. Current dose is 7.5 mg Mon,Wed, and Fri, 10 mg daily rest of week, Disp: 180 tablet, Rfl: 0  No Known Allergies  PMH, SOC HIST, FAM HIST, PROBLEM LIST:  All reviewed in EPIC.    OBJECTIVE:  /51 (BP Location: Left arm, Patient Position: Chair, Cuff Size: Adult Large)  Pulse 69  Temp 98.5  F (36.9  C)  "(Oral)  Ht 1.727 m (5' 8\")  Wt 90.1 kg (198 lb 9.6 oz)  BMI 30.2 kg/m2    Imaging:  None new.    EXAM:  Well developed well nourished male found seated comfortably in exam chair.  No apparent distress. He is accompanied by wife.  A&O X3.  Mood and affect WNL. Language and fund of knowledge intact.  Is able to sit and rise independently. He is able to rise up to tip-toe with both feet, but unable to sustain with just the left.  I'd grade his gastroc at 4/5 (3/5 preop).  He has a nicely healed incision.     Assessment:  1. Osteoarthritis of spine with radiculopathy, lumbar region    2. Lumbar radicular pain    3. Post-operative state      Rigoberto Holguin is doing well 6 weeks post decompression with some improvement in left calf muscle.  The wound is healthy. He's ready to start getting back into shape.  We discussed how to do that.  See below.    PLAN:  We discussed medication.    *  Medications prescribed today:   none  We discussed activities and return to work.  *   Start PT 3-5 visits for Home Ex Program.  *   We recommend Yoga or Pilate's as regular exercise for joint strength and flexibility.  *   Avoid excessively heavy lifting and contact sports.  *    May use the recumbent elliptical and may golf.  *   We recommend he return to normal activities as able.  We discussed follow up    *  All the patient's questions have been answered and they demonstrate good understanding of the above.    *  Return to clinic in 6 weeks with Dr Mclaughlin, he already has that appt.  Imaging for that visit: none  *   The patient has our contact information and is aware that he should call if he has questions comments or concerns.     We appreciate the opportunity to be of service in the care of this pleasant patient.  Please do call if there is anything more we can do    Anna Marie Cruz PA-C  [facility]  Department of Neurosurgery  Phone: [phone]  Fax: [phone]  "

## 2017-04-20 NOTE — NURSING NOTE
Chief Complaint   Patient presents with     RECHECK     [facility] RETURN - 6 Weeks F/U     Esther Cote MA

## 2017-04-20 NOTE — MR AVS SNAPSHOT
After Visit Summary   4/20/2017    Rigoberto Holguin    MRN: [id number]           Patient Information     Date Of Birth          1940        Visit Information        Provider Department      4/20/2017 2:30 PM Anna Marie Cruz PA-C [facility] Neurosurgery        Today's Diagnoses     Osteoarthritis of spine with radiculopathy, lumbar region    -  1    Lumbar radicular pain        Post-operative state           Follow-ups after your visit        Additional Services     PT Evaluation and Treatment (Internal Referral) [2563]       Your provider has referred you to: ABRAHAM Garcia      Please be aware that coverage of these services is subject to the terms and limitations of your health insurance plan.  Call member services at your health plan with any benefit or coverage questions.      Treatment: Evaluation & Treatment  Special Instructions: 3-5 visits to learn a Home exercise program    Please bring the following to your appointment:  >>   Any x-rays, CTs or MRIs which have been performed.  Contact the facility where they were done to arrange for  prior to your scheduled appointment.  Any new CT, MRI or other procedures ordered by your specialist must be performed at a [place] facility or coordinated by your clinic's referral office.    >>   List of current medications   >>   This referral request   >>   Any documents/labs given to you for this referral                  Your next 10 appointments already scheduled     May 08, 2017  8:30 AM CDT   Anticoagulation Visit with BE ANTI COAG   [place] Hyun Garcia ([facility] [place])    [address]  [place] [address] [phone]            Jun 12, 2017 11:15 AM CDT   (Arrive by 11:00 AM)   Return Visit with MD LORENZO Chaudhari [facility] Neurosurgery ([facility])    [address]  [address]rd [facility] [address] [phone]            Jun 27, 2017  9:00 AM CDT   Return Visit with Vincent  Ezio Rucker MD, SAMUEL CYSTO PROC ROOM   [facility]    [address] [place] [address]   [phone]            Jul 11, 2017 10:30 AM CDT   Return Visit with Vickie Whalen MD   [facility]    [address] [facility] [address] [phone]            Dec 19, 2017 10:45 AM CST   Return Visit with Vickie Whalen MD   [facility] ([facility])    [address] [facility] [address] [phone]              Who to contact     Please call your clinic at [phone] to:    Ask questions about your health    Make or cancel appointments    Discuss your medicines    Learn about your test results    Speak to your doctor   If you have compliments or concerns about an experience at your clinic, or if you wish to file a complaint, please contact [facility] Patient Relations at [phone] or email us at [email or web address]         Additional Information About Your Visit        [employer] Information     Server[employer]ilot gives you secure access to your electronic health record. If you see a primary care provider, you can also send messages to your care team and make appointments. If you have questions, please call your primary care clinic.  If you do not have a primary care provider, please call [phone] and they will assist you.      ServerPilot is an electronic gateway that provides easy, online access to your medical records. With Server[employer], you can request a clinic appointment, read your test results, renew a prescription or communicate with your care team.     To access your existing account, please contact your [facility] or call [phone] for assistance.        Care EveryWhere ID     This is your Care EveryWhere ID. This could be used by other organizations to access your [facility]  "records  [id number]        Your Vitals Were     Pulse Temperature Height BMI (Body Mass Index)          69 98.5  F (36.9  C) (Oral) 1.727 m (5' 8\") 30.2 kg/m2         Blood Pressure from Last 3 Encounters:   04/20/17 116/51   03/16/17 110/62   03/02/17 117/80    Weight from Last 3 Encounters:   04/20/17 90.1 kg (198 lb 9.6 oz)   03/16/17 89.4 kg (197 lb)   03/02/17 88.5 kg (195 lb 1.7 oz)              We Performed the Following     PT Evaluation and Treatment (Internal Referral) [4413]          Where to get your medicines      These medications were sent to [facility] [address], [place] [address]     Phone:  [phone]     warfarin 5 MG tablet          Primary Care Provider Office Phone # Fax #    Jamie Meraz PA-C [phone]       [facility] [address]  [place] [address]        Thank you!     Thank you for choosing [facility]  for your care. Our goal is always to provide you with excellent care. Hearing back from our patients is one way we can continue to improve our services. Please take a few minutes to complete the written survey that you may receive in the mail after your visit with us. Thank you!             Your Updated Medication List - Protect others around you: Learn how to safely use, store and throw away your medicines at www.disposemymeds.org.          This list is accurate as of: 4/20/17  3:56 PM.  Always use your most recent med list.                   Brand Name Dispense Instructions for use    cholecalciferol 1000 UNIT tablet    vitamin D    100 tablet    Take 1 tablet (1,000 Units) by mouth daily       EYE VITAMINS PO      Take 1 tablet by mouth daily       DAILY MULTIVITAMIN PO      Take 1 chew tab by mouth daily       JUICE PLUS FIBRE PO          levothyroxine 100 MCG tablet    SYNTHROID/LEVOTHROID    90 tablet    Take 1 tablet (100 mcg) by mouth every morning (before breakfast)    "    melatonin 5 MG tablet      Take 10 mg by mouth At Bedtime Reported on 2/21/2017       metroNIDAZOLE 0.75 % cream    METROCREAM    60 g    every day, once daily to face       TUMS 500 MG chewable tablet   Generic drug:  calcium carbonate      Take 1 chew tab by mouth daily as needed Reported on 4/18/2017       warfarin 5 MG tablet    COUMADIN    180 tablet    Take 1 tablet (5 mg) by mouth daily Or as directed by INR clinic. Current dose is 7.5 mg Mon,Wed, and Fri, 10 mg daily rest of week

## 2017-04-20 NOTE — PROGRESS NOTES
NEUROSURGERY POST OP CHECK  DATE OF VISIT:  4/20/2017    Date of Surgery 3/2/17  Dr Mclaughlin  DIAGNOSIS: Left-sided L5 radiculopathy. .     PROCEDURES PERFORMED:   1. Left-sided L4-5 hemilaminectomy and foraminotomy.  2. Left-sided L5-S1 hemilaminectomy and foraminotomy.       INDICATIONS FOR PROCEDURE and HPI   Mr. Rigoberto Holguin is a 76-year-old gentleman who presented to the Neurosurgery Clinic at the [facility] with concerns of low back pain with radicular symptoms primarily which was thought to be an L5 distribution. On imaging, he was found to have significant stenosis at the L4-5 and L5-S1 foramen, for which the above-mentioned procedure was recommended. The procedure itself was without incident.  He recovered well and was discharged home on in good condition.  Since then his back pain has diminished.  He gets pretty sore if he's up doing activities.  Muscles tighten up if he's doing something slightly bent forward.  He's not exercising. He never had much leg pain, only weakness, and he hasn't noticed much improvement there, still can't stand on his toes.  He understands that may or may not improve.  He presents for routine 6 week visit.    STATUS REPORT  Patient Supplied Answers To the [facility] Pain Questionnaire  [facility] Pain -  Patient Entered Questionnaire/Answers 4/20/2017   What number best describes your pain right now:  0 = No pain  to  10 = Worst pain imaginable 0   How would you describe the pain? dull, aching   Which of the following worsen your pain? walking   Which of the following improve or reduce your pain?  sitting   What number best describes your average pain for the past week:  0 = No pain  to  10 = Worst pain imaginable 4   What number best describes your LOWEST pain in past 24 hours:  0 = No pain  to  10 = Worst pain imaginable 0   What number best describes your WORST pain in past 24 hours:  0 = No pain  to  10 = Worst pain imaginable 6   When is your pain worst? Constant   What non-medicine  "treatments have you already had for your pain? none   Have you tried treating your pain with medication?  No   Are you currently taking medications for your pain? No       Current Outpatient Prescriptions:      warfarin (COUMADIN) 5 MG tablet, Take 1 tablet (5 mg) by mouth daily Or as directed by INR clinic. Current dose is 7.5 mg Mon,Wed, and Fri, 10 mg daily rest of week, Disp: 180 tablet, Rfl: 0     metroNIDAZOLE (METROCREAM) 0.75 % cream, every day, once daily to face, Disp: 60 g, Rfl: 3     Multiple Vitamins-Minerals (EYE VITAMINS PO), Take 1 tablet by mouth daily, Disp: , Rfl:      melatonin 5 MG tablet, Take 10 mg by mouth At Bedtime Reported on 2/21/2017, Disp: , Rfl:      levothyroxine (SYNTHROID,LEVOTHROID) 100 MCG tablet, Take 1 tablet (100 mcg) by mouth every morning (before breakfast), Disp: 90 tablet, Rfl: 2     cholecalciferol (VITAMIN D) 1000 UNIT tablet, Take 1 tablet (1,000 Units) by mouth daily, Disp: 100 tablet, Rfl: 0     Nutritional Supplements (JUICE PLUS FIBRE PO), , Disp: , Rfl:      calcium carbonate (TUMS) 500 MG chewable tablet, Take 1 chew tab by mouth daily as needed Reported on 4/18/2017, Disp: , Rfl:      Multiple Vitamin (DAILY MULTIVITAMIN PO), Take 1 chew tab by mouth daily , Disp: , Rfl:      [DISCONTINUED] warfarin (COUMADIN) 5 MG tablet, Take 1 tablet (5 mg) by mouth daily Or as directed by INR clinic. Current dose is 7.5 mg Mon,Wed, and Fri, 10 mg daily rest of week, Disp: 180 tablet, Rfl: 0  No Known Allergies  PMH, SOC HIST, FAM HIST, PROBLEM LIST:  All reviewed in EPIC.    OBJECTIVE:  /51 (BP Location: Left arm, Patient Position: Chair, Cuff Size: Adult Large)  Pulse 69  Temp 98.5  F (36.9  C) (Oral)  Ht 1.727 m (5' 8\")  Wt 90.1 kg (198 lb 9.6 oz)  BMI 30.2 kg/m2    Imaging:  None new.    EXAM:  Well developed well nourished male found seated comfortably in exam chair.  No apparent distress. He is accompanied by wife.  A&O X3.  Mood and affect WNL. Language and " fund of knowledge intact.  Is able to sit and rise independently. He is able to rise up to tip-toe with both feet, but unable to sustain with just the left.  I'd grade his gastroc at 4/5 (3/5 preop).  He has a nicely healed incision.     Assessment:  1. Osteoarthritis of spine with radiculopathy, lumbar region    2. Lumbar radicular pain    3. Post-operative state      Rigoberto Holguin is doing well 6 weeks post decompression with some improvement in left calf muscle.  The wound is healthy. He's ready to start getting back into shape.  We discussed how to do that.  See below.    PLAN:  We discussed medication.    *  Medications prescribed today:   none  We discussed activities and return to work.  *   Start PT 3-5 visits for Home Ex Program.  *   We recommend Yoga or Pilate's as regular exercise for joint strength and flexibility.  *   Avoid excessively heavy lifting and contact sports.  *    May use the recumbent elliptical and may golf.  *   We recommend he return to normal activities as able.  We discussed follow up    *  All the patient's questions have been answered and they demonstrate good understanding of the above.    *  Return to clinic in 6 weeks with Dr Mclaughlin, he already has that appt.  Imaging for that visit: none  *   The patient has our contact information and is aware that he should call if he has questions comments or concerns.     We appreciate the opportunity to be of service in the care of this pleasant patient.  Please do call if there is anything more we can do    Anna Marie Cruz PA-C  [facility]  Department of Neurosurgery  Phone: [phone]  Fax: [phone]

## 2017-04-20 NOTE — TELEPHONE ENCOUNTER
Rigoberto is out of Warfarin.  He normally uses [facility] mail order pharmacy, but they say they did not get a refill request (which Rigoberto javed should have been done already?)  Rigoberto will need refill today from clinic pharmacy.  Call when done.

## 2017-04-24 DIAGNOSIS — Z86.718 HISTORY OF DVT OF LOWER EXTREMITY: ICD-10-CM

## 2017-04-24 DIAGNOSIS — Z79.01 CHRONIC ANTICOAGULATION: ICD-10-CM

## 2017-04-24 RX ORDER — WARFARIN SODIUM 5 MG/1
5 TABLET ORAL DAILY
Qty: 180 TABLET | Refills: 0 | Status: SHIPPED | OUTPATIENT
Start: 2017-04-24 | End: 2017-10-23

## 2017-04-24 NOTE — TELEPHONE ENCOUNTER
WARFARIN    Last Written Prescription Date: ?  Last Fill Qty: ?, # refills: 0  Last Office Visit with [facility], P or [facility] prescribing provider: 2-9-17  Next 5 appointments (look out 90 days)     Jun 27, 2017  9:00 AM CDT   Return Visit with Vincent Rucker MD, SAMUEL CYSTO PROC ROOM   [facility] ([address] [address]   [phone]            Jul 11, 2017 10:30 AM CDT   Return Visit with Vickie Whalen MD   [facility]    [address] [place] [address] [phone]                   Date and Result of Last PT/INR:   Lab Results   Component Value Date    INR 2.8 03/27/2017    INR 1.9 03/13/2017    INR 3.09 02/21/2017    INR 2.10 12/01/2009

## 2017-05-01 ENCOUNTER — OFFICE VISIT (OUTPATIENT)
Dept: FAMILY MEDICINE | Facility: CLINIC | Age: 77
End: 2017-05-01
Payer: MEDICARE

## 2017-05-01 VITALS
TEMPERATURE: 98.2 F | BODY MASS INDEX: 30.01 KG/M2 | HEART RATE: 68 BPM | OXYGEN SATURATION: 95 % | RESPIRATION RATE: 18 BRPM | WEIGHT: 198 LBS | SYSTOLIC BLOOD PRESSURE: 117 MMHG | DIASTOLIC BLOOD PRESSURE: 70 MMHG | HEIGHT: 68 IN

## 2017-05-01 DIAGNOSIS — R10.32 LLQ ABDOMINAL PAIN: ICD-10-CM

## 2017-05-01 DIAGNOSIS — Z79.01 LONG-TERM (CURRENT) USE OF ANTICOAGULANTS: Primary | ICD-10-CM

## 2017-05-01 LAB
ALBUMIN UR-MCNC: NEGATIVE MG/DL
APPEARANCE UR: CLEAR
BACTERIA #/AREA URNS HPF: ABNORMAL /HPF
BILIRUB UR QL STRIP: NEGATIVE
COLOR UR AUTO: YELLOW
ERYTHROCYTE [DISTWIDTH] IN BLOOD BY AUTOMATED COUNT: 14.4 % (ref 10–15)
GLUCOSE UR STRIP-MCNC: NEGATIVE MG/DL
HCT VFR BLD AUTO: 43 % (ref 40–53)
HGB BLD-MCNC: 14.2 G/DL (ref 13.3–17.7)
HGB UR QL STRIP: ABNORMAL
KETONES UR STRIP-MCNC: NEGATIVE MG/DL
LEUKOCYTE ESTERASE UR QL STRIP: NEGATIVE
MCH RBC QN AUTO: 33.6 PG (ref 26.5–33)
MCHC RBC AUTO-ENTMCNC: 33 G/DL (ref 31.5–36.5)
MCV RBC AUTO: 102 FL (ref 78–100)
MUCOUS THREADS #/AREA URNS LPF: PRESENT /LPF
NITRATE UR QL: NEGATIVE
PH UR STRIP: 5.5 PH (ref 5–7)
PLATELET # BLD AUTO: 169 10E9/L (ref 150–450)
RBC # BLD AUTO: 4.22 10E12/L (ref 4.4–5.9)
RBC #/AREA URNS AUTO: ABNORMAL /HPF (ref 0–2)
SP GR UR STRIP: 1.02 (ref 1–1.03)
URN SPEC COLLECT METH UR: ABNORMAL
UROBILINOGEN UR STRIP-ACNC: 0.2 EU/DL (ref 0.2–1)
WBC # BLD AUTO: 6.5 10E9/L (ref 4–11)
WBC #/AREA URNS AUTO: ABNORMAL /HPF (ref 0–2)

## 2017-05-01 PROCEDURE — 99214 OFFICE O/P EST MOD 30 MIN: CPT | Performed by: PHYSICIAN ASSISTANT

## 2017-05-01 PROCEDURE — 85027 COMPLETE CBC AUTOMATED: CPT | Performed by: PHYSICIAN ASSISTANT

## 2017-05-01 PROCEDURE — 36415 COLL VENOUS BLD VENIPUNCTURE: CPT | Performed by: PHYSICIAN ASSISTANT

## 2017-05-01 PROCEDURE — 81001 URINALYSIS AUTO W/SCOPE: CPT | Performed by: PHYSICIAN ASSISTANT

## 2017-05-01 NOTE — PROGRESS NOTES
SUBJECTIVE:                                                    Rigoberto Holguin is a 76 year old male who presents to clinic today for the following health issues:      FLANK PAIN     Onset: 3 days    Description:   Character: Sharp  Location: left flank  Radiation: None    Intensity: moderate    Progression of Symptoms:  waxing and waning    Accompanying Signs & Symptoms:  Fever/Chills?: no   Gas/Bloating: YES  Nausea: no   Vomitting: no   Diarrhea?: YES  Constipation:no   Dysuria or Hematuria: no    History:   Trauma: no   Previous similar pain: no    Previous tests done: none    Precipitating factors:   Does the pain change with:     Food: pain starts right after eating or after any physical activity     BM: no     Urination: no     Alleviating factors:      Therapies Tried and outcome:     LMP:  not applicable     Was doing some raking and shoveling and heavier work last week. Denies n/v/d/c. Meals occasionally influence it. Some pain with vacuuming this am. Symptoms x 2 days. No fevers. No low back pain currently . No dysuria. No acute urinary frequency. No hesitancy. Nocturia x 1-2 . No testicular pain .   No rashes.     Problem list and histories reviewed & adjusted, as indicated.  Additional history: as documented        Reviewed and updated as needed this visit by clinical staff  Tobacco  Allergies  Meds  Med Hx  Surg Hx  Fam Hx  Soc Hx      Reviewed and updated as needed this visit by Provider         All other systems negative except as outline above  OBJECTIVE:  Eye exam - right eye normal lid, conjunctiva, cornea, pupil and fundus, left eye normal lid, conjunctiva, cornea, pupil and fundus.  CHEST:chest clear to IPPA, no tachypnea, retractions or cyanosis and S1, S2 normal, no murmur, no gallop, rate regular.  The abdomen is soft without tenderness, guarding, mass, rebound or organomegaly. Bowel sounds are normal. No CVA tenderness or inguinal adenopathy noted.  Genitals normal; both testes normal  without tenderness, masses, hydroceles, varicoceles, erythema or swelling. Shaft normal, uncircumcised, meatus normal without discharge. No inguinal hernia noted. No inguinal lymphadenopathy.  No rash.    Rigoberto was seen today for flank pain.    Diagnoses and all orders for this visit:    Long-term (current) use of anticoagulants [Z79.01]  -     INR CLINIC REFERRAL  -     Urine Microscopic    LLQ abdominal pain  -     *UA reflex to Microscopic and Culture ([facility] (except [place] and [place])  -     CBC with platelets    query a abdominal muscle strain.   Advised supportive and symptomatic treatment.  Follow up with Provider - if condition persists or worsens.

## 2017-05-01 NOTE — MR AVS SNAPSHOT
After Visit Summary   5/1/2017    Rigoberto Holguin    MRN: [id number]           Patient Information     Date Of Birth          1940        Visit Information        Provider Department      5/1/2017 12:00 PM Jamie Meraz PA-C [facility]        Today's Diagnoses     Long-term (current) use of anticoagulants [Z79.01]    -  1    LLQ abdominal pain           Follow-ups after your visit        Additional Services     INR CLINIC REFERRAL       Your provider has referred you to INR Services.    Please be aware that coverage of these services is subject to the terms and limitations of your health insurance plan.  Call member services at your health plan with any benefit or coverage questions.    Indication for Anticoagulation: DVT (first time)  Other: factor V leiden  If nonstandard INR is desired, indicate goal range and explanation: 2-3  Expected Duration of Therapy: Lifetime                  Your next 10 appointments already scheduled     May 02, 2017  8:20 AM CDT   ROVERTO Spine with Nahomy Brizuela PT   [facility] [place] [facility] ([facility] [place])    [address]  [place] [address]   [phone]            May 08, 2017  8:30 AM CDT   Anticoagulation Visit with BE ANTI COAG   [facility] [place] ([facility])    [address]  [place] [address]   [phone]            May 09, 2017  8:20 AM CDT   ROVERTO Spine with Jaleel Castellon PTA   [facility] [place] [facility] ([facility] [place])    [address]  [address]  [place] [address]   [phone]            May 16, 2017  8:20 AM CDT   ROVERTO Spine with Jaleel Castellon PTA   [facility] [place] [facility] ([facility] [place])    [address]  [address]  [place] [address]   [phone]            Jun 12, 2017 11:15 AM CDT   (Arrive by 11:00 AM)   Return Visit with Ricky Mclaughlin MD   [facility] Neurosurgery [facility]    [address]  3rd Floor  [facility] [address]   [phone]            Jun 27, 2017  9:00 AM CDT   Return Visit with Vincent Rucker MD, SAMUEL CYSTO PROC ROOM   [facility] ([facility])    [address] [place] [address] [address]   [phone]            Jul 11, 2017 10:30 AM CDT   Return Visit with Vickie Whalen MD   [facility]    [address] [place] [address] [phone]            Dec 19, 2017 10:45 AM CST   Return Visit with Vickie Whalen MD   [facility])    [address] [place] [address] [phone]              Who to contact     Normal or non-critical lab and imaging results will be communicated to you by Sport[employer], letter or phone within 4 business days after the clinic has received the results. If you do not hear from us within 7 days, please contact the clinic through [employer] or phone. If you have a critical or abnormal lab result, we will notify you by phone as soon as possible.  Submit refill requests through [employer] or call your pharmacy and they will forward the refill request to us. Please allow 3 business days for your refill to be completed.          If you need to speak with a  for additional information , please call: [phone]             Additional Information About Your Visit        [employer] Information     [employer] gives you secure access to your electronic health record. If you see a primary care provider, you can also send messages to your care team and make appointments. If you have questions, please call your primary care clinic.  If you do not have a primary care provider, please call [phone] and they will assist you.        Care EveryWhere ID     This is your Care EveryWhere ID. This could be used by other organizations to access your [facility]  "records  [id number]        Your Vitals Were     Pulse Temperature Respirations Height Pulse Oximetry BMI (Body Mass Index)    68 98.2  F (36.8  C) (Tympanic) 18 5' 8\" (1.727 m) 95% 30.11 kg/m2       Blood Pressure from Last 3 Encounters:   05/01/17 117/70   04/20/17 116/51   03/16/17 110/62    Weight from Last 3 Encounters:   05/01/17 198 lb (89.8 kg)   04/20/17 198 lb 9.6 oz (90.1 kg)   03/16/17 197 lb (89.4 kg)              We Performed the Following     *UA reflex to Microscopic and Culture ([facility] (except [place] and [place])     CBC with platelets     INR CLINIC REFERRAL     Urine Microscopic        Primary Care Provider Office Phone # Fax #    Jamie Meraz PA-C [phone]       [facility] [place] [address]  [place] [address]        Thank you!     Thank you for choosing [facility]  for your care. Our goal is always to provide you with excellent care. Hearing back from our patients is one way we can continue to improve our services. Please take a few minutes to complete the written survey that you may receive in the mail after your visit with us. Thank you!             Your Updated Medication List - Protect others around you: Learn how to safely use, store and throw away your medicines at www.disposemymeds.org.          This list is accurate as of: 5/1/17  1:36 PM.  Always use your most recent med list.                   Brand Name Dispense Instructions for use    cholecalciferol 1000 UNIT tablet    vitamin D    100 tablet    Take 1 tablet (1,000 Units) by mouth daily       EYE VITAMINS PO      Take 1 tablet by mouth daily       DAILY MULTIVITAMIN PO      Take 1 chew tab by mouth daily       JUICE PLUS FIBRE PO          levothyroxine 100 MCG tablet    SYNTHROID/LEVOTHROID    90 tablet    Take 1 tablet (100 mcg) by mouth every morning (before breakfast)       melatonin 5 MG tablet      Take 10 mg by mouth At Bedtime Reported on 2/21/2017       " metroNIDAZOLE 0.75 % cream    METROCREAM    60 g    every day, once daily to face       TUMS 500 MG chewable tablet   Generic drug:  calcium carbonate      Take 1 chew tab by mouth daily as needed Reported on 4/18/2017       warfarin 5 MG tablet    COUMADIN    180 tablet    Take 1 tablet (5 mg) by mouth daily Or as directed by INR clinic. Current dose is 7.5 mg Mon,Wed, and Fri, 10 mg daily rest of week

## 2017-05-02 ENCOUNTER — THERAPY VISIT (OUTPATIENT)
Dept: PHYSICAL THERAPY | Facility: CLINIC | Age: 77
End: 2017-05-02
Payer: MEDICARE

## 2017-05-02 DIAGNOSIS — Z47.89 AFTERCARE FOLLOWING SURGERY OF THE MUSCULOSKELETAL SYSTEM: ICD-10-CM

## 2017-05-02 DIAGNOSIS — Z98.890 STATUS POST LUMBAR SURGERY: Primary | ICD-10-CM

## 2017-05-02 PROCEDURE — 97110 THERAPEUTIC EXERCISES: CPT | Mod: GP | Performed by: PHYSICAL THERAPIST

## 2017-05-02 PROCEDURE — G8978 MOBILITY CURRENT STATUS: HCPCS | Mod: GP | Performed by: PHYSICAL THERAPIST

## 2017-05-02 PROCEDURE — G8979 MOBILITY GOAL STATUS: HCPCS | Mod: GP | Performed by: PHYSICAL THERAPIST

## 2017-05-02 PROCEDURE — 97161 PT EVAL LOW COMPLEX 20 MIN: CPT | Mod: GP | Performed by: PHYSICAL THERAPIST

## 2017-05-02 NOTE — LETTER
DEPARTMENT OF HEALTH AND HUMAN SERVICES  CENTERS FOR MEDICARE & MEDICAID SERVICES    PLAN/UPDATED PLAN OF PROGRESS FOR OUTPATIENT REHABILITATION    PATIENTS NAME:  Rigoberto Holguin   : 1940  PROVIDER NUMBER:  [id number]  [id number]   PROVIDER NAME: [employer] [facility] [place] [facility]  MEDICAL RECORD NUMBER: [id number]   START OF CARE DATE:  SOC Date: 17   TYPE:  PT  PRIMARY/TREATMENT DIAGNOSIS: (Pertinent Medical Diagnosis)  Status post lumbar surgery  Aftercare following surgery of the musculoskeletal system  VISITS FROM START OF CARE:  Rxs Used: 1     [facility] Initial Evaluation    Subjective:  Patient is a 76 year old male presenting with rehab back hpi. The history is provided by the patient. No  was used. Rigoberto Holguin is a 76 year old male with a lumbar condition.  This is a new condition  Patient is s/p L45 hemilaminectomy and [id number] 3-2-17. Date of MD order for this episode was 17 to instruct in HEP. Patient states he is very weak in the legs and has LBP It is difficult to wash dishes, carry things, go up steps d/t leg weakness and desc steps due to L foot placement(heel slaps on step, no control). Patient pulled an abodminal mm(L side) last week when shoveling gravel for his [employer]. Patient reports pain:  Lumbar spine right and lumbar spine left.    Pain is described as aching and is intermittent and reported as 5/10 (with certain activities).  Associated symptoms:  Loss of strength and loss of motion/stiffness. Pain is the same all the time.  Symptoms are exacerbated by bending, twisting, lifting, carrying, standing and certain positions and relieved by rest.  Since onset symptoms are unchanged.  General health as reported by patient is good.  Pertinent medical history includes:  Osteoarthritis, cancer and thyroid problems.  Medical allergies: no.  Other surgeries include: Other (bladder).  Current medications:  Heparin/coumadin  and thyroid medication.  Current occupation is retired.    Primary job tasks include:  Other (home tasks).  Barriers include:  None as reported by the patient.  Red flags:  None as reported by the patient.    Gait:    Gait Type:  Antalgic     Flexibility/Screens:   Lower Extremity:  Decreased left lower extremity flexibility:Hip Flexors and Quadriceps  Decreased right lower extremity flexibility:  Hip Flexors and Quadriceps       Lumbar/SI Evaluation  ROM:  AROM Lumbar: normal  Strength: hip ext B 3+/5, hip abd R 3/5, L 3+/5    PATIENTS NAME:  Rigoberto Holguin   : 1940  Lumbar Myotomes:    T12-L3 (Hip Flex):  Left: 4    Right: 5  L2-4 (Quads):  Left:  5    Right:  5  L4 (Ankle DF):  Left:  5    Right:  3  L5 (Great Toe Ext): Left: 5    Right: 5   S1 (Toe Raise):  Left: 2    Right: 5  Lumbar Dermtomes:  Lumbar dermatomes: decreased to light touch L lateral thigh, tingling B toes.  Lumbar Palpation:  Palpation (lumbar): scar mobile and well healing.  Tenderness present at Left:    Erector Spinae  Tenderness not present at Left:    Piriformis; PSIS; ASIS; Greater Trochanter or Ischial Tuberosity  Tenderness present at Right: Erector Spinae  Tenderness not present at Right:  Piriformis; PSIS; ASIS; Greater Trochanter or Ischial Tuberosity  Assessment/Plan:    Patient is a 76 year old male with lumbar complaints.    Patient has the following significant findings with corresponding treatment plan.                Diagnosis 1:  S/p lumbar surgery, pain, weakness  Pain -  hot/cold therapy, self management, education and home program  Decreased ROM/flexibility - manual therapy, therapeutic exercise and home program  Decreased strength - therapeutic exercise, therapeutic activities and home program  Impaired balance - neuro re-education, gait training, therapeutic activities and home program  Decreased proprioception - neuro re-education, therapeutic activities and home program  Impaired gait - gait training and home  program    Therapy Evaluation Codes:   1) History comprised of:   Personal factors that impact the plan of care:      Age and Time since onset of symptoms.    Comorbidity factors that impact the plan of care are:      none.     Medications impacting care: None.  2) Examination of Body Systems comprised of:   Body structures and functions that impact the plan of care:      Hip and Lumbar spine.   Activity limitations that impact the plan of care are:      Bending, Lifting, Squatting/kneeling, Stairs, Standing and Walking.  3) Clinical presentation characteristics are:   Stable/Uncomplicated.  4) Decision-Making    Low complexity using standardized patient assessment instrument and/or measureable assessment of functional outcome.    Cumulative Therapy Evaluation is: Low complexity.  Previous and current functional limitations:  (See Goal Flow Sheet for this information)    Short term and Long term goals: (See Goal Flow Sheet for this information)   PATIENTS NAME:  Rigoberto Holguin   : 1940  Communication ability:  Patient appears to be able to clearly communicate and understand verbal and written communication and follow directions correctly.  Treatment Explanation - The following has been discussed with the patient:   RX ordered/plan of care  Anticipated outcomes  Possible risks and side effects  This patient would benefit from PT intervention to resume normal activities.   Rehab potential is good.  Frequency:  1 X week, once daily  Duration:  for 5 weeks  Discharge Plan:  Achieve all LTG.  Independent in home treatment program.  Reach maximal therapeutic benefit.          Caregiver Signature/Credentials _____________________________ Date ________      Treating Provider: Nahomy Brizuela, PT   I have reviewed and certified the need for these services and plan of treatment while under my care.        PHYSICIAN'S SIGNATURE:   _________________________________________  Date___________   Anna Marie Cruz    Certification  "period:  Beginning of Cert date period: 05/02/17 to  End of Cert period date: 07/30/17     Functional Level Progress Report: Please see attached \"Goal Flow sheet for Functional level.\"    ____X____ Continue Services or       ________ DC Services                Service dates: From  SOC Date: 05/02/17 date to present                         "

## 2017-05-02 NOTE — MR AVS SNAPSHOT
After Visit Summary   5/2/2017    Rigoberto Holguin    MRN: [id number]           Patient Information     Date Of Birth          1940        Visit Information        Provider Department      5/2/2017 8:20 AM Nahomy Brizuela, PT [facility] [place] [facility]        Today's Diagnoses     Status post lumbar surgery    -  1    Aftercare following surgery of the musculoskeletal system           Follow-ups after your visit        Your next 10 appointments already scheduled     May 08, 2017  8:30 AM CDT   Anticoagulation Visit with BE ANTI COAG   [facility] [place] ([facility] [place])    [address]  [place] [address]   [phone]            May 09, 2017  8:20 AM CDT   ROVERTO Spine with Jaleel Castellon PTA   [facility] [place] [facility] ([facility] [place])    [address]  [address]  [place] [address]   [phone]            May 16, 2017  8:20 AM CDT   ROVERTO Spine with Jaleel Castellon PTA   [facility] [place] [facility] ([facility] [place])    [address]  [address]  [place] [address]   [phone]            Jun 12, 2017 11:15 AM CDT   (Arrive by 11:00 AM)   Return Visit with Ricky Mclaughlin MD   [facility] ([facility])    [address] [place] [address]   [phone]            Jun 27, 2017  9:00 AM CDT   Return Visit with Vincent Rucker MD, FRIFERNANDEZ CYSTO PROC ROOM   [facility] ([address] [address]   [phone]            Jul 11, 2017 10:30 AM CDT   [facility] Visit with Vickie Whalen MD   [facility]    [address] [place] [address] [phone]            Dec 19, 2017 10:45 AM CST   Return Visit with Vickie Whalen MD   [facility]    [id number] [place] [address]  [place] [address] [phone]              Who to contact     If you have questions or need follow up information about today's clinic visit or your schedule please contact [facility] directly at [phone].  Normal or non-critical lab and imaging results will be communicated to you by MyChart, letter or phone within 4 business days after the clinic has received the results. If you do not hear from us within 7 days, please contact the clinic through [facility] or phone. If you have a critical or abnormal lab result, we will notify you by phone as soon as possible.  Submit refill requests through [employer] [facility] or call your pharmacy and they will forward the refill request to us. Please allow 3 business days for your refill to be completed.          Additional Information About Your Visit        [facility] Information     [employer] [facility] gives you secure access to your electronic health record. If you see a primary care provider, you can also send messages to your care team and make appointments. If you have questions, please call your primary care clinic.  If you do not have a primary care provider, please call [phone] and they will assist you.        Care EveryWhere ID     This is your Care EveryWhere ID. This could be used by other organizations to access your [facility] medical records  [id number]         Blood Pressure from Last 3 Encounters:   05/01/17 117/70   04/20/17 116/51   03/16/17 110/62    Weight from Last 3 Encounters:   05/01/17 89.8 kg (198 lb)   04/20/17 90.1 kg (198 lb 9.6 oz)   03/16/17 89.4 kg (197 lb)              We Performed the Following     [facility]O CERT REPORT     ROVERTO Inital Eval Report     PT Eval, Low Complexity (07791)     Therapeutic Exercises        Primary Care Provider Office Phone # Fax #    Jamie Meraz PA-C [phone]       [facility] [place] [address]  [place] [address]        Thank you!     Thank you for choosing [facility]  for your care. Our goal is always to provide you with excellent care. Hearing back from our patients is one way we can continue to improve our services. Please take a few minutes to complete the written survey that you may receive in the mail after your visit with us. Thank you!             Your Updated Medication List - Protect others around you: Learn how to safely use, store and throw away your medicines at www.disposemymeds.org.          This list is accurate as of: 5/2/17 10:03 AM.  Always use your most recent med list.                   Brand Name Dispense Instructions for use    cholecalciferol 1000 UNIT tablet    vitamin D    100 tablet    Take 1 tablet (1,000 Units) by mouth daily       EYE VITAMINS PO      Take 1 tablet by mouth daily       DAILY MULTIVITAMIN PO      Take 1 chew tab by mouth daily       JUICE PLUS FIBRE PO          levothyroxine 100 MCG tablet    SYNTHROID/LEVOTHROID    90 tablet    Take 1 tablet (100 mcg) by mouth every morning (before breakfast)       melatonin 5 MG tablet      Take 10 mg by mouth At Bedtime Reported on 2/21/2017       metroNIDAZOLE 0.75 % cream    METROCREAM    60 g    every day, once daily to face       TUMS 500 MG chewable tablet   Generic drug:  calcium carbonate      Take 1 chew tab by mouth daily as needed Reported on 4/18/2017       warfarin 5 MG tablet    COUMADIN    180 tablet    Take 1 tablet (5 mg) by mouth daily Or as directed by INR clinic. Current dose is 7.5 mg Mon,Wed, and Fri, 10 mg daily rest of week

## 2017-05-02 NOTE — PROGRESS NOTES
[facility] Initial Evaluation  Subjective:    Patient is a 76 year old male presenting with rehab back hpi. The history is provided by the patient. No  was used.   Rigoberto Holguin is a 76 year old male with a lumbar condition.      This is a new condition  Patient is s/p L45 hemilaminectomy and [id number] 3-2-17. Date of MD order for this episode was 4-20-17 to instruct in HEP. Patient states he is very weak in the legs and has LBP It is difficult to wash dishes, carry things, go up steps d/t leg weakness and desc steps due to L foot placement(heel slaps on step, no control).     Patient pulled an abodminal mm(L side) last week when shoveling gravel for his [employer]..    Patient reports pain:  Lumbar spine right and lumbar spine left.    Pain is described as aching and is intermittent and reported as 5/10 (with certain activities).  Associated symptoms:  Loss of strength and loss of motion/stiffness. Pain is the same all the time.  Symptoms are exacerbated by bending, twisting, lifting, carrying, standing and certain positions and relieved by rest.  Since onset symptoms are unchanged.        General health as reported by patient is good.  Pertinent medical history includes:  Osteoarthritis, cancer and thyroid problems.  Medical allergies: no.  Other surgeries include:  Other (bladder).  Current medications:  Heparin/coumadin and thyroid medication.  Current occupation is retired.    Primary job tasks include:  Other (home tasks).    Barriers include:  None as reported by the patient.    Red flags:  None as reported by the patient.                        Objective:      Gait:    Gait Type:  Antalgic         Flexibility/Screens:       Lower Extremity:  Decreased left lower extremity flexibility:Hip Flexors and Quadriceps    Decreased right lower extremity flexibility:  Hip Flexors and Quadriceps               Lumbar/SI Evaluation  ROM:  AROM Lumbar: normal    Strength: hip ext  B 3+/5, hip abd R 3/5, L 3+/5  Lumbar Myotomes:    T12-L3 (Hip Flex):  Left: 4    Right: 5  L2-4 (Quads):  Left:  5    Right:  5  L4 (Ankle DF):  Left:  5    Right:  3  L5 (Great Toe Ext): Left: 5    Right: 5   S1 (Toe Raise):  Left: 2    Right: 5      Lumbar Dermtomes:  Lumbar dermatomes: decreased to light touch L lateral thigh, tingling B toes.                  Lumbar Palpation:  Palpation (lumbar): scar mobile and well healing.  Tenderness present at Left:    Erector Spinae  Tenderness not present at Left:    Piriformis; PSIS; ASIS; Greater Trochanter or Ischial Tuberosity  Tenderness present at Right: Erector Spinae  Tenderness not present at Right:  Piriformis; PSIS; ASIS; Greater Trochanter or Ischial Tuberosity                                                     General     ROS    Assessment/Plan:      Patient is a 76 year old male with lumbar complaints.    Patient has the following significant findings with corresponding treatment plan.                Diagnosis 1:  S/p lumbar surgery, pain, weakness  Pain -  hot/cold therapy, self management, education and home program  Decreased ROM/flexibility - manual therapy, therapeutic exercise and home program  Decreased strength - therapeutic exercise, therapeutic activities and home program  Impaired balance - neuro re-education, gait training, therapeutic activities and home program  Decreased proprioception - neuro re-education, therapeutic activities and home program  Impaired gait - gait training and home program    Therapy Evaluation Codes:   1) History comprised of:   Personal factors that impact the plan of care:      Age and Time since onset of symptoms.    Comorbidity factors that impact the plan of care are:      none.     Medications impacting care: None.  2) Examination of Body Systems comprised of:   Body structures and functions that impact the plan of care:      Hip and Lumbar spine.   Activity limitations that impact the plan of care are:       Bending, Lifting, Squatting/kneeling, Stairs, Standing and Walking.  3) Clinical presentation characteristics are:   Stable/Uncomplicated.  4) Decision-Making    Low complexity using standardized patient assessment instrument and/or measureable assessment of functional outcome.  Cumulative Therapy Evaluation is: Low complexity.    Previous and current functional limitations:  (See Goal Flow Sheet for this information)    Short term and Long term goals: (See Goal Flow Sheet for this information)     Communication ability:  Patient appears to be able to clearly communicate and understand verbal and written communication and follow directions correctly.  Treatment Explanation - The following has been discussed with the patient:   RX ordered/plan of care  Anticipated outcomes  Possible risks and side effects  This patient would benefit from PT intervention to resume normal activities.   Rehab potential is good.    Frequency:  1 X week, once daily  Duration:  for 5 weeks  Discharge Plan:  Achieve all LTG.  Independent in home treatment program.  Reach maximal therapeutic benefit.    Please refer to the daily flowsheet for treatment today, total treatment time and time spent performing 1:1 timed codes.

## 2017-05-08 ENCOUNTER — ANTICOAGULATION THERAPY VISIT (OUTPATIENT)
Dept: NURSING | Facility: CLINIC | Age: 77
End: 2017-05-08
Payer: MEDICARE

## 2017-05-08 DIAGNOSIS — Z79.01 LONG-TERM (CURRENT) USE OF ANTICOAGULANTS: ICD-10-CM

## 2017-05-08 LAB — INR POINT OF CARE: 2.7 (ref 0.86–1.14)

## 2017-05-08 PROCEDURE — 85610 PROTHROMBIN TIME: CPT | Mod: QW

## 2017-05-08 PROCEDURE — 99207 ZZC NO CHARGE NURSE ONLY: CPT

## 2017-05-08 PROCEDURE — 36416 COLLJ CAPILLARY BLOOD SPEC: CPT

## 2017-05-08 NOTE — PROGRESS NOTES
ANTICOAGULATION FOLLOW-UP CLINIC VISIT    Patient Name:  Rigoberto Holguin  Date:  5/8/2017  Contact Type:  Face to Face    SUBJECTIVE:     Patient Findings     Positives No Problem Findings           OBJECTIVE    INR Protime   Date Value Ref Range Status   05/08/2017 2.7 (A) 0.86 - 1.14 Final       ASSESSMENT / PLAN  INR assessment THER    Recheck INR In: 6 WEEKS    INR Location Clinic      Anticoagulation Summary as of 5/8/2017     INR goal 2.0-3.0   Today's INR 2.7   Maintenance plan 7.5 mg (5 mg x 1.5) on Mon, Wed, Fri; 10 mg (5 mg x 2) all other days   Full instructions 7.5 mg on Mon, Wed, Fri; 10 mg all other days   Weekly total 62.5 mg   No change documented Aparna Robison   Plan last modified Aparna Robison (1/25/2016)   Next INR check 6/19/2017   Target end date     Indications   Long-term (current) use of anticoagulants [Z79.01] [Z79.01]         Anticoagulation Episode Summary     INR check location     Preferred lab     Send INR reminders to [facility]    Comments       Anticoagulation Care Providers     Provider Role Specialty Phone number    Jamie Meraz PA-C Responsible Physician Assistant [phone]            See the Encounter Report to view Anticoagulation Flowsheet and Dosing Calendar (Go to Encounters tab in chart review, and find the Anticoagulation Therapy Visit)        APARNA ROBISON

## 2017-05-08 NOTE — MR AVS SNAPSHOT
Rigoberto LORENZO Holguin   5/8/2017 8:30 AM   Anticoagulation Therapy Visit    Description:  76 year old male   Provider:  ALANA ANTI COAG   Department:  [facility] Nurse           INR as of 5/8/2017     Today's INR 2.7      Anticoagulation Summary as of 5/8/2017     INR goal 2.0-3.0   Today's INR 2.7   Full instructions 7.5 mg on Mon, Wed, Fri; 10 mg all other days   Next INR check 6/19/2017    Indications   Long-term (current) use of anticoagulants [Z79.01] [Z79.01]         Your next Anticoagulation Clinic appointment(s)     Jun 19, 2017  8:30 AM CDT   Anticoagulation Visit with ALANA ANTI COATONY   [facility] [place] ([facility] [place])    [address]  [place] [address] [phone]              Contact Numbers     [facility]  Please call [phone] with any problems or questions regarding your therapy, or to cancel or reschedule your appointment.          May 2017 Details    Sun Mon Tue Wed Thu Fri Sat      1               2               3               4               5               6                 7               8      7.5 mg   See details      9      10 mg         10      7.5 mg         11      10 mg         12      7.5 mg         13      10 mg           14      10 mg         15      7.5 mg         16      10 mg         17      7.5 mg         18      10 mg         19      7.5 mg         20      10 mg           21      10 mg         22      7.5 mg         23      10 mg         24      7.5 mg         25      10 mg         26      7.5 mg         27      10 mg           28      10 mg         29      7.5 mg         30      10 mg         31      7.5 mg             Date Details   05/08 This INR check               How to take your warfarin dose     To take:  7.5 mg Take 1.5 of the 5 mg tablets.    To take:  10 mg Take 2 of the 5 mg tablets.           June 2017 Details    Sun Mon Tue Wed Thu Fri Sat         1      10 mg         2      7.5 mg         3      10 mg           4      10 mg         5       7.5 mg         6      10 mg         7      7.5 mg         8      10 mg         9      7.5 mg         10      10 mg           11      10 mg         12      7.5 mg         13      10 mg         14      7.5 mg         15      10 mg         16      7.5 mg         17      10 mg           18      10 mg         19            20               21               22               23               24                 25               26               27               28               29               30                 Date Details   No additional details    Date of next INR:  6/19/2017         How to take your warfarin dose     To take:  7.5 mg Take 1.5 of the 5 mg tablets.    To take:  10 mg Take 2 of the 5 mg tablets.

## 2017-05-09 ENCOUNTER — THERAPY VISIT (OUTPATIENT)
Dept: PHYSICAL THERAPY | Facility: CLINIC | Age: 77
End: 2017-05-09
Payer: MEDICARE

## 2017-05-09 DIAGNOSIS — Z98.890 STATUS POST LUMBAR SURGERY: ICD-10-CM

## 2017-05-09 DIAGNOSIS — Z47.89 AFTERCARE FOLLOWING SURGERY OF THE MUSCULOSKELETAL SYSTEM: ICD-10-CM

## 2017-05-09 PROCEDURE — 97530 THERAPEUTIC ACTIVITIES: CPT | Mod: GP | Performed by: PHYSICAL THERAPY ASSISTANT

## 2017-05-09 PROCEDURE — 97110 THERAPEUTIC EXERCISES: CPT | Mod: GP | Performed by: PHYSICAL THERAPY ASSISTANT

## 2017-05-09 NOTE — PROGRESS NOTES
Subjective:    HPI                    Objective:    System    Physical Exam    General     ROS    Assessment/Plan:      SUBJECTIVE  Subjective changes as noted by pt:  Pt states the LB is feeling ok this week. Has a [employer] that he will be working on this summer and wants to get stronger overall.   Current pain level:  4/10 (standing at sink, working in a veggie garden)   Changes in function:  None     Adverse reaction to treatment or activity:  None    OBJECTIVE  Changes in objective findings: Red tube added for hip abd x15, green band for left ankle PF x15 today.      ASSESSMENT  Rigoberto continues to require intervention to meet STG and LTG's: PT  Patient is progressing as expected  Progress made towards STG/LTG?  None    PLAN  Continue current treatment plan until patient demonstrates readiness to progress to higher level exercises.    PTA/ATC plan:  Will continue with present plan of care.    Please refer to the daily flowsheet for treatment today, total treatment time and time spent performing 1:1 timed codes.

## 2017-05-09 NOTE — MR AVS SNAPSHOT
After Visit Summary   5/9/2017    Rigoberto Holguin    MRN: [id number]           Patient Information     Date Of Birth          1940        Visit Information        Provider Department      5/9/2017 8:20 AM Jaleel Castellon PTA [facility] [place] [facility]        Today's Diagnoses     Status post lumbar surgery        Aftercare following surgery of the musculoskeletal system           Follow-ups after your visit        Your next 10 appointments already scheduled     May 16, 2017  8:20 AM CDT   ROVERTO Spine with Jaleel Castellon PTA   [facility] [place] [facility] ([facility])    [address] [facility]  [address]  [place] [address]   [phone]            Jun 12, 2017 11:15 AM CDT   (Arrive by 11:00 AM)   Return Visit with Ricky Mclaughlin MD   [facility] ([facility])    [address] [address]   [phone]            Jun 19, 2017  8:30 AM CDT   Anticoagulation Visit with BE ANTI COAG   [facility] [place] ([facility] [place])    [address] [address] [facility]  [place] [address]   [phone]            Jun 27, 2017  9:00 AM CDT   Return Visit with Vincent Rucker MD, SAMUEL CYSTO PROC ROOM   [facility]    [address] [place] [address]   [phone]            Jul 11, 2017 10:30 AM CDT   Return Visit with Vickie Whalen MD   [facility])    [address] [place] [id number] [phone]            Dec 19, 2017 10:45 AM CST   Return Visit with Vickie Whalen MD   [facility])    [address] [place] [address] [phone]              Who to contact     If you have questions or need follow up information about today's clinic visit or your schedule please contact [facility]  directly at [phone].  Normal or non-critical lab and imaging results will be communicated to you by MyChart, letter or phone within 4 business days after the clinic has received the results. If you do not hear from us within 7 days, please contact the clinic through [employer] or phone. If you have a critical or abnormal lab result, we will notify you by phone as soon as possible.  Submit refill requests through [employer] or call your pharmacy and they will forward the refill request to us. Please allow 3 business days for your refill to be completed.          Additional Information About Your Visit        [employer] Information     [employer] gives you secure access to your electronic health record. If you see a primary care provider, you can also send messages to your care team and make appointments. If you have questions, please call your primary care clinic.  If you do not have a primary care provider, please call [phone] and they will assist you.        Care EveryWhere ID     This is your Care EveryWhere ID. This could be used by other organizations to access your [place] medical records  [id number]         Blood Pressure from Last 3 Encounters:   05/01/17 117/70   04/20/17 116/51   03/16/17 110/62    Weight from Last 3 Encounters:   05/01/17 89.8 kg (198 lb)   04/20/17 90.1 kg (198 lb 9.6 oz)   03/16/17 89.4 kg (197 lb)              We Performed the Following     Therapeutic Activities     Therapeutic Exercises        Primary Care Provider Office Phone # Fax #    Jamie Meraz PA-C [phone]       [facility] [place] [address]  [place] [address]        Thank you!     Thank you for choosing [facility] [place] [facility]  for your care. Our goal is always to provide you with excellent care. Hearing back from our patients is one way we can continue to improve our services. Please take a few minutes to complete the written survey that you may receive in the mail after your  visit with us. Thank you!             Your Updated Medication List - Protect others around you: Learn how to safely use, store and throw away your medicines at www.disposemymeds.org.          This list is accurate as of: 5/9/17  9:02 AM.  Always use your most recent med list.                   Brand Name Dispense Instructions for use    cholecalciferol 1000 UNIT tablet    vitamin D    100 tablet    Take 1 tablet (1,000 Units) by mouth daily       EYE VITAMINS PO      Take 1 tablet by mouth daily       DAILY MULTIVITAMIN PO      Take 1 chew tab by mouth daily       JUICE PLUS FIBRE PO          levothyroxine 100 MCG tablet    SYNTHROID/LEVOTHROID    90 tablet    Take 1 tablet (100 mcg) by mouth every morning (before breakfast)       melatonin 5 MG tablet      Take 10 mg by mouth At Bedtime Reported on 2/21/2017       metroNIDAZOLE 0.75 % cream    METROCREAM    60 g    every day, once daily to face       TUMS 500 MG chewable tablet   Generic drug:  calcium carbonate      Take 1 chew tab by mouth daily as needed Reported on 4/18/2017       warfarin 5 MG tablet    COUMADIN    180 tablet    Take 1 tablet (5 mg) by mouth daily Or as directed by INR clinic. Current dose is 7.5 mg Mon,Wed, and Fri, 10 mg daily rest of week

## 2017-05-16 ENCOUNTER — THERAPY VISIT (OUTPATIENT)
Dept: PHYSICAL THERAPY | Facility: CLINIC | Age: 77
End: 2017-05-16
Payer: MEDICARE

## 2017-05-16 DIAGNOSIS — Z47.89 AFTERCARE FOLLOWING SURGERY OF THE MUSCULOSKELETAL SYSTEM: ICD-10-CM

## 2017-05-16 DIAGNOSIS — Z98.890 STATUS POST LUMBAR SURGERY: ICD-10-CM

## 2017-05-16 PROCEDURE — 97110 THERAPEUTIC EXERCISES: CPT | Mod: GP | Performed by: PHYSICAL THERAPY ASSISTANT

## 2017-05-16 NOTE — PROGRESS NOTES
Subjective:    HPI                    Objective:    System    Physical Exam    General     ROS    Assessment/Plan:      SUBJECTIVE  Subjective changes as noted by pt: Pt doing much garden work this week even though much rain in forecast so, he is hoping to not work so fast just to not get wet and hurt his LB area.     Current pain level: 0/10 (in legs 0/10, LB area 2-3/10)   Changes in function:  None     Adverse reaction to treatment or activity:  None    OBJECTIVE  Changes in objective findings:  2+ months post-op.  Pt able to ascend and decend regular flight of steps but, uses 1 rail on the R side for down, L side for upwards otherwise, hip drop noted on the L at mid-stance phase.      ASSESSMENT  Rigoberto continues to require intervention to meet STG and LTG's: PT  Patient is not progressing as expected.  Response to therapy has shown progress in pain level  Progress made towards STG/LTG?  None    PLAN  Continue current treatment plan until patient demonstrates readiness to progress to higher level exercises.    PTA/ATC plan:  Will continue with present plan of care.    Please refer to the daily flowsheet for treatment today, total treatment time and time spent performing 1:1 timed codes.

## 2017-05-16 NOTE — MR AVS SNAPSHOT
After Visit Summary   5/16/2017    Rigoberto Holguin    MRN: [id number]           Patient Information     Date Of Birth          1940        Visit Information        Provider Department      5/16/2017 8:20 AM Jaleel Castellon PTA [facility] [place] [facility]        Today's Diagnoses     Status post lumbar surgery        Aftercare following surgery of the musculoskeletal system           Follow-ups after your visit        Your next 10 appointments already scheduled     Jun 12, 2017 11:15 AM CDT   (Arrive by 11:00 AM)   Return Visit with Ricky Mclaughlin MD   [facility] ([facility] [place])    [address] [address]   [phone]            Jun 19, 2017  8:30 AM CDT   Anticoagulation Visit with BE ANTI COAG   [facility] [place] ([facility] [place])    [address] [place] [address]  [place] [address] [phone]            Jun 27, 2017  9:00 AM CDT   Return Visit with Vincent Rucker MD, [facility] PROC ROOM   [facility] ([facility])    [address] [place] [address]   [phone]            Jul 11, 2017 10:30 AM CDT   Return Visit with Vickie Whalen MD   [facility])    [address] [place] [address] [phone]            Dec 19, 2017 10:45 AM CST   Return Visit with Vickie Whalen MD   [facility])    [address] [place] [address] [phone]              Who to contact     If you have questions or need follow up information about today's clinic visit or your schedule please contact [facility] [place] [facility] directly at [phone].  Normal or non-critical lab and imaging results will be communicated to you by MyChart, letter or phone within 4 business days after the clinic has received the results. If you do not  hear from us within 7 days, please contact the clinic through [employer] or phone. If you have a critical or abnormal lab result, we will notify you by phone as soon as possible.  Submit refill requests through [employer] or call your pharmacy and they will forward the refill request to us. Please allow 3 business days for your refill to be completed.          Additional Information About Your Visit        [employer] Information     [employer] gives you secure access to your electronic health record. If you see a primary care provider, you can also send messages to your care team and make appointments. If you have questions, please call your primary care clinic.  If you do not have a primary care provider, please call [phone] and they will assist you.        Care EveryWhere ID     This is your Care EveryWhere ID. This could be used by other organizations to access your [place] medical records  [id number]         Blood Pressure from Last 3 Encounters:   05/01/17 117/70   04/20/17 116/51   03/16/17 110/62    Weight from Last 3 Encounters:   05/01/17 89.8 kg (198 lb)   04/20/17 90.1 kg (198 lb 9.6 oz)   03/16/17 89.4 kg (197 lb)              We Performed the Following     Therapeutic Exercises        Primary Care Provider Office Phone # Fax #    Jamie Meraz PA-C [phone]       [facility] [place] [address]  [place] [address]        Thank you!     Thank you for choosing [facility] [place] [facility]  for your care. Our goal is always to provide you with excellent care. Hearing back from our patients is one way we can continue to improve our services. Please take a few minutes to complete the written survey that you may receive in the mail after your visit with us. Thank you!             Your Updated Medication List - Protect others around you: Learn how to safely use, store and throw away your medicines at www.disposemymeds.org.          This list is accurate as of: 5/16/17  9:25  AM.  Always use your most recent med list.                   Brand Name Dispense Instructions for use    cholecalciferol 1000 UNIT tablet    vitamin D    100 tablet    Take 1 tablet (1,000 Units) by mouth daily       EYE VITAMINS PO      Take 1 tablet by mouth daily       DAILY MULTIVITAMIN PO      Take 1 chew tab by mouth daily       JUICE PLUS FIBRE PO          levothyroxine 100 MCG tablet    SYNTHROID/LEVOTHROID    90 tablet    Take 1 tablet (100 mcg) by mouth every morning (before breakfast)       melatonin 5 MG tablet      Take 10 mg by mouth At Bedtime Reported on 2/21/2017       metroNIDAZOLE 0.75 % cream    METROCREAM    60 g    every day, once daily to face       TUMS 500 MG chewable tablet   Generic drug:  calcium carbonate      Take 1 chew tab by mouth daily as needed Reported on 4/18/2017       warfarin 5 MG tablet    COUMADIN    180 tablet    Take 1 tablet (5 mg) by mouth daily Or as directed by INR clinic. Current dose is 7.5 mg Mon,Wed, and Fri, 10 mg daily rest of week

## 2017-06-12 ENCOUNTER — OFFICE VISIT (OUTPATIENT)
Dept: NEUROSURGERY | Facility: CLINIC | Age: 77
End: 2017-06-12

## 2017-06-12 VITALS — HEIGHT: 68 IN | SYSTOLIC BLOOD PRESSURE: 118 MMHG | HEART RATE: 58 BPM | DIASTOLIC BLOOD PRESSURE: 62 MMHG

## 2017-06-12 DIAGNOSIS — M54.16 LUMBAR RADICULAR PAIN: Primary | ICD-10-CM

## 2017-06-12 ASSESSMENT — PAIN SCALES - GENERAL: PAINLEVEL: NO PAIN (0)

## 2017-06-12 NOTE — NURSING NOTE
Chief Complaint   Patient presents with     RECHECK     s/p Left L4-5-L5-S1 Foraminotomies/ DOS: 3/2/2017 Arnoldo Muniz, CMA

## 2017-06-12 NOTE — MR AVS SNAPSHOT
After Visit Summary   6/12/2017    Rigoberto Holguin    MRN: [id number]           Patient Information     Date Of Birth          1940        Visit Information        Provider Department      6/12/2017 11:15 AM Ricky Mclaughlin MD [facility] Neurosurgery        Today's Diagnoses     Lumbar radicular pain    -  1       Follow-ups after your visit        Your next 10 appointments already scheduled     Jun 27, 2017  9:00 AM CDT   Return Visit with Vincent Rucker MD, SAMUEL CYSTO PROC ROOM   [facility]    [address] [place] [address]   [phone]            Jul 26, 2017  9:15 AM CDT   Return Visit with Nickolas Ferrell MD   [facility])    [address] [place] [address] [phone]            Jul 31, 2017  8:30 AM CDT   Anticoagulation Visit with BE ANTI COAG   [facility] [place] ([facility])    [address] [place] [address]  [place] [address] [phone]            Dec 19, 2017 10:45 AM CST   Return Visit with Vickie Whalen MD   [facility])    [address] [place] [address] [phone]              Who to contact     Please call your clinic at [phone] to:    Ask questions about your health    Make or cancel appointments    Discuss your medicines    Learn about your test results    Speak to your doctor   If you have compliments or concerns about an experience at your clinic, or if you wish to file a complaint, please contact [facility] Patient Relations at [phone] or email us at [email or web address]         Additional Information About Your Visit        MyChart Information     MyChart gives you secure access to your electronic health record. If you see a primary care provider, you can also send messages to your care team and make appointments.  "If you have questions, please call your primary care clinic.  If you do not have a primary care provider, please call [phone] and they will assist you.      Sef[employer] is an electronic gateway that provides easy, online access to your medical records. With [employer], you can request a clinic appointment, read your test results, renew a prescription or communicate with your care team.     To access your existing account, please contact your [facility] or call [phone] for assistance.        Care EveryWhere ID     This is your Care EveryWhere ID. This could be used by other organizations to access your [place] medical records  [id number]        Your Vitals Were     Pulse Height                58 1.727 m (5' 8\")           Blood Pressure from Last 3 Encounters:   06/12/17 118/62   05/01/17 117/70   04/20/17 116/51    Weight from Last 3 Encounters:   05/01/17 89.8 kg (198 lb)   04/20/17 90.1 kg (198 lb 9.6 oz)   03/16/17 89.4 kg (197 lb)              Today, you had the following     No orders found for display       Primary Care Provider Office Phone # Fax #    Jamie Meraz PA-C [phone]       [facility] [place] [address]  [place] [address]        Equal Access to Services     [facility]: Hadii aad ku hadasho Soomaali, waaxda luqadaha, qaybta kaalmada adeegyada, waxay idiin hayaan alessandro khbernice garcia . So [facility] [phone].    ATENCIÓN: Si habla español, tiene a thomas disposición servicios gratuitos de asistencia lingüística. Llame al [phone].    We comply with applicable federal civil rights laws and [place] laws. We do not discriminate on the basis of race, color, national origin, age, disability sex, sexual orientation or gender identity.            Thank you!     Thank you for choosing [facility]  for your care. Our goal is always to provide you with excellent care. Hearing back from our patients is one way we can continue to " improve our services. Please take a few minutes to complete the written survey that you may receive in the mail after your visit with us. Thank you!             Your Updated Medication List - Protect others around you: Learn how to safely use, store and throw away your medicines at www.disposemymeds.org.          This list is accurate as of: 6/12/17 11:59 PM.  Always use your most recent med list.                   Brand Name Dispense Instructions for use Diagnosis    cholecalciferol 1000 UNIT tablet    vitamin D    100 tablet    Take 1 tablet (1,000 Units) by mouth daily    Vitamin D deficiency       EYE VITAMINS PO      Take 1 tablet by mouth daily        DAILY MULTIVITAMIN PO      Take 1 chew tab by mouth daily        JUICE PLUS FIBRE PO           levothyroxine 100 MCG tablet    SYNTHROID/LEVOTHROID    90 tablet    Take 1 tablet (100 mcg) by mouth every morning (before breakfast)    Hypothyroidism       melatonin 5 MG tablet      Take 10 mg by mouth At Bedtime Reported on 2/21/2017        metroNIDAZOLE 0.75 % cream    METROCREAM    60 g    every day, once daily to face    Rosacea       TUMS 500 MG chewable tablet   Generic drug:  calcium carbonate      Take 1 chew tab by mouth daily as needed Reported on 4/18/2017        warfarin 5 MG tablet    COUMADIN    180 tablet    Take 1 tablet (5 mg) by mouth daily Or as directed by INR clinic. Current dose is 7.5 mg Mon,Wed, and Fri, 10 mg daily rest of week    History of DVT of lower extremity, Chronic anticoagulation

## 2017-06-12 NOTE — LETTER
6/12/2017       RE: Rigoberto Holguin  [address] [address] [address]     Dear Colleague,    Thank you for referring your patient, Rigoberto Holguin, to the [facility] NEUROSURGERY at [facility]. Please see a copy of my visit note below.    HISTORY OF PRESENT ILLNESS:  Mr. Holguin is seen in Neurosurgery Clinic today for a 3-month followup of left sided L4-5, L5-S1 foraminotomies.  He reports no improvement since the operation in terms of low back pain and left lower extremity weakness.  He has completed physical therapy at this time; however, he continues to perform the exercises he learned in physical therapy at home.  He is somewhat disappointed in the lack of symptomatic improvement at this point following the operation.  However, he notes that he does not have worsened low back pain or any untoward side effects following the operation.      PHYSICAL EXAMINATION:   STRENGTH:  5/5 and symmetric in lower extremities with the exception of 4/5 dorsiflexion left foot.   REFLEXES:  2/4 and symmetric throughout.   Gait is somewhat antalgic with a mild foot drop noted in the left lower extremity.      IMAGING:  No new imaging was acquired prior to this visit.      ASSESSMENT:  Mr. Holguin is status post left-sided L4-5, L5-S1 foraminotomies.  He continues to have left lower extremity weakness and low back pain, which has not improved since the operation.  We discussed that given the degree and duration of stenosis associated symptoms in his lower extremity that it may take a long time for any improvement.  We also discussed that he may not experience any improvement given the chronic nature of his nerve root injury.        PLAN:  Followup as needed in Neurosurgery Clinic.      I saw the patient with the resident.  I have reviewed and edited the resident note and agree with the plan of care.    EVELYN CLINTON MD       As dictated by WHIT MATTHEWS MD, PHD, PGY1 neurosurgery resident.

## 2017-06-12 NOTE — PROGRESS NOTES
HISTORY OF PRESENT ILLNESS:  Mr. Holguin is seen in Neurosurgery Clinic today for a 3-month followup of left sided L4-5, L5-S1 foraminotomies.  He reports no improvement since the operation in terms of low back pain and left lower extremity weakness.  He has completed physical therapy at this time; however, he continues to perform the exercises he learned in physical therapy at home.  He is somewhat disappointed in the lack of symptomatic improvement at this point following the operation.  However, he notes that he does not have worsened low back pain or any untoward side effects following the operation.      PHYSICAL EXAMINATION:   STRENGTH:  5/5 and symmetric in lower extremities with the exception of 4/5 dorsiflexion left foot.   REFLEXES:  2/4 and symmetric throughout.   Gait is somewhat antalgic with a mild foot drop noted in the left lower extremity.      IMAGING:  No new imaging was acquired prior to this visit.      ASSESSMENT:  Mr. Holguin is status post left-sided L4-5, L5-S1 foraminotomies.  He continues to have left lower extremity weakness and low back pain, which has not improved since the operation.  We discussed that given the degree and duration of stenosis associated symptoms in his lower extremity that it may take a long time for any improvement.  We also discussed that he may not experience any improvement given the chronic nature of his nerve root injury.        PLAN:  Followup as needed in Neurosurgery Clinic.      I saw the patient with the resident.  I have reviewed and edited the resident note and agree with the plan of care.      MD EVELYN Marques MD       As dictated by WHIT MATTHEWS MD, PHD, PGY1 neurosurgery resident.             D: 2017 13:57   T: 2017 14:37   MT: LANI      Name:     SAVNANAH HOLGUIN   MRN:      [id number]        Account:      [id number]   :      1940           Service Date: 2017      Document: [id number]

## 2017-06-14 ENCOUNTER — DOCUMENTATION ONLY (OUTPATIENT)
Dept: OTHER | Facility: CLINIC | Age: 77
End: 2017-06-14

## 2017-06-14 DIAGNOSIS — Z71.89 ACP (ADVANCE CARE PLANNING): Chronic | ICD-10-CM

## 2017-06-19 ENCOUNTER — ANTICOAGULATION THERAPY VISIT (OUTPATIENT)
Dept: NURSING | Facility: CLINIC | Age: 77
End: 2017-06-19
Payer: MEDICARE

## 2017-06-19 DIAGNOSIS — Z79.01 LONG-TERM (CURRENT) USE OF ANTICOAGULANTS: ICD-10-CM

## 2017-06-19 LAB — INR POINT OF CARE: 3.1 (ref 0.86–1.14)

## 2017-06-19 PROCEDURE — 36416 COLLJ CAPILLARY BLOOD SPEC: CPT

## 2017-06-19 PROCEDURE — 99207 ZZC NO CHARGE NURSE ONLY: CPT

## 2017-06-19 PROCEDURE — 85610 PROTHROMBIN TIME: CPT | Mod: QW

## 2017-06-19 NOTE — PROGRESS NOTES
ANTICOAGULATION FOLLOW-UP CLINIC VISIT    Patient Name:  Rigoberto Holguin  Date:  6/19/2017  Contact Type:  Face to Face    SUBJECTIVE:     Patient Findings     Positives No Problem Findings           OBJECTIVE    INR Protime   Date Value Ref Range Status   06/19/2017 3.1 (A) 0.86 - 1.14 Final       ASSESSMENT / PLAN  INR assessment THER    Recheck INR In: 6 WEEKS    INR Location Clinic      Anticoagulation Summary as of 6/19/2017     INR goal 2.0-3.0   Today's INR 3.1!   Maintenance plan 7.5 mg (5 mg x 1.5) on Mon, Wed, Fri; 10 mg (5 mg x 2) all other days   Full instructions 7.5 mg on Mon, Wed, Fri; 10 mg all other days   Weekly total 62.5 mg   No change documented Aparna Robison   Plan last modified Aparna Robison (1/25/2016)   Next INR check 7/31/2017   Target end date     Indications   Long-term (current) use of anticoagulants [Z79.01] [Z79.01]         Anticoagulation Episode Summary     INR check location     Preferred lab     Send INR reminders to [facility]    Comments       Anticoagulation Care Providers     Provider Role Specialty Phone number    Jamie Meraz PA-C Responsible Physician Assistant [phone]            See the Encounter Report to view Anticoagulation Flowsheet and Dosing Calendar (Go to Encounters tab in chart review, and find the Anticoagulation Therapy Visit)        APARNA ROBISON

## 2017-06-19 NOTE — MR AVS SNAPSHOT
Rigoberto LORENZO Holguin   6/19/2017 8:30 AM   Anticoagulation Therapy Visit    Description:  76 year old male   Provider:  ALANA ANTI COAG   Department:  Be Nurse           INR as of 6/19/2017     Today's INR 3.1!      Anticoagulation Summary as of 6/19/2017     INR goal 2.0-3.0   Today's INR 3.1!   Full instructions 7.5 mg on Mon, Wed, Fri; 10 mg all other days   Next INR check 7/31/2017    Indications   Long-term (current) use of anticoagulants [Z79.01] [Z79.01]         Your next Anticoagulation Clinic appointment(s)     Jul 31, 2017  8:30 AM CDT   Anticoagulation Visit with ALANA ANTI COATONY   [facility] [place] ([facility] [place])    [address]  [place] [address] [phone]              Contact Numbers     [facility]  Please call [phone] with any problems or questions regarding your therapy, or to cancel or reschedule your appointment.          June 2017 Details    Sun Mon Tue Wed Thu Fri Sat         1               2               3                 4               5               6               7               8               9               10                 11               12               13               14               15               16               17                 18               19      7.5 mg   See details      20      10 mg         21      7.5 mg         22      10 mg         23      7.5 mg         24      10 mg           25      10 mg         26      7.5 mg         27      10 mg         28      7.5 mg         29      10 mg         30      7.5 mg           Date Details   06/19 This INR check               How to take your warfarin dose     To take:  7.5 mg Take 1.5 of the 5 mg tablets.    To take:  10 mg Take 2 of the 5 mg tablets.           July 2017 Details    Sun Mon Tue Wed Thu Fri Sat           1      10 mg           2      10 mg         3      7.5 mg         4      10 mg         5      7.5 mg         6      10 mg         7      7.5 mg         8      10 mg            9      10 mg         10      7.5 mg         11      10 mg         12      7.5 mg         13      10 mg         14      7.5 mg         15      10 mg           16      10 mg         17      7.5 mg         18      10 mg         19      7.5 mg         20      10 mg         21      7.5 mg         22      10 mg           23      10 mg         24      7.5 mg         25      10 mg         26      7.5 mg         27      10 mg         28      7.5 mg         29      10 mg           30      10 mg         31                  Date Details   No additional details    Date of next INR:  7/31/2017         How to take your warfarin dose     To take:  7.5 mg Take 1.5 of the 5 mg tablets.    To take:  10 mg Take 2 of the 5 mg tablets.

## 2017-06-27 ENCOUNTER — OFFICE VISIT (OUTPATIENT)
Dept: UROLOGY | Facility: CLINIC | Age: 77
End: 2017-06-27
Payer: MEDICARE

## 2017-06-27 VITALS — SYSTOLIC BLOOD PRESSURE: 121 MMHG | OXYGEN SATURATION: 99 % | HEART RATE: 57 BPM | DIASTOLIC BLOOD PRESSURE: 70 MMHG

## 2017-06-27 DIAGNOSIS — Z85.51 PERSONAL HISTORY OF MALIGNANT NEOPLASM OF BLADDER: Primary | ICD-10-CM

## 2017-06-27 PROCEDURE — 99207 ZZC DROP WITH A PROCEDURE: CPT | Mod: 25 | Performed by: UROLOGY

## 2017-06-27 PROCEDURE — 52000 CYSTOURETHROSCOPY: CPT | Performed by: UROLOGY

## 2017-06-27 NOTE — PATIENT INSTRUCTIONS
Your next cystoscopy is scheduled 6/26/2017 @ 9:00am. Please call  if you need to reschedule this appointment.

## 2017-06-27 NOTE — NURSING NOTE
"Chief Complaint   Patient presents with     Cystoscopy       Initial /70 (BP Location: Left arm, Patient Position: Chair, Cuff Size: Adult Regular)  Pulse 57  SpO2 99% Estimated body mass index is 30.11 kg/(m^2) as calculated from the following:    Height as of 5/1/17: 1.727 m (5' 8\").    Weight as of 5/1/17: 89.8 kg (198 lb).  Medication Reconciliation: complete   Thelma Pisano CMA      "

## 2017-06-27 NOTE — PROGRESS NOTES
This 75 year old year old male is here for  yearly surveillance cystoscopy.  No recurrent disease for many years    Cysto: the anterior urethra is normal.      Prostatic Urethra mild trilobar enlargement.  The prostatic urethral length is 3.5  cm.     The bladder showed no evidence of recurrent disease    Follow up: will have patient come back in 1 year for bladder tumor recheck.

## 2017-06-27 NOTE — MR AVS SNAPSHOT
After Visit Summary   6/27/2017    Rigoberto Holguin    MRN: [id number]           Patient Information     Date Of Birth          1940        Visit Information        Provider Department      6/27/2017 9:00 AM Vincent Rucker MD; [facility] CYSTO PROC ROOM [facility]        Today's Diagnoses     Personal history of malignant neoplasm of bladder    -  1      Care Instructions    Your next cystoscopy is scheduled 6/26/2017 @ 9:00am. Please call  if you need to reschedule this appointment.            Follow-ups after your visit        Your next 10 appointments already scheduled     Jul 26, 2017  9:15 AM CDT   Return Visit with Nickolas Ferrell MD   [facility] ([facility])    [address] [place] [id number] [phone]            Jul 31, 2017  8:30 AM CDT   Anticoagulation Visit with BE ANTI COAG   [facility] [place] ([facility] [place]    [id number] [place] [address]  [place] [id number] [phone]            Dec 19, 2017 10:45 AM CST   Return Visit with Vickie Whalen MD   [facility] ([facility])    [address] [place] [id number] [phone]            Jun 26, 2018  9:00 AM CDT   Return Visit with Vincent Rucker MD, [facility] CYSTO PROC ROOM   [facility] ([facility] [address] [phone]              Who to contact     If you have questions or need follow up information about today's clinic visit or your schedule please contact [facility] directly at [phone].  Normal or non-critical lab and imaging results will be communicated to you by MyChart, letter or phone within 4 business days after the clinic has received the results. If you do not hear from us within 7 days, please contact the clinic through MyChart or phone. If you have a critical or abnormal lab result, we  will notify you by phone as soon as possible.  Submit refill requests through [employer] or call your pharmacy and they will forward the refill request to us. Please allow 3 business days for your refill to be completed.          Additional Information About Your Visit        [employer] Information     [employer] gives you secure access to your electronic health record. If you see a primary care provider, you can also send messages to your care team and make appointments. If you have questions, please call your primary care clinic.  If you do not have a primary care provider, please call [phone] and they will assist you.        Care EveryWhere ID     This is your Care EveryWhere ID. This could be used by other organizations to access your [facility] medical records  [id number]        Your Vitals Were     Pulse Pulse Oximetry                57 99%           Blood Pressure from Last 3 Encounters:   06/27/17 121/70   06/12/17 118/62   05/01/17 117/70    Weight from Last 3 Encounters:   05/01/17 89.8 kg (198 lb)   04/20/17 90.1 kg (198 lb 9.6 oz)   03/16/17 89.4 kg (197 lb)              We Performed the Following     CYSTOURETHROSCOPY        Primary Care Provider Office Phone # Fax #    Jamie Meraz PA-C [phone]       [facility] [place] [address]  [place] [address]        Equal Access to Services     [facility]: Hadii aad ku hadasho Soomaali, waaxda luqadaha, qaybta kaalmada adeegyada, tacho aguilera haylashell garcia . So [facility] [phone].    ATENCIÓN: Si habla español, tiene a thomas disposición servicios gratuitos de asistencia lingüística. Llclarence al [phone].    We comply with applicable federal civil rights laws and [place] laws. We do not discriminate on the basis of race, color, national origin, age, disability sex, sexual orientation or gender identity.            Thank you!     Thank you for choosing [facility]  for your care. Our goal is always to provide  you with excellent care. Hearing back from our patients is one way we can continue to improve our services. Please take a few minutes to complete the written survey that you may receive in the mail after your visit with us. Thank you!             Your Updated Medication List - Protect others around you: Learn how to safely use, store and throw away your medicines at www.disposemymeds.org.          This list is accurate as of: 6/27/17  9:20 AM.  Always use your most recent med list.                   Brand Name Dispense Instructions for use Diagnosis    cholecalciferol 1000 UNIT tablet    vitamin D    100 tablet    Take 1 tablet (1,000 Units) by mouth daily    Vitamin D deficiency       EYE VITAMINS PO      Take 1 tablet by mouth daily        DAILY MULTIVITAMIN PO      Take 1 chew tab by mouth daily        JUICE PLUS FIBRE PO           levothyroxine 100 MCG tablet    SYNTHROID/LEVOTHROID    90 tablet    Take 1 tablet (100 mcg) by mouth every morning (before breakfast)    Hypothyroidism       melatonin 5 MG tablet      Take 10 mg by mouth At Bedtime Reported on 2/21/2017        metroNIDAZOLE 0.75 % cream    METROCREAM    60 g    every day, once daily to face    Rosacea       TUMS 500 MG chewable tablet   Generic drug:  calcium carbonate      Take 1 chew tab by mouth daily as needed Reported on 4/18/2017        warfarin 5 MG tablet    COUMADIN    180 tablet    Take 1 tablet (5 mg) by mouth daily Or as directed by INR clinic. Current dose is 7.5 mg Mon,Wed, and Fri, 10 mg daily rest of week    History of DVT of lower extremity, Chronic anticoagulation

## 2017-07-26 ENCOUNTER — OFFICE VISIT (OUTPATIENT)
Dept: DERMATOLOGY | Facility: CLINIC | Age: 77
End: 2017-07-26
Payer: MEDICARE

## 2017-07-26 DIAGNOSIS — L57.0 ACTINIC KERATOSIS: Primary | ICD-10-CM

## 2017-07-26 PROCEDURE — 17000 DESTRUCT PREMALG LESION: CPT | Performed by: DERMATOLOGY

## 2017-07-26 RX ORDER — TIMOLOL MALEATE 5 MG/ML
SOLUTION/ DROPS OPHTHALMIC
COMMUNITY
Start: 2017-07-21

## 2017-07-26 RX ORDER — BIMATOPROST 0.1 MG/ML
SOLUTION/ DROPS OPHTHALMIC
COMMUNITY
Start: 2017-07-21 | End: 2019-01-14

## 2017-07-26 NOTE — PATIENT INSTRUCTIONS
Cryotherapy    What is it?    Use of a very cold liquid, such as liquid nitrogen, to freeze and destroy abnormal skin cells that need to be removed    What should I expect?    Tenderness and redness    A small blister that might grow and fill with dark purple blood. There may be crusting.    More than one treatment may be needed if the lesions do not go away.    How do I care for the treated area?    Gently wash the area with your hands when bathing.    Use a thin layer of Vaseline to help with healing. You may use a Band-Aid.     The area should heal within 7-10 days and may leave behind a pink or lighter color.     Do not use an antibiotic or Neosporin ointment.     You may take acetaminophen (Tylenol) for pain.     Call your Doctor if you have:    Severe pain    Signs of infection (warmth, redness, cloudy yellow drainage, and or a bad smell)    Questions or concerns    Who should I call with questions?       [facility]: [phone]       [facility]: [phone]       For urgent needs outside of business hours call the [facility] at [phone]        and ask for the dermatology resident on call

## 2017-07-26 NOTE — PROGRESS NOTES
"[facility] Dermatology Note      Dermatology Problem List:  1. Hx of NMSC  -SCC, left dorsal forearm, s/p ED&C 6/2/2016  -SCC, left nasal side wall, s/p Mohs 3/10/2016   -BCC left malar cheek, s/p Mohs 3/2013 per records from [facility]  2. Actinic keratoses  -Previous Tx: script for efudex initiated but cost prohibitive, cryotherapy, PDT (X3 forehead/scalp early 2017) (X2 sessions 2015 with improvement) (1-2 treatments at outside facility)  3.Lesion, left ear, records from [facility] were reviewed  -s/p biopsy 06/2014 demonstrating ulceration with inflammation  -s/p biopsy 07/2014 demonstrating erosion and ulcer with hemorrhagic scale crust, impetiginized, [facility] was queried  4. Rosacea  -Current Tx: Metrocream with improvement.   5. Lesion to recheck on R upper arm. S/p bx (non-diagnostic) and cryotherapy x 1 (7/26/17)    Encounter Date: Jul 26, 2017    CC:  Chief Complaint   Patient presents with     Derm Problem     recheck spot on rt arm       History of Present Illness:  Mr. Rigoberto Holguin is a 76 year old male with a hx of NMSC presents as a follow up for lesion on the right arm s/p biopsy. The patient was last seen 3/21/2017 when he had a biopsy of the right upper arm that showed \"abundant fragments of compact orthokeratosis\". He has also had x3 PDT for AKs of the forehead and scalp in 2017. Today the lesion is not painful and is smaller. The patient reports no other lesions of concern.    Past Medical History:   Patient Active Problem List   Diagnosis     Encounter for long-term current use of medication     Malignant neoplasm of other specified sites of bladder     Other and unspecified coagulation defects     Nonallopathic lesion of lumbar region     Stenosis, spinal, lumbar     CARDIOVASCULAR SCREENING; LDL GOAL LESS THAN 160     ACP (advance care planning)     Factor V Leiden mutation (H)     History of DVT of lower extremity     Postphlebitic syndrome     " Chronic anticoagulation     Peripheral vascular disease (H)     Personal history of malignant neoplasm of bladder     Vitamin D deficiency     Long-term (current) use of anticoagulants [Z79.01]     Lumbar radicular pain     Hypothyroidism due to Hashimoto's thyroiditis     Status post lumbar surgery     Aftercare following surgery of the musculoskeletal system     Past Medical History:   Diagnosis Date     Actinic keratosis      Basal cell carcinoma      Bladder cancer (H) 2003     DJD (degenerative joint disease), lumbar     S/P epidural x 2     DVT (deep venous thrombosis) (H) 2007    Factor V ( bilateral legs)      GERD (gastroesophageal reflux disease)      Hypothyroidism      Polio     Age 4, weakness of right arm and leg      S/P appendectomy      Past Surgical History:   Procedure Laterality Date     Appendectomy       FORAMINOTOMY LUMBAR POSTERIOR TWO LEVELS Left 3/2/2017    Procedure: FORAMINOTOMY LUMBAR POSTERIOR TWO LEVELS;  Surgeon: Ricky Mclaughlin MD;  Location: [facility] OR     HC REPAIR ROTATOR CUFF,ACUTE Left 2007     PHOTODYNAMIC THERAPY - (PDT)  2/13/2015     SURGICAL HISTORY OF -   2003    Urinary Bladder Cancer treatment     SURGICAL HISTORY OF -       Gall Bladder     Social History:  Reviewed and unchanged but kept in chart for clinician convenience  The patient is retired. He was a . He drinks 1-2 glasses of alcohol per week.    Family History:  Reviewed and unchanged but kept in chart for clinician convenience  There is no family history of skin cancer.    Medications:  Current Outpatient Prescriptions   Medication Sig Dispense Refill     warfarin (COUMADIN) 5 MG tablet Take 1 tablet (5 mg) by mouth daily Or as directed by INR clinic. Current dose is 7.5 mg Mon,Wed, and Fri, 10 mg daily rest of week 180 tablet 0     metroNIDAZOLE (METROCREAM) 0.75 % cream every day, once daily to face 60 g 3     Multiple Vitamins-Minerals (EYE VITAMINS PO) Take 1 tablet by mouth daily       melatonin 5 MG  tablet Take 10 mg by mouth At Bedtime Reported on 2/21/2017       levothyroxine (SYNTHROID,LEVOTHROID) 100 MCG tablet Take 1 tablet (100 mcg) by mouth every morning (before breakfast) 90 tablet 2     cholecalciferol (VITAMIN D) 1000 UNIT tablet Take 1 tablet (1,000 Units) by mouth daily 100 tablet 0     Nutritional Supplements (JUICE PLUS FIBRE PO)        calcium carbonate (TUMS) 500 MG chewable tablet Take 1 chew tab by mouth daily as needed Reported on 4/18/2017       Multiple Vitamin (DAILY MULTIVITAMIN PO) Take 1 chew tab by mouth daily         No Known Allergies    Review of Systems:  -Const: Denies fevers, chills or unintended changes in weight.   -Skin Establ Pt: The patient denies any new rash, pruritus, or lesions that are symptomatic, changing or bleeding, except as per HPI.    Physical exam:  There were no vitals taken for this visit.  -This is a well developed, well-nourished male in no acute distress, in a pleasant mood.    SKIN: Focused examination of the right upper arm was performed.  -on the right upper arm site of prior biopsy is a yellow, crusty papule. No pain on palpation  -No other lesions of concern on areas examined.     Impression/Plan:  1. ACtinic keratosis s/p non-diagnostic biopsy (not deep enough) of right upper arm. Possibly BLK or AK. Will treat empirically with cryotherapy.   Cryotherapy procedure note: After verbal consent and discussion of risks and benefits including but no limited to dyspigmentation/scar, blister, and pain, 1 was treated with 1-2mm freeze border for 2 cycles with liquid nitrogen. Post cryotherapy instructions were provided.   Pt to monitor lesion for changes  New photo taken.    Follow up in 4 months for skin check, earlier for new or changing lesions.     CC Paula Whalen and Jamie Meraz    Staff Involved:  Scribe/Staff    Scribe Disclosure:   IPraneeth, am serving as a scribe to document services personally performed by Dr. Nickolas Ferrell, based on data  collection and the provider's statements to me.    Provider Disclosure:   I have reviewed the documentation recorded by the scribe and have edited it as needed. I have personally performed the services documented here and the documentation accurately represents those services and the decisions made by me.     Nickolas Ferrell MD, MS    Department of Dermatology  [facility]: Phone: [phone], Fax:[phone]  [facility]: Phone: [phone], Fax: [phone]

## 2017-07-26 NOTE — LETTER
"7/26/2017       RE: Rigoberto Holguin  [address] [address]     Dear Colleague,    Thank you for referring your patient, Rigoberto Holguin, to the [facility]. Please see a copy of my visit note below.    [facility] Dermatology Note      Dermatology Problem List:  1. Hx of NMSC  -SCC, left dorsal forearm, s/p ED&C 6/2/2016  -SCC, left nasal side wall, s/p Mohs 3/10/2016   -BCC left malar cheek, s/p Mohs 3/2013 per records from [facility]  2. Actinic keratoses  -Previous Tx: script for efudex initiated but cost prohibitive, cryotherapy, PDT (X3 forehead/scalp early 2017) (X2 sessions 2015 with improvement) (1-2 treatments at outside facility)  3.Lesion, left ear, records from [facility] were reviewed  -s/p biopsy 06/2014 demonstrating ulceration with inflammation  -s/p biopsy 07/2014 demonstrating erosion and ulcer with hemorrhagic scale crust, impetiginized, [facility] was queried  4. Rosacea  -Current Tx: Metrocream with improvement.   5. Lesion to recheck on R upper arm. S/p bx (non-diagnostic) and cryotherapy x 1 (7/26/17)    Encounter Date: Jul 26, 2017    CC:  Chief Complaint   Patient presents with     Derm Problem     recheck spot on rt arm       History of Present Illness:  Mr. Rigoberto Holguin is a 76 year old male with a hx of NMSC presents as a follow up for lesion on the right arm s/p biopsy. The patient was last seen 3/21/2017 when he had a biopsy of the right upper arm that showed \"abundant fragments of compact orthokeratosis\". He has also had x3 PDT for AKs of the forehead and scalp in 2017. Today the lesion is not painful and is smaller. The patient reports no other lesions of concern.    Past Medical History:   Patient Active Problem List   Diagnosis     Encounter for long-term current use of medication     Malignant neoplasm of other specified sites of bladder     Other and " unspecified coagulation defects     Nonallopathic lesion of lumbar region     Stenosis, spinal, lumbar     CARDIOVASCULAR SCREENING; LDL GOAL LESS THAN 160     ACP (advance care planning)     Factor V Leiden mutation (H)     History of DVT of lower extremity     Postphlebitic syndrome     Chronic anticoagulation     Peripheral vascular disease (H)     Personal history of malignant neoplasm of bladder     Vitamin D deficiency     Long-term (current) use of anticoagulants [Z79.01]     Lumbar radicular pain     Hypothyroidism due to Hashimoto's thyroiditis     Status post lumbar surgery     Aftercare following surgery of the musculoskeletal system     Past Medical History:   Diagnosis Date     Actinic keratosis      Basal cell carcinoma      Bladder cancer (H) 2003     DJD (degenerative joint disease), lumbar     S/P epidural x 2     DVT (deep venous thrombosis) (H) 2007    Factor V ( bilateral legs)      GERD (gastroesophageal reflux disease)      Hypothyroidism      Polio     Age 4, weakness of right arm and leg      S/P appendectomy      Past Surgical History:   Procedure Laterality Date     Appendectomy       FORAMINOTOMY LUMBAR POSTERIOR TWO LEVELS Left 3/2/2017    Procedure: FORAMINOTOMY LUMBAR POSTERIOR TWO LEVELS;  Surgeon: Ricky Mclaughlin MD;  Location: [facility] OR     HC REPAIR ROTATOR CUFF,ACUTE Left 2007     PHOTODYNAMIC THERAPY - (PDT)  2/13/2015     SURGICAL HISTORY OF -   2003    Urinary Bladder Cancer treatment     SURGICAL HISTORY OF -       Gall Bladder     Social History:  Reviewed and unchanged but kept in chart for clinician convenience  The patient is retired. He was a . He drinks 1-2 glasses of alcohol per week.    Family History:  Reviewed and unchanged but kept in chart for clinician convenience  There is no family history of skin cancer.    Medications:  Current Outpatient Prescriptions   Medication Sig Dispense Refill     warfarin (COUMADIN) 5 MG tablet Take 1 tablet (5 mg) by mouth daily  Or as directed by INR clinic. Current dose is 7.5 mg Mon,Wed, and Fri, 10 mg daily rest of week 180 tablet 0     metroNIDAZOLE (METROCREAM) 0.75 % cream every day, once daily to face 60 g 3     Multiple Vitamins-Minerals (EYE VITAMINS PO) Take 1 tablet by mouth daily       melatonin 5 MG tablet Take 10 mg by mouth At Bedtime Reported on 2/21/2017       levothyroxine (SYNTHROID,LEVOTHROID) 100 MCG tablet Take 1 tablet (100 mcg) by mouth every morning (before breakfast) 90 tablet 2     cholecalciferol (VITAMIN D) 1000 UNIT tablet Take 1 tablet (1,000 Units) by mouth daily 100 tablet 0     Nutritional Supplements (JUICE PLUS FIBRE PO)        calcium carbonate (TUMS) 500 MG chewable tablet Take 1 chew tab by mouth daily as needed Reported on 4/18/2017       Multiple Vitamin (DAILY MULTIVITAMIN PO) Take 1 chew tab by mouth daily         No Known Allergies    Review of Systems:  -Const: Denies fevers, chills or unintended changes in weight.   -Skin Establ Pt: The patient denies any new rash, pruritus, or lesions that are symptomatic, changing or bleeding, except as per HPI.    Physical exam:  There were no vitals taken for this visit.  -This is a well developed, well-nourished male in no acute distress, in a pleasant mood.    SKIN: Focused examination of the right upper arm was performed.  -on the right upper arm site of prior biopsy is a yellow, crusty papule. No pain on palpation  -No other lesions of concern on areas examined.     Impression/Plan:  1. ACtinic keratosis s/p non-diagnostic biopsy (not deep enough) of right upper arm. Possibly BLK or AK. Will treat empirically with cryotherapy.   Cryotherapy procedure note: After verbal consent and discussion of risks and benefits including but no limited to dyspigmentation/scar, blister, and pain, 1 was treated with 1-2mm freeze border for 2 cycles with liquid nitrogen. Post cryotherapy instructions were provided.   Pt to monitor lesion for changes  New photo  taken.    Follow up in 4 months for skin check, earlier for new or changing lesions.     CC Paula Whalen and Jamie Meraz    Staff Involved:  Scribe/Staff    Scribe Disclosure:   I, Praneeth Caal, am serving as a scribe to document services personally performed by Dr. Nickolas Ferrell, based on data collection and the provider's statements to me.    Provider Disclosure:   I have reviewed the documentation recorded by the scribe and have edited it as needed. I have personally performed the services documented here and the documentation accurately represents those services and the decisions made by me.     Nickolas Ferrell MD, MS    Department of Dermatology  [facility]: Phone: [phone], Fax:[phone]  [facility]: Phone: [phone], Fax: [phone]

## 2017-07-26 NOTE — MR AVS SNAPSHOT
After Visit Summary   7/26/2017    Rigoberto Holguin    MRN: [id number]           Patient Information     Date Of Birth          1940        Visit Information        Provider Department      7/26/2017 9:15 AM Nickloas Ferrell MD [facility]        Care Instructions    Cryotherapy    What is it?    Use of a very cold liquid, such as liquid nitrogen, to freeze and destroy abnormal skin cells that need to be removed    What should I expect?    Tenderness and redness    A small blister that might grow and fill with dark purple blood. There may be crusting.    More than one treatment may be needed if the lesions do not go away.    How do I care for the treated area?    Gently wash the area with your hands when bathing.    Use a thin layer of Vaseline to help with healing. You may use a Band-Aid.     The area should heal within 7-10 days and may leave behind a pink or lighter color.     Do not use an antibiotic or Neosporin ointment.     You may take acetaminophen (Tylenol) for pain.     Call your Doctor if you have:    Severe pain    Signs of infection (warmth, redness, cloudy yellow drainage, and or a bad smell)    Questions or concerns    Who should I call with questions?       [facility]: [phone]       [facility]: [phone]       For urgent needs outside of business hours call the [facility] at [phone]        and ask for the dermatology resident on call            Follow-ups after your visit        Your next 10 appointments already scheduled     Jul 31, 2017  8:30 AM CDT   Anticoagulation Visit with BE ANTI COAG   [facility] [place] ([facility])    [address] [id number] [phone]            Dec 19, 2017 10:45 AM CST   Return Visit with Vickie Whalen MD   [facility]    [address] [place] [address]  [address]   [phone]            Jun 26, 2018  9:00 AM CDT   Return Visit with Vincent Rucker MD, [place] [facility]   [facility] ([address]  [place] [address] [phone]              Who to contact     If you have questions or need follow up information about today's clinic visit or your schedule please contact [facility] directly at [phone].  Normal or non-critical lab and imaging results will be communicated to you by [employer], letter or phone within 4 business days after the clinic has received the results. If you do not hear from us within 7 days, please contact the clinic through [employer] or phone. If you have a critical or abnormal lab result, we will notify you by phone as soon as possible.  Submit refill requests through [employer] or call your pharmacy and they will forward the refill request to us. Please allow 3 business days for your refill to be completed.          Additional Information About Your Visit        [employer] Information     [employer] gives you secure access to your electronic health record. If you see a primary care provider, you can also send messages to your care team and make appointments. If you have questions, please call your primary care clinic.  If you do not have a primary care provider, please call [phone] and they will assist you.      [employer] is an electronic gateway that provides easy, online access to your medical records. With [employer], you can request a clinic appointment, read your test results, renew a prescription or communicate with your care team.     To access your existing account, please contact your [facility] or call [phone] for assistance.        Care EveryWhere ID     This is your Care EveryWhere ID. This could be used by other organizations to access your [place] medical records  [id number]         Blood Pressure from Last 3  Encounters:   06/27/17 121/70   06/12/17 118/62   05/01/17 117/70    Weight from Last 3 Encounters:   05/01/17 89.8 kg (198 lb)   04/20/17 90.1 kg (198 lb 9.6 oz)   03/16/17 89.4 kg (197 lb)              Today, you had the following     No orders found for display       Primary Care Provider Office Phone # Fax #    Jamie Meraz PA-C [phone]       [facility] [place] [address] [facility] [address]        Equal Access to Services     [facility]: Hadii aad ku hadasho Soomaali, waaxda luqadaha, qaybta kaalmada adeegyada, tacho aguilera hayaan adeharini garcia . So [facility] [phone].    ATENCIÓN: Si habla español, tiene a thomas disposición servicios gratuitos de asistencia lingüística. [facility] [phone].    We comply with applicable federal civil rights laws and [place] laws. We do not discriminate on the basis of race, color, national origin, age, disability sex, sexual orientation or gender identity.            Thank you!     Thank you for choosing [facility]  for your care. Our goal is always to provide you with excellent care. Hearing back from our patients is one way we can continue to improve our services. Please take a few minutes to complete the written survey that you may receive in the mail after your visit with us. Thank you!             Your Updated Medication List - Protect others around you: Learn how to safely use, store and throw away your medicines at www.disposemymeds.org.          This list is accurate as of: 7/26/17  9:24 AM.  Always use your most recent med list.                   Brand Name Dispense Instructions for use Diagnosis    cholecalciferol 1000 UNIT tablet    vitamin D    100 tablet    Take 1 tablet (1,000 Units) by mouth daily    Vitamin D deficiency       EYE VITAMINS PO      Take 1 tablet by mouth daily        DAILY MULTIVITAMIN PO      Take 1 chew tab by mouth daily        JUICE PLUS FIBRE PO           levothyroxine 100 MCG tablet     SYNTHROID/LEVOTHROID    90 tablet    Take 1 tablet (100 mcg) by mouth every morning (before breakfast)    Hypothyroidism       LUMIGAN 0.01 % Soln   Generic drug:  bimatoprost           melatonin 5 MG tablet      Take 10 mg by mouth At Bedtime Reported on 2/21/2017        metroNIDAZOLE 0.75 % cream    METROCREAM    60 g    every day, once daily to face    Rosacea       timolol 0.5 % ophthalmic solution    TIMOPTIC          TUMS 500 MG chewable tablet   Generic drug:  calcium carbonate      Take 1 chew tab by mouth daily as needed Reported on 4/18/2017        warfarin 5 MG tablet    COUMADIN    180 tablet    Take 1 tablet (5 mg) by mouth daily Or as directed by INR clinic. Current dose is 7.5 mg Mon,Wed, and Fri, 10 mg daily rest of week    History of DVT of lower extremity, Chronic anticoagulation

## 2017-07-31 ENCOUNTER — ANTICOAGULATION THERAPY VISIT (OUTPATIENT)
Dept: NURSING | Facility: CLINIC | Age: 77
End: 2017-07-31
Payer: MEDICARE

## 2017-07-31 DIAGNOSIS — Z79.01 LONG-TERM (CURRENT) USE OF ANTICOAGULANTS: ICD-10-CM

## 2017-07-31 LAB — INR POINT OF CARE: 2.7 (ref 0.86–1.14)

## 2017-07-31 PROCEDURE — 99207 ZZC NO CHARGE NURSE ONLY: CPT

## 2017-07-31 PROCEDURE — 36416 COLLJ CAPILLARY BLOOD SPEC: CPT

## 2017-07-31 PROCEDURE — 85610 PROTHROMBIN TIME: CPT | Mod: QW

## 2017-07-31 NOTE — PROGRESS NOTES
ANTICOAGULATION FOLLOW-UP CLINIC VISIT    Patient Name:  Rigoberto Holguin  Date:  7/31/2017  Contact Type:  Face to Face    SUBJECTIVE:     Patient Findings     Positives No Problem Findings           OBJECTIVE    INR Protime   Date Value Ref Range Status   07/31/2017 2.7 (A) 0.86 - 1.14 Final       ASSESSMENT / PLAN  INR assessment THER    Recheck INR In: 6 WEEKS    INR Location Clinic      Anticoagulation Summary as of 7/31/2017     INR goal 2.0-3.0   Today's INR 2.7   Maintenance plan 7.5 mg (5 mg x 1.5) on Mon, Wed, Fri; 10 mg (5 mg x 2) all other days   Full instructions 7.5 mg on Mon, Wed, Fri; 10 mg all other days   Weekly total 62.5 mg   No change documented Aparna Robison   Plan last modified Aparna Robison (1/25/2016)   Next INR check 9/12/2017   Target end date     Indications   Long-term (current) use of anticoagulants [Z79.01] [Z79.01]         Anticoagulation Episode Summary     INR check location     Preferred lab     Send INR reminders to [facility]    Comments       Anticoagulation Care Providers     Provider Role Specialty Phone number    Jamie Meraz PA-C Responsible Physician Assistant [phone]            See the Encounter Report to view Anticoagulation Flowsheet and Dosing Calendar (Go to Encounters tab in chart review, and find the Anticoagulation Therapy Visit)        APARNA ROBISON

## 2017-07-31 NOTE — MR AVS SNAPSHOT
Rigoberto LORENZO Holguin   7/31/2017 8:30 AM   Anticoagulation Therapy Visit    Description:  76 year old male   Provider:  ALANA ANTI COAG   Department:  Be Nurse           INR as of 7/31/2017     Today's INR 2.7      Anticoagulation Summary as of 7/31/2017     INR goal 2.0-3.0   Today's INR 2.7   Full instructions 7.5 mg on Mon, Wed, Fri; 10 mg all other days   Next INR check 9/12/2017    Indications   Long-term (current) use of anticoagulants [Z79.01] [Z79.01]         Your next Anticoagulation Clinic appointment(s)     Sep 12, 2017  8:30 AM CDT   Anticoagulation Visit with ALANA ANTI GARRISON   [facility] [place] ([facility]    [address]  [place] [address] [phone]              Contact Numbers     [facility]  Please call [phone] with any problems or questions regarding your therapy, or to cancel or reschedule your appointment.          July 2017 Details    Sun Mon Tue Wed Thu Fri Sat           1                 2               3               4               5               6               7               8                 9               10               11               12               13               14               15                 16               17               18               19               20               21               22                 23               24               25               26               27               28               29                 30               31      7.5 mg   See details            Date Details   07/31 This INR check               How to take your warfarin dose     To take:  7.5 mg Take 1.5 of the 5 mg tablets.           August 2017 Details    Sun Mon Tue Wed Thu Fri Sat       1      10 mg         2      7.5 mg         3      10 mg         4      7.5 mg         5      10 mg           6      10 mg         7      7.5 mg         8      10 mg         9      7.5 mg         10      10 mg         11      7.5 mg         12      10 mg            13      10 mg         14      7.5 mg         15      10 mg         16      7.5 mg         17      10 mg         18      7.5 mg         19      10 mg           20      10 mg         21      7.5 mg         22      10 mg         23      7.5 mg         24      10 mg         25      7.5 mg         26      10 mg           27      10 mg         28      7.5 mg         29      10 mg         30      7.5 mg         31      10 mg            Date Details   No additional details            How to take your warfarin dose     To take:  7.5 mg Take 1.5 of the 5 mg tablets.    To take:  10 mg Take 2 of the 5 mg tablets.           September 2017 Details    Sun Mon Tue Wed Thu Fri Sat          1      7.5 mg         2      10 mg           3      10 mg         4      7.5 mg         5      10 mg         6      7.5 mg         7      10 mg         8      7.5 mg         9      10 mg           10      10 mg         11      7.5 mg         12            13               14               15               16                 17               18               19               20               21               22               23                 24               25               26               27               28               29               30                Date Details   No additional details    Date of next INR:  9/12/2017         How to take your warfarin dose     To take:  7.5 mg Take 1.5 of the 5 mg tablets.    To take:  10 mg Take 2 of the 5 mg tablets.

## 2017-08-07 DIAGNOSIS — E06.3 HYPOTHYROIDISM DUE TO HASHIMOTO'S THYROIDITIS: Primary | ICD-10-CM

## 2017-08-07 RX ORDER — LEVOTHYROXINE SODIUM 100 UG/1
100 TABLET ORAL
Qty: 90 TABLET | Refills: 0 | Status: SHIPPED | OUTPATIENT
Start: 2017-08-07 | End: 2017-10-22

## 2017-08-07 NOTE — TELEPHONE ENCOUNTER
Medication is being filled for 1 time refill only due to:  Patient needs to be seen because tsh over due .

## 2017-08-07 NOTE — TELEPHONE ENCOUNTER
Levothyroxine     Last Written Prescription Date: 10/31/16  Last Quantity: 90, # refills: 2  Last Office Visit with G, P or [facility] prescribing provider: 05/01/17        TSH   Date Value Ref Range Status   08/02/2016 3.46 0.40 - 4.00 mU/L Final

## 2017-09-12 ENCOUNTER — ANTICOAGULATION THERAPY VISIT (OUTPATIENT)
Dept: NURSING | Facility: CLINIC | Age: 77
End: 2017-09-12
Payer: MEDICARE

## 2017-09-12 DIAGNOSIS — Z79.01 LONG-TERM (CURRENT) USE OF ANTICOAGULANTS: ICD-10-CM

## 2017-09-12 LAB — INR POINT OF CARE: 3 (ref 0.86–1.14)

## 2017-09-12 PROCEDURE — 36416 COLLJ CAPILLARY BLOOD SPEC: CPT

## 2017-09-12 PROCEDURE — 85610 PROTHROMBIN TIME: CPT | Mod: QW

## 2017-09-12 PROCEDURE — 99207 ZZC NO CHARGE NURSE ONLY: CPT

## 2017-09-12 NOTE — PROGRESS NOTES
ANTICOAGULATION FOLLOW-UP CLINIC VISIT    Patient Name:  Rigoberto Holguin  Date:  9/12/2017  Contact Type:  Face to Face    SUBJECTIVE:     Patient Findings     Positives No Problem Findings           OBJECTIVE    INR Protime   Date Value Ref Range Status   09/12/2017 3.0 (A) 0.86 - 1.14 Final       ASSESSMENT / PLAN  No question data found.  Anticoagulation Summary as of 9/12/2017     INR goal 2.0-3.0   Today's INR 3.0   Maintenance plan 7.5 mg (5 mg x 1.5) on Mon, Wed, Fri; 10 mg (5 mg x 2) all other days   Full instructions 7.5 mg on Mon, Wed, Fri; 10 mg all other days   Weekly total 62.5 mg   No change documented Geetha Rae   Plan last modified Aparna Vicente (1/25/2016)   Next INR check 10/23/2017   Target end date     Indications   Long-term (current) use of anticoagulants [Z79.01] [Z79.01]         Anticoagulation Episode Summary     INR check location     Preferred lab     Send INR reminders to [facility]    Comments       Anticoagulation Care Providers     Provider Role Specialty Phone number    Jamie Meraz PA-C Responsible Physician Assistant [phone]            See the Encounter Report to view Anticoagulation Flowsheet and Dosing Calendar (Go to Encounters tab in chart review, and find the Anticoagulation Therapy Visit)        Geetha Rae

## 2017-09-12 NOTE — MR AVS SNAPSHOT
Rigoberto LORENZO Holguin   9/12/2017 8:30 AM   Anticoagulation Therapy Visit    Description:  76 year old male   Provider:  ALANA ANTI COAG   Department:  Be Nurse           INR as of 9/12/2017     Today's INR 3.0      Anticoagulation Summary as of 9/12/2017     INR goal 2.0-3.0   Today's INR 3.0   Full instructions 7.5 mg on Mon, Wed, Fri; 10 mg all other days   Next INR check 10/23/2017    Indications   Long-term (current) use of anticoagulants [Z79.01] [Z79.01]         Your next Anticoagulation Clinic appointment(s)     Oct 23, 2017  8:45 AM CDT   Anticoagulation Visit with ALANA ANTI COATONY   [facility] [place] [facility] [place]    [address]  [place] [address] [phone]              Contact Numbers     [facility]  Please call [phone] with any problems or questions regarding your therapy, or to cancel or reschedule your appointment.          September 2017 Details    Sun Mon Tue Wed Thu Fri Sat          1               2                 3               4               5               6               7               8               9                 10               11               12      10 mg   See details      13      7.5 mg         14      10 mg         15      7.5 mg         16      10 mg           17      10 mg         18      7.5 mg         19      10 mg         20      7.5 mg         21      10 mg         22      7.5 mg         23      10 mg           24      10 mg         25      7.5 mg         26      10 mg         27      7.5 mg         28      10 mg         29      7.5 mg         30      10 mg          Date Details   09/12 This INR check               How to take your warfarin dose     To take:  7.5 mg Take 1.5 of the 5 mg tablets.    To take:  10 mg Take 2 of the 5 mg tablets.           October 2017 Details    Sun Mon Tue Wed Thu Fri Sat     1      10 mg         2      7.5 mg         3      10 mg         4      7.5 mg         5      10 mg         6      7.5 mg         7       10 mg           8      10 mg         9      7.5 mg         10      10 mg         11      7.5 mg         12      10 mg         13      7.5 mg         14      10 mg           15      10 mg         16      7.5 mg         17      10 mg         18      7.5 mg         19      10 mg         20      7.5 mg         21      10 mg           22      10 mg         23            24               25               26               27               28                 29               30               31                    Date Details   No additional details    Date of next INR:  10/23/2017         How to take your warfarin dose     To take:  7.5 mg Take 1.5 of the 5 mg tablets.    To take:  10 mg Take 2 of the 5 mg tablets.

## 2017-10-13 ENCOUNTER — OFFICE VISIT (OUTPATIENT)
Dept: FAMILY MEDICINE | Facility: CLINIC | Age: 77
End: 2017-10-13
Payer: MEDICARE

## 2017-10-13 ENCOUNTER — RADIANT APPOINTMENT (OUTPATIENT)
Dept: GENERAL RADIOLOGY | Facility: CLINIC | Age: 77
End: 2017-10-13
Attending: PHYSICIAN ASSISTANT
Payer: MEDICARE

## 2017-10-13 VITALS
WEIGHT: 206 LBS | BODY MASS INDEX: 31.32 KG/M2 | HEART RATE: 65 BPM | SYSTOLIC BLOOD PRESSURE: 122 MMHG | OXYGEN SATURATION: 98 % | TEMPERATURE: 98 F | DIASTOLIC BLOOD PRESSURE: 69 MMHG

## 2017-10-13 DIAGNOSIS — E06.3 HYPOTHYROIDISM DUE TO HASHIMOTO'S THYROIDITIS: ICD-10-CM

## 2017-10-13 DIAGNOSIS — N52.9 ERECTILE DYSFUNCTION, UNSPECIFIED ERECTILE DYSFUNCTION TYPE: ICD-10-CM

## 2017-10-13 DIAGNOSIS — M25.532 LEFT WRIST PAIN: ICD-10-CM

## 2017-10-13 DIAGNOSIS — Z23 NEED FOR PROPHYLACTIC VACCINATION AND INOCULATION AGAINST INFLUENZA: ICD-10-CM

## 2017-10-13 DIAGNOSIS — M25.532 LEFT WRIST PAIN: Primary | ICD-10-CM

## 2017-10-13 DIAGNOSIS — Z12.5 SCREENING FOR PROSTATE CANCER: ICD-10-CM

## 2017-10-13 DIAGNOSIS — Z86.718 HISTORY OF DVT OF LOWER EXTREMITY: ICD-10-CM

## 2017-10-13 DIAGNOSIS — Z79.01 LONG-TERM (CURRENT) USE OF ANTICOAGULANTS: ICD-10-CM

## 2017-10-13 LAB
PSA SERPL-ACNC: 1.28 UG/L (ref 0–4)
T4 FREE SERPL-MCNC: 1.1 NG/DL (ref 0.76–1.46)
TSH SERPL DL<=0.005 MIU/L-ACNC: 4.55 MU/L (ref 0.4–4)

## 2017-10-13 PROCEDURE — 84443 ASSAY THYROID STIM HORMONE: CPT | Performed by: PHYSICIAN ASSISTANT

## 2017-10-13 PROCEDURE — G0103 PSA SCREENING: HCPCS | Performed by: PHYSICIAN ASSISTANT

## 2017-10-13 PROCEDURE — 73110 X-RAY EXAM OF WRIST: CPT | Mod: LT

## 2017-10-13 PROCEDURE — 20605 DRAIN/INJ JOINT/BURSA W/O US: CPT | Performed by: PHYSICIAN ASSISTANT

## 2017-10-13 PROCEDURE — 99213 OFFICE O/P EST LOW 20 MIN: CPT | Mod: 25 | Performed by: PHYSICIAN ASSISTANT

## 2017-10-13 PROCEDURE — 36415 COLL VENOUS BLD VENIPUNCTURE: CPT | Performed by: PHYSICIAN ASSISTANT

## 2017-10-13 PROCEDURE — G0008 ADMIN INFLUENZA VIRUS VAC: HCPCS | Performed by: PHYSICIAN ASSISTANT

## 2017-10-13 PROCEDURE — 84439 ASSAY OF FREE THYROXINE: CPT | Performed by: PHYSICIAN ASSISTANT

## 2017-10-13 PROCEDURE — 90662 IIV NO PRSV INCREASED AG IM: CPT | Performed by: PHYSICIAN ASSISTANT

## 2017-10-13 RX ORDER — SILDENAFIL CITRATE 20 MG/1
TABLET ORAL
Qty: 30 TABLET | Refills: 11 | Status: SHIPPED | OUTPATIENT
Start: 2017-10-13 | End: 2019-06-08

## 2017-10-13 ASSESSMENT — PAIN SCALES - GENERAL: PAINLEVEL: NO PAIN (0)

## 2017-10-13 NOTE — NURSING NOTE
"Chief Complaint   Patient presents with     Musculoskeletal Problem       Initial /69 (BP Location: Right arm, Patient Position: Chair, Cuff Size: Adult Regular)  Pulse 65  Temp 98  F (36.7  C) (Oral)  Wt 206 lb (93.4 kg)  SpO2 98%  BMI 31.32 kg/m2 Estimated body mass index is 31.32 kg/(m^2) as calculated from the following:    Height as of 6/12/17: 5' 8\" (1.727 m).    Weight as of this encounter: 206 lb (93.4 kg).  Medication Reconciliation: complete   Emily Reich MA      "

## 2017-10-13 NOTE — MR AVS SNAPSHOT
After Visit Summary   10/13/2017    Rigoberto Holguin    MRN: [id number]           Patient Information     Date Of Birth          1940        Visit Information        Provider Department      10/13/2017 10:20 AM Jamie Meraz PA-C [facility]        Today's Diagnoses     Need for prophylactic vaccination and inoculation against influenza    -  1    History of DVT of lower extremity        Long-term (current) use of anticoagulants [Z79.01]        Hypothyroidism due to Hashimoto's thyroiditis        Left wrist pain        Screening for prostate cancer        Erectile dysfunction, unspecified erectile dysfunction type           Follow-ups after your visit        Additional Services     INR CLINIC REFERRAL       Your provider has referred you to INR Services.    Please be aware that coverage of these services is subject to the terms and limitations of your health insurance plan.  Call member services at your health plan with any benefit or coverage questions.    Indication for Anticoagulation: DVT (recurrent)  If nonstandard INR is desired, indicate goal range and explanation: 2-3  Expected Duration of Therapy: Lifetime                  Your next 10 appointments already scheduled     Oct 23, 2017  8:45 AM CDT   Anticoagulation Visit with BE ANTI COAG   [facility] ([facility])    [address] [place] [address]   [phone]            Nov 14, 2017 10:20 AM CST   New Visit with Jennifer Zuñiga PA-C   [facility]    [address] [place] [address]   [phone]            Dec 19, 2017 10:45 AM CST   Return Visit with Vickie Whalen MD   [facility]    [address] [place] [address] [phone]            Jun 26, 2018  9:00 AM CDT   Return Visit with Vincent Rucker MD, SAMUEL CYSTO [facility] ROOM   [facility]  ([facility])    [address]  [place] [address] [phone]              Who to contact     Normal or non-critical lab and imaging results will be communicated to you by viVoodhart, letter or phone within 4 business days after the clinic has received the results. If you do not hear from us within 7 days, please contact the clinic through viVoodhart or phone. If you have a critical or abnormal lab result, we will notify you by phone as soon as possible.  Submit refill requests through [facility] or call your pharmacy and they will forward the refill request to us. Please allow 3 business days for your refill to be completed.          If you need to speak with a  for additional information , please call: [phone]             Additional Information About Your Visit        [facility] Information     [facility] gives you secure access to your electronic health record. If you see a primary care provider, you can also send messages to your care team and make appointments. If you have questions, please call your primary care clinic.  If you do not have a primary care provider, please call [phone] and they will assist you.        Care EveryWhere ID     This is your Care EveryWhere ID. This could be used by other organizations to access your [facility] medical records  [id number]        Your Vitals Were     Pulse Temperature Pulse Oximetry BMI (Body Mass Index)          65 98  F (36.7  C) (Oral) 98% 31.32 kg/m2         Blood Pressure from Last 3 Encounters:   10/13/17 122/69   06/27/17 121/70   06/12/17 118/62    Weight from Last 3 Encounters:   10/13/17 206 lb (93.4 kg)   05/01/17 198 lb (89.8 kg)   04/20/17 198 lb 9.6 oz (90.1 kg)              We Performed the Following     DRAIN/INJECT MEDIUM JOINT/BURSA     FLU VACCINE, INCREASED ANTIGEN, PRESV FREE, AGE 65+ [06190]     INR CLINIC REFERRAL     PSA, screen     TSH with free T4 reflex     Vaccine Administration, Initial [06876]           Today's Medication Changes          These changes are accurate as of: 10/13/17 11:40 AM.  If you have any questions, ask your nurse or doctor.               Start taking these medicines.        Dose/Directions    sildenafil 20 MG tablet   Commonly known as:  REVATIO   Used for:  Erectile dysfunction, unspecified erectile dysfunction type   Started by:  Jamie Meraz PA-C        Take 2-5 tabs daily prn   Quantity:  30 tablet   Refills:  11            Where to get your medicines      These medications were sent to [facility] - [address] [facility]  [address] [address]     Phone:  [phone]     sildenafil 20 MG tablet                Primary Care Provider Office Phone # Fax #    Jamie Meraz PA-C [phone]       [address]  [place] [address]        Equal Access to Services     [facility]: Hadii aad ku hadasho Soomaali, waaxda luqadaha, qaybta kaalmada adeegyada, waxay idiin hayaan alessandro kharadory mcaacarl . So [facility] [phone].    ATENCIÓN: Si habla español, tiene a thomas disposición servicios gratuitos de asistencia lingüística. Llame al [phone].    We comply with applicable federal civil rights laws and [place] laws. We do not discriminate on the basis of race, color, national origin, age, disability, sex, sexual orientation, or gender identity.            Thank you!     Thank you for choosing [facility]  for your care. Our goal is always to provide you with excellent care. Hearing back from our patients is one way we can continue to improve our services. Please take a few minutes to complete the written survey that you may receive in the mail after your visit with us. Thank you!             Your Updated Medication List - Protect others around you: Learn how to safely use, store and throw away your medicines at www.disposemymeds.org.          This list is accurate as of: 10/13/17 11:40 AM.  Always use your  most recent med list.                   Brand Name Dispense Instructions for use Diagnosis    cholecalciferol 1000 UNIT tablet    vitamin D    100 tablet    Take 1 tablet (1,000 Units) by mouth daily    Vitamin D deficiency       EYE VITAMINS PO      Take 1 tablet by mouth daily        DAILY MULTIVITAMIN PO      Take 1 chew tab by mouth daily        JUICE PLUS FIBRE PO           levothyroxine 100 MCG tablet    SYNTHROID/LEVOTHROID    90 tablet    Take 1 tablet (100 mcg) by mouth every morning (before breakfast)    Hypothyroidism due to Hashimoto's thyroiditis       LUMIGAN 0.01 % Soln   Generic drug:  bimatoprost       Actinic keratosis       melatonin 5 MG tablet      Take 10 mg by mouth At Bedtime Reported on 2/21/2017        metroNIDAZOLE 0.75 % cream    METROCREAM    60 g    every day, once daily to face    Rosacea       sildenafil 20 MG tablet    REVATIO    30 tablet    Take 2-5 tabs daily prn    Erectile dysfunction, unspecified erectile dysfunction type       timolol 0.5 % ophthalmic solution    TIMOPTIC      Actinic keratosis       TUMS 500 MG chewable tablet   Generic drug:  calcium carbonate      Take 1 chew tab by mouth daily as needed Reported on 4/18/2017        warfarin 5 MG tablet    COUMADIN    180 tablet    Take 1 tablet (5 mg) by mouth daily Or as directed by INR clinic. Current dose is 7.5 mg Mon,Wed, and Fri, 10 mg daily rest of week    History of DVT of lower extremity, Chronic anticoagulation

## 2017-10-13 NOTE — PROGRESS NOTES
SUBJECTIVE:   Rigoberto Holguin is a 76 year old male who presents to clinic today for the following health issues:      Joint Pain    Onset: 1 year     Description:   Location: left wrist  Character: Sharp and Dull ache    Intensity: mild, moderate    Progression of Symptoms: worse, ROM limited    Accompanying Signs & Symptoms:  Other symptoms: none    History:   Previous similar pain: YES      Precipitating factors:   Trauma or overuse: no     Alleviating factors:  Improved by: nothing    Therapies Tried and outcome: Nothing              Problem list and histories reviewed & adjusted, as indicated.  Additional history: as documented    Patient Active Problem List   Diagnosis     Encounter for long-term current use of medication     Malignant neoplasm of other specified sites of bladder     Other and unspecified coagulation defects     Nonallopathic lesion of lumbar region     Stenosis, spinal, lumbar     CARDIOVASCULAR SCREENING; LDL GOAL LESS THAN 160     ACP (advance care planning)     Factor V Leiden mutation (H)     History of DVT of lower extremity     Postphlebitic syndrome     Chronic anticoagulation     Peripheral vascular disease (H)     Personal history of malignant neoplasm of bladder     Vitamin D deficiency     Long-term (current) use of anticoagulants [Z79.01]     Lumbar radicular pain     Hypothyroidism due to Hashimoto's thyroiditis     Status post lumbar surgery     Aftercare following surgery of the musculoskeletal system     Past Surgical History:   Procedure Laterality Date     Appendectomy       FORAMINOTOMY LUMBAR POSTERIOR TWO LEVELS Left 3/2/2017    Procedure: FORAMINOTOMY LUMBAR POSTERIOR TWO LEVELS;  Surgeon: Ricky Mclaughlin MD;  Location: [facility] OR     HC REPAIR ROTATOR CUFF,ACUTE Left 2007     PHOTODYNAMIC THERAPY - (PDT)  2/13/2015     SURGICAL HISTORY OF -   2003    Urinary Bladder Cancer treatment     SURGICAL HISTORY OF -       Gall Bladder       Social History   Substance Use Topics      Smoking status: Former Smoker     Types: Cigarettes     Quit date: 10/1/1970     Smokeless tobacco: Never Used     Alcohol use 0.0 oz/week     0 Standard drinks or equivalent per week      Comment: Rarely drink 1-2 beers per week     Family History   Problem Relation Age of Onset     CANCER Father      lung cancer     CANCER Brother      lymphoma     Neurologic Disorder Sister      parkinsonism         Recent Labs   Lab Test  10/13/17   1117  02/21/17   1211  08/02/16   0740  10/20/15   0803   08/15/14   0632   10/04/12   1001   09/30/09   1056   LDL   --    --   99  110   --    --    --   155*   < >  157*   HDL   --    --   51  50   --    --    --   40   < >  46   TRIG   --    --   97  82   --    --    --   118   < >  110   ALT   --    --    --   24   --   48   --    --    --   23   CR   --   1.02  0.81  1.01   --    --    < >   --    --   0.98   GFRESTIMATED   --   71  >90  Non  GFR Calc    72   --    --    < >   --    --   76   GFRESTBLACK   --   86  >90   GFR Calc    87   --    --    < >   --    --   >90   POTASSIUM   --   4.4  4.4  4.3   --    --    < >   --    --   5.0   TSH  4.55*   --   3.46  2.88   < >   --    < >  14.60*   < >  5.47*    < > = values in this interval not displayed.      BP Readings from Last 3 Encounters:   10/13/17 122/69   06/27/17 121/70   06/12/17 118/62    Wt Readings from Last 3 Encounters:   10/13/17 206 lb (93.4 kg)   05/01/17 198 lb (89.8 kg)   04/20/17 198 lb 9.6 oz (90.1 kg)                          Reviewed and updated as needed this visit by clinical staffTobacco  Allergies  Meds  Problems  Med Hx  Surg Hx  Fam Hx  Soc Hx        Reviewed and updated as needed this visit by Provider  Tobacco  Allergies  Meds  Problems  Med Hx  Surg Hx  Fam Hx  Soc Hx          All other systems negative except as outline above  OBJECTIVE:    Eye exam - right eye normal lid, conjunctiva, cornea, pupil and fundus, left eye normal lid, conjunctiva,  cornea, pupil and fundus.  Thyroid not palpable, not enlarged, no nodules detected.  CHEST:chest clear to IPPA, no tachypnea, retractions or cyanosis and S1, S2 normal, no murmur, no gallop, rate regular.  Left wrist. Some swelling of lateral and dorsal wrist. Acute pain at this site.            The risks, benefits and potential complications (including but not limited to, bleeding, infection, pain, scar, damage to adjacent structures, atrophy or necrosis of soft tissue, skin blanching, failure to relieve symptoms) of injection were discussed with the patient. Questions were addressed and answered.The patient elected to proceed. Written informed consent was obtained. The correct procedural site was identified and confirmed. A Left wrist intraarticular injection was performed using 1mL Depo Medrol 40mg per mL and 2mL (0.25% marcaine) of local anesthetic after sterile prep, to the correct procedural site. Sterile bandaid applied. This was tolerated well by the patient. No apparent complications.Did also discuss that if diabetic, recommend close monitoring of blood sugars over the next week as cortisone injections can temporarily elevate blood sugars.    Rigoberto was seen today for musculoskeletal problem.    Diagnoses and all orders for this visit:    Left wrist pain  -     XR Wrist Left G/E 3 Views; Future  -     DRAIN/INJECT MEDIUM JOINT/BURSA  -     order for DME; Thumb spica wrist brace  -     DEPO MEDROL 40 MG  -     T4 free    Need for prophylactic vaccination and inoculation against influenza  -     FLU VACCINE, INCREASED ANTIGEN, PRESV FREE, AGE 65+ [65749]  -     Vaccine Administration, Initial [07766]    History of DVT of lower extremity  -     INR CLINIC REFERRAL    Long-term (current) use of anticoagulants [Z79.01]  -     INR CLINIC REFERRAL    Hypothyroidism due to Hashimoto's thyroiditis  -     TSH with free T4 reflex    Screening for prostate cancer  -     PSA, screen    Erectile dysfunction, unspecified  erectile dysfunction type  -     sildenafil (REVATIO) 20 MG tablet; Take 2-5 tabs daily prn      work on lifestyle modification  Advised supportive and symptomatic treatment.  Follow up with Provider - if condition persists or worsens.       Injectable Influenza Immunization Documentation    1.  Is the person to be vaccinated sick today?   No    2. Does the person to be vaccinated have an allergy to a component   of the vaccine?   No    3. Has the person to be vaccinated ever had a serious reaction   to influenza vaccine in the past?   No    4. Has the person to be vaccinated ever had Guillain-Barré syndrome?  no    Form completed by isaias Reyez

## 2017-10-13 NOTE — NURSING NOTE
Prior to injection verified patient identity using patient's name and date of birth.  Emily Reich MA    Per orders of Jamie Meraz, injection of Flu given by Emily Reich. Patient instructed to remain in clinic for 15 minutes afterwards, and to report any adverse reaction to me immediately.  Emily Reich MA    VIS for Flu given on same date of administration.  Staff signature/Title: Emily Reich MA

## 2017-10-22 RX ORDER — LEVOTHYROXINE SODIUM 112 UG/1
112 TABLET ORAL
Qty: 60 TABLET | Refills: 0 | Status: SHIPPED | OUTPATIENT
Start: 2017-10-22 | End: 2018-01-03

## 2017-10-23 ENCOUNTER — TELEPHONE (OUTPATIENT)
Dept: FAMILY MEDICINE | Facility: CLINIC | Age: 77
End: 2017-10-23

## 2017-10-23 ENCOUNTER — ANTICOAGULATION THERAPY VISIT (OUTPATIENT)
Dept: NURSING | Facility: CLINIC | Age: 77
End: 2017-10-23
Payer: MEDICARE

## 2017-10-23 DIAGNOSIS — Z79.01 CHRONIC ANTICOAGULATION: ICD-10-CM

## 2017-10-23 DIAGNOSIS — Z79.01 LONG-TERM (CURRENT) USE OF ANTICOAGULANTS: ICD-10-CM

## 2017-10-23 DIAGNOSIS — Z86.718 HISTORY OF DVT OF LOWER EXTREMITY: ICD-10-CM

## 2017-10-23 LAB — INR POINT OF CARE: 3.2 (ref 0.86–1.14)

## 2017-10-23 PROCEDURE — 99207 ZZC NO CHARGE NURSE ONLY: CPT

## 2017-10-23 PROCEDURE — 85610 PROTHROMBIN TIME: CPT | Mod: QW

## 2017-10-23 PROCEDURE — 36416 COLLJ CAPILLARY BLOOD SPEC: CPT

## 2017-10-23 NOTE — TELEPHONE ENCOUNTER
Routing refill request to provider for review/approval because:  Drug interaction warning with metrocream  Pended for approval.  Aparna Vicente RN

## 2017-10-23 NOTE — MR AVS SNAPSHOT
Rigoberto LORENZO Holguin   10/23/2017 8:45 AM   Anticoagulation Therapy Visit    Description:  77 year old male   Provider:  ALANA ANTI COAG   Department:  Be Nurse           INR as of 10/23/2017     Today's INR 3.2!      Anticoagulation Summary as of 10/23/2017     INR goal 2.0-3.0   Today's INR 3.2!   Full instructions 7.5 mg on Mon, Wed, Fri; 10 mg all other days   Next INR check 11/6/2017    Indications   Long-term (current) use of anticoagulants [Z79.01] [Z79.01]         Your next Anticoagulation Clinic appointment(s)     Oct 23, 2017  8:45 AM CDT   Anticoagulation Visit with BE ANTI COAG   [facility] [place] ([facility])    [address] [address] [facility]  [place] [id number] [phone]            Nov 06, 2017 10:15 AM CST   Anticoagulation Visit with BE ANTI COAG   [facility] [place] ([facility])    [address] [facility] [address]  [place] [address] [phone]              Contact Numbers     [facility]  Please call [phone] with any problems or questions regarding your therapy, or to cancel or reschedule your appointment.          October 2017 Details    Sun Mon Tue Wed Thu Fri Sat     1               2               3               4               5               6               7                 8               9               10               11               12               13               14                 15               16               17               18               19               20               21                 22               23      7.5 mg   See details      24      10 mg         25      7.5 mg         26      10 mg         27      7.5 mg         28      10 mg           29      10 mg         30      7.5 mg         31      10 mg              Date Details   10/23 This INR check               How to take your warfarin dose     To take:  7.5 mg Take 1.5 of the 5 mg tablets.    To take:  10 mg Take 2 of the 5 mg tablets.           November 2017 Details     Sun Mon Tue Wed Thu Fri Sat        1      7.5 mg         2      10 mg         3      7.5 mg         4      10 mg           5      10 mg         6            7               8               9               10               11                 12               13               14               15               16               17               18                 19               20               21               22               23               24               25                 26               27               28               29               30                  Date Details   No additional details    Date of next INR:  11/6/2017         How to take your warfarin dose     To take:  7.5 mg Take 1.5 of the 5 mg tablets.    To take:  10 mg Take 2 of the 5 mg tablets.

## 2017-10-23 NOTE — PROGRESS NOTES
ANTICOAGULATION FOLLOW-UP CLINIC VISIT    Patient Name:  Rigoberto Holguin  Date:  10/23/2017  Contact Type:  Face to Face    SUBJECTIVE:     Patient Findings     Positives No Problem Findings, Unexplained INR or factor level change           OBJECTIVE    INR Protime   Date Value Ref Range Status   10/23/2017 3.2 (A) 0.86 - 1.14 Final       ASSESSMENT / PLAN  INR assessment SUPRA    Recheck INR In: 2 WEEKS    INR Location Clinic      Anticoagulation Summary as of 10/23/2017     INR goal 2.0-3.0   Today's INR 3.2!   Maintenance plan 7.5 mg (5 mg x 1.5) on Mon, Wed, Fri; 10 mg (5 mg x 2) all other days   Full instructions 7.5 mg on Mon, Wed, Fri; 10 mg all other days   Weekly total 62.5 mg   No change documented Aparna Robison   Plan last modified Aparna Robison (1/25/2016)   Next INR check 11/6/2017   Target end date     Indications   Long-term (current) use of anticoagulants [Z79.01] [Z79.01]         Anticoagulation Episode Summary     INR check location     Preferred lab     Send INR reminders to [facility]    Comments       Anticoagulation Care Providers     Provider Role Specialty Phone number    Jamie Meraz PA-C Responsible Physician Assistant [phone]            See the Encounter Report to view Anticoagulation Flowsheet and Dosing Calendar (Go to Encounters tab in chart review, and find the Anticoagulation Therapy Visit)        APARNA ROBISON

## 2017-10-27 RX ORDER — WARFARIN SODIUM 5 MG/1
5 TABLET ORAL DAILY
Qty: 180 TABLET | Refills: 1 | Status: SHIPPED | OUTPATIENT
Start: 2017-10-27 | End: 2017-10-30

## 2017-10-30 ENCOUNTER — TELEPHONE (OUTPATIENT)
Dept: FAMILY MEDICINE | Facility: CLINIC | Age: 77
End: 2017-10-30

## 2017-10-30 DIAGNOSIS — Z79.01 CHRONIC ANTICOAGULATION: ICD-10-CM

## 2017-10-30 DIAGNOSIS — Z86.718 HISTORY OF DVT OF LOWER EXTREMITY: ICD-10-CM

## 2017-10-30 NOTE — TELEPHONE ENCOUNTER
Will resend RX.  Already states current dose on it.  Due to drug interaction have to have PCP sign.          Anticoagulation Summary as of 10/23/2017            INR goal 2.0-3.0   Today's INR 3.2!   Maintenance plan 7.5 mg (5 mg x 1.5) on Mon, Wed, Fri; 10 mg (5 mg x 2) all other days   Full instructions 7.5 mg on Mon, Wed, Fri; 10 mg all other days   Weekly total 62.5 mg   No change documented Aparna Vicente   Plan last modified Aparna Vicente (1/25/2016)   Next INR check 11/6/2017   Target end date       Indications   Long-term (current) use of anticoagulants [Z79.01] [Z79.01]

## 2017-10-30 NOTE — TELEPHONE ENCOUNTER
There are conflicting directions for Warfarin 5 mg. / 1 tab QD as directed or 1 &1/2 tabs Monday, Wednesday & Friday and 2 tabs daily the rest of the week. Please clarify. Thanks!

## 2017-11-01 ENCOUNTER — TELEPHONE (OUTPATIENT)
Dept: FAMILY MEDICINE | Facility: CLINIC | Age: 77
End: 2017-11-01

## 2017-11-01 RX ORDER — WARFARIN SODIUM 5 MG/1
5 TABLET ORAL DAILY
Qty: 180 TABLET | Refills: 1 | Status: SHIPPED | OUTPATIENT
Start: 2017-11-01 | End: 2018-06-19

## 2017-11-01 NOTE — TELEPHONE ENCOUNTER
Message to provider:  ERX for warfarin 5 mg tab with conflicting directions.  Should this be 1 tab QD as directed or 1 &1/2 tabs Monday, Wednesday, & Friday and 2 tabs daily the rest of the week?      Please clarify.

## 2017-11-01 NOTE — TELEPHONE ENCOUNTER
Spoke with pharmacy to clarify Warfarin orders. It should say as directed by INR clinic- current dose  Maintenance plan 7.5 mg (5 mg x 1.5) on Mon, Wed, Fri; 10 mg (5 mg x 2) all other days

## 2017-11-06 ENCOUNTER — ANTICOAGULATION THERAPY VISIT (OUTPATIENT)
Dept: NURSING | Facility: CLINIC | Age: 77
End: 2017-11-06
Payer: MEDICARE

## 2017-11-06 DIAGNOSIS — Z79.01 LONG-TERM (CURRENT) USE OF ANTICOAGULANTS: ICD-10-CM

## 2017-11-06 LAB — INR POINT OF CARE: 3.7 (ref 0.86–1.14)

## 2017-11-06 PROCEDURE — 85610 PROTHROMBIN TIME: CPT | Mod: QW

## 2017-11-06 PROCEDURE — 36416 COLLJ CAPILLARY BLOOD SPEC: CPT

## 2017-11-06 PROCEDURE — 99207 ZZC NO CHARGE NURSE ONLY: CPT

## 2017-11-06 NOTE — PROGRESS NOTES
ANTICOAGULATION FOLLOW-UP CLINIC VISIT    Patient Name:  Rigoberto Holguin  Date:  11/6/2017  Contact Type:  Face to Face    SUBJECTIVE:     Patient Findings     Positives Unexplained INR or factor level change           OBJECTIVE    INR Protime   Date Value Ref Range Status   11/06/2017 3.7 (A) 0.86 - 1.14 Final       ASSESSMENT / PLAN  INR assessment SUPRA    Recheck INR In: 3 WEEKS    INR Location Clinic      Anticoagulation Summary as of 11/6/2017     INR goal 2.0-3.0   Today's INR 3.7!   Maintenance plan 10 mg (5 mg x 2) on Sun, Tue, Thu; 7.5 mg (5 mg x 1.5) all other days   Full instructions 11/6: Hold; Otherwise 10 mg on Sun, Tue, Thu; 7.5 mg all other days   Weekly total 60 mg   Plan last modified Aparna Robison (11/6/2017)   Next INR check 11/27/2017   Target end date     Indications   Long-term (current) use of anticoagulants [Z79.01] [Z79.01]         Anticoagulation Episode Summary     INR check location     Preferred lab     Send INR reminders to [facility]    Comments       Anticoagulation Care Providers     Provider Role Specialty Phone number    Jamie Meraz PA-C Responsible Physician Assistant [phone]            See the Encounter Report to view Anticoagulation Flowsheet and Dosing Calendar (Go to Encounters tab in chart review, and find the Anticoagulation Therapy Visit)        APARNA ROBISON

## 2017-11-06 NOTE — MR AVS SNAPSHOT
Rigoberto LORENZO Holguin   11/6/2017 10:15 AM   Anticoagulation Therapy Visit    Description:  77 year old male   Provider:  ALANA ANTI COAG   Department:  Be Nurse           INR as of 11/6/2017     Today's INR 3.7!      Anticoagulation Summary as of 11/6/2017     INR goal 2.0-3.0   Today's INR 3.7!   Full instructions 11/6: Hold; Otherwise 10 mg on Sun, Tue, Thu; 7.5 mg all other days   Next INR check 11/27/2017    Indications   Long-term (current) use of anticoagulants [Z79.01] [Z79.01]         Your next Anticoagulation Clinic appointment(s)     Nov 27, 2017 10:15 AM CST   Anticoagulation Visit with BE ANTI COAG   [facility] [place] ([facility] [place])    [address]  [place] [address] [phone]              Contact Numbers     [facility]  Please call [phone] with any problems or questions regarding your therapy, or to cancel or reschedule your appointment.          November 2017 Details    Sun Mon Tue Wed Thu Fri Sat        1               2               3               4                 5               6      Hold   See details      7      10 mg         8      7.5 mg         9      10 mg         10      7.5 mg         11      7.5 mg           12      10 mg         13      7.5 mg         14      10 mg         15      7.5 mg         16      10 mg         17      7.5 mg         18      7.5 mg           19      10 mg         20      7.5 mg         21      10 mg         22      7.5 mg         23      10 mg         24      7.5 mg         25      7.5 mg           26      10 mg         27            28               29               30                  Date Details   11/06 This INR check       Date of next INR:  11/27/2017         How to take your warfarin dose     To take:  7.5 mg Take 1.5 of the 5 mg tablets.    To take:  10 mg Take 2 of the 5 mg tablets.    Hold Do not take your warfarin dose. See the Details table to the right for additional instructions.

## 2017-11-28 ENCOUNTER — ANTICOAGULATION THERAPY VISIT (OUTPATIENT)
Dept: NURSING | Facility: CLINIC | Age: 77
End: 2017-11-28
Payer: MEDICARE

## 2017-11-28 DIAGNOSIS — Z79.01 LONG-TERM (CURRENT) USE OF ANTICOAGULANTS: ICD-10-CM

## 2017-11-28 LAB — INR POINT OF CARE: 2.7 (ref 0.86–1.14)

## 2017-11-28 PROCEDURE — 36416 COLLJ CAPILLARY BLOOD SPEC: CPT

## 2017-11-28 PROCEDURE — 85610 PROTHROMBIN TIME: CPT | Mod: QW

## 2017-11-28 PROCEDURE — 99207 ZZC NO CHARGE NURSE ONLY: CPT

## 2017-11-28 NOTE — MR AVS SNAPSHOT
Rigoberto LORENZO Holguin   11/28/2017 8:45 AM   Anticoagulation Therapy Visit    Description:  77 year old male   Provider:  ALANA ANTI COAG   Department:  [facility] Nurse           INR as of 11/28/2017     Today's INR 2.7      Anticoagulation Summary as of 11/28/2017     INR goal 2.0-3.0   Today's INR 2.7   Full instructions 10 mg on Sun, Tue, Thu; 7.5 mg all other days   Next INR check 1/8/2018    Indications   Long-term (current) use of anticoagulants [Z79.01] [Z79.01]         Your next Anticoagulation Clinic appointment(s)     Jan 08, 2018 10:30 AM CST   Anticoagulation Visit with ALANA ANTI COATONY   [facility] [place] ([facility])    [address] [address] [facility]  [place] [address] [phone]              Contact Numbers     [facility]  Please call [phone] with any problems or questions regarding your therapy, or to cancel or reschedule your appointment.          November 2017 Details    Sun Mon Tue Wed Thu Fri Sat        1               2               3               4                 5               6               7               8               9               10               11                 12               13               14               15               16               17               18                 19               20               21               22               23               24               25                 26               27               28      10 mg   See details      29      7.5 mg         30      10 mg            Date Details   11/28 This INR check               How to take your warfarin dose     To take:  7.5 mg Take 1.5 of the 5 mg tablets.    To take:  10 mg Take 2 of the 5 mg tablets.           December 2017 Details    Sun Mon Tue Wed Thu Fri Sat          1      7.5 mg         2      7.5 mg           3      10 mg         4      7.5 mg         5      10 mg         6      7.5 mg         7      10 mg         8      7.5 mg         9      7.5 mg           10      10  mg         11      7.5 mg         12      10 mg         13      7.5 mg         14      10 mg         15      7.5 mg         16      7.5 mg           17      10 mg         18      7.5 mg         19      10 mg         20      7.5 mg         21      10 mg         22      7.5 mg         23      7.5 mg           24      10 mg         25      7.5 mg         26      10 mg         27      7.5 mg         28      10 mg         29      7.5 mg         30      7.5 mg           31      10 mg                Date Details   No additional details            How to take your warfarin dose     To take:  7.5 mg Take 1.5 of the 5 mg tablets.    To take:  10 mg Take 2 of the 5 mg tablets.           January 2018 Details    Sun Mon Tue Wed Thu Fri Sat      1      7.5 mg         2      10 mg         3      7.5 mg         4      10 mg         5      7.5 mg         6      7.5 mg           7      10 mg         8            9               10               11               12               13                 14               15               16               17               18               19               20                 21               22               23               24               25               26               27                 28               29               30               31                   Date Details   No additional details    Date of next INR:  1/8/2018         How to take your warfarin dose     To take:  7.5 mg Take 1.5 of the 5 mg tablets.    To take:  10 mg Take 2 of the 5 mg tablets.

## 2017-11-28 NOTE — PROGRESS NOTES
ANTICOAGULATION FOLLOW-UP CLINIC VISIT    Patient Name:  Rigoberto Holguin  Date:  11/28/2017  Contact Type:  Face to Face    SUBJECTIVE:     Patient Findings     Positives No Problem Findings           OBJECTIVE    INR Protime   Date Value Ref Range Status   11/28/2017 2.7 (A) 0.86 - 1.14 Final       ASSESSMENT / PLAN  INR assessment THER    Recheck INR In: 6 WEEKS    INR Location Clinic      Anticoagulation Summary as of 11/28/2017     INR goal 2.0-3.0   Today's INR 2.7   Maintenance plan 10 mg (5 mg x 2) on Sun, Tue, Thu; 7.5 mg (5 mg x 1.5) all other days   Full instructions 10 mg on Sun, Tue, Thu; 7.5 mg all other days   Weekly total 60 mg   No change documented Geetha Rae   Plan last modified Aparna Vicente (11/6/2017)   Next INR check 1/8/2018   Target end date     Indications   Long-term (current) use of anticoagulants [Z79.01] [Z79.01]         Anticoagulation Episode Summary     INR check location     Preferred lab     Send INR reminders to [facility]    Comments       Anticoagulation Care Providers     Provider Role Specialty Phone number    Jamie Meraz PA-C Responsible Physician Assistant [phone]            See the Encounter Report to view Anticoagulation Flowsheet and Dosing Calendar (Go to Encounters tab in chart review, and find the Anticoagulation Therapy Visit)        Geetha Rae

## 2017-12-04 DIAGNOSIS — E06.3 HYPOTHYROIDISM DUE TO HASHIMOTO'S THYROIDITIS: ICD-10-CM

## 2017-12-04 LAB — TSH SERPL DL<=0.005 MIU/L-ACNC: 0.63 MU/L (ref 0.4–4)

## 2017-12-04 PROCEDURE — 36415 COLL VENOUS BLD VENIPUNCTURE: CPT | Performed by: PHYSICIAN ASSISTANT

## 2017-12-04 PROCEDURE — 84443 ASSAY THYROID STIM HORMONE: CPT | Performed by: PHYSICIAN ASSISTANT

## 2017-12-19 ENCOUNTER — OFFICE VISIT (OUTPATIENT)
Dept: DERMATOLOGY | Facility: CLINIC | Age: 77
End: 2017-12-19
Payer: MEDICARE

## 2017-12-19 DIAGNOSIS — L57.0 ACTINIC KERATOSIS: ICD-10-CM

## 2017-12-19 DIAGNOSIS — H02.30 EXCESS SKIN OF EYELID, UNSPECIFIED LATERALITY: Primary | ICD-10-CM

## 2017-12-19 PROCEDURE — 17004 DESTROY PREMAL LESIONS 15/>: CPT | Performed by: DERMATOLOGY

## 2017-12-19 PROCEDURE — 99213 OFFICE O/P EST LOW 20 MIN: CPT | Mod: 25 | Performed by: DERMATOLOGY

## 2017-12-19 NOTE — LETTER
12/19/2017      RE: Rigoberto Holguin  [address] [facility] [address]       [facility] Dermatology Note      Dermatology Problem List:  1. Hx of NMSC  -SCC, left dorsal forearm, s/p ED&C 6/2/2016  -SCC, left nasal side wall, s/p Mohs 3/10/2016   -BCC left malar cheek, s/p Mohs 3/2013 per records from [facility]  2. Actinic keratoses  -Previous Tx: script for efudex initiated but cost prohibitive, cryotherapy, PDT (X3 forehead/scalp early 2017) (X2 sessions 2015 with improvement) (1-2 treatments at outside facility)  3.Lesion, left ear, records from [facility] were reviewed  -s/p biopsy 06/2014 demonstrating ulceration with inflammation  -s/p biopsy 07/2014 demonstrating erosion and ulcer with hemorrhagic scale crust, impetiginized, [facility] was queried  4. Rosacea  -Current Tx: Metrocream with improvement.   5. Lesion to recheck on R upper arm. S/p bx (non-diagnostic) and cryotherapy x 1 (7/26/17)    Encounter Date: Dec 19, 2017    CC:  Chief Complaint   Patient presents with     Derm Problem     skin check, spot check on rt upper arm       History of Present Illness:  Mr. Rigoberto Holguin is a 77 year old male with a hx of NMSC presents as a follow up for lesion on the right arm s/p biopsy. The patient was last seen 7/26/17 when he underwent cryo for AKs. He feels his skin is doing well.     The patient reports no other lesions of concern.    Past Medical History:   Patient Active Problem List   Diagnosis     Encounter for long-term current use of medication     Malignant neoplasm of other specified sites of bladder     Other and unspecified coagulation defects     Nonallopathic lesion of lumbar region     Stenosis, spinal, lumbar     CARDIOVASCULAR SCREENING; LDL GOAL LESS THAN 160     ACP (advance care planning)     Factor V Leiden mutation (H)     History of DVT of lower extremity     Postphlebitic syndrome     Chronic anticoagulation     Peripheral vascular disease  (H)     Personal history of malignant neoplasm of bladder     Vitamin D deficiency     Long-term (current) use of anticoagulants [Z79.01]     Lumbar radicular pain     Hypothyroidism due to Hashimoto's thyroiditis     Status post lumbar surgery     Aftercare following surgery of the musculoskeletal system     Past Medical History:   Diagnosis Date     Actinic keratosis      Basal cell carcinoma      Bladder cancer (H) 2003     DJD (degenerative joint disease), lumbar     S/P epidural x 2     DVT (deep venous thrombosis) (H) 2007    Factor V ( bilateral legs)      GERD (gastroesophageal reflux disease)      Hypothyroidism      Polio     Age 4, weakness of right arm and leg      S/P appendectomy      Past Surgical History:   Procedure Laterality Date     Appendectomy       FORAMINOTOMY LUMBAR POSTERIOR TWO LEVELS Left 3/2/2017    Procedure: FORAMINOTOMY LUMBAR POSTERIOR TWO LEVELS;  Surgeon: Ricky Mclaughlin MD;  Location: [facility] OR     HC REPAIR ROTATOR CUFF,ACUTE Left 2007     PHOTODYNAMIC THERAPY - (PDT)  2/13/2015     SURGICAL HISTORY OF -   2003    Urinary Bladder Cancer treatment     SURGICAL HISTORY OF -       Gall Bladder     Social History:  Reviewed and unchanged but kept in chart for clinician convenience  The patient is retired. He was a . He has 1 granddaughter.     Family History:  Reviewed and unchanged but kept in chart for clinician convenience  There is no family history of skin cancer.    Medications:  Current Outpatient Prescriptions   Medication Sig Dispense Refill     warfarin (COUMADIN) 5 MG tablet Take 1 tablet (5 mg) by mouth daily Or as directed by INR clinic. Current dose is 7.5 mg Mon,Wed, and Fri, 10 mg daily rest of week 180 tablet 1     levothyroxine (SYNTHROID/LEVOTHROID) 112 MCG tablet Take 1 tablet (112 mcg) by mouth every morning (before breakfast) 60 tablet 0     sildenafil (REVATIO) 20 MG tablet Take 2-5 tabs daily prn 30 tablet 11     order for DME Thumb spica wrist brace 1  Device 0     LUMIGAN 0.01 % SOLN ophthalmic solution        timolol (TIMOPTIC) 0.5 % ophthalmic solution        metroNIDAZOLE (METROCREAM) 0.75 % cream every day, once daily to face 60 g 3     Multiple Vitamins-Minerals (EYE VITAMINS PO) Take 1 tablet by mouth daily       melatonin 5 MG tablet Take 10 mg by mouth At Bedtime Reported on 2/21/2017       cholecalciferol (VITAMIN D) 1000 UNIT tablet Take 1 tablet (1,000 Units) by mouth daily 100 tablet 0     Nutritional Supplements (JUICE PLUS FIBRE PO)        calcium carbonate (TUMS) 500 MG chewable tablet Take 1 chew tab by mouth daily as needed Reported on 4/18/2017       Multiple Vitamin (DAILY MULTIVITAMIN PO) Take 1 chew tab by mouth daily         No Known Allergies    Review of Systems:  -Const: Denies fevers, chills or unintended changes in weight.   -Skin: Ask per HPI     Physical exam:  There were no vitals taken for this visit.  -This is a well developed, well-nourished male in no acute distress, in a pleasant mood.    SKIN: Total skin excluding the undergarment areas was performed. The exam included the head/face, neck, both arms, chest, back, abdomen, both legs, digits and/or nails. Excluding ankles and feet. Declines genital exam.   -There is no erythema, telangectasias, nodularity, or pigmentation on the left forearm, nose, left cheek.  -  There are erythematous macules with overyling adherent scale on the right temple, right nasal tip, left forehead, left temple, vertex scalp.   -lesion on the right upper arm is resolved.  - half a a pea sized nodule, mobile on post auricular  -dropping of th eyelids, left more than right  -No other lesions of concern on areas examined.     Impression/Plan:  1. Actinic keratosis: Right temple, right nasal tip, left forehead, left temple, vertex scalp.   Cryotherapy procedure note: After verbal consent and discussion of risks and benefits including but no limited to dyspigmentation/scar, blister, and pain, 16 was treated  with 1-2mm freeze border for 1-2 cycles with liquid nitrogen. Post cryotherapy instructions were provided.   Recommend sunscreens SPF #30 or greater, protective clothing and avoidance of tanning beds.  2. Excess eyelid skin that hinders eyesight.     Follow up with Dr. Potter   3. Nodule, post auricular, reviewed that this would need histopath to diagnosis. It may be a cyst, he declines biopsy today  Follow up in 8 months, earlier for new or changing lesions.     CC Paula Whalen and Jamie Meraz    Staff Involved:  Scribe/Staff    Scribe Disclosure:   I, Taylor Chapa, am serving as a scribe to document services personally performed by Dr. Vickie Whalen, based on data collection and the provider's statements to me.       Provider Disclosure:   The documentation recorded by the scribe accurately reflects the services I personally performed and the decisions made by me.    Vickie Whalen MD    Department of Dermatology  [facility]: Phone: [phone], Fax:[phone]  [facility]: Phone: [phone], Fax: [phone]        Vickie Whalen MD

## 2017-12-19 NOTE — PROGRESS NOTES
[facility] Dermatology Note      Dermatology Problem List:  1. Hx of NMSC  -SCC, left dorsal forearm, s/p ED&C 6/2/2016  -SCC, left nasal side wall, s/p Mohs 3/10/2016   -BCC left malar cheek, s/p Mohs 3/2013 per records from [facility]  2. Actinic keratoses  -Previous Tx: script for efudex initiated but cost prohibitive, cryotherapy, PDT (X3 forehead/scalp early 2017) (X2 sessions 2015 with improvement) (1-2 treatments at outside facility)  3.Lesion, left ear, records from [facility] were reviewed  -s/p biopsy 06/2014 demonstrating ulceration with inflammation  -s/p biopsy 07/2014 demonstrating erosion and ulcer with hemorrhagic scale crust, impetiginized, [facility] was queried  4. Rosacea  -Current Tx: Metrocream with improvement.   5. Lesion to recheck on R upper arm. S/p bx (non-diagnostic) and cryotherapy x 1 (7/26/17)    Encounter Date: Dec 19, 2017    CC:  Chief Complaint   Patient presents with     Derm Problem     skin check, spot check on rt upper arm       History of Present Illness:  Mr. Rigoberto Holguin is a 77 year old male with a hx of NMSC presents as a follow up for lesion on the right arm s/p biopsy. The patient was last seen 7/26/17 when he underwent cryo for AKs. He feels his skin is doing well.     The patient reports no other lesions of concern.    Past Medical History:   Patient Active Problem List   Diagnosis     Encounter for long-term current use of medication     Malignant neoplasm of other specified sites of bladder     Other and unspecified coagulation defects     Nonallopathic lesion of lumbar region     Stenosis, spinal, lumbar     CARDIOVASCULAR SCREENING; LDL GOAL LESS THAN 160     ACP (advance care planning)     Factor V Leiden mutation (H)     History of DVT of lower extremity     Postphlebitic syndrome     Chronic anticoagulation     Peripheral vascular disease (H)     Personal history of malignant neoplasm of bladder     Vitamin D  deficiency     Long-term (current) use of anticoagulants [Z79.01]     Lumbar radicular pain     Hypothyroidism due to Hashimoto's thyroiditis     Status post lumbar surgery     Aftercare following surgery of the musculoskeletal system     Past Medical History:   Diagnosis Date     Actinic keratosis      Basal cell carcinoma      Bladder cancer (H) 2003     DJD (degenerative joint disease), lumbar     S/P epidural x 2     DVT (deep venous thrombosis) (H) 2007    Factor V ( bilateral legs)      GERD (gastroesophageal reflux disease)      Hypothyroidism      Polio     Age 4, weakness of right arm and leg      S/P appendectomy      Past Surgical History:   Procedure Laterality Date     Appendectomy       FORAMINOTOMY LUMBAR POSTERIOR TWO LEVELS Left 3/2/2017    Procedure: FORAMINOTOMY LUMBAR POSTERIOR TWO LEVELS;  Surgeon: Ricky Mclaughlin MD;  Location: [facility] OR     HC REPAIR ROTATOR CUFF,ACUTE Left 2007     PHOTODYNAMIC THERAPY - (PDT)  2/13/2015     SURGICAL HISTORY OF -   2003    Urinary Bladder Cancer treatment     SURGICAL HISTORY OF -       Gall Bladder     Social History:  Reviewed and unchanged but kept in chart for clinician convenience  The patient is retired. He was a . He has 1 granddaughter.     Family History:  Reviewed and unchanged but kept in chart for clinician convenience  There is no family history of skin cancer.    Medications:  Current Outpatient Prescriptions   Medication Sig Dispense Refill     warfarin (COUMADIN) 5 MG tablet Take 1 tablet (5 mg) by mouth daily Or as directed by INR clinic. Current dose is 7.5 mg Mon,Wed, and Fri, 10 mg daily rest of week 180 tablet 1     levothyroxine (SYNTHROID/LEVOTHROID) 112 MCG tablet Take 1 tablet (112 mcg) by mouth every morning (before breakfast) 60 tablet 0     sildenafil (REVATIO) 20 MG tablet Take 2-5 tabs daily prn 30 tablet 11     order for DME Thumb spica wrist brace 1 Device 0     LUMIGAN 0.01 % SOLN ophthalmic solution        timolol  (TIMOPTIC) 0.5 % ophthalmic solution        metroNIDAZOLE (METROCREAM) 0.75 % cream every day, once daily to face 60 g 3     Multiple Vitamins-Minerals (EYE VITAMINS PO) Take 1 tablet by mouth daily       melatonin 5 MG tablet Take 10 mg by mouth At Bedtime Reported on 2/21/2017       cholecalciferol (VITAMIN D) 1000 UNIT tablet Take 1 tablet (1,000 Units) by mouth daily 100 tablet 0     Nutritional Supplements (JUICE PLUS FIBRE PO)        calcium carbonate (TUMS) 500 MG chewable tablet Take 1 chew tab by mouth daily as needed Reported on 4/18/2017       Multiple Vitamin (DAILY MULTIVITAMIN PO) Take 1 chew tab by mouth daily         No Known Allergies    Review of Systems:  -Const: Denies fevers, chills or unintended changes in weight.   -Skin: Ask per HPI     Physical exam:  There were no vitals taken for this visit.  -This is a well developed, well-nourished male in no acute distress, in a pleasant mood.    SKIN: Total skin excluding the undergarment areas was performed. The exam included the head/face, neck, both arms, chest, back, abdomen, both legs, digits and/or nails. Excluding ankles and feet. Declines genital exam.   -There is no erythema, telangectasias, nodularity, or pigmentation on the left forearm, nose, left cheek.  -  There are erythematous macules with overyling adherent scale on the right temple, right nasal tip, left forehead, left temple, vertex scalp.   -lesion on the right upper arm is resolved.  - half a a pea sized nodule, mobile on post auricular  -dropping of th eyelids, left more than right  -No other lesions of concern on areas examined.     Impression/Plan:  1. Actinic keratosis: Right temple, right nasal tip, left forehead, left temple, vertex scalp.   Cryotherapy procedure note: After verbal consent and discussion of risks and benefits including but no limited to dyspigmentation/scar, blister, and pain, 16 was treated with 1-2mm freeze border for 1-2 cycles with liquid nitrogen. Post  cryotherapy instructions were provided.   Recommend sunscreens SPF #30 or greater, protective clothing and avoidance of tanning beds.  2. Excess eyelid skin that hinders eyesight.     Follow up with Dr. Potter   3. Nodule, post auricular, reviewed that this would need histopath to diagnosis. It may be a cyst, he declines biopsy today  Follow up in 8 months, earlier for new or changing lesions.     CC Paula Whalen and Jamie Meraz    Staff Involved:  Scribe/Staff    Scribe Disclosure:   I, Taylor Chapa, am serving as a scribe to document services personally performed by Dr. Vicike Whalen, based on data collection and the provider's statements to me.       Provider Disclosure:   The documentation recorded by the scribe accurately reflects the services I personally performed and the decisions made by me.    Vickie Whalen MD    Department of Dermatology  [facility]: Phone: [phone], Fax:[phone]  [facility]: Phone: [phone], Fax: [phone]

## 2017-12-19 NOTE — PATIENT INSTRUCTIONS
CRYOTHERAPY    What is it?    Use of a very cold liquid, such as liquid nitrogen, to freeze and destroy abnormal skin cells that need to be removed    What should I expect?    Tenderness and redness    A small blister that might grow and fill with dark purple blood. There may be crusting.    More than one treatment may be needed if the lesions do not go away.    How do I care for the treated area?    Gently wash the area with your hands when bathing.    Use a thin layer of Vaselin to help with healing. You may use a Band-Aid.     The area should heal within 7-10 days.    Do not use an antibiotic or Neosporin ointment.     You may take acetaminophen (Tylenol) for pain.     Call your Doctor if you have:    Severe pain    Signs of infection (warmth, redness, cloudy yellow drainage, and or a bad smell)    Questions or concerns    Contact infromation:  [facility] [phone]  [facility] [phone]

## 2017-12-19 NOTE — MR AVS SNAPSHOT
After Visit Summary   12/19/2017    Rigoberto Holguin    MRN: [id number]           Patient Information     Date Of Birth          1940        Visit Information        Provider Department      12/19/2017 10:45 AM Vickie Whalen MD [facility] [[email or web address]]        Today's Diagnoses     Excess skin of eyelid, unspecified laterality    -  1    Actinic keratosis          Care Instructions    CRYOTHERAPY    What is it?    Use of a very cold liquid, such as liquid nitrogen, to freeze and destroy abnormal skin cells that need to be removed    What should I expect?    Tenderness and redness    A small blister that might grow and fill with dark purple blood. There may be crusting.    More than one treatment may be needed if the lesions do not go away.    How do I care for the treated area?    Gently wash the area with your hands when bathing.    Use a thin layer of Vaselin to help with healing. You may use a Band-Aid.     The area should heal within 7-10 days.    Do not use an antibiotic or Neosporin ointment.     You may take acetaminophen (Tylenol) for pain.     Call your Doctor if you have:    Severe pain    Signs of infection (warmth, redness, cloudy yellow drainage, and or a bad smell)    Questions or concerns    Contact infromation:  [facility] [phone]  [facility] [phone]            Follow-ups after your visit        Additional Services     PLASTIC SURGERY REFERRAL       Your provider has referred you to: [facility]: [facility] [phone] [email or web address]    Please be aware that coverage of these services is subject to the terms and limitations of your health insurance plan.  Call member services at your health plan with any benefit or coverage questions.      Please bring the following with you to  your appointment:    (1) Any X-Rays, CTs or MRIs which have been performed.  Contact the facility where they were done to arrange for  prior to your scheduled appointment.    (2) List of current medications  (3) This referral request   (4) Any documents/labs given to you for this referral                  Follow-up notes from your care team     Return in about 6 months (around 6/19/2018).      Your next 10 appointments already scheduled     Jan 08, 2018 10:30 AM CST   Anticoagulation Visit with BE ANTI COAG   [facility] [place] ([facility])    [address]  [place] [address]   [phone]            Jan 09, 2018  1:40 PM CST   New Visit with LORENZO Potter MD   [facility]    [address] [place] [address] [phone]            Jun 21, 2018 10:30 AM CDT   Return Visit with Vickie Whalen MD   [facility] ([facility])    [address] [place] [id number] [phone]            Jun 26, 2018  9:00 AM CDT   Return Visit with Vincent Rucker MD, SAMUEL CYSTO PROC ROOM   [facility] [place] ([facility] [place]  [place] [address] [phone]              Who to contact     If you have questions or need follow up information about today's clinic visit or your schedule please contact [facility] directly at [phone].  Normal or non-critical lab and imaging results will be communicated to you by MyChart, letter or phone within 4 business days after the clinic has received the results. If you do not hear from us within 7 days, please contact the clinic through MyChart or phone. If you have a critical or abnormal lab result, we will notify you by phone as soon as possible.  Submit refill requests through [employer] or call your pharmacy and they will forward the refill request to us. Please allow 3  business days for your refill to be completed.          Additional Information About Your Visit        [employer] Information     [employer] gives you secure access to your electronic health record. If you see a primary care provider, you can also send messages to your care team and make appointments. If you have questions, please call your primary care clinic.  If you do not have a primary care provider, please call [phone] and they will assist you.      [employer] is an electronic gateway that provides easy, online access to your medical records. With [employer], you can request a clinic appointment, read your test results, renew a prescription or communicate with your care team.     To access your existing account, please contact your [facility] or call [phone] for assistance.        Care EveryWhere ID     This is your Care EveryWhere ID. This could be used by other organizations to access your [place] medical records  [id number]         Blood Pressure from Last 3 Encounters:   10/13/17 122/69   06/27/17 121/70   06/12/17 118/62    Weight from Last 3 Encounters:   10/13/17 93.4 kg (206 lb)   05/01/17 89.8 kg (198 lb)   04/20/17 90.1 kg (198 lb 9.6 oz)              We Performed the Following     DESTRUCT PREMALIGNANT LESION, 15 OR MORE     PLASTIC SURGERY REFERRAL        Primary Care Provider Office Phone # Fax #    Jamie Meraz PA-C [phone]       [address] [facility] [address]        Equal Access to Services     ALOK TOLEDO : Hadii aad ku hadasho Soomaali, waaxda luqadaha, qaybta kaalmada adeegyada, tacho garcia . So [facility] [phone].    ATENCIÓN: Si habla español, tiene a thomas disposición servicios gratuitos de asistencia lingüística. Llame al [phone].    We comply with applicable federal civil rights laws and [place] laws. We do not discriminate on the basis of race, color, national origin, age, disability, sex,  sexual orientation, or gender identity.            Thank you!     Thank you for choosing [facility]  for your care. Our goal is always to provide you with excellent care. Hearing back from our patients is one way we can continue to improve our services. Please take a few minutes to complete the written survey that you may receive in the mail after your visit with us. Thank you!             Your Updated Medication List - Protect others around you: Learn how to safely use, store and throw away your medicines at www.disposemymeds.org.          This list is accurate as of: 12/19/17 11:28 AM.  Always use your most recent med list.                   Brand Name Dispense Instructions for use Diagnosis    cholecalciferol 1000 UNIT tablet    vitamin D3    100 tablet    Take 1 tablet (1,000 Units) by mouth daily    Vitamin D deficiency       EYE VITAMINS PO      Take 1 tablet by mouth daily        DAILY MULTIVITAMIN PO      Take 1 chew tab by mouth daily        JUICE PLUS FIBRE PO           levothyroxine 112 MCG tablet    SYNTHROID/LEVOTHROID    60 tablet    Take 1 tablet (112 mcg) by mouth every morning (before breakfast)    Hypothyroidism due to Hashimoto's thyroiditis       LUMIGAN 0.01 % Soln   Generic drug:  bimatoprost       Actinic keratosis       melatonin 5 MG tablet      Take 10 mg by mouth At Bedtime Reported on 2/21/2017        metroNIDAZOLE 0.75 % cream    METROCREAM    60 g    every day, once daily to face    Rosacea       order for DME     1 Device    Thumb spica wrist brace    Left wrist pain       sildenafil 20 MG tablet    REVATIO    30 tablet    Take 2-5 tabs daily prn    Erectile dysfunction, unspecified erectile dysfunction type       timolol 0.5 % ophthalmic solution    TIMOPTIC      Actinic keratosis       TUMS 500 MG chewable tablet   Generic drug:  calcium carbonate      Take 1 chew tab by mouth daily as needed Reported on 4/18/2017        warfarin 5 MG tablet    COUMADIN    180  tablet    Take 1 tablet (5 mg) by mouth daily Or as directed by INR clinic. Current dose is 7.5 mg Mon,Wed, and Fri, 10 mg daily rest of week    History of DVT of lower extremity, Chronic anticoagulation

## 2017-12-19 NOTE — NURSING NOTE
"Rigoberto Holguin's goals for this visit include: skin check  He requests these members of his care team be copied on today's visit information:     PCP: Jamie Meraz    Referring Provider:  No referring provider defined for this encounter.    Chief Complaint   Patient presents with     Derm Problem     skin check, spot check on rt upper arm       Initial There were no vitals taken for this visit. Estimated body mass index is 31.32 kg/(m^2) as calculated from the following:    Height as of 6/12/17: 1.727 m (5' 8\").    Weight as of 10/13/17: 93.4 kg (206 lb).  Medication Reconciliation: complete    "

## 2018-01-03 ENCOUNTER — TELEPHONE (OUTPATIENT)
Dept: FAMILY MEDICINE | Facility: CLINIC | Age: 78
End: 2018-01-03

## 2018-01-03 DIAGNOSIS — E06.3 HYPOTHYROIDISM DUE TO HASHIMOTO'S THYROIDITIS: ICD-10-CM

## 2018-01-03 RX ORDER — LEVOTHYROXINE SODIUM 112 UG/1
112 TABLET ORAL
Qty: 90 TABLET | Refills: 3 | Status: SHIPPED | OUTPATIENT
Start: 2018-01-03 | End: 2018-10-23

## 2018-01-03 NOTE — TELEPHONE ENCOUNTER
Prescription approved per [facility] Refill Protocol.  Spoke with patient verified dose and informed script refilled.  Aparna Vicente RN      Entered by Jamie Meraz PA-C at 12/11/2017  7:19 AM   Read by Rigoberto Holguin at 12/11/2017  2:30 PM   Rigoberto,   Your tsh is now within therapeutic range. Continue your current dose of levothyroxine.     Jamie

## 2018-01-03 NOTE — TELEPHONE ENCOUNTER
Pt would like to discuss dosages on his thyroid.  Also may need a new prescription if keeping on new dosage.

## 2018-01-09 ENCOUNTER — ANTICOAGULATION THERAPY VISIT (OUTPATIENT)
Dept: NURSING | Facility: CLINIC | Age: 78
End: 2018-01-09
Payer: MEDICARE

## 2018-01-09 ENCOUNTER — OFFICE VISIT (OUTPATIENT)
Dept: SURGERY | Facility: CLINIC | Age: 78
End: 2018-01-09
Payer: MEDICARE

## 2018-01-09 VITALS
BODY MASS INDEX: 32.67 KG/M2 | HEIGHT: 68 IN | HEART RATE: 60 BPM | SYSTOLIC BLOOD PRESSURE: 117 MMHG | DIASTOLIC BLOOD PRESSURE: 70 MMHG | WEIGHT: 215.6 LBS | OXYGEN SATURATION: 97 %

## 2018-01-09 DIAGNOSIS — Z79.01 LONG-TERM (CURRENT) USE OF ANTICOAGULANTS: ICD-10-CM

## 2018-01-09 DIAGNOSIS — H02.403 PTOSIS OF BOTH EYELIDS: Primary | ICD-10-CM

## 2018-01-09 PROBLEM — H02.409 BLEPHAROPTOSIS: Status: ACTIVE | Noted: 2018-01-09

## 2018-01-09 LAB — INR POINT OF CARE: 2.5 (ref 0.86–1.14)

## 2018-01-09 PROCEDURE — 36416 COLLJ CAPILLARY BLOOD SPEC: CPT

## 2018-01-09 PROCEDURE — 99214 OFFICE O/P EST MOD 30 MIN: CPT | Performed by: PLASTIC SURGERY

## 2018-01-09 PROCEDURE — 99207 ZZC NO CHARGE NURSE ONLY: CPT

## 2018-01-09 PROCEDURE — 85610 PROTHROMBIN TIME: CPT | Mod: QW

## 2018-01-09 NOTE — PROGRESS NOTES
ANTICOAGULATION FOLLOW-UP CLINIC VISIT    Patient Name:  Rigoberto Holguin  Date:  1/9/2018  Contact Type:  Face to Face    SUBJECTIVE:     Patient Findings     Positives No Problem Findings           OBJECTIVE    INR Protime   Date Value Ref Range Status   01/09/2018 2.5 (A) 0.86 - 1.14 Final       ASSESSMENT / PLAN  INR assessment THER    Recheck INR In: 6 WEEKS    INR Location Clinic      Anticoagulation Summary as of 1/9/2018     INR goal 2.0-3.0   Today's INR 2.5   Maintenance plan 10 mg (5 mg x 2) on Sun, Tue, Thu; 7.5 mg (5 mg x 1.5) all other days   Full instructions 10 mg on Sun, Tue, Thu; 7.5 mg all other days   Weekly total 60 mg   No change documented Geetha Rae   Plan last modified Aparna Vicente (11/6/2017)   Next INR check 2/19/2018   Target end date     Indications   Long-term (current) use of anticoagulants [Z79.01] [Z79.01]         Anticoagulation Episode Summary     INR check location     Preferred lab     Send INR reminders to [facility]    Comments       Anticoagulation Care Providers     Provider Role Specialty Phone number    Jamie Meraz PA-C Responsible Physician Assistant [phone]            See the Encounter Report to view Anticoagulation Flowsheet and Dosing Calendar (Go to Encounters tab in chart review, and find the Anticoagulation Therapy Visit)        Geetha Rae               ANTICOAGULATION FOLLOW-UP CLINIC VISIT    Patient Name:  Rigoberto Holguin  Date:  1/9/2018  Contact Type:  Face to Face    SUBJECTIVE:     Patient Findings     Positives No Problem Findings           OBJECTIVE    INR Protime   Date Value Ref Range Status   01/09/2018 2.5 (A) 0.86 - 1.14 Final       ASSESSMENT / PLAN  INR assessment THER    Recheck INR In: 6 WEEKS    INR Location Clinic      Anticoagulation Summary as of 1/9/2018     INR goal 2.0-3.0   Today's INR 2.5   Maintenance plan 10 mg (5 mg x 2) on Sun, Tue, Thu; 7.5 mg (5 mg x 1.5) all other days   Full instructions 10 mg on  Sun, Tue, Thu; 7.5 mg all other days   Weekly total 60 mg   No change documented Geetha Rae   Plan last modified Aparna Vicente (11/6/2017)   Next INR check 2/19/2018   Target end date     Indications   Long-term (current) use of anticoagulants [Z79.01] [Z79.01]         Anticoagulation Episode Summary     INR check location     Preferred lab     Send INR reminders to [facility]    Comments       Anticoagulation Care Providers     Provider Role Specialty Phone number    Jamie Meraz PA-C Responsible Physician Assistant [phone]            See the Encounter Report to view Anticoagulation Flowsheet and Dosing Calendar (Go to Encounters tab in chart review, and find the Anticoagulation Therapy Visit)        Geetha Rae

## 2018-01-09 NOTE — LETTER
1/9/2018         RE: Rigoberto Holguin  [address] [address] [address]        Dear Colleague,    Thank you for referring your patient, Rigoberto Holguin, to the [facility]. Please see a copy of my visit note below.    REFERRING PROVIDER:  Dr. Vickie Whalen, [facility]      PRESENTING COMPLAINT:  Consultation for ptosis of the upper eyelids.      HISTORY OF PRESENTING COMPLAINT:  Mr. Rigoberto Holguin is 77 years old.  He has been noticing drooping of his upper eyelids, more on the left than on the right, for many years now.  It is causing him difficulty in peripheral vision as well as now forward vision on the left side.  He recently had his vision checked.  Intraocularly, everything is stable.  He does have glaucoma.  He denies any dry eye symptoms.  He is here to discuss options for treating this.      PAST MEDICAL HISTORY:  Hypothyroidism, factor V Leiden deficiency, history of a DVT in the lower extremities but no PE, degenerative joint disease, history of basal cell carcinoma, and GERD.      PAST SURGICAL HISTORY:  Shoulder surgery, appendectomy, laparoscopic cholecystectomy, and back surgery.      MEDICATIONS:     1.  Levothyroxine.    2.  Coumadin.    3.  Timolol drops.      ALLERGIES:  Nil.      SOCIAL HISTORY:  Quit smoking in 1970, smoked maybe 1/2 pack a day for a couple of years.  Drinks socially, lives in [place], is a retired .      REVIEW OF SYSTEMS:  Denies chest pain, shortness of breath, MI, CVA.  Has had a DVT but no PE.      PHYSICAL EXAMINATION:   VITAL SIGNS:  Stable.  He is afebrile, in no obvious distress.  He is 5 feet 8 inches, 215 pounds, BMI 33 kg/m2.   SKIN:  On examination of his periocular area, he has a long forehead, balding hair, significant rhytides in the forehead, moderate brow ptosis.  The left brow is compensated upwards compared to the right side.  Upper eyelids have significant dermatochalasis bilaterally.  He has significant ptosis on the left up  to the upper part of the pupil, and on the right has about 3 mm ptosis.  Levator function is moderate on both sides, higher supratarsal crease on the left side.  Lower lids are slightly loose on each side but no obvious ectropion.  Bell phenomenon is intact.  Vector is neutral to positive.      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  He also has some brow ptosis.  I explained to the patient the pathophysiology behind his problem.  Given his findings, I think the best course of action to fix his problem is a blepharoplasty with ptosis repair.  Direct brow lifts another option, but he is not interested in that at this time.  I explained to the patient how the procedure would be done, showed him where the scars would be, explained to him the risks, benefits and alternatives including pain, infection, bleeding, scarring, asymmetry, seromas, hematomas, wound breakdown, wound dehiscence, retrobulbar hematoma, asymmetries of the upper eyelids, retraction of the upper eyelids, lagophthalmos, dry eye symptoms, deep vein thrombosis, pulmonary embolism, myocardial infarction, cerebrovascular accident, pneumonia and death.  I explained to him this would be done under local monitored anesthesia care anesthesia.  He will need to be off his Coumadin for the surgery.  Will need to get peripheral vision test documenting his peripheral vision obstruction and then get prior authorization for this procedure thereafter.  Consent was obtained and photographs obtained.  All questions were answered.  He was happy with his visit.  I look forward to helping him out in the near future.      Total time spent with the patient was 30 minutes, more than half in counseling.         LORENZO CASTILLO MD             D: 2018 15:01   T: 2018 22:57   MT: [id number]      Name:     SAVANNAH GARZA   MRN:      [id number]        Account:      [id number]   :       1940           Visit Date:   01/09/2018      Document: [id number]       cc: Vickie Whalen MD      Again, thank you for allowing me to participate in the care of your patient.        Sincerely,        LORENZO Potter MD

## 2018-01-09 NOTE — NURSING NOTE
"Rigoberto Holguin's goals for this visit include: one droopy eyelid  He requests these members of his care team be copied on today's visit information: no    PCP: Jamie Meraz    Referring Provider:  Vickie Whalen MD  [address] [place] [address] [address]  [place] [address]    Chief Complaint   Patient presents with     Consult     one droopy eyelid       Initial There were no vitals taken for this visit. Estimated body mass index is 31.32 kg/(m^2) as calculated from the following:    Height as of 6/12/17: 1.727 m (5' 8\").    Weight as of 10/13/17: 93.4 kg (206 lb).  Medication Reconciliation: complete    Do you need any medication refills at today's visit? No    Aby Palma LPN      "

## 2018-01-09 NOTE — MR AVS SNAPSHOT
After Visit Summary   1/9/2018    Rigoberto Holguin    MRN: [id number]           Patient Information     Date Of Birth          1940        Visit Information        Provider Department      1/9/2018 1:40 PM LORENZO Potter MD [facility]        Care Instructions    Please call to schedule an eye exam at [phone]          Follow-ups after your visit        Your next 10 appointments already scheduled     Feb 19, 2018 10:00 AM CST   Anticoagulation Visit with BE ANTI COAG   [facility] [place] ([facility])    [address]  [place] [address]   [phone]            Jun 21, 2018 10:30 AM CDT   Return Visit with Vickie Whalen MD   [facility] ([facility]    [address] [place] [id number] [phone]            Jun 26, 2018  9:00 AM CDT   Return Visit with Vincent Rucker MD, SAMUEL CYSTO PROC ROOM   [facility] ([address] [address] [phone]              Who to contact     If you have questions or need follow up information about today's clinic visit or your schedule please contact [facility] directly at [phone].  Normal or non-critical lab and imaging results will be communicated to you by MyChart, letter or phone within 4 business days after the clinic has received the results. If you do not hear from us within 7 days, please contact the clinic through MyChart or phone. If you have a critical or abnormal lab result, we will notify you by phone as soon as possible.  Submit refill requests through [employer] or call your pharmacy and they will forward the refill request to us. Please allow 3 business days for your refill to be completed.          Additional Information About Your Visit        [employer] Information     [employer] gives you secure access to your electronic health record. If you see a  "primary care provider, you can also send messages to your care team and make appointments. If you have questions, please call your primary care clinic.  If you do not have a primary care provider, please call [phone] and they will assist you.      [employer] is an electronic gateway that provides easy, online access to your medical records. With [employer], you can request a clinic appointment, read your test results, renew a prescription or communicate with your care team.     To access your existing account, please contact your [facility] or call [phone] for assistance.        Care EveryWhere ID     This is your Care EveryWhere ID. This could be used by other organizations to access your [place] medical records  [id number]        Your Vitals Were     Pulse Height Pulse Oximetry BMI (Body Mass Index)          60 1.727 m (5' 8\") 97% 32.78 kg/m2         Blood Pressure from Last 3 Encounters:   01/09/18 117/70   10/13/17 122/69   06/27/17 121/70    Weight from Last 3 Encounters:   01/09/18 97.8 kg (215 lb 9.6 oz)   10/13/17 93.4 kg (206 lb)   05/01/17 89.8 kg (198 lb)              Today, you had the following     No orders found for display       Primary Care Provider Office Phone # Fax #    Jamie Meraz PA-C [phone] [address] [facility] [address]        Equal Access to Services     [facility]: Hadii aad ku hadasho Soomaali, waaxda luqadaha, qaybta kaalmada adeegyada, tacho garcia . So [facility] [phone].    ATENCIÓN: Si habla español, tiene a thomas disposición servicios gratuitos de asistencia lingüística. Llame al [phone].    We comply with applicable federal civil rights laws and [place] laws. We do not discriminate on the basis of race, color, national origin, age, disability, sex, sexual orientation, or gender identity.            Thank you!     Thank you for choosing [facility]  for your " care. Our goal is always to provide you with excellent care. Hearing back from our patients is one way we can continue to improve our services. Please take a few minutes to complete the written survey that you may receive in the mail after your visit with us. Thank you!             Your Updated Medication List - Protect others around you: Learn how to safely use, store and throw away your medicines at www.disposemymeds.org.          This list is accurate as of: 1/9/18  2:01 PM.  Always use your most recent med list.                   Brand Name Dispense Instructions for use Diagnosis    cholecalciferol 1000 UNIT tablet    vitamin D3    100 tablet    Take 1 tablet (1,000 Units) by mouth daily    Vitamin D deficiency       EYE VITAMINS PO      Take 1 tablet by mouth daily        DAILY MULTIVITAMIN PO      Take 1 chew tab by mouth daily        JUICE PLUS FIBRE PO           levothyroxine 112 MCG tablet    SYNTHROID/LEVOTHROID    90 tablet    Take 1 tablet (112 mcg) by mouth every morning (before breakfast)    Hypothyroidism due to Hashimoto's thyroiditis       LUMIGAN 0.01 % Soln   Generic drug:  bimatoprost       Actinic keratosis       melatonin 5 MG tablet      Take 10 mg by mouth At Bedtime Reported on 2/21/2017        metroNIDAZOLE 0.75 % cream    METROCREAM    60 g    every day, once daily to face    Rosacea       order for DME     1 Device    Thumb spica wrist brace    Left wrist pain       sildenafil 20 MG tablet    REVATIO    30 tablet    Take 2-5 tabs daily prn    Erectile dysfunction, unspecified erectile dysfunction type       timolol 0.5 % ophthalmic solution    TIMOPTIC      Actinic keratosis       TUMS 500 MG chewable tablet   Generic drug:  calcium carbonate      Take 1 chew tab by mouth daily as needed Reported on 4/18/2017        warfarin 5 MG tablet    COUMADIN    180 tablet    Take 1 tablet (5 mg) by mouth daily Or as directed by INR clinic. Current dose is 7.5 mg Mon,Wed, and Fri, 10 mg daily rest  of week    History of DVT of lower extremity, Chronic anticoagulation

## 2018-01-09 NOTE — NURSING NOTE
Per Dr. Potter pictures to be taken of pt face forward eyes open and eyes closed..Corina Duval RN

## 2018-01-09 NOTE — MR AVS SNAPSHOT
Rigoberto LORENZO Holguin   1/9/2018 10:00 AM   Anticoagulation Therapy Visit    Description:  77 year old male   Provider:  ALANA ANTI COAG   Department:  Be Nurse           INR as of 1/9/2018     Today's INR 2.5      Anticoagulation Summary as of 1/9/2018     INR goal 2.0-3.0   Today's INR 2.5   Full instructions 10 mg on Sun, Tue, Thu; 7.5 mg all other days   Next INR check 2/19/2018    Indications   Long-term (current) use of anticoagulants [Z79.01] [Z79.01]         Your next Anticoagulation Clinic appointment(s)     Feb 19, 2018 10:00 AM CST   Anticoagulation Visit with ALANA ANTI COATONY   [facility] [place] ([facility])    [address]  [place] [address] [phone]              Contact Numbers     [facility]  Please call [phone] with any problems or questions regarding your therapy, or to cancel or reschedule your appointment.          January 2018 Details    Sun Mon Tue Wed Thu Fri Sat      1               2               3               4               5               6                 7               8               9      10 mg   See details      10      7.5 mg         11      10 mg         12      7.5 mg         13      7.5 mg           14      10 mg         15      7.5 mg         16      10 mg         17      7.5 mg         18      10 mg         19      7.5 mg         20      7.5 mg           21      10 mg         22      7.5 mg         23      10 mg         24      7.5 mg         25      10 mg         26      7.5 mg         27      7.5 mg           28      10 mg         29      7.5 mg         30      10 mg         31      7.5 mg             Date Details   01/09 This INR check               How to take your warfarin dose     To take:  7.5 mg Take 1.5 of the 5 mg tablets.    To take:  10 mg Take 2 of the 5 mg tablets.           February 2018 Details    Sun Mon Tue Wed Thu Fri Sat         1      10 mg         2      7.5 mg         3      7.5 mg           4      10 mg         5       7.5 mg         6      10 mg         7      7.5 mg         8      10 mg         9      7.5 mg         10      7.5 mg           11      10 mg         12      7.5 mg         13      10 mg         14      7.5 mg         15      10 mg         16      7.5 mg         17      7.5 mg           18      10 mg         19            20               21               22               23               24                 25               26               27               28                   Date Details   No additional details    Date of next INR:  2/19/2018         How to take your warfarin dose     To take:  7.5 mg Take 1.5 of the 5 mg tablets.    To take:  10 mg Take 2 of the 5 mg tablets.

## 2018-01-10 ENCOUNTER — TELEPHONE (OUTPATIENT)
Dept: SURGERY | Facility: CLINIC | Age: 78
End: 2018-01-10

## 2018-01-10 NOTE — PROGRESS NOTES
REFERRING PROVIDER:  Dr. Vickie Whalen, [facility]      PRESENTING COMPLAINT:  Consultation for ptosis of the upper eyelids.      HISTORY OF PRESENTING COMPLAINT:  Mr. Rigoberto Holguin is 77 years old.  He has been noticing drooping of his upper eyelids, more on the left than on the right, for many years now.  It is causing him difficulty in peripheral vision as well as now forward vision on the left side.  He recently had his vision checked.  Intraocularly, everything is stable.  He does have glaucoma.  He denies any dry eye symptoms.  He is here to discuss options for treating this.      PAST MEDICAL HISTORY:  Hypothyroidism, factor V Leiden deficiency, history of a DVT in the lower extremities but no PE, degenerative joint disease, history of basal cell carcinoma, and GERD.      PAST SURGICAL HISTORY:  Shoulder surgery, appendectomy, laparoscopic cholecystectomy, and back surgery.      MEDICATIONS:     1.  Levothyroxine.    2.  Coumadin.    3.  Timolol drops.      ALLERGIES:  Nil.      SOCIAL HISTORY:  Quit smoking in 1970, smoked maybe 1/2 pack a day for a couple of years.  Drinks socially, lives in [place], is a retired .      REVIEW OF SYSTEMS:  Denies chest pain, shortness of breath, MI, CVA.  Has had a DVT but no PE.      PHYSICAL EXAMINATION:   VITAL SIGNS:  Stable.  He is afebrile, in no obvious distress.  He is 5 feet 8 inches, 215 pounds, BMI 33 kg/m2.   SKIN:  On examination of his periocular area, he has a long forehead, balding hair, significant rhytides in the forehead, moderate brow ptosis.  The left brow is compensated upwards compared to the right side.  Upper eyelids have significant dermatochalasis bilaterally.  He has significant ptosis on the left up to the upper part of the pupil, and on the right has about 3 mm ptosis.  Levator function is moderate on both sides, higher supratarsal crease on the left side.  Lower lids are slightly loose on each side but no obvious ectropion.  Bell  phenomenon is intact.  Vector is neutral to positive.      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  He also has some brow ptosis.  I explained to the patient the pathophysiology behind his problem.  Given his findings, I think the best course of action to fix his problem is a blepharoplasty with ptosis repair.  Direct brow lifts another option, but he is not interested in that at this time.  I explained to the patient how the procedure would be done, showed him where the scars would be, explained to him the risks, benefits and alternatives including pain, infection, bleeding, scarring, asymmetry, seromas, hematomas, wound breakdown, wound dehiscence, retrobulbar hematoma, asymmetries of the upper eyelids, retraction of the upper eyelids, lagophthalmos, dry eye symptoms, deep vein thrombosis, pulmonary embolism, myocardial infarction, cerebrovascular accident, pneumonia and death.  I explained to him this would be done under local monitored anesthesia care anesthesia.  He will need to be off his Coumadin for the surgery.  Will need to get peripheral vision test documenting his peripheral vision obstruction and then get prior authorization for this procedure thereafter.  Consent was obtained and photographs obtained.  All questions were answered.  He was happy with his visit.  I look forward to helping him out in the near future.      Total time spent with the patient was 30 minutes, more than half in counseling.         LORENZO CASTILLO MD             D: 2018 15:01   T: 2018 22:57   MT:       Name:     SAVANNAH GARZA   MRN:      [id number]        Account:      [id number]   :      1940           Visit Date:   2018      Document: [id number]       cc: Vickie Whalen MD

## 2018-02-01 ENCOUNTER — OFFICE VISIT (OUTPATIENT)
Dept: FAMILY MEDICINE | Facility: CLINIC | Age: 78
End: 2018-02-01
Payer: MEDICARE

## 2018-02-01 ENCOUNTER — RADIANT APPOINTMENT (OUTPATIENT)
Dept: GENERAL RADIOLOGY | Facility: CLINIC | Age: 78
End: 2018-02-01
Attending: PHYSICIAN ASSISTANT
Payer: MEDICARE

## 2018-02-01 VITALS
OXYGEN SATURATION: 98 % | BODY MASS INDEX: 32.2 KG/M2 | HEART RATE: 65 BPM | TEMPERATURE: 97.5 F | WEIGHT: 211.8 LBS | SYSTOLIC BLOOD PRESSURE: 127 MMHG | DIASTOLIC BLOOD PRESSURE: 73 MMHG

## 2018-02-01 DIAGNOSIS — D68.51 FACTOR V LEIDEN MUTATION (H): ICD-10-CM

## 2018-02-01 DIAGNOSIS — M25.511 ACUTE PAIN OF RIGHT SHOULDER: ICD-10-CM

## 2018-02-01 DIAGNOSIS — M25.511 ACUTE PAIN OF RIGHT SHOULDER: Primary | ICD-10-CM

## 2018-02-01 DIAGNOSIS — C67.8 MALIGNANT NEOPLASM OF OVERLAPPING SITES OF BLADDER (H): ICD-10-CM

## 2018-02-01 DIAGNOSIS — I73.9 PERIPHERAL VASCULAR DISEASE (H): ICD-10-CM

## 2018-02-01 PROCEDURE — 73030 X-RAY EXAM OF SHOULDER: CPT | Mod: RT

## 2018-02-01 PROCEDURE — 99212 OFFICE O/P EST SF 10 MIN: CPT | Mod: 25 | Performed by: PHYSICIAN ASSISTANT

## 2018-02-01 PROCEDURE — 20610 DRAIN/INJ JOINT/BURSA W/O US: CPT | Performed by: PHYSICIAN ASSISTANT

## 2018-02-01 NOTE — MR AVS SNAPSHOT
After Visit Summary   2/1/2018    Rigoberto Holguin    MRN: [id number]           Patient Information     Date Of Birth          1940        Visit Information        Provider Department      2/1/2018 2:20 PM Jamie Meraz PA-C [facility]        Today's Diagnoses     Acute pain of right shoulder    -  1    Peripheral vascular disease (H)        Factor V Leiden mutation (H)        Malignant neoplasm of overlapping sites of bladder (H)           Follow-ups after your visit        Additional Services     [facility] PT, HAND, AND CHIROPRACTIC REFERRAL       **This order will print in the [facility] Scheduling Office**    Physical Therapy, Hand Therapy and Chiropractic Care are available through:    *[facility]  *[facility]  *[facility]    Call one number to schedule at any of the above locations: [phone].    Your provider has referred you to: Physical Therapy at [facility] or [facility]    Indication/Reason for Referral: Shoulder Pain  Onset of Illness: 2 wks  Therapy Orders: Evaluate and Treat  Special Programs: None  Special Request: None    Tyler Scott      Additional Comments for the Therapist or Chiropractor:     Please be aware that coverage of these services is subject to the terms and limitations of your health insurance plan.  Call member services at your health plan with any benefit or coverage questions.      Please bring the following to your appointment:    *Your personal calendar for scheduling future appointments  *Comfortable clothing                  Your next 10 appointments already scheduled     Feb 06, 2018 10:30 AM CST   New Visit with Larry Rausch MD, MG OPHTH NURSE ONLY   [facility] ([facility]    [address] [place] [id number] [phone]            Feb 19, 2018 10:00 AM CST   Anticoagulation Visit with BE ANTI COAG   [facility] [place] ([facility]  [place])    [address]  [place] [address]   [phone]            Jun 21, 2018 10:30 AM CDT   Return Visit with Vickie Whalen MD   [facility] ([facility])    [address] [place] [id number] [phone]            Jun 26, 2018  9:00 AM CDT   Return Visit with Vincent Rucker MD, [place] [facility] [facility] ROOM   [facility]    [address] [place] [address]  [place] [address] [phone]              Who to contact     Normal or non-critical lab and imaging results will be communicated to you by uberVUhart, letter or phone within 4 business days after the clinic has received the results. If you do not hear from us within 7 days, please contact the clinic through uberVUhart or phone. If you have a critical or abnormal lab result, we will notify you by phone as soon as possible.  Submit refill requests through Aidhenscorner or call your pharmacy and they will forward the refill request to us. Please allow 3 business days for your refill to be completed.          If you need to speak with a  for additional information , please call: [phone]             Additional Information About Your Visit        Aidhenscorner Information     Aidhenscorner gives you secure access to your electronic health record. If you see a primary care provider, you can also send messages to your care team and make appointments. If you have questions, please call your primary care clinic.  If you do not have a primary care provider, please call [phone] and they will assist you.        Care EveryWhere ID     This is your Care EveryWhere ID. This could be used by other organizations to access your [place] medical records  [id number]        Your Vitals Were     Pulse Temperature Pulse Oximetry BMI (Body Mass Index)          65 97.5  F (36.4  C) (Oral) 98% 32.2 kg/m2         Blood Pressure from Last 3 Encounters:   02/01/18 127/73   01/09/18  117/70   10/13/17 122/69    Weight from Last 3 Encounters:   02/01/18 211 lb 12.8 oz (96.1 kg)   01/09/18 215 lb 9.6 oz (97.8 kg)   10/13/17 206 lb (93.4 kg)              We Performed the Following     DEPO MEDROL 40 MG     DRAIN/INJECT LARGE JOINT/BURSA     [facility] PT, HAND, AND CHIROPRACTIC REFERRAL        Primary Care Provider Office Phone # Fax #    Jamie Meraz PA-C [phone]       [address] [facility] [address]        Equal Access to Services     [facility]: Hadii aad ku hadasho Soomaali, waaxda luqadaha, qaybta kaalmada adeegyada, waxbarbara aguilera hayolen alessandro garcia . So [facility] [phone].    ATENCIÓN: Si habla español, tiene a thomas disposición servicios gratuitos de asistencia lingüística. [facility] [phone].    We comply with applicable federal civil rights laws and [place] laws. We do not discriminate on the basis of race, color, national origin, age, disability, sex, sexual orientation, or gender identity.            Thank you!     Thank you for choosing [facility]  for your care. Our goal is always to provide you with excellent care. Hearing back from our patients is one way we can continue to improve our services. Please take a few minutes to complete the written survey that you may receive in the mail after your visit with us. Thank you!             Your Updated Medication List - Protect others around you: Learn how to safely use, store and throw away your medicines at www.disposemymeds.org.          This list is accurate as of 2/1/18  3:33 PM.  Always use your most recent med list.                   Brand Name Dispense Instructions for use Diagnosis    cholecalciferol 1000 UNIT tablet    vitamin D3    100 tablet    Take 1 tablet (1,000 Units) by mouth daily    Vitamin D deficiency       EYE VITAMINS PO      Take 1 tablet by mouth daily        DAILY MULTIVITAMIN PO      Take 1 chew tab by mouth daily        JUICE PLUS FIBRE PO           levothyroxine 112  MCG tablet    SYNTHROID/LEVOTHROID    90 tablet    Take 1 tablet (112 mcg) by mouth every morning (before breakfast)    Hypothyroidism due to Hashimoto's thyroiditis       LUMIGAN 0.01 % Soln   Generic drug:  bimatoprost       Actinic keratosis       melatonin 5 MG tablet      Take 10 mg by mouth At Bedtime Reported on 2/21/2017        metroNIDAZOLE 0.75 % cream    METROCREAM    60 g    every day, once daily to face    Rosacea       order for DME     1 Device    Thumb spica wrist brace    Left wrist pain       sildenafil 20 MG tablet    REVATIO    30 tablet    Take 2-5 tabs daily prn    Erectile dysfunction, unspecified erectile dysfunction type       timolol 0.5 % ophthalmic solution    TIMOPTIC      Actinic keratosis       TUMS 500 MG chewable tablet   Generic drug:  calcium carbonate      Take 1 chew tab by mouth daily as needed Reported on 4/18/2017        warfarin 5 MG tablet    COUMADIN    180 tablet    Take 1 tablet (5 mg) by mouth daily Or as directed by INR clinic. Current dose is 7.5 mg Mon,Wed, and Fri, 10 mg daily rest of week    History of DVT of lower extremity, Chronic anticoagulation

## 2018-02-01 NOTE — NURSING NOTE
The following medication was given by Jamie Meraz PA-C:     MEDICATION: Depo Medrol 40mg  ROUTE: IM  SITE: Left Shoulder  DOSE: 1ml  LOT #: [id number]  :  Jonny [facility]  EXPIRATION DATE:  7/1/2020  NDC#: 0614-9298-22  Trini You MA

## 2018-02-01 NOTE — PROGRESS NOTES
SUBJECTIVE:   Rigoberto Holguin is a 77 year old male who presents to clinic today for the following health issues:      Joint Pain    Onset: 2 1/2 weeks ago    Description:   Location: right shoulder  Character: Sharp with movement    Intensity: moderate    Progression of Symptoms: worse    Accompanying Signs & Symptoms:  Other symptoms: none    History:   Previous similar pain: YES- Hx of shoulder pain      Precipitating factors:   Trauma or overuse: YES- slipped ice and fell, recycling bin fell on top of him.    Alleviating factors:  Improved by: nothing    Therapies Tried and outcome: icy hot/bengay- no help    Has had injections in the past by another provider a long time ago.    Recheck of bladder cancer. Sees urology annually. Cancer free.  Recheck of pvd. No claudication symptoms.  Factor v leiden: manages condition with chronic anticoagulation.     Problem list and histories reviewed & adjusted, as indicated.  Additional history: as documented    Patient Active Problem List   Diagnosis     Encounter for long-term current use of medication     Malignant neoplasm of overlapping sites of bladder (H)     Other and unspecified coagulation defects     Nonallopathic lesion of lumbar region     Stenosis, spinal, lumbar     CARDIOVASCULAR SCREENING; LDL GOAL LESS THAN 160     ACP (advance care planning)     Factor V Leiden mutation (H)     History of DVT of lower extremity     Postphlebitic syndrome     Chronic anticoagulation     Peripheral vascular disease (H)     Personal history of malignant neoplasm of bladder     Vitamin D deficiency     Long-term (current) use of anticoagulants [Z79.01]     Lumbar radicular pain     Hypothyroidism due to Hashimoto's thyroiditis     Status post lumbar surgery     Aftercare following surgery of the musculoskeletal system     Blepharoptosis     Past Surgical History:   Procedure Laterality Date     Appendectomy       FORAMINOTOMY LUMBAR POSTERIOR TWO LEVELS Left 3/2/2017     Procedure: FORAMINOTOMY LUMBAR POSTERIOR TWO LEVELS;  Surgeon: Ricky Mclaughlin MD;  Location: [facility] OR     HC REPAIR ROTATOR CUFF,ACUTE Left 2007     PHOTODYNAMIC THERAPY - (PDT)  2/13/2015     SURGICAL HISTORY OF -   2003    Urinary Bladder Cancer treatment     SURGICAL HISTORY OF -       Gall Bladder       Social History   Substance Use Topics     Smoking status: Former Smoker     Types: Cigarettes     Quit date: 10/1/1970     Smokeless tobacco: Never Used     Alcohol use 0.0 oz/week     0 Standard drinks or equivalent per week      Comment: Rarely drink 1-2 beers per week     Family History   Problem Relation Age of Onset     CANCER Father      lung cancer     CANCER Brother      lymphoma     Neurologic Disorder Sister      parkinsonism         Current Outpatient Prescriptions   Medication Sig Dispense Refill     levothyroxine (SYNTHROID/LEVOTHROID) 112 MCG tablet Take 1 tablet (112 mcg) by mouth every morning (before breakfast) 90 tablet 3     warfarin (COUMADIN) 5 MG tablet Take 1 tablet (5 mg) by mouth daily Or as directed by INR clinic. Current dose is 7.5 mg Mon,Wed, and Fri, 10 mg daily rest of week 180 tablet 1     sildenafil (REVATIO) 20 MG tablet Take 2-5 tabs daily prn 30 tablet 11     LUMIGAN 0.01 % SOLN ophthalmic solution        timolol (TIMOPTIC) 0.5 % ophthalmic solution        metroNIDAZOLE (METROCREAM) 0.75 % cream every day, once daily to face 60 g 3     Multiple Vitamins-Minerals (EYE VITAMINS PO) Take 1 tablet by mouth daily       melatonin 5 MG tablet Take 10 mg by mouth At Bedtime Reported on 2/21/2017       cholecalciferol (VITAMIN D) 1000 UNIT tablet Take 1 tablet (1,000 Units) by mouth daily 100 tablet 0     Nutritional Supplements (JUICE PLUS FIBRE PO)        calcium carbonate (TUMS) 500 MG chewable tablet Take 1 chew tab by mouth daily as needed Reported on 4/18/2017       Multiple Vitamin (DAILY MULTIVITAMIN PO) Take 1 chew tab by mouth daily        order for DME Thumb spica wrist  brace (Patient not taking: Reported on 2/1/2018) 1 Device 0     No Known Allergies  Recent Labs   Lab Test  12/04/17   1311  10/13/17   1117  02/21/17   1211  08/02/16   0740  10/20/15   0803   08/15/14   0632   10/04/12   1001   LDL   --    --    --   99  110   --    --    --   155*   HDL   --    --    --   51  50   --    --    --   40   TRIG   --    --    --   97  82   --    --    --   118   ALT   --    --    --    --   24   --   48   --    --    CR   --    --   1.02  0.81  1.01   --    --    < >   --    GFRESTIMATED   --    --   71  >90  Non  GFR Calc    72   --    --    < >   --    GFRESTBLACK   --    --   86  >90   GFR Calc    87   --    --    < >   --    POTASSIUM   --    --   4.4  4.4  4.3   --    --    < >   --    TSH  0.63  4.55*   --   3.46  2.88   < >   --    < >  14.60*    < > = values in this interval not displayed.      BP Readings from Last 3 Encounters:   02/01/18 127/73   01/09/18 117/70   10/13/17 122/69    Wt Readings from Last 3 Encounters:   02/01/18 211 lb 12.8 oz (96.1 kg)   01/09/18 215 lb 9.6 oz (97.8 kg)   10/13/17 206 lb (93.4 kg)                  Labs reviewed in EPIC    Reviewed and updated as needed this visit by clinical staff       Reviewed and updated as needed this visit by Provider         All other systems negative except as outline above  OBJECTIVE:  Shoulder exam shows positive impingement signs are present with pain at high arc of abduction and forward flexion on right. There is tenderness of the lateral shoulder. No profound weakness. Positive neer and saini tests. .    The risks, benefits and potential complications (including but not limited to, bleeding, infection, pain, scar, damage to adjacent structures, atrophy or necrosis of soft tissue, skin blanching, failure to relieve symptoms) of injection were discussed with the patient. Questions were addressed and answered.The patient elected to proceed. Written informed consent was obtained.  The correct procedural site was identified and confirmed. A Right shoulder  intraarticular injection was performed using 1mL Depo Medrol 40mg per mL and 2mL (0.25% marcaine) of local anesthetic after sterile prep, to the correct procedural site. Sterile bandaid applied. This was tolerated well by the patient. No apparent complications. Did also discuss that if diabetic, recommend close monitoring of blood sugars over the next week as cortisone injections can temporarily elevate blood sugars.       Rigoberto was seen today for musculoskeletal problem.    Diagnoses and all orders for this visit:    Acute pain of right shoulder  -     DEPO MEDROL 40 MG  -     [facility] PT, HAND, AND CHIROPRACTIC REFERRAL  -     XR Shoulder Right G/E 3 Views; Future  -     DRAIN/INJECT LARGE JOINT/BURSA    Peripheral vascular disease (H)    Factor V Leiden mutation (H)    Malignant neoplasm of overlapping sites of bladder (H)      Advised supportive and symptomatic treatment.  Follow up with Provider - if condition persists or worsens.

## 2018-02-01 NOTE — NURSING NOTE
"Chief Complaint   Patient presents with     Musculoskeletal Problem     R shoulder pain       Initial /73  Pulse 65  Temp 97.5  F (36.4  C) (Oral)  Wt 211 lb 12.8 oz (96.1 kg)  SpO2 98%  BMI 32.2 kg/m2 Estimated body mass index is 32.2 kg/(m^2) as calculated from the following:    Height as of 1/9/18: 5' 8\" (1.727 m).    Weight as of this encounter: 211 lb 12.8 oz (96.1 kg).  Medication Reconciliation: complete   Trini You MA      "

## 2018-02-12 ENCOUNTER — TELEPHONE (OUTPATIENT)
Dept: SURGERY | Facility: CLINIC | Age: 78
End: 2018-02-12

## 2018-02-12 NOTE — TELEPHONE ENCOUNTER
RN received a staff message on 2/12/18 notifying staff that pt's peripheral vision test that was scheduled on 2/6/18 was cancelled and no future appt is scheduled. RN attempted to contact pt at home and mobile. No answer. No VM id. Left general message for pt to call the [facility] clinic back at [phone]...Corina Duval RN

## 2018-02-12 NOTE — TELEPHONE ENCOUNTER
Pt returned call on Derm/plastics vm. RN attempted to contact pt. No answer, left general message to call the [facility] back at [phone]. Staff will be available 8-5 tomorrow Tuesday 2/13/18....Corina Duval RN

## 2018-02-13 NOTE — TELEPHONE ENCOUNTER
Pt called back and states that he cancelled his appt on 2/6/18 as he already see's an optometrist and has a future appt with his Optometrist on 2/27/18. Pt states that he will have the Peripheral vision test done then and will have his optometrists' office fax over the results. RN verified MG's fax number with pt. RN notified Gwen Jhaveri at the [facility] of pt's message and that RN will fax over results to her once received. ...Corina Duval RN

## 2018-02-20 ENCOUNTER — ANTICOAGULATION THERAPY VISIT (OUTPATIENT)
Dept: NURSING | Facility: CLINIC | Age: 78
End: 2018-02-20
Payer: MEDICARE

## 2018-02-20 DIAGNOSIS — Z79.01 LONG-TERM (CURRENT) USE OF ANTICOAGULANTS: ICD-10-CM

## 2018-02-20 LAB — INR POINT OF CARE: 2.9 (ref 0.86–1.14)

## 2018-02-20 PROCEDURE — 36416 COLLJ CAPILLARY BLOOD SPEC: CPT

## 2018-02-20 PROCEDURE — 85610 PROTHROMBIN TIME: CPT | Mod: QW

## 2018-03-08 ENCOUNTER — TELEPHONE (OUTPATIENT)
Dept: SURGERY | Facility: CLINIC | Age: 78
End: 2018-03-08

## 2018-03-08 NOTE — TELEPHONE ENCOUNTER
Pt called regarding note below as RN has not received any forms from pt's Optometrist's office. No answer. No VM id. Left general message for pt to call the [facility] back at [phone]...Corina Duval RN                  Me        9:18 AM   Note      Pt called back and states that he cancelled his appt on 2/6/18 as he already see's an optometrist and has a future appt with his Optometrist on 2/27/18. Pt states that he will have the Peripheral vision test done then and will have his optometrists' office fax over the results. RN verified MG's fax number with pt. RN notified Gwen Jhaveri at the [facility] of pt's message and that RN will fax over results to her once received. ...Corina Duval RN

## 2018-03-12 NOTE — TELEPHONE ENCOUNTER
Pt called, No answer.  does not identify pt. Left general message for pt to call the [facility] clinic back at [phone]....Corina Duval RN

## 2018-04-02 ENCOUNTER — ANTICOAGULATION THERAPY VISIT (OUTPATIENT)
Dept: NURSING | Facility: CLINIC | Age: 78
End: 2018-04-02
Payer: MEDICARE

## 2018-04-02 DIAGNOSIS — Z79.01 LONG-TERM (CURRENT) USE OF ANTICOAGULANTS: ICD-10-CM

## 2018-04-02 LAB — INR POINT OF CARE: 3 (ref 0.86–1.14)

## 2018-04-02 PROCEDURE — 36416 COLLJ CAPILLARY BLOOD SPEC: CPT

## 2018-04-02 PROCEDURE — 85610 PROTHROMBIN TIME: CPT | Mod: QW

## 2018-04-02 PROCEDURE — 99207 ZZC NO CHARGE NURSE ONLY: CPT

## 2018-04-02 NOTE — PROGRESS NOTES
ANTICOAGULATION FOLLOW-UP CLINIC VISIT    Patient Name:  Rigoberto Holguin  Date:  4/2/2018  Contact Type:  Face to Face    SUBJECTIVE:     Patient Findings     Positives No Problem Findings           OBJECTIVE    INR Protime   Date Value Ref Range Status   04/02/2018 3.0 (A) 0.86 - 1.14 Final       ASSESSMENT / PLAN  INR assessment THER    Recheck INR In: 6 WEEKS    INR Location Clinic      Anticoagulation Summary as of 4/2/2018     INR goal 2.0-3.0   Today's INR 3.0   Maintenance plan 10 mg (5 mg x 2) on Sun, Tue, Thu; 7.5 mg (5 mg x 1.5) all other days   Full instructions 10 mg on Sun, Tue, Thu; 7.5 mg all other days   Weekly total 60 mg   No change documented Aparna Robison   Plan last modified Aparna Robison (11/6/2017)   Next INR check 5/14/2018   Target end date     Indications   Long-term (current) use of anticoagulants [Z79.01] [Z79.01]         Anticoagulation Episode Summary     INR check location     Preferred lab     Send INR reminders to [facility]    Comments       Anticoagulation Care Providers     Provider Role Specialty Phone number    Jamie Meraz PA-C Responsible Physician Assistant [phone]            See the Encounter Report to view Anticoagulation Flowsheet and Dosing Calendar (Go to Encounters tab in chart review, and find the Anticoagulation Therapy Visit)        APARNA ROBISON

## 2018-04-02 NOTE — MR AVS SNAPSHOT
Rigoberto LORENZO Holguin   4/2/2018 10:30 AM   Anticoagulation Therapy Visit    Description:  77 year old male   Provider:  ALANA ANTI COAG   Department:  [facility] Nurse           INR as of 4/2/2018     Today's INR 3.0      Anticoagulation Summary as of 4/2/2018     INR goal 2.0-3.0   Today's INR 3.0   Full instructions 10 mg on Sun, Tue, Thu; 7.5 mg all other days   Next INR check 5/14/2018    Indications   Long-term (current) use of anticoagulants [Z79.01] [Z79.01]         Your next Anticoagulation Clinic appointment(s)     May 14, 2018 10:30 AM CDT   Anticoagulation Visit with ALANA ANTI COATONY   [facility] [place] ([facility])    [address]  [place] [address] [phone]              Contact Numbers     [facility]  Please call [phone] with any problems or questions regarding your therapy, or to cancel or reschedule your appointment.          April 2018 Details    Sun Mon Tue Wed Thu Fri Sat     1               2      7.5 mg   See details      3      10 mg         4      7.5 mg         5      10 mg         6      7.5 mg         7      7.5 mg           8      10 mg         9      7.5 mg         10      10 mg         11      7.5 mg         12      10 mg         13      7.5 mg         14      7.5 mg           15      10 mg         16      7.5 mg         17      10 mg         18      7.5 mg         19      10 mg         20      7.5 mg         21      7.5 mg           22      10 mg         23      7.5 mg         24      10 mg         25      7.5 mg         26      10 mg         27      7.5 mg         28      7.5 mg           29      10 mg         30      7.5 mg               Date Details   04/02 This INR check               How to take your warfarin dose     To take:  7.5 mg Take 1.5 of the 5 mg tablets.    To take:  10 mg Take 2 of the 5 mg tablets.           May 2018 Details    Sun Mon Tue Wed Thu Fri Sat       1      10 mg         2      7.5 mg         3      10 mg         4      7.5 mg          5      7.5 mg           6      10 mg         7      7.5 mg         8      10 mg         9      7.5 mg         10      10 mg         11      7.5 mg         12      7.5 mg           13      10 mg         14            15               16               17               18               19                 20               21               22               23               24               25               26                 27               28               29               30               31                  Date Details   No additional details    Date of next INR:  5/14/2018         How to take your warfarin dose     To take:  7.5 mg Take 1.5 of the 5 mg tablets.    To take:  10 mg Take 2 of the 5 mg tablets.

## 2018-04-18 ENCOUNTER — TELEPHONE (OUTPATIENT)
Dept: FAMILY MEDICINE | Facility: CLINIC | Age: 78
End: 2018-04-18

## 2018-04-18 NOTE — TELEPHONE ENCOUNTER
Patient states he re-injured his right shoulder and had a cortisone shot on 2-1-18 and wonders if it is too soon to have another one.  Please call.    Thank you.

## 2018-04-18 NOTE — TELEPHONE ENCOUNTER
How long should pt wait between cortisone shots, pt wondering if ok to get another one, last was 2/1/18.

## 2018-04-24 ENCOUNTER — OFFICE VISIT (OUTPATIENT)
Dept: FAMILY MEDICINE | Facility: CLINIC | Age: 78
End: 2018-04-24
Payer: MEDICARE

## 2018-04-24 VITALS
WEIGHT: 210 LBS | OXYGEN SATURATION: 95 % | BODY MASS INDEX: 31.83 KG/M2 | DIASTOLIC BLOOD PRESSURE: 66 MMHG | RESPIRATION RATE: 16 BRPM | SYSTOLIC BLOOD PRESSURE: 114 MMHG | HEIGHT: 68 IN | HEART RATE: 68 BPM

## 2018-04-24 DIAGNOSIS — Z79.01 CHRONIC ANTICOAGULATION: ICD-10-CM

## 2018-04-24 DIAGNOSIS — Z86.718 HISTORY OF DVT OF LOWER EXTREMITY: Primary | ICD-10-CM

## 2018-04-24 DIAGNOSIS — S43.421A SPRAIN OF RIGHT ROTATOR CUFF CAPSULE, INITIAL ENCOUNTER: ICD-10-CM

## 2018-04-24 PROCEDURE — 20610 DRAIN/INJ JOINT/BURSA W/O US: CPT | Performed by: PHYSICIAN ASSISTANT

## 2018-04-24 PROCEDURE — 99213 OFFICE O/P EST LOW 20 MIN: CPT | Mod: 25 | Performed by: PHYSICIAN ASSISTANT

## 2018-04-24 NOTE — PROGRESS NOTES
SUBJECTIVE:   Rigoberto Holguin is a 77 year old male who presents to clinic today for the following health issues:      Joint Pain    Onset: 6+ months    Description:   Location: right shoulder  Character: Sharp and Dull ache    Intensity: moderate    Progression of Symptoms: worse    Accompanying Signs & Symptoms:  Other symptoms: none    History:   Previous similar pain: YES      Precipitating factors:   Trauma or overuse: YES- fall    Alleviating factors:  Improved by: nothing    Therapies Tried and outcome: injection with some relief      Some decrease in rom. Pain seems to limit his strength.        Problem list and histories reviewed & adjusted, as indicated.  Additional history: as documented    BP Readings from Last 3 Encounters:   04/24/18 114/66   02/01/18 127/73   01/09/18 117/70    Wt Readings from Last 3 Encounters:   04/24/18 210 lb (95.3 kg)   02/01/18 211 lb 12.8 oz (96.1 kg)   01/09/18 215 lb 9.6 oz (97.8 kg)                    Reviewed and updated as needed this visit by clinical staff  Tobacco  Allergies  Meds       Reviewed and updated as needed this visit by Provider         All other systems negative except as outline above  OBJECTIVE:  Shoulder exam shows positive impingement signs are present with pain at high arc of abduction and forward flexion on right. There is tenderness of the lateral shoulder.  Some minimal weakness with external rotation and abduction. .    The risks, benefits and potential complications (including but not limited to, bleeding, infection, pain, scar, damage to adjacent structures, atrophy or necrosis of soft tissue, skin blanching, failure to relieve symptoms) of injection were discussed with the patient. Questions were addressed and answered.The patient elected to proceed. Written informed consent was obtained. The correct procedural site was identified and confirmed. A Right shoulder intraarticular injection was performed using 1mL Depo Medrol 40mg per mL and 2mL  (0.25% marcaine) of local anesthetic after sterile prep, to the correct procedural site. Sterile bandaid applied. This was tolerated well by the patient. No apparent complications. Did also discuss that if diabetic, recommend close monitoring of blood sugars over the next week as cortisone injections can temporarily elevate blood sugars.      Rigoberto was seen today for shoulder pain.    Diagnoses and all orders for this visit:    History of DVT of lower extremity  -     INR CLINIC REFERRAL    Chronic anticoagulation  -     INR CLINIC REFERRAL    Sprain of right rotator cuff capsule, initial encounter  -     DEPO MEDROL 40 MG  -     DRAIN/INJECT LARGE JOINT/BURSA  -     [facility] PT, HAND, AND CHIROPRACTIC REFERRAL      Advised supportive and symptomatic treatment.  Follow up with Provider - if condition persists or worsens.

## 2018-04-24 NOTE — MR AVS SNAPSHOT
After Visit Summary   4/24/2018    Rigoberto Holguin    MRN: [id number]           Patient Information     Date Of Birth          1940        Visit Information        Provider Department      4/24/2018 1:00 PM Jamie Meraz PA-C [facility] [place]        Today's Diagnoses     History of DVT of lower extremity    -  1    Chronic anticoagulation        Sprain of right rotator cuff capsule, initial encounter           Follow-ups after your visit        Additional Services     [facility] PT, HAND, AND CHIROPRACTIC REFERRAL       **This order will print in the [facility] Scheduling Office**    Physical Therapy, Hand Therapy and Chiropractic Care are available through:    *[facility]  *[facility]  *[facility]    Call one number to schedule at any of the above locations: [phone].    Your provider has referred you to: Physical Therapy at [facility] or [facility]    Indication/Reason for Referral: Shoulder Pain  Onset of Illness: 6 wks  Therapy Orders: Evaluate and Treat  Special Programs: None  Special Request: None    [place]      Additional Comments for the Therapist or Chiropractor:     Please be aware that coverage of these services is subject to the terms and limitations of your health insurance plan.  Call member services at your health plan with any benefit or coverage questions.      Please bring the following to your appointment:    *Your personal calendar for scheduling future appointments  *Comfortable clothing            INR CLINIC REFERRAL       Your provider has referred you to INR Services.    Please be aware that coverage of these services is subject to the terms and limitations of your health insurance plan.  Call member services at your health plan with any benefit or coverage questions.    Indication for Anticoagulation: Other: factor v leiden with DVT  If nonstandard INR is desired, indicate goal range and explanation: 2-3  Expected  Duration of Therapy: Lifetime                  Your next 10 appointments already scheduled     May 14, 2018 10:30 AM CDT   Anticoagulation Visit with BE ANTI COAG   [facility] [place] ([facility])    [address] [place] [id number] [phone]            Jun 21, 2018 10:30 AM CDT   Return Visit with Vickie Whalen MD   [facility]    [address] [place] [address] [phone]            Jun 26, 2018  9:00 AM CDT   Return Visit with Vincent Rucker MD, [place] [facility]   [facility]    [address] [place] [address]  [place] [address] [phone]              Who to contact     Normal or non-critical lab and imaging results will be communicated to you by Terranovahart, letter or phone within 4 business days after the clinic has received the results. If you do not hear from us within 7 days, please contact the clinic through MyChart or phone. If you have a critical or abnormal lab result, we will notify you by phone as soon as possible.  Submit refill requests through [employer] or call your pharmacy and they will forward the refill request to us. Please allow 3 business days for your refill to be completed.          If you need to speak with a  for additional information , please call: [phone]             Additional Information About Your Visit        [employer] Information     [employer] gives you secure access to your electronic health record. If you see a primary care provider, you can also send messages to your care team and make appointments. If you have questions, please call your primary care clinic.  If you do not have a primary care provider, please call [phone] and they will assist you.        Care EveryWhere ID     This is your Care EveryWhere ID. This could be used by other organizations to access your [place] medical records  [id number]       "  Your Vitals Were     Pulse Respirations Height Pulse Oximetry BMI (Body Mass Index)       68 16 5' 8\" (1.727 m) 95% 31.93 kg/m2        Blood Pressure from Last 3 Encounters:   04/24/18 114/66   02/01/18 127/73   01/09/18 117/70    Weight from Last 3 Encounters:   04/24/18 210 lb (95.3 kg)   02/01/18 211 lb 12.8 oz (96.1 kg)   01/09/18 215 lb 9.6 oz (97.8 kg)              We Performed the Following     DEPO MEDROL 40 MG     DRAIN/INJECT LARGE JOINT/BURSA     [facility] PT, HAND, AND CHIROPRACTIC REFERRAL     INR CLINIC REFERRAL          Today's Medication Changes          These changes are accurate as of 4/24/18  1:34 PM.  If you have any questions, ask your nurse or doctor.               Stop taking these medicines if you haven't already. Please contact your care team if you have questions.     order for DME   Stopped by:  Jamie Meraz PA-C                    Primary Care Provider Office Phone # Fax #    Jamie Meraz PA-C [phone]       [address] [facility] [address]        Equal Access to Services     ALOK TOLEDO : Hadii angelita maxwell hadasho Soomaali, waaxda luqadaha, qaybta kaalmada adeegyada, tacho drummond. So [facility] [phone].    ATENCIÓN: Si habla español, tiene a thomas disposición servicios gratuitos de asistencia lingüística. Paulnie al [phone].    We comply with applicable federal civil rights laws and [place] laws. We do not discriminate on the basis of race, color, national origin, age, disability, sex, sexual orientation, or gender identity.            Thank you!     Thank you for choosing [facility]  for your care. Our goal is always to provide you with excellent care. Hearing back from our patients is one way we can continue to improve our services. Please take a few minutes to complete the written survey that you may receive in the mail after your visit with us. Thank you!             Your Updated Medication List - Protect others " around you: Learn how to safely use, store and throw away your medicines at www.disposemymeds.org.          This list is accurate as of 4/24/18  1:34 PM.  Always use your most recent med list.                   Brand Name Dispense Instructions for use Diagnosis    cholecalciferol 1000 UNIT tablet    vitamin D3    100 tablet    Take 1 tablet (1,000 Units) by mouth daily    Vitamin D deficiency       EYE VITAMINS PO      Take 1 tablet by mouth daily        DAILY MULTIVITAMIN PO      Take 1 chew tab by mouth daily        JUICE PLUS FIBRE PO           levothyroxine 112 MCG tablet    SYNTHROID/LEVOTHROID    90 tablet    Take 1 tablet (112 mcg) by mouth every morning (before breakfast)    Hypothyroidism due to Hashimoto's thyroiditis       LUMIGAN 0.01 % Soln   Generic drug:  bimatoprost       Actinic keratosis       melatonin 5 MG tablet      Take 10 mg by mouth At Bedtime Reported on 2/21/2017        metroNIDAZOLE 0.75 % cream    METROCREAM    60 g    every day, once daily to face    Rosacea       sildenafil 20 MG tablet    REVATIO    30 tablet    Take 2-5 tabs daily prn    Erectile dysfunction, unspecified erectile dysfunction type       timolol 0.5 % ophthalmic solution    TIMOPTIC      Actinic keratosis       TUMS 500 MG chewable tablet   Generic drug:  calcium carbonate      Take 1 chew tab by mouth daily as needed Reported on 4/18/2017        warfarin 5 MG tablet    COUMADIN    180 tablet    Take 1 tablet (5 mg) by mouth daily Or as directed by INR clinic. Current dose is 7.5 mg Mon,Wed, and Fri, 10 mg daily rest of week    History of DVT of lower extremity, Chronic anticoagulation

## 2018-04-27 ENCOUNTER — THERAPY VISIT (OUTPATIENT)
Dept: PHYSICAL THERAPY | Facility: CLINIC | Age: 78
End: 2018-04-27
Payer: MEDICARE

## 2018-04-27 DIAGNOSIS — M25.511 ACUTE PAIN OF RIGHT SHOULDER: Primary | ICD-10-CM

## 2018-04-27 PROCEDURE — G8984 CARRY CURRENT STATUS: HCPCS | Mod: GP | Performed by: PHYSICAL THERAPIST

## 2018-04-27 PROCEDURE — G8985 CARRY GOAL STATUS: HCPCS | Mod: GP | Performed by: PHYSICAL THERAPIST

## 2018-04-27 PROCEDURE — 97110 THERAPEUTIC EXERCISES: CPT | Mod: GP | Performed by: PHYSICAL THERAPIST

## 2018-04-27 PROCEDURE — 97161 PT EVAL LOW COMPLEX 20 MIN: CPT | Mod: GP | Performed by: PHYSICAL THERAPIST

## 2018-04-27 NOTE — LETTER
"DEPARTMENT OF HEALTH AND HUMAN SERVICES  CENTERS FOR MEDICARE & MEDICAID SERVICES    PLAN/UPDATED PLAN OF PROGRESS FOR OUTPATIENT REHABILITATION    PATIENTS NAME:  Rigoberto Holguin   : 1940  PR[facility]ER NUMBER:    [id number]  [id number]   PROVIDER [facility]: [facility]  MEDICAL RECORD NUMBER: [id number]   START OF CARE DATE:  SOC Date: 18   TYPE:  PT  PRIMARY/TREATMENT DIAGNOSIS: (Pertinent Medical Diagnosis)  Acute pain of right shoulder  VISITS FROM START OF CARE:  Rxs Used: 1     [facility] Initial Evaluation  Subjective:  Patient is a 77 year old male presenting with rehab right shoulder hpi. The history is provided by the patient. No  was used. Rigoberto Holguin is a 77 year old male with a right shoulder condition.  Condition occurred with:  A fall.  Condition occurred: in the community.  This is a new condition  Patient reports history of right shoulder pain beginning in 2017 when he slipped on the ice when he was taking his garbage can out. He reports that he didn't let go of the garbage and it \"wrenched\" his shoulder. He reports that this was improving until he fell again in March. Patient reports he had a cortisone injection 18 and then again on 18 which helped with the pain. Most recently saw physician for this problem on 18.    Patient reports pain:  Anterior, lateral and in the joint.    Pain is described as sharp and stabbing and is intermittent and reported as 2/10 (up to 7/10 with activity).  Associated symptoms:  Loss of motion/stiffness and loss of strength. Pain is worse during the day.  Symptoms are exacerbated by using arm overhead, lifting, using arm at shoulder level and certain positions and relieved by rest.  Since onset symptoms are gradually improving.  Special tests:  X-ray.  Previous treatment includes other (cortisone injection).  There was significant improvement following previous " treatment.  General health as reported by patient is excellent.  Pertinent medical history includes:  Thyroid problems and cancer.  Medical allergies: no.  Other surgeries include:  Cancer surgery (left rotator cuff repair , bladder cancer ).  Current medications:  Thyroid medication and other (warfarin).  Current occupation is Retired  Barriers include:  None as reported by the patient.  Red flags:  None as reported by the patient.  Objective:  Standing Alignment:    Shoulder/UE:  Rounded shoulders  Shoulder Evaluation:  ROM:  AROM:    Flexion:  Left:  150    Right:  152 (painful arc)  Extension: Left: 55Right: 55  Abduction:  Left: 142   Right:  155  External Rotation:  Left:  80    Right:  95  Elbow Extension:  Left:  T8   Right:  T8  Strength:    Flexion: Left:4+/5    Pain: -    Right: 4/5      Pain:  -/+  Abduction:  Left: 4+/5   Pain:-    Right: 3+/5      Pain:+  PATIENTS NAME:  Rigoberto Holguin   : 1940    Internal Rotation:  Left:5/5      Pain:-    Right: 5-/5      Pain:-  External Rotation:   Left:5-/5      Pain:-   Right:3+/5      Pain:+    Special Tests:  Special tests assessed shoulder: Right shoulder: Lift off test and empty can positive, belly test negative.  Right shoulder negative for the following special tests:Impingement  Palpation:  normal  Repeated Movement Testing:  Repeated passive shoulder extension: increased strain during, NW after, slight decrease in painful arc with NE on ROM or strength    Assessment/Plan:    Patient is a 77 year old male with right side shoulder complaints.    Patient has the following significant findings with corresponding treatment plan.                Diagnosis 1:  Right shoulder pain    Pain -  self management, education, directional preference exercise and home program  Decreased ROM/flexibility - manual therapy, therapeutic exercise and home program  Decreased strength - therapeutic exercise, therapeutic activities and home program  Impaired muscle  performance - neuro re-education and home program  Decreased function - therapeutic activities and home program  Impaired posture - neuro re-education and home program    Therapy Evaluation Codes:   1) History comprised of:   Personal factors that impact the plan of care:      None.    Comorbidity factors that impact the plan of care are:      None.     Medications impacting care: None.  2) Examination of Body Systems comprised of:   Body structures and functions that impact the plan of care:      Shoulder.   Activity limitations that impact the plan of care are:      Dressing, Lifting and Reaching.  3) Clinical presentation characteristics are:   Stable/Uncomplicated.  4) Decision-Making    Low complexity using standardized patient assessment instrument and/or measureable assessment of functional outcome.  Cumulative Therapy Evaluation is: Low complexity.  Previous and current functional limitations:  (See Goal Flow Sheet for this information)    Short term and Long term goals: (See Goal Flow Sheet for this information)   Communication ability:  Patient appears to be able to clearly communicate and understand verbal and written communication and follow directions correctly.  Treatment Explanation - The following has been discussed with the patient:   RX ordered/plan of care  Anticipated outcomes  Possible risks and side effects  This patient would benefit from PT intervention to resume normal activities.   Rehab potential is good.  Frequency:  1 X week, once daily  PATIENTS NAME:  Rigoberto Holguin   : 1940    Duration:  for 6 weeks  Discharge Plan:  Achieve all LTG.  Independent in home treatment program.  Reach maximal therapeutic benefit.  Please refer to the daily flowsheet for treatment today, total treatment time and time spent performing 1:1 timed codes.         Caregiver Signature/Credentials _____________________________ Date ________      Treating Provider: Jamie العلي DPT   I have reviewed and certified  "the need for these services and plan of treatment while under my care.        PHYSICIAN'S SIGNATURE:   _________________________________________  Date___________   Jamie Meraz PA-C    Certification period:  Beginning of Cert date period: 04/27/18 to  End of Cert period date: 07/25/18     Functional Level Progress Report: Please see attached \"Goal Flow sheet for Functional level.\"    ____X____ Continue Services or       ________ DC Services                Service dates: From  SOC Date: 04/27/18 date to present                         "

## 2018-04-27 NOTE — PROGRESS NOTES
"[facility] Initial Evaluation  Subjective:  Patient is a 77 year old male presenting with rehab right shoulder hpi. The history is provided by the patient. No  was used.   Rigoberto Holguin is a 77 year old male with a right shoulder condition.  Condition occurred with:  A fall.  Condition occurred: in the community.  This is a new condition  Patient reports history of right shoulder pain beginning in December 2017 when he slipped on the ice when he was taking his garbage can out. He reports that he didn't let go of the garbage and it \"wrenched\" his shoulder. He reports that this was improving until he fell again in March. Patient reports he had a cortisone injection 2/1/18 and then again on 4/24/18 which helped with the pain. Most recently saw physician for this problem on 4/24/18.    Patient reports pain:  Anterior, lateral and in the joint.    Pain is described as sharp and stabbing and is intermittent and reported as 2/10 (up to 7/10 with activity).  Associated symptoms:  Loss of motion/stiffness and loss of strength. Pain is worse during the day.  Symptoms are exacerbated by using arm overhead, lifting, using arm at shoulder level and certain positions and relieved by rest.  Since onset symptoms are gradually improving.  Special tests:  X-ray.  Previous treatment includes other (cortisone injection).  There was significant improvement following previous treatment.  General health as reported by patient is excellent.  Pertinent medical history includes:  Thyroid problems and cancer.  Medical allergies: no.  Other surgeries include:  Cancer surgery (left rotator cuff repair 2007, bladder cancer 2005).  Current medications:  Thyroid medication and other (warfarin).  Current occupation is Retired  .        Barriers include:  None as reported by the patient.    Red flags:  None as reported by the patient.                        Objective:  Standing Alignment:      Shoulder/UE:  " Rounded shoulders                                       Shoulder Evaluation:  ROM:  AROM:    Flexion:  Left:  150    Right:  152 (painful arc)  Extension: Left: 55Right: 55  Abduction:  Left: 142   Right:  155      External Rotation:  Left:  80    Right:  95        Elbow Extension:  Left:  T8   Right:  T8              Strength:    Flexion: Left:4+/5    Pain: -    Right: 4/5      Pain:  -/+    Abduction:  Left: 4+/5   Pain:-    Right: 3+/5      Pain:+    Internal Rotation:  Left:5/5      Pain:-    Right: 5-/5      Pain:-  External Rotation:   Left:5-/5      Pain:-   Right:3+/5      Pain:+              Special Tests:  Special tests assessed shoulder: Right shoulder: Lift off test and empty can positive, belly test negative.      Right shoulder negative for the following special tests:Impingement  Palpation:  normal                                     Repeated Movement Testing:  Repeated passive shoulder extension: increased strain during, NW after, slight decrease in painful arc with NE on ROM or strength    General     ROS    Assessment/Plan:    Patient is a 77 year old male with right side shoulder complaints.    Patient has the following significant findings with corresponding treatment plan.                Diagnosis 1:  Right shoulder pain    Pain -  self management, education, directional preference exercise and home program  Decreased ROM/flexibility - manual therapy, therapeutic exercise and home program  Decreased strength - therapeutic exercise, therapeutic activities and home program  Impaired muscle performance - neuro re-education and home program  Decreased function - therapeutic activities and home program  Impaired posture - neuro re-education and home program    Therapy Evaluation Codes:   1) History comprised of:   Personal factors that impact the plan of care:      None.    Comorbidity factors that impact the plan of care are:      None.     Medications impacting care: None.  2) Examination of Body  Systems comprised of:   Body structures and functions that impact the plan of care:      Shoulder.   Activity limitations that impact the plan of care are:      Dressing, Lifting and Reaching.  3) Clinical presentation characteristics are:   Stable/Uncomplicated.  4) Decision-Making    Low complexity using standardized patient assessment instrument and/or measureable assessment of functional outcome.  Cumulative Therapy Evaluation is: Low complexity.    Previous and current functional limitations:  (See Goal Flow Sheet for this information)    Short term and Long term goals: (See Goal Flow Sheet for this information)     Communication ability:  Patient appears to be able to clearly communicate and understand verbal and written communication and follow directions correctly.  Treatment Explanation - The following has been discussed with the patient:   RX ordered/plan of care  Anticipated outcomes  Possible risks and side effects  This patient would benefit from PT intervention to resume normal activities.   Rehab potential is good.    Frequency:  1 X week, once daily  Duration:  for 6 weeks  Discharge Plan:  Achieve all LTG.  Independent in home treatment program.  Reach maximal therapeutic benefit.    Please refer to the daily flowsheet for treatment today, total treatment time and time spent performing 1:1 timed codes.

## 2018-04-27 NOTE — MR AVS SNAPSHOT
After Visit Summary   4/27/2018    Rigoberto Holguin    MRN: [id number]           Patient Information     Date Of Birth          1940        Visit Information        Provider Department      4/27/2018 8:20 AM Jamie العلي, PT [facility] [place] [facility]        Today's Diagnoses     Acute pain of right shoulder    -  1       Follow-ups after your visit        Your next 10 appointments already scheduled     May 04, 2018  8:20 AM CDT   ROVERTO Extremity with Lauren Sosa PT   [facility] [place] [facility] ([facility] [place])    [address]  [place] [address]   [phone]            May 11, 2018  1:30 PM CDT   ROVERTO Extremity with Jamie العلي PT   [facility] [place] [facility] ([facility] [place])    [address]  [place] [address]   [phone]            May 14, 2018 10:30 AM CDT   Anticoagulation Visit with BE ANTI COAG   [facility] ([facility] [place])    [address]  [place] [address]   [phone]            Jun 21, 2018 10:30 AM CDT   Return Visit with Vickie Whalen MD   [facility]    [address] [place] [address] [phone]            Jun 26, 2018  9:00 AM CDT   Return Visit with Vincent Rucker MD, SAMU[facility] CYST[facility] PROC ROOM   [facility] ([address] [address] [phone]              Who to contact     If you have questions or need follow up information about today's clinic visit or your schedule please contact [facility] directly at [phone].  Normal or non-critical lab and imaging results will be communicated to you by MyChart, letter or phone within 4 business days after the clinic has received the results. If you do not hear from us within 7 days, please contact the clinic through MyChart or  phone. If you have a critical or abnormal lab result, we will notify you by phone as soon as possible.  Submit refill requests through [employer] or call your pharmacy and they will forward the refill request to us. Please allow 3 business days for your refill to be completed.          Additional Information About Your Visit        [employer] Information     [employer] gives you secure access to your electronic health record. If you see a primary care provider, you can also send messages to your care team and make appointments. If you have questions, please call your primary care clinic.  If you do not have a primary care provider, please call [phone] and they will assist you.        Care EveryWhere ID     This is your Care EveryWhere ID. This could be used by other organizations to access your [facility] medical records  [id number]         Blood Pressure from Last 3 Encounters:   04/24/18 114/66   02/01/18 127/73   01/09/18 117/70    Weight from Last 3 Encounters:   04/24/18 95.3 kg (210 lb)   02/01/18 96.1 kg (211 lb 12.8 oz)   01/09/18 97.8 kg (215 lb 9.6 oz)              We Performed the Following     HC PT EVAL, LOW COMPLEXITY     ROVERTO CERT REPORT     ROVERTO INITIAL EVAL REPORT     THERAPEUTIC EXERCISES        Primary Care Provider Office Phone # Fax #    Jamie Meraz PA-C [phone] [address]  [place] [address]        Equal Access to Services     [facility] Center[facility] : Hadii aad ku hadasho Soomaali, waaxda luqadaha, qaybta kaalmada adeegyada, waxbarbara idiin hayaan alessandro conley la'lashell . So [facility] [phone].    ATENCIÓN: Si habla español, tiene a thomas disposición servicios gratuitos de asistencia lingüística. Pauline al [phone].    We comply with applicable federal civil rights laws and [place] laws. We do not discriminate on the basis of race, color, national origin, age, disability, sex, sexual orientation, or gender identity.            Thank you!     Thank you for choosing [facility]  for your care. Our goal is always to provide you with excellent care. Hearing back from our patients is one way we can continue to improve our services. Please take a few minutes to complete the written survey that you may receive in the mail after your visit with us. Thank you!             Your Updated Medication List - Protect others around you: Learn how to safely use, store and throw away your medicines at www.disposemymeds.org.          This list is accurate as of 4/27/18 12:25 PM.  Always use your most recent med list.                   Brand Name Dispense Instructions for use Diagnosis    cholecalciferol 1000 UNIT tablet    vitamin D3    100 tablet    Take 1 tablet (1,000 Units) by mouth daily    Vitamin D deficiency       EYE VITAMINS PO      Take 1 tablet by mouth daily        DAILY MULTIVITAMIN PO      Take 1 chew tab by mouth daily        JUICE PLUS FIBRE PO           levothyroxine 112 MCG tablet    SYNTHROID/LEVOTHROID    90 tablet    Take 1 tablet (112 mcg) by mouth every morning (before breakfast)    Hypothyroidism due to Hashimoto's thyroiditis       LUMIGAN 0.01 % Soln   Generic drug:  bimatoprost       Actinic keratosis       melatonin 5 MG tablet      Take 10 mg by mouth At Bedtime Reported on 2/21/2017        metroNIDAZOLE 0.75 % cream    METROCREAM    60 g    every day, once daily to face    Rosacea       sildenafil 20 MG tablet    REVATIO    30 tablet    Take 2-5 tabs daily prn    Erectile dysfunction, unspecified erectile dysfunction type       timolol 0.5 % ophthalmic solution    TIMOPTIC      Actinic keratosis       TUMS 500 MG chewable tablet   Generic drug:  calcium carbonate      Take 1 chew tab by mouth daily as needed Reported on 4/18/2017        warfarin 5 MG tablet    COUMADIN    180 tablet    Take 1 tablet (5 mg) by mouth daily Or as directed by INR clinic. Current dose is 7.5 mg Mon,Wed, and Fri, 10 mg daily rest of week    History of DVT of lower  extremity, Chronic anticoagulation

## 2018-05-04 ENCOUNTER — THERAPY VISIT (OUTPATIENT)
Dept: PHYSICAL THERAPY | Facility: CLINIC | Age: 78
End: 2018-05-04
Payer: MEDICARE

## 2018-05-04 DIAGNOSIS — M25.511 ACUTE PAIN OF RIGHT SHOULDER: ICD-10-CM

## 2018-05-04 PROCEDURE — 97110 THERAPEUTIC EXERCISES: CPT | Mod: GP | Performed by: PHYSICAL THERAPIST

## 2018-05-04 PROCEDURE — 97112 NEUROMUSCULAR REEDUCATION: CPT | Mod: GP | Performed by: PHYSICAL THERAPIST

## 2018-05-10 ENCOUNTER — TELEPHONE (OUTPATIENT)
Dept: FAMILY MEDICINE | Facility: CLINIC | Age: 78
End: 2018-05-10

## 2018-05-11 ENCOUNTER — THERAPY VISIT (OUTPATIENT)
Dept: PHYSICAL THERAPY | Facility: CLINIC | Age: 78
End: 2018-05-11
Payer: MEDICARE

## 2018-05-11 DIAGNOSIS — M25.511 ACUTE PAIN OF RIGHT SHOULDER: ICD-10-CM

## 2018-05-11 PROCEDURE — 97110 THERAPEUTIC EXERCISES: CPT | Mod: GP | Performed by: PHYSICAL THERAPIST

## 2018-05-11 PROCEDURE — 97112 NEUROMUSCULAR REEDUCATION: CPT | Mod: GP | Performed by: PHYSICAL THERAPIST

## 2018-05-16 ENCOUNTER — ANTICOAGULATION THERAPY VISIT (OUTPATIENT)
Dept: NURSING | Facility: CLINIC | Age: 78
End: 2018-05-16
Payer: MEDICARE

## 2018-05-16 DIAGNOSIS — Z79.01 LONG-TERM (CURRENT) USE OF ANTICOAGULANTS: ICD-10-CM

## 2018-05-16 LAB — INR POINT OF CARE: 3.7 (ref 0.86–1.14)

## 2018-05-16 PROCEDURE — 36416 COLLJ CAPILLARY BLOOD SPEC: CPT

## 2018-05-16 PROCEDURE — 99207 ZZC NO CHARGE NURSE ONLY: CPT

## 2018-05-16 PROCEDURE — 85610 PROTHROMBIN TIME: CPT | Mod: QW

## 2018-05-16 NOTE — PROGRESS NOTES
ANTICOAGULATION FOLLOW-UP CLINIC VISIT    Patient Name:  Rigoberto Holguin  Date:  5/16/2018  Contact Type:  Face to Face    SUBJECTIVE:     Patient Findings     Positives Unexplained INR or factor level change           OBJECTIVE    INR Protime   Date Value Ref Range Status   05/16/2018 3.7 (A) 0.86 - 1.14 Final       ASSESSMENT / PLAN  INR assessment SUPRA    Recheck INR In: 2 WEEKS    INR Location Clinic      Anticoagulation Summary as of 5/16/2018     INR goal 2.0-3.0   Today's INR 3.7!   Maintenance plan 10 mg (5 mg x 2) on Sun, Tue, Thu; 7.5 mg (5 mg x 1.5) all other days   Full instructions 5/16: Hold; Otherwise 10 mg on Sun, Tue, Thu; 7.5 mg all other days   Weekly total 60 mg   Plan last modified Aparna Vicente (11/6/2017)   Next INR check 5/29/2018   Target end date     Indications   Long-term (current) use of anticoagulants [Z79.01] [Z79.01]         Anticoagulation Episode Summary     INR check location     Preferred lab     Send INR reminders to [facility]    Comments       Anticoagulation Care Providers     Provider Role Specialty Phone number    Jamie Meraz PA-C Responsible Physician Assistant [phone]            See the Encounter Report to view Anticoagulation Flowsheet and Dosing Calendar (Go to Encounters tab in chart review, and find the Anticoagulation Therapy Visit)        Larisa Hitchcock RN

## 2018-05-16 NOTE — MR AVS SNAPSHOT
Rigoberto VENTURA Ezoi   5/16/2018 2:30 PM   Anticoagulation Therapy Visit    Description:  77 year old male   Provider:  ALANA ANTI COAG   Department:  Be Nurse           INR as of 5/16/2018     Today's INR 3.7!      Anticoagulation Summary as of 5/16/2018     INR goal 2.0-3.0   Today's INR 3.7!   Full instructions 5/16: Hold; Otherwise 10 mg on Sun, Tue, Thu; 7.5 mg all other days   Next INR check 5/29/2018    Indications   Long-term (current) use of anticoagulants [Z79.01] [Z79.01]         Your next Anticoagulation Clinic appointment(s)     May 29, 2018  9:15 AM CDT   Anticoagulation Visit with ALANA ANTI COATONY   [facility] [place] ([facility] [place])    [address]  [place] [address] [phone]              Contact Numbers     [place] [facility]  Please call [phone] with any problems or questions regarding your therapy, or to cancel or reschedule your appointment.          May 2018 Details    Sun Mon Tue Wed Thu Fri Sat       1               2               3               4               5                 6               7               8               9               10               11               12                 13               14               15               16      Hold   See details      17      10 mg         18      7.5 mg         19      7.5 mg           20      10 mg         21      7.5 mg         22      10 mg         23      7.5 mg         24      10 mg         25      7.5 mg         26      7.5 mg           27      10 mg         28      7.5 mg         29            30               31                  Date Details   05/16 This INR check       Date of next INR:  5/29/2018         How to take your warfarin dose     To take:  7.5 mg Take 1.5 of the 5 mg tablets.    To take:  10 mg Take 2 of the 5 mg tablets.    Hold Do not take your warfarin dose. See the Details table to the right for additional instructions.

## 2018-05-29 ENCOUNTER — THERAPY VISIT (OUTPATIENT)
Dept: PHYSICAL THERAPY | Facility: CLINIC | Age: 78
End: 2018-05-29
Payer: MEDICARE

## 2018-05-29 DIAGNOSIS — M25.511 ACUTE PAIN OF RIGHT SHOULDER: ICD-10-CM

## 2018-05-29 PROCEDURE — 97112 NEUROMUSCULAR REEDUCATION: CPT | Mod: GP | Performed by: PHYSICAL THERAPIST

## 2018-05-29 PROCEDURE — 97110 THERAPEUTIC EXERCISES: CPT | Mod: GP | Performed by: PHYSICAL THERAPIST

## 2018-05-31 ENCOUNTER — ANTICOAGULATION THERAPY VISIT (OUTPATIENT)
Dept: NURSING | Facility: CLINIC | Age: 78
End: 2018-05-31
Payer: MEDICARE

## 2018-05-31 DIAGNOSIS — Z79.01 LONG-TERM (CURRENT) USE OF ANTICOAGULANTS: ICD-10-CM

## 2018-05-31 LAB — INR POINT OF CARE: 3.4 (ref 0.86–1.14)

## 2018-05-31 PROCEDURE — 36416 COLLJ CAPILLARY BLOOD SPEC: CPT

## 2018-05-31 PROCEDURE — 85610 PROTHROMBIN TIME: CPT | Mod: QW

## 2018-05-31 PROCEDURE — 99207 ZZC NO CHARGE NURSE ONLY: CPT

## 2018-05-31 NOTE — MR AVS SNAPSHOT
Rigoberto LORENZO Holguin   5/31/2018 11:45 AM   Anticoagulation Therapy Visit    Description:  77 year old male   Provider:  ALANA ANTI COAG   Department:  Be Nurse           INR as of 5/31/2018     Today's INR 3.4!      Anticoagulation Summary as of 5/31/2018     INR goal 2.0-3.0   Today's INR 3.4!   Full warfarin instructions 5/31: Hold; Otherwise 10 mg on Tue, Thu; 7.5 mg all other days   Next INR check 6/15/2018    Indications   Long-term (current) use of anticoagulants [Z79.01] [Z79.01]         Your next Anticoagulation Clinic appointment(s)     Ashish 15, 2018  8:30 AM CDT   Anticoagulation Visit with BE ANTI COAG   [facility] [place] [facility] [place]    [address]  [place] [address] [phone]              Contact Numbers     [facility]  Please call [phone] with any problems or questions regarding your therapy, or to cancel or reschedule your appointment.          May 2018 Details    Sun Mon Tue Wed Thu Fri Sat       1               2               3               4               5                 6               7               8               9               10               11               12                 13               14               15               16               17               18               19                 20               21               22               23               24               25               26                 27               28               29               30               31      Hold   See details         Date Details   05/31 This INR check               How to take your warfarin dose     Hold Do not take your warfarin dose. See the Details table to the right for additional instructions.                June 2018 Details    Sun Mon Tue Wed Thu Fri Sat          1      7.5 mg         2      7.5 mg           3      7.5 mg         4      7.5 mg         5      10 mg         6      7.5 mg         7      10 mg         8      7.5 mg         9       7.5 mg           10      7.5 mg         11      7.5 mg         12      10 mg         13      7.5 mg         14      10 mg         15            16                 17               18               19               20               21               22               23                 24               25               26               27               28               29               30                Date Details   No additional details    Date of next INR:  6/15/2018         How to take your warfarin dose     To take:  7.5 mg Take 1.5 of the 5 mg tablets.    To take:  10 mg Take 2 of the 5 mg tablets.

## 2018-05-31 NOTE — PROGRESS NOTES
ANTICOAGULATION FOLLOW-UP CLINIC VISIT    Patient Name:  Rigoberto Holguin  Date:  5/31/2018  Contact Type:  Face to Face    SUBJECTIVE:     Patient Findings     Positives Unexplained INR or factor level change           OBJECTIVE    INR Protime   Date Value Ref Range Status   05/31/2018 3.4 (A) 0.86 - 1.14 Final       ASSESSMENT / PLAN  INR assessment SUPRA maintenence dose decreased   Recheck INR In: 2 WEEKS    INR Location Clinic      Anticoagulation Summary as of 5/31/2018     INR goal 2.0-3.0   Today's INR 3.4!   Warfarin maintenance plan 10 mg (5 mg x 2) on Tue, Thu; 7.5 mg (5 mg x 1.5) all other days   Full warfarin instructions 5/31: Hold; Otherwise 10 mg on Tue, Thu; 7.5 mg all other days   Weekly warfarin total 57.5 mg   Plan last modified Aparna [facility] (5/31/2018)   Next INR check 6/15/2018   Target end date     Indications   Long-term (current) use of anticoagulants [Z79.01] [Z79.01]         Anticoagulation Episode Summary     INR check location     Preferred lab     Send INR reminders to [facility]    Comments       Anticoagulation Care Providers     Provider Role Specialty Phone number    Jamie Meraz PA-C Responsible Physician Assistant [phone]            See the Encounter Report to view Anticoagulation Flowsheet and Dosing Calendar (Go to Encounters tab in chart review, and find the Anticoagulation Therapy Visit)        APARNA [facility]

## 2018-06-15 ENCOUNTER — ANTICOAGULATION THERAPY VISIT (OUTPATIENT)
Dept: NURSING | Facility: CLINIC | Age: 78
End: 2018-06-15
Payer: MEDICARE

## 2018-06-15 DIAGNOSIS — Z79.01 LONG-TERM (CURRENT) USE OF ANTICOAGULANTS: ICD-10-CM

## 2018-06-15 LAB — INR POINT OF CARE: 2.8 (ref 0.86–1.14)

## 2018-06-15 PROCEDURE — 99207 ZZC NO CHARGE NURSE ONLY: CPT

## 2018-06-15 PROCEDURE — 36416 COLLJ CAPILLARY BLOOD SPEC: CPT

## 2018-06-15 PROCEDURE — 85610 PROTHROMBIN TIME: CPT | Mod: QW

## 2018-06-15 NOTE — MR AVS SNAPSHOT
Rigoberto VENTURA Ezio   6/15/2018 8:30 AM   Anticoagulation Therapy Visit    Description:  77 year old male   Provider:  ALANA ANTI COAG   Department:  Be Nurse           INR as of 6/15/2018     Today's INR 2.8      Anticoagulation Summary as of 6/15/2018     INR goal 2.0-3.0   Today's INR 2.8   Full warfarin instructions 10 mg on Tue, Thu; 7.5 mg all other days   Next INR check 7/27/2018    Indications   Long-term (current) use of anticoagulants [Z79.01] [Z79.01]         Your next Anticoagulation Clinic appointment(s)     Jul 27, 2018  8:30 AM CDT   Anticoagulation Visit with BE ANTI COAG   [facility] [place] ([facility] [place])    [address]  [place] [id number] [phone]              Contact Numbers     [facility]  Please call [phone] with any problems or questions regarding your therapy, or to cancel or reschedule your appointment.          June 2018 Details    Sun Mon Tue Wed Thu Fri Sat          1               2                 3               4               5               6               7               8               9                 10               11               12               13               14               15      7.5 mg   See details      16      7.5 mg           17      7.5 mg         18      7.5 mg         19      10 mg         20      7.5 mg         21      10 mg         22      7.5 mg         23      7.5 mg           24      7.5 mg         25      7.5 mg         26      10 mg         27      7.5 mg         28      10 mg         29      7.5 mg         30      7.5 mg          Date Details   06/15 This INR check               How to take your warfarin dose     To take:  7.5 mg Take 1.5 of the 5 mg tablets.    To take:  10 mg Take 2 of the 5 mg tablets.           July 2018 Details    Sun Mon Tue Wed Thu Fri Sat     1      7.5 mg         2      7.5 mg         3      10 mg         4      7.5 mg         5      10 mg         6      7.5 mg         7      7.5 mg            8      7.5 mg         9      7.5 mg         10      10 mg         11      7.5 mg         12      10 mg         13      7.5 mg         14      7.5 mg           15      7.5 mg         16      7.5 mg         17      10 mg         18      7.5 mg         19      10 mg         20      7.5 mg         21      7.5 mg           22      7.5 mg         23      7.5 mg         24      10 mg         25      7.5 mg         26      10 mg         27            28                 29               30               31                    Date Details   No additional details    Date of next INR:  7/27/2018         How to take your warfarin dose     To take:  7.5 mg Take 1.5 of the 5 mg tablets.    To take:  10 mg Take 2 of the 5 mg tablets.

## 2018-06-15 NOTE — PROGRESS NOTES
ANTICOAGULATION FOLLOW-UP CLINIC VISIT    Patient Name:  Rigoberto Holguin  Date:  6/15/2018  Contact Type:  Face to Face    SUBJECTIVE:     Patient Findings     Positives No Problem Findings           OBJECTIVE    INR Protime   Date Value Ref Range Status   06/15/2018 2.8 (A) 0.86 - 1.14 Final       ASSESSMENT / PLAN  INR assessment THER    Recheck INR In: 6 WEEKS    INR Location Clinic      Anticoagulation Summary as of 6/15/2018     INR goal 2.0-3.0   Today's INR 2.8   Warfarin maintenance plan 10 mg (5 mg x 2) on Tue, Thu; 7.5 mg (5 mg x 1.5) all other days   Full warfarin instructions 10 mg on Tue, Thu; 7.5 mg all other days   Weekly warfarin total 57.5 mg   No change documented Aparna Robison   Plan last modified Aparna [facility] (5/31/2018)   Next INR check 7/27/2018   Target end date     Indications   Long-term (current) use of anticoagulants [Z79.01] [Z79.01]         Anticoagulation Episode Summary     INR check location     Preferred lab     Send INR reminders to [facility]    Comments       Anticoagulation Care Providers     Provider Role Specialty Phone number    Jamie Meraz PA-C Responsible Physician Assistant [phone]            See the Encounter Report to view Anticoagulation Flowsheet and Dosing Calendar (Go to Encounters tab in chart review, and find the Anticoagulation Therapy Visit)        APARNA ROBISON

## 2018-06-19 ENCOUNTER — THERAPY VISIT (OUTPATIENT)
Dept: PHYSICAL THERAPY | Facility: CLINIC | Age: 78
End: 2018-06-19
Payer: MEDICARE

## 2018-06-19 DIAGNOSIS — Z86.718 HISTORY OF DVT OF LOWER EXTREMITY: ICD-10-CM

## 2018-06-19 DIAGNOSIS — Z79.01 CHRONIC ANTICOAGULATION: ICD-10-CM

## 2018-06-19 DIAGNOSIS — M25.511 ACUTE PAIN OF RIGHT SHOULDER: ICD-10-CM

## 2018-06-19 PROCEDURE — 97110 THERAPEUTIC EXERCISES: CPT | Mod: GP | Performed by: PHYSICAL THERAPIST

## 2018-06-19 PROCEDURE — 97112 NEUROMUSCULAR REEDUCATION: CPT | Mod: GP | Performed by: PHYSICAL THERAPIST

## 2018-06-19 RX ORDER — WARFARIN SODIUM 5 MG/1
7.5 TABLET ORAL DAILY
Qty: 135 TABLET | Refills: 0 | Status: SHIPPED | OUTPATIENT
Start: 2018-06-19 | End: 2018-06-21

## 2018-06-19 NOTE — TELEPHONE ENCOUNTER
"Requested Prescriptions   Pending Prescriptions Disp Refills     warfarin (COUMADIN) 5 MG tablet [Pharmacy Med Name: WARFARIN SODIUM 5 MG Tablet] 180 tablet 1    Last Written Prescription Date:  1-15-18  Last Fill Quantity: 180,  # refills: 1   Last office visit: 4/24/2018 with prescribing provider:  4-24-18   Future Office Visit:   Next 5 appointments (look out 90 days)     Jun 21, 2018 10:30 AM CDT   [facility] with Vickie [facility]alen MD   [facility]    [address] [place] [address]   [phone]            Jun 25, 2018 12:40 PM CDT   Office Visit with Jamie Meraz PA-C   [facility]    [address] [place] [address]   [phone]            Jun 26, 2018  9:00 AM CDT   Return Visit with Vincent Rucker MD, [place] [facility] PROC ROOM   [facility] ([address] [address]   [phone]                Sig: TAKE 1 AND 1/2 TABLETS ON MONDAY, WEDNESDAY AND FRIDAY AND 2 TABLETS ALL OTHER DAYS OF THE WEEK OR AS DIRECTED BY INR CLINIC    Vitamin K Antagonists Failed    6/19/2018 10:44 AM       Failed - INR is within goal in the past 6 weeks    Confirm INR is within goal in the past 6 weeks.     Recent Labs   Lab Test 06/15/18   INR  2.8*                      Passed - Recent (12 mo) or future (30 days) visit within the authorizing provider's specialty    Patient had office visit in the last 12 months or has a visit in the next 30 days with authorizing provider or within the authorizing provider's specialty.  See \"Patient Info\" tab in inbasket, or \"Choose Columns\" in Meds & Orders section of the refill encounter.           Passed - Patient is 18 years of age or older        "

## 2018-06-19 NOTE — TELEPHONE ENCOUNTER
Routing refill request to provider for review/approval because:  Drug interaction warning,  Pended for approval.  Aparna Vicente RN

## 2018-06-19 NOTE — PROGRESS NOTES
Subjective:  HPI                    Objective:  System    Physical Exam    General     ROS    Assessment/Plan:    PROGRESS  REPORT    Progress reporting period is from 4/27/18 to 6/19/18.       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 He reports compliance with HEP, but strengthening exercises for the shoulder remain difficult and painful    Current Pain level: 2/10.     Initial Pain level: 2/10 (up to 7/10).   Changes in function:  Yes (See Goal flowsheet attached for changes in current functional level)  Adverse reaction to treatment or activity: None    OBJECTIVE  Changes noted in objective findings:  AROM right shoulder flexion 155, abduction 160, ER 88. Strength: flexion 3+/5 (pain), abduction 3+/5 (pain), IR 5-/5, ER 3/5 (pain). Patient demonstrates improved technique with scapular stabilization exercises     ASSESSMENT/PLAN  Updated problem list and treatment plan: Diagnosis 1:  Right shoulder pain      Pain -  self management, education and home program  Decreased strength - therapeutic exercise, therapeutic activities and home program  Impaired muscle performance - neuro re-education and home program  Decreased function - therapeutic activities and home program  STG/LTGs have been met or progress has been made towards goals:  Yes (See Goal flow sheet completed today.)  Assessment of Progress: The patient's progress has plateaued.  The patient's progress has slowed.  Self Management Plans:  Patient has been instructed in a home treatment program.  Patient  has been instructed in self management of symptoms.  I have re-evaluated this patient and find that the nature, scope, duration and intensity of the therapy is appropriate for the medical condition of the patient.  Rigoberto continues to require the  following intervention to meet STG and LTG's:    At this time, I recommended that patient follow up with PCP for potential referral to ortho. Based on lack of strength still present after 7 weeks of therapy, recommend evaluation for potential structural compromise in the shoulder.      Recommendations:  This patient would benefit from further evaluation by MD    Please refer to the daily flowsheet for treatment today, total treatment time and time spent performing 1:1 timed codes.

## 2018-06-19 NOTE — MR AVS SNAPSHOT
After Visit Summary   6/19/2018    Rigoberto Holguin    MRN: [id number]           Patient Information     Date Of Birth          1940        Visit Information        Provider Department      6/19/2018 8:40 AM Jamie العلي PT [facility] [place]        Today's Diagnoses     Acute pain of right shoulder           Follow-ups after your visit        Your next 10 appointments already scheduled     Jun 21, 2018 10:30 AM CDT   Return Visit with Vickie Whalen MD   [facility]    [address] [place] [address]   [phone]            Jun 25, 2018 12:40 PM CDT   Office Visit with Jamie Meraz PA-C   [facility] [place] ([facility] [place])    [address]  [place] [id number] [phone]           Bring a current list of meds and any records pertaining to this visit. For Physicals, please bring immunization records and any forms needing to be filled out. Please arrive 10 minutes early to complete paperwork.            Jun 26, 2018  9:00 AM CDT   Return Visit with Vincent Rucker MD, [place] [facility] PROC ROOM   [facility] ([facility] [place]  [place] [address] [phone]            Jul 27, 2018  8:30 AM CDT   Anticoagulation Visit with BE ANTI COAG   [facility] [place] ([facility] [place])    [address]  [place] [id number] [phone]              Who to contact     If you have questions or need follow up information about today's clinic visit or your schedule please contact [facility] [place] [facility] directly at [phone].  Normal or non-critical lab and imaging results will be communicated to you by MyChart, letter or phone within 4 business days after the clinic has received the results. If you do not hear from us within 7 days, please contact the clinic through MyChart or phone. If you  have a critical or abnormal lab result, we will notify you by phone as soon as possible.  Submit refill requests through [employer] or call your pharmacy and they will forward the refill request to us. Please allow 3 business days for your refill to be completed.          Additional Information About Your Visit        [employer] Information     [employer] gives you secure access to your electronic health record. If you see a primary care provider, you can also send messages to your care team and make appointments. If you have questions, please call your primary care clinic.  If you do not have a primary care provider, please call [phone] and they will assist you.        Care EveryWhere ID     This is your Care EveryWhere ID. This could be used by other organizations to access your [facility] medical records  [id number]         Blood Pressure from Last 3 Encounters:   04/24/18 114/66   02/01/18 127/73   01/09/18 117/70    Weight from Last 3 Encounters:   04/24/18 95.3 kg (210 lb)   02/01/18 96.1 kg (211 lb 12.8 oz)   01/09/18 97.8 kg (215 lb 9.6 oz)              We Performed the Following     [facility] PROGRESS NOTES REPORT     NEUROMUSCULAR RE-EDUCATION     THERAPEUTIC EXERCISES          Today's Medication Changes          These changes are accurate as of 6/19/18  5:36 PM.  If you have any questions, ask your nurse or doctor.               These medicines have changed or have updated prescriptions.        Dose/Directions    warfarin 5 MG tablet   Commonly known as:  COUMADIN   This may have changed:    - how much to take  - additional instructions   Used for:  History of DVT of lower extremity, Chronic anticoagulation   Changed by:  Jamie Meraz PA-C        Dose:  7.5 mg   Take 1.5 tablets (7.5 mg) by mouth daily . Current dose 10 mg Tues,Thurs and 7.5 mg daily rest of week   Quantity:  135 tablet   Refills:  0            Where to get your medicines      These medications were sent to [facility] -  [place], [place] - [address]  [address], [facility] [address]     Phone:  [phone]     warfarin 5 MG tablet                Primary Care Provider Office Phone # Fax #    Jamie Meraz PA-C [phone] [address] [place] [address]        Equal Access to Services     [facility]: Hadii aad ku hadasho Soomaali, waaxda luqadaha, qaybta kaalmada adeegyada, waxay idiin hayaan adeeg kharash la'aan ah. So [facility] [phone].    ATENCIÓN: Si habla español, tiene a thomas disposición servicios gratuitos de asistencia lingüística. [facility] [phone].    We comply with applicable federal civil rights laws and [place] laws. We do not discriminate on the basis of race, color, national origin, age, disability, sex, sexual orientation, or gender identity.            Thank you!     Thank you for choosing [facility]  for your care. Our goal is always to provide you with excellent care. Hearing back from our patients is one way we can continue to improve our services. Please take a few minutes to complete the written survey that you may receive in the mail after your visit with us. Thank you!             Your Updated Medication List - Protect others around you: Learn how to safely use, store and throw away your medicines at www.disposemymeds.org.          This list is accurate as of 6/19/18  5:36 PM.  Always use your most recent med list.                   Brand Name Dispense Instructions for use Diagnosis    cholecalciferol 1000 UNIT tablet    vitamin D3    100 tablet    Take 1 tablet (1,000 Units) by mouth daily    Vitamin D deficiency       EYE VITAMINS PO      Take 1 tablet by mouth daily        DAILY MULTIVITAMIN PO      Take 1 chew tab by mouth daily        JUICE PLUS FIBRE PO           levothyroxine 112 MCG tablet    SYNTHROID/LEVOTHROID    90 tablet    Take 1 tablet (112 mcg) by mouth every morning (before breakfast)    Hypothyroidism due to  Hashimoto's thyroiditis       LUMIGAN 0.01 % Soln   Generic drug:  bimatoprost       Actinic keratosis       melatonin 5 MG tablet      Take 10 mg by mouth At Bedtime Reported on 2/21/2017        metroNIDAZOLE 0.75 % cream    METROCREAM    60 g    every day, once daily to face    Rosacea       sildenafil 20 MG tablet    REVATIO    30 tablet    Take 2-5 tabs daily prn    Erectile dysfunction, unspecified erectile dysfunction type       timolol 0.5 % ophthalmic solution    TIMOPTIC      Actinic keratosis       TUMS 500 MG chewable tablet   Generic drug:  calcium carbonate      Take 1 chew tab by mouth daily as needed Reported on 4/18/2017        warfarin 5 MG tablet    COUMADIN    135 tablet    Take 1.5 tablets (7.5 mg) by mouth daily . Current dose 10 mg Tues,Thurs and 7.5 mg daily rest of week    History of DVT of lower extremity, Chronic anticoagulation

## 2018-06-20 ENCOUNTER — TELEPHONE (OUTPATIENT)
Dept: FAMILY MEDICINE | Facility: CLINIC | Age: 78
End: 2018-06-20

## 2018-06-20 DIAGNOSIS — Z86.718 HISTORY OF DVT OF LOWER EXTREMITY: ICD-10-CM

## 2018-06-20 DIAGNOSIS — Z79.01 CHRONIC ANTICOAGULATION: ICD-10-CM

## 2018-06-21 ENCOUNTER — OFFICE VISIT (OUTPATIENT)
Dept: DERMATOLOGY | Facility: CLINIC | Age: 78
End: 2018-06-21
Payer: MEDICARE

## 2018-06-21 DIAGNOSIS — Z85.828 HISTORY OF NONMELANOMA SKIN CANCER: Primary | ICD-10-CM

## 2018-06-21 DIAGNOSIS — R58 ECCHYMOSIS: ICD-10-CM

## 2018-06-21 DIAGNOSIS — D48.5 NEOPLASM OF UNCERTAIN BEHAVIOR OF SKIN: ICD-10-CM

## 2018-06-21 DIAGNOSIS — L57.0 AK (ACTINIC KERATOSIS): ICD-10-CM

## 2018-06-21 DIAGNOSIS — L82.1 SEBORRHEIC KERATOSIS: ICD-10-CM

## 2018-06-21 PROCEDURE — 99213 OFFICE O/P EST LOW 20 MIN: CPT | Mod: 25 | Performed by: DERMATOLOGY

## 2018-06-21 PROCEDURE — 88305 TISSUE EXAM BY PATHOLOGIST: CPT | Mod: TC | Performed by: DERMATOLOGY

## 2018-06-21 PROCEDURE — 17003 DESTRUCT PREMALG LES 2-14: CPT | Performed by: DERMATOLOGY

## 2018-06-21 PROCEDURE — 17000 DESTRUCT PREMALG LESION: CPT | Performed by: DERMATOLOGY

## 2018-06-21 PROCEDURE — 11100 HC DESTRUCT PREMALIGNANT LESION, FIRST: CPT | Mod: 59 | Performed by: DERMATOLOGY

## 2018-06-21 RX ORDER — WARFARIN SODIUM 5 MG/1
TABLET ORAL
Qty: 135 TABLET | Refills: 0 | Status: SHIPPED | OUTPATIENT
Start: 2018-06-21 | End: 2018-06-25

## 2018-06-21 ASSESSMENT — PAIN SCALES - GENERAL: PAINLEVEL: NO PAIN (0)

## 2018-06-21 NOTE — PATIENT INSTRUCTIONS
Wound Care After a Biopsy    What is a skin biopsy?  A skin biopsy allows the doctor to examine a very small piece of tissue under the microscope to determine the diagnosis and the best treatment for the skin condition. A local anesthetic (numbing medicine)  is injected with a very small needle into the skin area to be tested. A small piece of skin is taken from the area. Sometimes a suture (stitch) is used.     What are the risks of a skin biopsy?  I will experience scar, bleeding, swelling, pain, crusting and redness. I may experience incomplete removal or recurrence. Risks of this procedure are excessive bleeding, bruising, infection, nerve damage, numbness, thick (hypertrophic or keloidal) scar and non-diagnostic biopsy.    How should I care for my wound for the first 24 hours?    Keep the wound dry and covered for 24 hours    If it bleeds, hold direct pressure on the area for 15 minutes. If bleeding does not stop then go to the emergency room    Avoid strenuous exercise the first 1-2 days or as your doctor instructs you    How should I care for the wound after 24 hours?    After 24 hours, remove the bandage    You may bathe or shower as normal    If you had a scalp biopsy, you can shampoo as usual and can use shower water to clean the biopsy site daily    Clean the wound twice a day with gentle soap and water    Do not scrub, be gentle    Apply white petroleum/Vaseline after cleaning the wound with a cotton swab or a clean finger, and keep the site covered with a Bandaid /bandage. Bandages are not necessary with a scalp biopsy    If you are unable to cover the site with a Bandaid /bandage, re-apply ointment 2-3 times a day to keep the site moist. Moisture will help with healing    Avoid strenuous activity for first 1-2 days    Avoid lakes, rivers, pools, and oceans until the stitches are removed or the site is healed    How do I clean my wound?    Wash hands thoroughly with soap or use hand  before all  wound care    Clean the wound with gentle soap and water    Apply white petroleum/Vaseline  to wound after it is clean    Replace the Bandaid /bandage to keep the wound covered for the first few days or as instructed by your doctor    If you had a scalp biopsy, warm shower water to the area on a daily basis should suffice    What should I use to clean my wound?     Cotton-tipped applicators (Qtips )    White petroleum jelly (Vaseline ). Use a clean new container and use Q-tips to apply.    Bandaids   as needed    Gentle soap     How should I care for my wound long term?    Do not get your wound dirty    Keep up with wound care for one week or until the area is healed.    A small scab will form and fall off by itself when the area is completely healed. The area will be red and will become pink in color as it heals. Sun protection is very important for how your scar will turn out. Sunscreen with an SPF 30 or greater is recommended once the area is healed.    You should have some soreness but it should be mild and slowly go away over several days. Talk to your doctor about using tylenol for pain,    When should I call my doctor?  If you have increased:     Pain or swelling    Pus or drainage (clear or slightly yellow drainage is ok)    Temperature over 100F    Spreading redness or warmth around wound    When will I hear about my results?  The biopsy results can take 2-3 weeks to come back. The clinic will call you with the results, send you a [employer] message, or have you schedule a follow-up clinic or phone time to discuss the results. Contact our clinics if you do not hear from us in 3 weeks.     Who should I call with questions?    [facility]: [phone]     [facility]: [phone]    For urgent needs outside of business hours call the [facility] at [phone] and ask for the dermatology resident on call    CRYOTHERAPY    What is it?    Use  of a very cold liquid, such as liquid nitrogen, to freeze and destroy abnormal skin cells that need to be removed    What should I expect?    Tenderness and redness    A small blister that might grow and fill with dark purple blood. There may be crusting.    More than one treatment may be needed if the lesions do not go away.    How do I care for the treated area?    Gently wash the area with your hands when bathing.    Use a thin layer of Vaselin to help with healing. You may use a Band-Aid.     The area should heal within 7-10 days.    Do not use an antibiotic or Neosporin ointment.     You may take acetaminophen (Tylenol) for pain.     Call your Doctor if you have:    Severe pain    Signs of infection (warmth, redness, cloudy yellow drainage, and or a bad smell)    Questions or concerns    Contact infromation:  [facility] [phone]  [facility] [phone]

## 2018-06-21 NOTE — LETTER
6/21/2018         RE: Rigoberto Holguin  [address] [facility] [address]        Dear Colleague,    Thank you for referring your patient, Rigoberto Holguin, to the [facility]. Please see a copy of my visit note below.    [facility] Dermatology Note      Dermatology Problem List:  1. Hx of NMSC  -SCC, left dorsal forearm, s/p ED&C 6/2/2016  -SCC, left nasal side wall, s/p Mohs 3/10/2016   -BCC left malar cheek, s/p Mohs 3/2013 per records from [facility]  2. Actinic keratoses  -Previous Tx: script for efudex initiated but cost prohibitive, cryotherapy, PDT (X3 forehead/scalp early 2017) (X2 sessions 2015 with improvement) (1-2 treatments at outside facility)  3.Lesion, left ear, records from [facility] were reviewed  -s/p biopsy 06/2014 demonstrating ulceration with inflammation  -s/p biopsy 07/2014 demonstrating erosion and ulcer with hemorrhagic scale crust, impetiginized, [facility] was queried  4. Rosacea  -Current Tx: Metrocream with improvement.   5. Lesion to recheck on R upper arm. S/p bx (non-diagnostic) and cryotherapy x 1 (7/26/17)  6. Nodule, post auricular      Encounter Date: Jun 21, 2018    CC:  Chief Complaint   Patient presents with     Derm Problem     New lesion on vertex scalp present 3 months.       History of Present Illness:  Mr. Rigoberto Holguin is a 77 year old male with a hx of NMSC presents as a follow up for lesion on the right arm s/p biopsy. The patient was last seen 12/19/2018 when the patient was treated with cryo for AKs.     The patient reports today that a new spot on the vertex scalp that has been present for 3 months.    He has spots on the left cheek that is non tender and been present for awhile.     His wife notes a dark area on the left  forearm that he should have evaluated.     The patient reports no other lesions of concern.    Past Medical History:   Patient Active Problem List   Diagnosis     Encounter for  long-term current use of medication     Malignant neoplasm of overlapping sites of bladder (H)     Other and unspecified coagulation defects     Nonallopathic lesion of lumbar region     Stenosis, spinal, lumbar     CARDIOVASCULAR SCREENING; LDL GOAL LESS THAN 160     ACP (advance care planning)     Factor V Leiden mutation (H)     History of DVT of lower extremity     Postphlebitic syndrome     Chronic anticoagulation     Peripheral vascular disease (H)     Personal history of malignant neoplasm of bladder     Vitamin D deficiency     Long-term (current) use of anticoagulants [Z79.01]     Lumbar radicular pain     Hypothyroidism due to Hashimoto's thyroiditis     Status post lumbar surgery     Aftercare following surgery of the musculoskeletal system     Blepharoptosis     Acute pain of right shoulder     Past Medical History:   Diagnosis Date     Actinic keratosis      Basal cell carcinoma      Bladder cancer (H) 2003     DJD (degenerative joint disease), lumbar     S/P epidural x 2     DVT (deep venous thrombosis) (H) 2007    Factor V ( bilateral legs)      GERD (gastroesophageal reflux disease)      Hypothyroidism      Polio     Age 4, weakness of right arm and leg      S/P appendectomy      Past Surgical History:   Procedure Laterality Date     Appendectomy       FORAMINOTOMY LUMBAR POSTERIOR TWO LEVELS Left 3/2/2017    Procedure: FORAMINOTOMY LUMBAR POSTERIOR TWO LEVELS;  Surgeon: Ricky Mclaughlin MD;  Location: [facility] OR      REPAIR ROTATOR CUFF,ACUTE Left 2007     PHOTODYNAMIC THERAPY - (PDT)  2/13/2015     SURGICAL HISTORY OF -   2003    Urinary Bladder Cancer treatment     SURGICAL HISTORY OF -       Gall Bladder     Social History:  The patient is retired. He was a . He has 2 granddaughter. He plays golf.   Kept in chart for convenience.       Family History:  Reviewed and unchanged but kept in chart for clinician convenience  There is no family history of skin cancer.    Medications:  Current  Outpatient Prescriptions   Medication Sig Dispense Refill     calcium carbonate (TUMS) 500 MG chewable tablet Take 1 chew tab by mouth daily as needed Reported on 4/18/2017       cholecalciferol (VITAMIN D) 1000 UNIT tablet Take 1 tablet (1,000 Units) by mouth daily 100 tablet 0     levothyroxine (SYNTHROID/LEVOTHROID) 112 MCG tablet Take 1 tablet (112 mcg) by mouth every morning (before breakfast) 90 tablet 3     LUMIGAN 0.01 % SOLN ophthalmic solution        melatonin 5 MG tablet Take 10 mg by mouth At Bedtime Reported on 2/21/2017       metroNIDAZOLE (METROCREAM) 0.75 % cream every day, once daily to face 60 g 3     Multiple Vitamin (DAILY MULTIVITAMIN PO) Take 1 chew tab by mouth daily        Multiple Vitamins-Minerals (EYE VITAMINS PO) Take 1 tablet by mouth daily       Nutritional Supplements (JUICE PLUS FIBRE PO)        sildenafil (REVATIO) 20 MG tablet Take 2-5 tabs daily prn 30 tablet 11     timolol (TIMOPTIC) 0.5 % ophthalmic solution        warfarin (COUMADIN) 5 MG tablet 10 mg(2 tabs) on Tue & Thu; 7.5 mg (1.5 tabs) all other days or as directed 135 tablet 0     [DISCONTINUED] warfarin (COUMADIN) 5 MG tablet Take 1.5 tablets (7.5 mg) by mouth daily . Current dose 10 mg Tues,Thurs and 7.5 mg daily rest of week 135 tablet 0      No Known Allergies    Review of Systems:  -Const: Denies fevers, chills or unintended changes in weight.   -Skin: Ask per HPI   -Msk: Shoulder and back hurts due to fall on the ice. Follows with MD for possibly procedure on shoulder    Physical exam:  There were no vitals taken for this visit.  -This is a well developed, well-nourished male in no acute distress, in a pleasant mood.    SKIN: Total skin excluding the undergarment areas was performed. The exam included the head/face, neck, both arms, chest, back, abdomen, both legs, digits and/or nails. Excluding ankles and feet. Declines genital exam.   -There is no erythema, telangectasias, nodularity, or pigmentation on the left  forearm, nose, left cheek.  -  There are erythematous macules with overyling adherent scale on the left sideburn and left zygomatic cheek, scalp   -Erythematous stuck on papule with scale on the right mid vertex scalp 8 mm   - half a a pea sized nodule, mobile on post auricular - unchanged   - There is a waxy stuck on tan to brown papule on the trunk.  -Violaceous patch consistent with ecchymosis on the left forearm  -No other lesions of concern on areas examined.     Impression/Plan:  1. Actinic keratosis: left sideburn and left zygomatic cheek, scalp   Cryotherapy procedure note: After verbal consent and discussion of risks and benefits including but no limited to dyspigmentation/scar, blister, and pain, 10 was treated with 1-2mm freeze border for 1-2 cycles with liquid nitrogen. Post cryotherapy instructions were provided.   Recommend sunscreens SPF #30 or greater, protective clothing and avoidance of tanning beds.  2. Erythematous stuck on papule with scale on the right mid vertex scalp 8 mm -SCC versus sk    After discussion of benefits and risks including but not limited to bleeding, infection, scar, incomplete removal, and non-diagnostic biopsy, written consent and photographs were obtained. The area was cleaned with isopropyl alcohol. 0.5 mL of 1% lidocaine was injected to obtain adequate anesthesia of the lesion on the right mid vertex scalp, measuring 8 mm. A shave biopsy was performed. Hemostasis was achieved with aluminium chloride. Vaseline and a sterile dressing were applied. The patient tolerated the procedure and no complications were noted. The patient was provided with verbal and written post care instructions.     3. Nodule, post auricular, reviewed that this would need histopath to diagnosis. He declines further evaluation or treatment - unchanged-declines removal        4. SK     No further intervention required at this time.   5. Ecchymosis, left forearm, call if not resolved in 2-3  weeks  Follow up in 6 months, earlier for new or changing lesions.       Staff Involved:  Scribe/Staff    Scribe Disclosure:   I, Taylor Chapa, am serving as a scribe to document services personally performed by Dr. Vickie Whalen, based on data collection and the provider's statements to me.     Provider Disclosure:   The documentation recorded by the scribe accurately reflects the services I personally performed and the decisions made by me.    Vickie Whalen MD    Department of Dermatology  [facility]: Phone: [phone], Fax:[phone]  [facility]: Phone: [phone], Fax: [phone]        Again, thank you for allowing me to participate in the care of your patient.        Sincerely,        Vickie Whalen MD

## 2018-06-21 NOTE — MR AVS SNAPSHOT
After Visit Summary   6/21/2018    Rigoberto Holguin    MRN: [id number]           Patient Information     Date Of Birth          1940        Visit Information        Provider Department      6/21/2018 10:30 AM Vickie Whalen MD [facility]        Today's Diagnoses     History of nonmelanoma skin cancer    -  1    Neoplasm of uncertain behavior of skin        Ecchymosis        AK (actinic keratosis)        Seborrheic keratosis          Care Instructions    Wound Care After a Biopsy    What is a skin biopsy?  A skin biopsy allows the doctor to examine a very small piece of tissue under the microscope to determine the diagnosis and the best treatment for the skin condition. A local anesthetic (numbing medicine)  is injected with a very small needle into the skin area to be tested. A small piece of skin is taken from the area. Sometimes a suture (stitch) is used.     What are the risks of a skin biopsy?  I will experience scar, bleeding, swelling, pain, crusting and redness. I may experience incomplete removal or recurrence. Risks of this procedure are excessive bleeding, bruising, infection, nerve damage, numbness, thick (hypertrophic or keloidal) scar and non-diagnostic biopsy.    How should I care for my wound for the first 24 hours?    Keep the wound dry and covered for 24 hours    If it bleeds, hold direct pressure on the area for 15 minutes. If bleeding does not stop then go to the emergency room    Avoid strenuous exercise the first 1-2 days or as your doctor instructs you    How should I care for the wound after 24 hours?    After 24 hours, remove the bandage    You may bathe or shower as normal    If you had a scalp biopsy, you can shampoo as usual and can use shower water to clean the biopsy site daily    Clean the wound twice a day with gentle soap and water    Do not scrub, be gentle    Apply white petroleum/Vaseline after cleaning the wound with a cotton swab or a clean finger,  and keep the site covered with a Bandaid /bandage. Bandages are not necessary with a scalp biopsy    If you are unable to cover the site with a Bandaid /bandage, re-apply ointment 2-3 times a day to keep the site moist. Moisture will help with healing    Avoid strenuous activity for first 1-2 days    Avoid lakes, rivers, pools, and oceans until the stitches are removed or the site is healed    How do I clean my wound?    Wash hands thoroughly with soap or use hand  before all wound care    Clean the wound with gentle soap and water    Apply white petroleum/Vaseline  to wound after it is clean    Replace the Bandaid /bandage to keep the wound covered for the first few days or as instructed by your doctor    If you had a scalp biopsy, warm shower water to the area on a daily basis should suffice    What should I use to clean my wound?     Cotton-tipped applicators (Qtips )    White petroleum jelly (Vaseline ). Use a clean new container and use Q-tips to apply.    Bandaids   as needed    Gentle soap     How should I care for my wound long term?    Do not get your wound dirty    Keep up with wound care for one week or until the area is healed.    A small scab will form and fall off by itself when the area is completely healed. The area will be red and will become pink in color as it heals. Sun protection is very important for how your scar will turn out. Sunscreen with an SPF 30 or greater is recommended once the area is healed.    You should have some soreness but it should be mild and slowly go away over several days. Talk to your doctor about using tylenol for pain,    When should I call my doctor?  If you have increased:     Pain or swelling    Pus or drainage (clear or slightly yellow drainage is ok)    Temperature over 100F    Spreading redness or warmth around wound    When will I hear about my results?  The biopsy results can take 2-3 weeks to come back. The clinic will call you with the results, send  you a mychart message, or have you schedule a follow-up clinic or phone time to discuss the results. Contact our clinics if you do not hear from us in 3 weeks.     Who should I call with questions?    [facility]: [phone]     [facility]: [phone]    For urgent needs outside of business hours call the [facility] at [phone] and ask for the dermatology resident on call    CRYOTHERAPY    What is it?    Use of a very cold liquid, such as liquid nitrogen, to freeze and destroy abnormal skin cells that need to be removed    What should I expect?    Tenderness and redness    A small blister that might grow and fill with dark purple blood. There may be crusting.    More than one treatment may be needed if the lesions do not go away.    How do I care for the treated area?    Gently wash the area with your hands when bathing.    Use a thin layer of Vaselin to help with healing. You may use a Band-Aid.     The area should heal within 7-10 days.    Do not use an antibiotic or Neosporin ointment.     You may take acetaminophen (Tylenol) for pain.     Call your Doctor if you have:    Severe pain    Signs of infection (warmth, redness, cloudy yellow drainage, and or a bad smell)    Questions or concerns    Contact infromation:  [facility] [phone]  [facility] [phone]            Follow-ups after your visit        Follow-up notes from your care team     Return in about 6 months (around 12/21/2018) for Skin Check - Hx of NMSC.      Your next 10 appointments already scheduled     Jun 25, 2018 12:40 PM CDT   Office Visit with Jamie Meraz PA-C   [facility] [place] ([facility] [place])    [address] [facility] [address] [phone]           Bring a current list of meds and any records pertaining to this visit. For Physicals, please bring  immunization records and any forms needing to be filled out. Please arrive 10 minutes early to complete paperwork.            Jun 26, 2018  9:00 AM CDT   Return Visit with Vincent Rucker MD, [place] [facility] RO[facility]   [facility] [place] ([facility])    [address] [place] [address]  [place] [address]   [phone]            Jul 27, 2018  8:30 AM CDT   Anticoagulation Visit with BE ANTI COAG   [facility] [place] ([facility])    [address]  [place] [address] [phone]            Dec 11, 2018  3:30 PM CST   Return Visit with Vickie Whalen MD   [facility] ([facility])    [address] [place] [address] [phone]              Who to contact     If you have questions or need follow up information about today's clinic visit or your schedule please contact [facility] directly at [phone].  Normal or non-critical lab and imaging results will be communicated to you by [employer], letter or phone within 4 business days after the clinic has received the results. If you do not hear from us within 7 days, please contact the clinic through [employer] or phone. If you have a critical or abnormal lab result, we will notify you by phone as soon as possible.  Submit refill requests through [employer] or call your pharmacy and they will forward the refill request to us. Please allow 3 business days for your refill to be completed.          Additional Information About Your Visit        [employer] Information     [employer] gives you secure access to your electronic health record. If you see a primary care provider, you can also send messages to your care team and make appointments. If you have questions, please call your primary care clinic.  If you do not have a primary care provider, please call [phone] and they will assist you.      [employer] is an electronic gateway that provides easy, online access to  your medical records. With [employer], you can request a clinic appointment, read your test results, renew a prescription or communicate with your care team.     To access your existing account, please contact your [facility] or call [phone] for assistance.        Care EveryWhere ID     This is your Care EveryWhere ID. This could be used by other organizations to access your [place] medical records  [id number]         Blood Pressure from Last 3 Encounters:   04/24/18 114/66   02/01/18 127/73   01/09/18 117/70    Weight from Last 3 Encounters:   04/24/18 95.3 kg (210 lb)   02/01/18 96.1 kg (211 lb 12.8 oz)   01/09/18 97.8 kg (215 lb 9.6 oz)              We Performed the Following     Dermatological path order and indications     DESTRUCT PREMALIGNANT LESION, 2-14     DESTRUCT PREMALIGNANT LESION, FIRST        Primary Care Provider Office Phone # Fax #    Jamie Meraz PA-C [phone]       [address] [facility] [address]        Equal Access to Services     [facility]: Hadii aad ku hadasho Soomaali, waaxda luqadaha, qaybta kaalmada adehariniyada, tacho garcia . So [facility] [phone].    ATENCIÓN: Si habla español, tiene a thomas disposición servicios gratuitos de asistencia lingüística. [facility] [phone].    We comply with applicable federal civil rights laws and [place] laws. We do not discriminate on the basis of race, color, national origin, age, disability, sex, sexual orientation, or gender identity.            Thank you!     Thank you for choosing [facility]  for your care. Our goal is always to provide you with excellent care. Hearing back from our patients is one way we can continue to improve our services. Please take a few minutes to complete the written survey that you may receive in the mail after your visit with us. Thank you!             Your Updated Medication List - Protect others around you:  Learn how to safely use, store and throw away your medicines at www.disposemymeds.org.          This list is accurate as of 6/21/18 11:07 AM.  Always use your most recent med list.                   Brand Name Dispense Instructions for use Diagnosis    cholecalciferol 1000 UNIT tablet    vitamin D3    100 tablet    Take 1 tablet (1,000 Units) by mouth daily    Vitamin D deficiency       EYE VITAMINS PO      Take 1 tablet by mouth daily        DAILY MULTIVITAMIN PO      Take 1 chew tab by mouth daily        JUICE PLUS FIBRE PO           levothyroxine 112 MCG tablet    SYNTHROID/LEVOTHROID    90 tablet    Take 1 tablet (112 mcg) by mouth every morning (before breakfast)    Hypothyroidism due to Hashimoto's thyroiditis       LUMIGAN 0.01 % Soln   Generic drug:  bimatoprost       Actinic keratosis       melatonin 5 MG tablet      Take 10 mg by mouth At Bedtime Reported on 2/21/2017        metroNIDAZOLE 0.75 % cream    METROCREAM    60 g    every day, once daily to face    Rosacea       sildenafil 20 MG tablet    REVATIO    30 tablet    Take 2-5 tabs daily prn    Erectile dysfunction, unspecified erectile dysfunction type       timolol 0.5 % ophthalmic solution    TIMOPTIC      Actinic keratosis       TUMS 500 MG chewable tablet   Generic drug:  calcium carbonate      Take 1 chew tab by mouth daily as needed Reported on 4/18/2017        warfarin 5 MG tablet    COUMADIN    135 tablet    10 mg(2 tabs) on Tue & Thu; 7.5 mg (1.5 tabs) all other days or as directed    History of DVT of lower extremity, Chronic anticoagulation

## 2018-06-21 NOTE — NURSING NOTE
Dermatology Rooming Note    Rigoberto Holguin's goals for this visit include:   Chief Complaint   Patient presents with     Derm Problem     New lesion on vertex scalp present 3 months.       Is a scribe okay for this visit:n/a    Are records needed for this visit(If yes, obtain release of information): No     Vitals: There were no vitals taken for this visit.    Referring Provider:  No referring provider defined for this encounter.    Rhonda Rodrigues RN

## 2018-06-21 NOTE — PROGRESS NOTES
[facility] Dermatology Note      Dermatology Problem List:  1. Hx of NMSC  -SCC, left dorsal forearm, s/p ED&C 6/2/2016  -SCC, left nasal side wall, s/p Mohs 3/10/2016   -BCC left malar cheek, s/p Mohs 3/2013 per records from [facility]  2. Actinic keratoses  -Previous Tx: script for efudex initiated but cost prohibitive, cryotherapy, PDT (X3 forehead/scalp early 2017) (X2 sessions 2015 with improvement) (1-2 treatments at outside facility)  3.Lesion, left ear, records from [facility] were reviewed  -s/p biopsy 06/2014 demonstrating ulceration with inflammation  -s/p biopsy 07/2014 demonstrating erosion and ulcer with hemorrhagic scale crust, impetiginized, CN was queried  4. Rosacea  -Current Tx: Metrocream with improvement.   5. Lesion to recheck on R upper arm. S/p bx (non-diagnostic) and cryotherapy x 1 (7/26/17)  6. Nodule, post auricular      Encounter Date: Jun 21, 2018    CC:  Chief Complaint   Patient presents with     Derm Problem     New lesion on vertex scalp present 3 months.       History of Present Illness:  Mr. Rigoberto Holguin is a 77 year old male with a hx of NMSC presents as a follow up for lesion on the right arm s/p biopsy. The patient was last seen 12/19/2018 when the patient was treated with cryo for AKs.     The patient reports today that a new spot on the vertex scalp that has been present for 3 months.    He has spots on the left cheek that is non tender and been present for awhile.     His wife notes a dark area on the left  forearm that he should have evaluated.     The patient reports no other lesions of concern.    Past Medical History:   Patient Active Problem List   Diagnosis     Encounter for long-term current use of medication     Malignant neoplasm of overlapping sites of bladder (H)     Other and unspecified coagulation defects     Nonallopathic lesion of lumbar region     Stenosis, spinal, lumbar     CARDIOVASCULAR SCREENING; LDL  GOAL LESS THAN 160     ACP (advance care planning)     Factor V Leiden mutation (H)     History of DVT of lower extremity     Postphlebitic syndrome     Chronic anticoagulation     Peripheral vascular disease (H)     Personal history of malignant neoplasm of bladder     Vitamin D deficiency     Long-term (current) use of anticoagulants [Z79.01]     Lumbar radicular pain     Hypothyroidism due to Hashimoto's thyroiditis     Status post lumbar surgery     Aftercare following surgery of the musculoskeletal system     Blepharoptosis     Acute pain of right shoulder     Past Medical History:   Diagnosis Date     Actinic keratosis      Basal cell carcinoma      Bladder cancer (H) 2003     DJD (degenerative joint disease), lumbar     S/P epidural x 2     DVT (deep venous thrombosis) (H) 2007    Factor V ( bilateral legs)      GERD (gastroesophageal reflux disease)      Hypothyroidism      Polio     Age 4, weakness of right arm and leg      S/P appendectomy      Past Surgical History:   Procedure Laterality Date     Appendectomy       FORAMINOTOMY LUMBAR POSTERIOR TWO LEVELS Left 3/2/2017    Procedure: FORAMINOTOMY LUMBAR POSTERIOR TWO LEVELS;  Surgeon: Ricky Mclaughlin MD;  Location: [facility] OR     HC REPAIR ROTATOR CUFF,ACUTE Left 2007     PHOTODYNAMIC THERAPY - (PDT)  2/13/2015     SURGICAL HISTORY OF -   2003    Urinary Bladder Cancer treatment     SURGICAL HISTORY OF -       Gall Bladder     Social History:  The patient is retired. He was a . He has 2 granddaughter. He plays golf.   Kept in chart for convenience.       Family History:  Reviewed and unchanged but kept in chart for clinician convenience  There is no family history of skin cancer.    Medications:  Current Outpatient Prescriptions   Medication Sig Dispense Refill     calcium carbonate (TUMS) 500 MG chewable tablet Take 1 chew tab by mouth daily as needed Reported on 4/18/2017       cholecalciferol (VITAMIN D) 1000 UNIT tablet Take 1 tablet (1,000  Units) by mouth daily 100 tablet 0     levothyroxine (SYNTHROID/LEVOTHROID) 112 MCG tablet Take 1 tablet (112 mcg) by mouth every morning (before breakfast) 90 tablet 3     LUMIGAN 0.01 % SOLN ophthalmic solution        melatonin 5 MG tablet Take 10 mg by mouth At Bedtime Reported on 2/21/2017       metroNIDAZOLE (METROCREAM) 0.75 % cream every day, once daily to face 60 g 3     Multiple Vitamin (DAILY MULTIVITAMIN PO) Take 1 chew tab by mouth daily        Multiple Vitamins-Minerals (EYE VITAMINS PO) Take 1 tablet by mouth daily       Nutritional Supplements (JUICE PLUS FIBRE PO)        sildenafil (REVATIO) 20 MG tablet Take 2-5 tabs daily prn 30 tablet 11     timolol (TIMOPTIC) 0.5 % ophthalmic solution        warfarin (COUMADIN) 5 MG tablet 10 mg(2 tabs) on Tue & Thu; 7.5 mg (1.5 tabs) all other days or as directed 135 tablet 0     [DISCONTINUED] warfarin (COUMADIN) 5 MG tablet Take 1.5 tablets (7.5 mg) by mouth daily . Current dose 10 mg Tues,Thurs and 7.5 mg daily rest of week 135 tablet 0      No Known Allergies    Review of Systems:  -Const: Denies fevers, chills or unintended changes in weight.   -Skin: Ask per HPI   -Msk: Shoulder and back hurts due to fall on the ice. Follows with MD for possibly procedure on shoulder    Physical exam:  There were no vitals taken for this visit.  -This is a well developed, well-nourished male in no acute distress, in a pleasant mood.    SKIN: Total skin excluding the undergarment areas was performed. The exam included the head/face, neck, both arms, chest, back, abdomen, both legs, digits and/or nails. Excluding ankles and feet. Declines genital exam.   -There is no erythema, telangectasias, nodularity, or pigmentation on the left forearm, nose, left cheek.  -  There are erythematous macules with overyling adherent scale on the left sideburn and left zygomatic cheek, scalp   -Erythematous stuck on papule with scale on the right mid vertex scalp 8 mm   - half a a pea sized  nodule, mobile on post auricular - unchanged   - There is a waxy stuck on tan to brown papule on the trunk.  -Violaceous patch consistent with ecchymosis on the left forearm  -No other lesions of concern on areas examined.     Impression/Plan:  1. Actinic keratosis: left sideburn and left zygomatic cheek, scalp   Cryotherapy procedure note: After verbal consent and discussion of risks and benefits including but no limited to dyspigmentation/scar, blister, and pain, 10 was treated with 1-2mm freeze border for 1-2 cycles with liquid nitrogen. Post cryotherapy instructions were provided.   Recommend sunscreens SPF #30 or greater, protective clothing and avoidance of tanning beds.  2. Erythematous stuck on papule with scale on the right mid vertex scalp 8 mm -SCC versus sk    After discussion of benefits and risks including but not limited to bleeding, infection, scar, incomplete removal, and non-diagnostic biopsy, written consent and photographs were obtained. The area was cleaned with isopropyl alcohol. 0.5 mL of 1% lidocaine was injected to obtain adequate anesthesia of the lesion on the right mid vertex scalp, measuring 8 mm. A shave biopsy was performed. Hemostasis was achieved with aluminium chloride. Vaseline and a sterile dressing were applied. The patient tolerated the procedure and no complications were noted. The patient was provided with verbal and written post care instructions.     3. Nodule, post auricular, reviewed that this would need histopath to diagnosis. He declines further evaluation or treatment - unchanged-declines removal        4. SK     No further intervention required at this time.   5. Ecchymosis, left forearm, call if not resolved in 2-3 weeks  Follow up in 6 months, earlier for new or changing lesions.       Staff Involved:  Scribe/Staff    Scribe Disclosure:   Taylor TAVERAS, am serving as a scribe to document services personally performed by Dr. Vickie Whalen, based on data collection  and the provider's statements to me.     Provider Disclosure:   The documentation recorded by the scribe accurately reflects the services I personally performed and the decisions made by me.    Vickie Whalen MD    Department of Dermatology  [facility]: Phone: [phone], Fax:[phone]  [facility]: Phone: [phone], Fax: [phone]

## 2018-06-25 ENCOUNTER — OFFICE VISIT (OUTPATIENT)
Dept: FAMILY MEDICINE | Facility: CLINIC | Age: 78
End: 2018-06-25
Payer: MEDICARE

## 2018-06-25 ENCOUNTER — TELEPHONE (OUTPATIENT)
Dept: FAMILY MEDICINE | Facility: CLINIC | Age: 78
End: 2018-06-25

## 2018-06-25 VITALS
WEIGHT: 209 LBS | BODY MASS INDEX: 31.67 KG/M2 | OXYGEN SATURATION: 96 % | HEIGHT: 68 IN | HEART RATE: 70 BPM | SYSTOLIC BLOOD PRESSURE: 105 MMHG | RESPIRATION RATE: 18 BRPM | TEMPERATURE: 98.5 F | DIASTOLIC BLOOD PRESSURE: 64 MMHG

## 2018-06-25 DIAGNOSIS — M25.511 CHRONIC RIGHT SHOULDER PAIN: Primary | ICD-10-CM

## 2018-06-25 DIAGNOSIS — G89.29 CHRONIC RIGHT SHOULDER PAIN: Primary | ICD-10-CM

## 2018-06-25 DIAGNOSIS — Z86.718 HISTORY OF DVT OF LOWER EXTREMITY: ICD-10-CM

## 2018-06-25 DIAGNOSIS — R29.898 SHOULDER WEAKNESS: ICD-10-CM

## 2018-06-25 DIAGNOSIS — Z79.01 CHRONIC ANTICOAGULATION: ICD-10-CM

## 2018-06-25 LAB — COPATH REPORT: NORMAL

## 2018-06-25 PROCEDURE — 99213 OFFICE O/P EST LOW 20 MIN: CPT | Performed by: PHYSICIAN ASSISTANT

## 2018-06-25 RX ORDER — WARFARIN SODIUM 5 MG/1
TABLET ORAL
Qty: 135 TABLET | Refills: 0 | Status: SHIPPED | OUTPATIENT
Start: 2018-06-25 | End: 2018-09-20

## 2018-06-25 NOTE — PROGRESS NOTES
SUBJECTIVE:   Rigoberto Holguin is a 77 year old male who presents to clinic today for the following health issues:      Right shoulder pain follow up-no improvement noted after PT. Discuss possible MRI today    Some weakness and pain. Physical therapy has not helped. A cortisone shot was minimally helpful for several weeks.     Problem list and histories reviewed & adjusted, as indicated.  Additional history: as documented    BP Readings from Last 3 Encounters:   06/25/18 105/64   04/24/18 114/66   02/01/18 127/73    Wt Readings from Last 3 Encounters:   06/25/18 209 lb (94.8 kg)   04/24/18 210 lb (95.3 kg)   02/01/18 211 lb 12.8 oz (96.1 kg)                    Reviewed and updated as needed this visit by clinical staff  Tobacco  Allergies  Meds       Reviewed and updated as needed this visit by Provider         All other systems negative except as outline above  OBJECTIVE:  Shoulder exam shows positive impingement signs are present with pain at high arc of abduction and forward flexion on right. There is tenderness of the lateral shoulder. Weakness with shoulder abduction, flexion and external rotation. Positive Khan's test raising some concern for a labral tear. .      Rigoberto was seen today for shoulder pain.    Diagnoses and all orders for this visit:    Chronic right shoulder pain  -     XR Shoulder Arthrogram Right; Future    Shoulder weakness  -     XR Shoulder Arthrogram Right; Future      Advised supportive and symptomatic treatment.  Follow up with Provider - if condition persists or worsens.

## 2018-06-25 NOTE — MR AVS SNAPSHOT
After Visit Summary   6/25/2018    Rigoberto Holguin    MRN: [id number]           Patient Information     Date Of Birth          1940        Visit Information        Provider Department      6/25/2018 12:40 PM Jamie Meraz PA-C [facility]        Today's Diagnoses     Chronic right shoulder pain    -  1    Shoulder weakness           Follow-ups after your visit        Your next 10 appointments already scheduled     Jun 26, 2018  9:00 AM CDT   Return Visit with Vincent Rucker MD, [facility]STO PROC ROOM   [facility]    [address] [place], [place] [address] [address]   [phone]            Jul 27, 2018  8:30 AM CDT   Anticoagulation Visit with BE ANTI COAG   [facility] ([facility]    [address] [place] [id number] [phone]            Dec 11, 2018  3:30 PM CST   Return Visit with Vickie Whalen MD   [facility] ([facility]    [address] [place] [id number] [phone]              Future tests that were ordered for you today     Open Future Orders        Priority Expected Expires Ordered    XR Shoulder Arthrogram Right Routine 6/25/2018 6/25/2019 6/25/2018            Who to contact     Normal or non-critical lab and imaging results will be communicated to you by MyChart, letter or phone within 4 business days after the clinic has received the results. If you do not hear from us within 7 days, please contact the clinic through MyChart or phone. If you have a critical or abnormal lab result, we will notify you by phone as soon as possible.  Submit refill requests through [employer] or call your pharmacy and they will forward the refill request to us. Please allow 3 business days for your refill to be completed.          If you need to speak with a  for additional information , please call: [phone]             Additional  "Information About Your Visit        MyChart Information     [employer] gives you secure access to your electronic health record. If you see a primary care provider, you can also send messages to your care team and make appointments. If you have questions, please call your primary care clinic.  If you do not have a primary care provider, please call [phone] and they will assist you.        Care EveryWhere ID     This is your Care EveryWhere ID. This could be used by other organizations to access your [place] medical records  [id number]        Your Vitals Were     Pulse Temperature Respirations Height Pulse Oximetry BMI (Body Mass Index)    70 98.5  F (36.9  C) (Oral) 18 5' 8\" (1.727 m) 96% 31.78 kg/m2       Blood Pressure from Last 3 Encounters:   06/25/18 105/64   04/24/18 114/66   02/01/18 127/73    Weight from Last 3 Encounters:   06/25/18 209 lb (94.8 kg)   04/24/18 210 lb (95.3 kg)   02/01/18 211 lb 12.8 oz (96.1 kg)               Primary Care Provider Office Phone # Fax #    Jamie Meraz PA-C [phone]       [address] [place] [address]        Equal Access to Services     ALOK TOLEDO : Hadii aad ku hadasho Soomaali, waaxda luqadaha, qaybta kaalmada adeegyada, waxay idiin hayolen alessandro conley laanahi . So [facility] [phone].    ATENCIÓN: Si habla español, tiene a thomas disposición servicios gratuitos de asistencia lingüística. [facility] [phone].    We comply with applicable federal civil rights laws and [place] laws. We do not discriminate on the basis of race, color, national origin, age, disability, sex, sexual orientation, or gender identity.            Thank you!     Thank you for choosing [facility]  for your care. Our goal is always to provide you with excellent care. Hearing back from our patients is one way we can continue to improve our services. Please take a few minutes to complete the written survey that you may receive in the mail after your visit " with us. Thank you!             Your Updated Medication List - Protect others around you: Learn how to safely use, store and throw away your medicines at www.disposemymeds.org.          This list is accurate as of 6/25/18  1:26 PM.  Always use your most recent med list.                   Brand Name Dispense Instructions for use Diagnosis    cholecalciferol 1000 UNIT tablet    vitamin D3    100 tablet    Take 1 tablet (1,000 Units) by mouth daily    Vitamin D deficiency       EYE VITAMINS PO      Take 1 tablet by mouth daily        DAILY MULTIVITAMIN PO      Take 1 chew tab by mouth daily        JUICE PLUS FIBRE PO           levothyroxine 112 MCG tablet    SYNTHROID/LEVOTHROID    90 tablet    Take 1 tablet (112 mcg) by mouth every morning (before breakfast)    Hypothyroidism due to Hashimoto's thyroiditis       LUMIGAN 0.01 % Soln   Generic drug:  bimatoprost       Actinic keratosis       melatonin 5 MG tablet      Take 10 mg by mouth At Bedtime Reported on 2/21/2017        metroNIDAZOLE 0.75 % cream    METROCREAM    60 g    every day, once daily to face    Rosacea       sildenafil 20 MG tablet    REVATIO    30 tablet    Take 2-5 tabs daily prn    Erectile dysfunction, unspecified erectile dysfunction type       timolol 0.5 % ophthalmic solution    TIMOPTIC      Actinic keratosis       TUMS 500 MG chewable tablet   Generic drug:  calcium carbonate      Take 1 chew tab by mouth daily as needed Reported on 4/18/2017        warfarin 5 MG tablet    COUMADIN    135 tablet    10 mg(2 tabs) on Tue & Thu; 7.5 mg (1.5 tabs) all other days or as directed    History of DVT of lower extremity, Chronic anticoagulation

## 2018-06-26 ENCOUNTER — OFFICE VISIT (OUTPATIENT)
Dept: UROLOGY | Facility: CLINIC | Age: 78
End: 2018-06-26
Payer: MEDICARE

## 2018-06-26 VITALS — DIASTOLIC BLOOD PRESSURE: 69 MMHG | HEART RATE: 59 BPM | SYSTOLIC BLOOD PRESSURE: 123 MMHG | OXYGEN SATURATION: 98 %

## 2018-06-26 DIAGNOSIS — Z85.51 PERSONAL HISTORY OF MALIGNANT NEOPLASM OF BLADDER: Primary | ICD-10-CM

## 2018-06-26 PROCEDURE — 52000 CYSTOURETHROSCOPY: CPT | Performed by: UROLOGY

## 2018-06-26 NOTE — MR AVS SNAPSHOT
After Visit Summary   6/26/2018    Rigoberto Holguin    MRN: [id number]           Patient Information     Date Of Birth          1940        Visit Information        Provider Department      6/26/2018 9:00 AM Vincent Rucker MD; SAMUEL CYSTO PROC ROOM [facility]        Today's Diagnoses     Personal history of malignant neoplasm of bladder    -  1       Follow-ups after your visit        Your next 10 appointments already scheduled     Jul 05, 2018  1:00 PM CDT   XR SHOULDER ARTHROGRAM RIGHT with MGXR3, MG NEURO RAD   [facility])    [address] [place] [address] [phone]           Stop drinking 1 hour before the exam.  You may take your medicines as usual, except for blood thinners (Coumadin, Plavix, Ticlid, Persantine, Aggrenox, Pletal, Effient, Brilliant). Talk to your doctor if you take these.  Tell your doctor if:   You have ever had an allergic reaction to X-ray dye (contrast fluid).   There is a chance you may be pregnant.  Please bring a list of your current medicines to your exam. Include vitamins, minerals and over-the-counter medicines.  Please call the Imaging Department at your exam site with any questions.            Jul 05, 2018  2:00 PM CDT   MR SHOULDER RIGHT W CONTRAST with MGMR1   [facility])    [address] [place] [address] [phone]           Take your medicines as usual, unless your doctor tells you not to. Bring a list of your current medicines to your exam (including vitamins, minerals and over-the-counter drugs).  You may or may not receive intravenous (IV) contrast for this exam pending the discretion of the Radiologist.  You do not need to do anything special to prepare.  The MRI machine uses a strong magnet. Please wear clothes without metal (snaps, zippers). A sweatsuit works well, or we may give you a hospital gown.   Please remove any body piercings and hair extensions before you arrive. You will also remove watches, jewelry, hairpins, wallets, dentures, partial dental plates and hearing aids. You may wear contact lenses, and you may be able to wear your rings. We have a safe place to keep your personal items, but it is safer to leave them at home.  **IMPORTANT** THE INSTRUCTIONS BELOW ARE ONLY FOR THOSE PATIENTS WHO HAVE BEEN PRESCRIBED SEDATION OR GENERAL ANESTHESIA DURING THEIR MRI PROCEDURE:  IF YOUR DOCTOR PRESCRIBED ORAL SEDATION (take medicine to help you relax during your exam):   You must get the medicine from your doctor (oral medication) before you arrive. Bring the medicine to the exam. Do not take it at home. You ll be told when to take it upon arriving for your exam.   Arrive one hour early. Bring someone who can take you home after the test. Your medicine will make you sleepy. After the exam, you may not drive, take a bus or take a taxi by yourself.  IF YOUR DOCTOR PRESCRIBED IV SEDATION:   Arrive one hour early. Bring someone who can take you home after the test. Your medicine will make you sleepy. After the exam, you may not drive, take a bus or take a taxi by yourself.   No eating 6 hours before your exam. You may have clear liquids up until 4 hours before your exam. (Clear liquids include water, clear tea, black coffee and fruit juice without pulp.)  IF YOUR DOCTOR PRESCRIBED ANESTHESIA (be asleep for your exam):   Arrive 1 1/2 hours early. Bring someone who can take you home after the test. You may not drive, take a bus or take a taxi by yourself.   No eating 8 hours before your exam. You may have clear liquids up until 4 hours before your exam. (Clear liquids include water, clear tea, black coffee and fruit juice without pulp.)   You will spend four to five hours in the recovery room.  Please call the Imaging Department at your exam site with any questions.            Jul 27, 2018  8:30 AM CDT    Anticoagulation Visit with BE ANTI COAG   [facility] [place] ([facility])    [address] [place] [address]   [phone]            Dec 11, 2018  3:30 PM CST   Return Visit with Vickie Whalen MD   [facility] ([facility])    [address] [place] [address]   [phone]            Jun 25, 2019  9:00 AM CDT   Return Visit with Vincent Rucker MD, SAMUEL CYSTO PROC ROOM   [facility] [place] ([facility])    [address] [place] [address] [phone]              Future tests that were ordered for you today     Open Future Orders        Priority Expected Expires Ordered    MR Shoulder Right w Contrast Routine  6/25/2019 6/25/2018    XR Shoulder Arthrogram Right Routine 6/25/2018 6/25/2019 6/25/2018            Who to contact     If you have questions or need follow up information about today's clinic visit or your schedule please contact [facility] directly at [phone].  Normal or non-critical lab and imaging results will be communicated to you by MyChart, letter or phone within 4 business days after the clinic has received the results. If you do not hear from us within 7 days, please contact the clinic through [employer] or phone. If you have a critical or abnormal lab result, we will notify you by phone as soon as possible.  Submit refill requests through [employer] or call your pharmacy and they will forward the refill request to us. Please allow 3 business days for your refill to be completed.          Additional Information About Your Visit        MyChart Information     [employer] gives you secure access to your electronic health record. If you see a primary care provider, you can also send messages to your care team and make appointments. If you have questions, please call your primary care clinic.  If you do not have a primary care provider, please call [phone] and they  will assist you.        Care EveryWhere ID     This is your Care EveryWhere ID. This could be used by other organizations to access your [facility] medical records  [id number]        Your Vitals Were     Pulse Pulse Oximetry                59 98%           Blood Pressure from Last 3 Encounters:   06/26/18 123/69   06/25/18 105/64   04/24/18 114/66    Weight from Last 3 Encounters:   06/25/18 94.8 kg (209 lb)   04/24/18 95.3 kg (210 lb)   02/01/18 96.1 kg (211 lb 12.8 oz)              We Performed the Following     CYSTOURETHROSCOPY ([id number])        Primary Care Provider Office Phone # Fax #    Jamie Meraz PA-C [phone]       [address] [place] [address]        Equal Access to Services     [facility]: Hadii aad ku hadasho Soomaali, waaxda luqadaha, qaybta kaalmada adeegyada, waxay ailynin haylashell garcia . So [facility] [phone].    ATENCIÓN: Si habla español, tiene a thomas disposición servicios gratuitos de asistencia lingüística. Pauline al [phone].    We comply with applicable federal civil rights laws and [place] laws. We do not discriminate on the basis of race, color, national origin, age, disability, sex, sexual orientation, or gender identity.            Thank you!     Thank you for choosing [facility]  for your care. Our goal is always to provide you with excellent care. Hearing back from our patients is one way we can continue to improve our services. Please take a few minutes to complete the written survey that you may receive in the mail after your visit with us. Thank you!             Your Updated Medication List - Protect others around you: Learn how to safely use, store and throw away your medicines at www.disposemymeds.org.          This list is accurate as of 6/26/18  9:31 AM.  Always use your most recent med list.                   Brand Name Dispense Instructions for use Diagnosis    cholecalciferol 1000 UNIT tablet    vitamin D3    100  tablet    Take 1 tablet (1,000 Units) by mouth daily    Vitamin D deficiency       EYE VITAMINS PO      Take 1 tablet by mouth daily        DAILY MULTIVITAMIN PO      Take 1 chew tab by mouth daily        JUICE PLUS FIBRE PO           levothyroxine 112 MCG tablet    SYNTHROID/LEVOTHROID    90 tablet    Take 1 tablet (112 mcg) by mouth every morning (before breakfast)    Hypothyroidism due to Hashimoto's thyroiditis       LUMIGAN 0.01 % Soln   Generic drug:  bimatoprost       Actinic keratosis       melatonin 5 MG tablet      Take 10 mg by mouth At Bedtime Reported on 2/21/2017        metroNIDAZOLE 0.75 % cream    METROCREAM    60 g    every day, once daily to face    Rosacea       sildenafil 20 MG tablet    REVATIO    30 tablet    Take 2-5 tabs daily prn    Erectile dysfunction, unspecified erectile dysfunction type       timolol 0.5 % ophthalmic solution    TIMOPTIC      Actinic keratosis       TUMS 500 MG chewable tablet   Generic drug:  calcium carbonate      Take 1 chew tab by mouth daily as needed Reported on 4/18/2017        warfarin 5 MG tablet    COUMADIN    135 tablet    10 mg(2 tabs) on Tue & Thu; 7.5 mg (1.5 tabs) all other days or as directed    History of DVT of lower extremity, Chronic anticoagulation

## 2018-06-26 NOTE — PROGRESS NOTES
This 77 year old year old male is here for  yearly surveillance cystoscopy.  No recurrent disease for many years    Cysto: the anterior urethra is normal.      Prostatic Urethra mild trilobar enlargement.  The prostatic urethral length is 3.5  cm.     The bladder showed no evidence of recurrent disease    Follow up: will have patient come back in 1 year for bladder tumor recheck.

## 2018-06-28 ENCOUNTER — TELEPHONE (OUTPATIENT)
Dept: DERMATOLOGY | Facility: CLINIC | Age: 78
End: 2018-06-28

## 2018-06-28 NOTE — TELEPHONE ENCOUNTER
[facility] previsit information                                                    Rigoberto Holguin is a 77 year old male       Patient is being referred to Dr. Pacheco  Referring Provider: Dr. Whalen   For treatment of: squamous cell carcinoma in situ  Site(s): right mid frontal  scalp   Previous Records received:  N/A      Medication & Allergy Information                                                    CURRENT MEDICATIONS:   Current Outpatient Prescriptions   Medication Sig Dispense Refill     calcium carbonate (TUMS) 500 MG chewable tablet Take 1 chew tab by mouth daily as needed Reported on 4/18/2017       cholecalciferol (VITAMIN D) 1000 UNIT tablet Take 1 tablet (1,000 Units) by mouth daily 100 tablet 0     levothyroxine (SYNTHROID/LEVOTHROID) 112 MCG tablet Take 1 tablet (112 mcg) by mouth every morning (before breakfast) 90 tablet 3     LUMIGAN 0.01 % SOLN ophthalmic solution        melatonin 5 MG tablet Take 10 mg by mouth At Bedtime Reported on 2/21/2017       metroNIDAZOLE (METROCREAM) 0.75 % cream every day, once daily to face 60 g 3     Multiple Vitamin (DAILY MULTIVITAMIN PO) Take 1 chew tab by mouth daily        Multiple Vitamins-Minerals (EYE VITAMINS PO) Take 1 tablet by mouth daily       Nutritional Supplements (JUICE PLUS FIBRE PO)        sildenafil (REVATIO) 20 MG tablet Take 2-5 tabs daily prn 30 tablet 11     timolol (TIMOPTIC) 0.5 % ophthalmic solution        warfarin (COUMADIN) 5 MG tablet 10 mg(2 tabs) on Tue & Thu; 7.5 mg (1.5 tabs) all other days or as directed 135 tablet 0       Do you take the following medications:  Aspirin:  NO  Ibuprofen, Advil, Motrin, Naproxen:   NO  Warfarin/Coumadin:   YES. Pt states that he will have his INR checked week of Mohs surgery   Vitamin E:   NO  Plavix:   NO    [place]'s warts: NO   Garlic supplement:  NO   Antibiotic Prophylaxis:  NO  Fish Oil: NO    Do you have an ALLERGIC REACTION to any medications:  NO   Review of patient's allergies indicates no known  allergies.      Past Medical History                                                    Do you currently or have you previously had any of the following conditions:       HIV/AIDS:  NO    Hepatitis:  NO    Suppressed Immune System:  NO    Autoimmune disorder/Lupus:  NO    Prolonged bleeding or Bleeding disorder:  NO    Fever blisters/herpes, cold sores:  NO    Kidney disease:  NO     Creatinine   Date Value Ref Range Status   02/21/2017 1.02 0.66 - 1.25 mg/dL Final     ]    Implanted device (pacemaker, defibrillator):  NO.      History of artificial or heart valve replacement:  NO.      Skin cancer:  YES.      Previous Mohs surgery: YES.   4 years ago left cheek     Organ transplant: NO.      Diabetes: NO    Pregnant: NO    Keloids or Abnormal scars: NO    Mobility device (wheelchair, transfer difficulty): NO    Tobacco use:  NO.       History   Smoking Status     Former Smoker     Types: Cigarettes     Quit date: 10/1/1970   Smokeless Tobacco     Never Used         Reviewed by:  Corina Duval RN                 Notes Recorded by Corina Duval RN on 6/28/2018 at 8:59 AM  Pt called and notified of results and of Dr. Pacheco's recommendation. Pt verbalized understanding and pt scheduled on 7/26/18 at 8:30am. Previsit mohs telephone screening completed. Letter sent...Corina Duval RN    ------    Notes Recorded by Anibal Pacheco MD on 6/27/2018 at 6:48 PM  Ok to schedule Mohs after telephone screening. Thank you.  ------    Notes Recorded by Yumiko Mendoza LPN on 6/27/2018 at 8:56 AM      Photo found.... [facility] needs to be [facility]    Yumiko Mendoza LPN    ------    Notes Recorded by Yumiko Mendoza LPN on 6/27/2018 at 8:55 AM  Dr. Pacheco please review and advise if consult is needed prior to MOHS.     Patient previously had MOHS in 2016    Erythematous stuck on papule with scale on the right mid vertex scalp 8 mm.    Unable to locate photo at this time.     Yumiko Mendoza  LPN    ------    Notes Recorded by Dereck Vanessa MD on 6/26/2018 at 7:18 PM  rec mohs for scc, pls call and schedule        7d ago       Copath Report Patient Name: SAVANNAH GARZA   MR#: [id number]   Specimen #: [id number]   Collected: 6/21/2018   Received: 6/22/2018   Reported: 6/25/2018 21:06   Ordering Phy(s): DERECK VANESSA     For improved result formatting, select 'View Enhanced Report Format' under    Linked Documents section.     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

## 2018-06-28 NOTE — LETTER
"        Mr.Gary [place]OR[place] Holguin  [address] [address] [facility] [address]        June 28, 2018    Dear ,    You have an upcoming appointment with Dr. Pacheco for Mohs surgery. It is scheduled for 7/26/18  at 8:30 AM. Please check-in 15 minutes prior to your appointment at check-in desk \"D\" on the 2nd floor. Our address is [address] [place] [address].  Please review these important reminders:    1) Expect to be here the majority of the morning.  The Mohs surgical procedure can be an extensive surgery requiring multiple stages. Each stage may take 1-2 hours.     2) You may eat breakfast. You may bring snacks or beverages with you.  3) Take all normal medications morning of surgery -- unless otherwise indicated by your physician   If you have an artificial heart valve we will prescribe an antibiotic. Please take one hour prior to surgery.     4) You will need a  to be with you if your surgical site is close to your eyes. You can get swelling/bruising immediately after surgery and will go home with a big bulky bandage that could obstruct your view of driving safely.     5) Wear comfortable clothing -- preferably a button down shirt or a loose fitted shirt (to avoid pulling over pressure bandage off when you get home). If your surgery site is on your leg, try wearing loose fitted pants. If your surgery site is on your arm, try wearing a short-sleeve shirt.     6) Bring reading material or other items to help pass time. We do have internet access available if you own a laptop, iPad, etc.    7) Women - do NOT wear make-up. We will be washing it off anyone needing surgery on the face     8) Do NOT stop blood thinning medication unless instructed to do so by your surgeon. This will include: Warfarin, Coumadin, Eliquis, Jantoven, Aspirin, Plavix and Pradaxa. If you are taking Warfarin or Coumadin, please have your INR blood test results sent to our office no more than 7 days prior to your surgery.     9) Tylenol is a good " alternative to take for headaches and pain, and can be taken throughout your surgery and postoperative recovery.    If you need to reschedule or have any questions or concerns, please call me at [phone].    Sincerely,      MANUEL Arriaga

## 2018-07-05 ENCOUNTER — RADIANT APPOINTMENT (OUTPATIENT)
Dept: MRI IMAGING | Facility: CLINIC | Age: 78
End: 2018-07-05
Attending: PHYSICIAN ASSISTANT
Payer: MEDICARE

## 2018-07-05 ENCOUNTER — RADIANT APPOINTMENT (OUTPATIENT)
Dept: GENERAL RADIOLOGY | Facility: CLINIC | Age: 78
End: 2018-07-05
Attending: PHYSICIAN ASSISTANT
Payer: MEDICARE

## 2018-07-05 VITALS — DIASTOLIC BLOOD PRESSURE: 67 MMHG | HEART RATE: 89 BPM | OXYGEN SATURATION: 96 % | SYSTOLIC BLOOD PRESSURE: 139 MMHG

## 2018-07-05 DIAGNOSIS — G89.29 CHRONIC RIGHT SHOULDER PAIN: ICD-10-CM

## 2018-07-05 DIAGNOSIS — M25.511 CHRONIC RIGHT SHOULDER PAIN: ICD-10-CM

## 2018-07-05 DIAGNOSIS — S43.421D SPRAIN OF RIGHT ROTATOR CUFF CAPSULE, SUBSEQUENT ENCOUNTER: Primary | ICD-10-CM

## 2018-07-05 DIAGNOSIS — R29.898 SHOULDER WEAKNESS: ICD-10-CM

## 2018-07-05 DIAGNOSIS — M75.121 COMPLETE TEAR OF RIGHT ROTATOR CUFF: ICD-10-CM

## 2018-07-05 PROCEDURE — 23350 INJECTION FOR SHOULDER X-RAY: CPT | Mod: RT | Performed by: RADIOLOGY

## 2018-07-05 PROCEDURE — 73222 MRI JOINT UPR EXTREM W/DYE: CPT | Mod: RT | Performed by: RADIOLOGY

## 2018-07-05 PROCEDURE — 73040 CONTRAST X-RAY OF SHOULDER: CPT | Mod: RT | Performed by: RADIOLOGY

## 2018-07-05 PROCEDURE — A9579 GAD-BASE MR CONTRAST NOS,1ML: HCPCS | Mod: JW | Performed by: PHYSICIAN ASSISTANT

## 2018-07-05 RX ORDER — IOPAMIDOL 408 MG/ML
10 INJECTION, SOLUTION INTRATHECAL ONCE
Status: COMPLETED | OUTPATIENT
Start: 2018-07-05 | End: 2018-07-05

## 2018-07-05 RX ORDER — LIDOCAINE HYDROCHLORIDE 10 MG/ML
5 INJECTION, SOLUTION EPIDURAL; INFILTRATION; INTRACAUDAL; PERINEURAL ONCE
Status: COMPLETED | OUTPATIENT
Start: 2018-07-05 | End: 2018-07-05

## 2018-07-05 RX ADMIN — LIDOCAINE HYDROCHLORIDE 5 ML: 10 INJECTION, SOLUTION EPIDURAL; INFILTRATION; INTRACAUDAL; PERINEURAL at 13:59

## 2018-07-05 RX ADMIN — IOPAMIDOL 1 ML: 408 INJECTION, SOLUTION INTRATHECAL at 13:58

## 2018-07-06 ENCOUNTER — OFFICE VISIT (OUTPATIENT)
Dept: FAMILY MEDICINE | Facility: CLINIC | Age: 78
End: 2018-07-06
Payer: MEDICARE

## 2018-07-06 VITALS
HEIGHT: 68 IN | SYSTOLIC BLOOD PRESSURE: 118 MMHG | TEMPERATURE: 97.8 F | HEART RATE: 68 BPM | DIASTOLIC BLOOD PRESSURE: 71 MMHG | RESPIRATION RATE: 16 BRPM | BODY MASS INDEX: 31.52 KG/M2 | WEIGHT: 208 LBS | OXYGEN SATURATION: 96 %

## 2018-07-06 DIAGNOSIS — Z01.818 PREOP GENERAL PHYSICAL EXAM: Primary | ICD-10-CM

## 2018-07-06 DIAGNOSIS — H02.402 EYELID DROOPING DISEASE, LEFT: ICD-10-CM

## 2018-07-06 PROCEDURE — 99214 OFFICE O/P EST MOD 30 MIN: CPT | Performed by: PHYSICIAN ASSISTANT

## 2018-07-06 NOTE — MR AVS SNAPSHOT
After Visit Summary   7/6/2018    Rigoberto Holguin    MRN: [id number]           Patient Information     Date Of Birth          1940        Visit Information        Provider Department      7/6/2018 1:40 PM Jamie Meraz PA-C [facility] [place]        Today's Diagnoses     Preop general physical exam    -  1    Eyelid drooping disease, left          Care Instructions      Before Your Surgery      Call your surgeon if there is any change in your health. This includes signs of a cold or flu (such as a sore throat, runny nose, cough, rash or fever).    Do not smoke, drink alcohol or take over the counter medicine (unless your surgeon or primary care doctor tells you to) for the 24 hours before and after surgery.    If you take prescribed drugs: Follow your doctor s orders about which medicines to take and which to stop until after surgery.    Eating and drinking prior to surgery: follow the instructions from your surgeon    Take a shower or bath the night before surgery. Use the soap your surgeon gave you to gently clean your skin. If you do not have soap from your surgeon, use your regular soap. Do not shave or scrub the surgery site.  Wear clean pajamas and have clean sheets on your bed.           Follow-ups after your visit        Your next 10 appointments already scheduled     Jul 23, 2018  1:00 PM CDT   Anticoagulation Visit with BE ANTI COAG   [facility] [place] ([facility] [place])    [address] [facility]  [place] [address]   [phone]            Jul 26, 2018  8:30 AM CDT   PROCEDURE with Anibal Pacheco MD   [facility])    [address] [place] [address]   [phone]            Dec 11, 2018  3:30 PM CST   Return Visit with Vickie Whalen MD   [facility])    [address] [place] [address]   [phone]            Jun 25, 2019  9:00 AM CDT  "  Return Visit with Vincent Rucker MD, [place] CYSTO PROC ROOM   [facility] ([facility])    [address] [place] [address]  [place] [id number] [phone]              Who to contact     Normal or non-critical lab and imaging results will be communicated to you by [employer], letter or phone within 4 business days after the clinic has received the results. If you do not hear from us within 7 days, please contact the clinic through MyChart or phone. If you have a critical or abnormal lab result, we will notify you by phone as soon as possible.  Submit refill requests through [employer] or call your pharmacy and they will forward the refill request to us. Please allow 3 business days for your refill to be completed.          If you need to speak with a  for additional information , please call: [phone]             Additional Information About Your Visit        [employer] Information     [employer] gives you secure access to your electronic health record. If you see a primary care provider, you can also send messages to your care team and make appointments. If you have questions, please call your primary care clinic.  If you do not have a primary care provider, please call [phone] and they will assist you.        Care EveryWhere ID     This is your Care EveryWhere ID. This could be used by other organizations to access your [facility] medical records  [id number]        Your Vitals Were     Pulse Temperature Respirations Height Pulse Oximetry BMI (Body Mass Index)    68 97.8  F (36.6  C) (Oral) 16 5' 8\" (1.727 m) 96% 31.63 kg/m2       Blood Pressure from Last 3 Encounters:   07/06/18 118/71   07/05/18 139/67   06/26/18 123/69    Weight from Last 3 Encounters:   07/06/18 208 lb (94.3 kg)   06/25/18 209 lb (94.8 kg)   04/24/18 210 lb (95.3 kg)              Today, you had the following     No orders found for display       Primary Care Provider Office Phone # Fax #    Jamie Jenkins " DENAE Meraz [phone]       [address] [facility] [address]        Equal Access to Services     NATALIE TOLEDO : Lex angelita maxwell juan Mercado, waeddida luqmack, qaybta kaalmada moody, tacho conley latipcarl drummond. So [facility] [phone].    ATENCIÓN: Si habla español, tiene a thomas disposición servicios gratuitos de asistencia lingüística. Llame al [phone].    We comply with applicable federal civil rights laws and [place] laws. We do not discriminate on the basis of race, color, national origin, age, disability, sex, sexual orientation, or gender identity.            Thank you!     Thank you for choosing [facility]  for your care. Our goal is always to provide you with excellent care. Hearing back from our patients is one way we can continue to improve our services. Please take a few minutes to complete the written survey that you may receive in the mail after your visit with us. Thank you!             Your Updated Medication List - Protect others around you: Learn how to safely use, store and throw away your medicines at www.disposemymeds.org.          This list is accurate as of 7/6/18  2:18 PM.  Always use your most recent med list.                   Brand Name Dispense Instructions for use Diagnosis    cholecalciferol 1000 UNIT tablet    vitamin D3    100 tablet    Take 1 tablet (1,000 Units) by mouth daily    Vitamin D deficiency       EYE VITAMINS PO      Take 1 tablet by mouth daily        DAILY MULTIVITAMIN PO      Take 1 chew tab by mouth daily        JUICE PLUS FIBRE PO           levothyroxine 112 MCG tablet    SYNTHROID/LEVOTHROID    90 tablet    Take 1 tablet (112 mcg) by mouth every morning (before breakfast)    Hypothyroidism due to Hashimoto's thyroiditis       LUMIGAN 0.01 % Soln   Generic drug:  bimatoprost       Actinic keratosis       melatonin 5 MG tablet      Take 10 mg by mouth At Bedtime Reported on 2/21/2017        metroNIDAZOLE 0.75 %  cream    METROCREAM    60 g    every day, once daily to face    Rosacea       sildenafil 20 MG tablet    REVATIO    30 tablet    Take 2-5 tabs daily prn    Erectile dysfunction, unspecified erectile dysfunction type       timolol 0.5 % ophthalmic solution    TIMOPTIC      Actinic keratosis       TUMS 500 MG chewable tablet   Generic drug:  calcium carbonate      Take 1 chew tab by mouth daily as needed Reported on 4/18/2017        warfarin 5 MG tablet    COUMADIN    135 tablet    10 mg(2 tabs) on Tue & Thu; 7.5 mg (1.5 tabs) all other days or as directed    History of DVT of lower extremity, Chronic anticoagulation

## 2018-07-06 NOTE — PROGRESS NOTES
[facility] [place]  [address]  [place] [address]  [phone]  Dept: [phone]    PRE-OP EVALUATION:  Today's date: 2018    Rigoberto Holguin (: 1940) presents for pre-operative evaluation assessment as requested by  .  He requires evaluation and anesthesia risk assessment prior to undergoing surgery/procedure for treatment of eyelid .    Proposed Surgery/ Procedure: Blepharoplasty  Date of Surgery/ Procedure: 18  Time of Surgery/ Procedure: [facility]  Hospital/Surgical Facility: [facility]  Fax number for surgical facility:   Primary Physician: Jamie Meraz  Type of Anesthesia Anticipated: to be determined    Patient has a Health Care Directive or Living Will:  YES     1. NO - Do you have a history of heart attack, stroke, stent, bypass or surgery on an artery in the head, neck, heart or legs?  2. NO - Do you ever have any pain or discomfort in your chest?  3. NO - Do you have a history of  Heart Failure?  4. NO - Are you troubled by shortness of breath when: walking on the level, up a slight hill or at night?  5. NO - Do you currently have a cold, bronchitis or other respiratory infection?  6. NO - Do you have a cough, shortness of breath or wheezing?  7. NO - Do you sometimes get pains in the calves of your legs when you walk?  8. YES - DO YOU OR ANYONE IN YOUR FAMILY HAVE PREVIOUS HISTORY OF BLOOD CLOTS? self  9. NO - Do you or does anyone in your family have a serious bleeding problem such as prolonged bleeding following surgeries or cuts?  10. NO - Have you ever had problems with anemia or been told to take iron pills?  11. NO - Have you had any abnormal blood loss such as black, tarry or bloody stools, or abnormal vaginal bleeding?  12. NO - Have you ever had a blood transfusion?  13. NO - Have you or any of your relatives ever had problems with anesthesia?  14. NO - Do you have sleep apnea, excessive snoring or daytime drowsiness?  15. NO - Do  you have any prosthetic heart valves?  16. NO - Do you have prosthetic joints?  17. NO - Is there any chance that you may be pregnant?      HPI:           See problem list for active medical problems.  Problems all longstanding and stable, except as noted/documented.  See ROS for pertinent symptoms related to these conditions.                                                                                                                                                          .    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     Priority: Medium     Status post lumbar surgery 05/02/2017     Priority: Medium     Aftercare following surgery of the musculoskeletal system 05/02/2017     Priority: Medium     Hypothyroidism due to Hashimoto's thyroiditis 02/09/2017     Priority: Medium     Lumbar radicular pain 10/23/2016     Priority: Medium     Long-term (current) use of anticoagulants [Z79.01] 01/25/2016     Priority: Medium     Vitamin D deficiency 01/04/2016     Priority: Medium     Personal history of malignant neoplasm of bladder 06/24/2015     Priority: Medium     Peripheral vascular disease (H) 12/05/2014     Priority: Medium     Problem list name updated by automated process. Provider to review       Factor V Leiden mutation (H) 06/03/2013     Priority: Medium     History of DVT of lower extremity 06/03/2013     Priority: Medium     Postphlebitic syndrome 06/03/2013     Priority: Medium     Chronic anticoagulation 06/03/2013     Priority: Medium     ACP (advance care planning) 01/23/2013     Priority: Medium     Discussed advance care planning with patient; information given to patient to review. 1/23/2013          CARDIOVASCULAR SCREENING; LDL GOAL LESS THAN 160 10/31/2010     Priority: Medium     Stenosis, spinal, lumbar 09/01/2010     Priority: Medium     Nonallopathic lesion of lumbar  region 07/02/2009     Priority: Medium     Problem list name updated by automated process. Provider to review       Other and unspecified coagulation defects 12/27/2007     Priority: Medium     Encounter for long-term current use of medication 08/16/2006     Priority: Medium     On coumadin since Bilateral LE DVT in 1996-while a . No pulmonary Emboli or Cardiac reason.  Problem list name updated by automated process. Provider to review       Malignant neoplasm of overlapping sites of bladder (H) 08/16/2006     Priority: Medium     DX 2003. Surgery.        Past Medical History:   Diagnosis Date     Actinic keratosis      Basal cell carcinoma      Bladder cancer (H) 2003     DJD (degenerative joint disease), lumbar     S/P epidural x 2     DVT (deep venous thrombosis) (H) 2007    Factor V ( bilateral legs)      GERD (gastroesophageal reflux disease)      Hypothyroidism      Polio     Age 4, weakness of right arm and leg      S/P appendectomy      Past Surgical History:   Procedure Laterality Date     Appendectomy       FORAMINOTOMY LUMBAR POSTERIOR TWO LEVELS Left 3/2/2017    Procedure: FORAMINOTOMY LUMBAR POSTERIOR TWO LEVELS;  Surgeon: Ricky Mclaughlin MD;  Location: [facility] OR      REPAIR ROTATOR CUFF,ACUTE Left 2007     PHOTODYNAMIC THERAPY - (PDT)  2/13/2015     SURGICAL HISTORY OF -   2003    Urinary Bladder Cancer treatment     SURGICAL HISTORY OF -       Gall Bladder     Current Outpatient Prescriptions   Medication Sig Dispense Refill     calcium carbonate (TUMS) 500 MG chewable tablet Take 1 chew tab by mouth daily as needed Reported on 4/18/2017       cholecalciferol (VITAMIN D) 1000 UNIT tablet Take 1 tablet (1,000 Units) by mouth daily 100 tablet 0     levothyroxine (SYNTHROID/LEVOTHROID) 112 MCG tablet Take 1 tablet (112 mcg) by mouth every morning (before breakfast) 90 tablet 3     LUMIGAN 0.01 % SOLN ophthalmic solution        melatonin 5 MG tablet Take 10 mg by mouth At Bedtime Reported on  "2/21/2017       metroNIDAZOLE (METROCREAM) 0.75 % cream every day, once daily to face 60 g 3     Multiple Vitamin (DAILY MULTIVITAMIN PO) Take 1 chew tab by mouth daily        Multiple Vitamins-Minerals (EYE VITAMINS PO) Take 1 tablet by mouth daily       Nutritional Supplements (JUICE PLUS FIBRE PO)        sildenafil (REVATIO) 20 MG tablet Take 2-5 tabs daily prn 30 tablet 11     timolol (TIMOPTIC) 0.5 % ophthalmic solution        warfarin (COUMADIN) 5 MG tablet 10 mg(2 tabs) on Tue & Thu; 7.5 mg (1.5 tabs) all other days or as directed 135 tablet 0     OTC products: None, except as noted above    No Known Allergies   Latex Allergy: NO    Social History   Substance Use Topics     Smoking status: Former Smoker     Types: Cigarettes     Quit date: 10/1/1970     Smokeless tobacco: Never Used     Alcohol use 0.0 oz/week     0 Standard drinks or equivalent per week      Comment: Rarely drink 1-2 beers per week     History   Drug Use No       REVIEW OF SYSTEMS:   Constitutional, neuro, ENT, endocrine, pulmonary, cardiac, gastrointestinal, genitourinary, musculoskeletal, integument and psychiatric systems are negative, except as otherwise noted.    EXAM:   /71  Pulse 68  Temp 97.8  F (36.6  C) (Oral)  Resp 16  Ht 5' 8\" (1.727 m)  Wt 208 lb (94.3 kg)  SpO2 96%  BMI 31.63 kg/m2    GENERAL APPEARANCE: healthy, alert and no distress     EYES: EOMI,  PERRL     HENT: ear canals and TM's normal and nose and mouth without ulcers or lesions     NECK: no adenopathy, no asymmetry, masses, or scars and thyroid normal to palpation     RESP: lungs clear to auscultation - no rales, rhonchi or wheezes     CV: regular rates and rhythm, normal S1 S2, no S3 or S4 and no murmur, click or rub     ABDOMEN:  soft, nontender, no HSM or masses and bowel sounds normal     MS: extremities normal- no gross deformities noted, no evidence of inflammation in joints, FROM in all extremities.     SKIN: no suspicious lesions or rashes     " NEURO: Normal strength and tone, sensory exam grossly normal, mentation intact and speech normal     PSYCH: mentation appears normal. and affect normal/bright     LYMPHATICS: No cervical adenopathy    DIAGNOSTICS:       Recent Labs   Lab Test 06/15/18 05/31/18   05/01/17   1301   02/21/17   1211   08/02/16   0740   HGB   --    --    --   14.2   --   14.3   --    --    PLT   --    --    --   169   --   145*   --    --    INR  2.8*  3.4*   < >   --    < >  3.09*   < >   --    NA   --    --    --    --    --   140   --   139   POTASSIUM   --    --    --    --    --   4.4   --   4.4   CR   --    --    --    --    --   1.02   --   0.81    < > = values in this interval not displayed.        IMPRESSION:       The proposed surgical procedure is considered LOW risk.    REVISED CARDIAC RISK INDEX  The patient has the following serious cardiovascular risks for perioperative complications such as (MI, PE, VFib and 3  AV Block):      The patient has the following additional risks for perioperative complications:  The 10-year ASCVD risk score (Divina DC Jr, et al., 2013) is: 23.5%    Values used to calculate the score:      Age: 77 years      Sex: Male      Is Non- : No      Diabetic: No      Tobacco smoker: No      Systolic Blood Pressure: 118 mmHg      Is BP treated: No      HDL Cholesterol: 51 mg/dL      Total Cholesterol: 169 mg/dL      ICD-10-CM    1. Preop general physical exam Z01.818    2. Eyelid drooping disease, left H02.402        RECOMMENDATIONS:       Hold warfarin 5 days prior to surgery.       APPROVAL GIVEN to proceed with proposed procedure, without further diagnostic evaluation       Signed Electronically by: Jamie Meraz PA-C    Copy of this evaluation report is provided to requesting physician.    [facility] Preop Guidelines    Revised Cardiac Risk Index

## 2018-07-09 ENCOUNTER — TELEPHONE (OUTPATIENT)
Dept: FAMILY MEDICINE | Facility: CLINIC | Age: 78
End: 2018-07-09

## 2018-07-13 ENCOUNTER — ANTICOAGULATION THERAPY VISIT (OUTPATIENT)
Dept: NURSING | Facility: CLINIC | Age: 78
End: 2018-07-13
Payer: MEDICARE

## 2018-07-13 DIAGNOSIS — Z79.01 LONG-TERM (CURRENT) USE OF ANTICOAGULANTS: ICD-10-CM

## 2018-07-13 LAB — INR POINT OF CARE: 3.4 (ref 0.86–1.14)

## 2018-07-13 PROCEDURE — 99207 ZZC NO CHARGE NURSE ONLY: CPT

## 2018-07-13 PROCEDURE — 36416 COLLJ CAPILLARY BLOOD SPEC: CPT

## 2018-07-13 PROCEDURE — 85610 PROTHROMBIN TIME: CPT | Mod: QW

## 2018-07-13 NOTE — PROGRESS NOTES
ANTICOAGULATION FOLLOW-UP CLINIC VISIT    Patient Name:  Rigoberto Holguin  Date:  7/13/2018  Contact Type:  Face to Face    SUBJECTIVE:     Patient Findings     Positives No Problem Findings    Comments Has surgery on eyelid on Monday 7/16, wants range to be in the low 2.0's. Patient will hold coumadin starting tonight.  Lavern BRAY, RN, CPN             OBJECTIVE    INR Protime   Date Value Ref Range Status   07/13/2018 3.4 (A) 0.86 - 1.14 Final       ASSESSMENT / PLAN  INR assessment SUPRA    Recheck INR In: 1 WEEK    INR Location Clinic      Anticoagulation Summary as of 7/13/2018     INR goal 2.0-3.0   Today's INR 3.4!   Warfarin maintenance plan 10 mg (5 mg x 2) on Tue, Thu; 7.5 mg (5 mg x 1.5) all other days   Full warfarin instructions 10 mg on Tue, Thu; 7.5 mg all other days   Weekly warfarin total 57.5 mg   Plan last modified Aparna Vicente (5/31/2018)   Next INR check 7/23/2018   Target end date     Indications   Long-term (current) use of anticoagulants [Z79.01] [Z79.01]         Anticoagulation Episode Summary     INR check location     Preferred lab     Send INR reminders to [facility]    Comments       Anticoagulation Care Providers     Provider Role Specialty Phone number    Jamie Meraz PA-C Responsible Physician Assistant [phone]            See the Encounter Report to view Anticoagulation Flowsheet and Dosing Calendar (Go to Encounters tab in chart review, and find the Anticoagulation Therapy Visit)        Lavern Mason RN

## 2018-07-13 NOTE — MR AVS SNAPSHOT
Rigoberto VENTURA [place]   7/13/2018 10:00 AM   Anticoagulation Therapy Visit    Description:  77 year old male   Provider:  AN ANTI COAG   Department:  [facility]           INR as of 7/13/2018     Today's INR 3.4!      Anticoagulation Summary as of 7/13/2018     INR goal 2.0-3.0   Today's INR 3.4!   Full warfarin instructions 10 mg on Tue, Thu; 7.5 mg all other days   Next INR check 7/23/2018    Indications   Long-term (current) use of anticoagulants [Z79.01] [Z79.01]         Your next Anticoagulation Clinic appointment(s)     Jul 13, 2018 10:00 AM CDT   [facility]    [address] [facility] [address] [phone]            Jul 23, 2018  1:00 PM CDT   Anticoagulation Visit with BE ANTI COAG   [facility] [place] ([facility])    [address]  [place] [address] [phone]              Contact Numbers     [facility]  Please call  [phone] to cancel and/or reschedule your appointment, or with any problems or questions regarding your therapy.        July 2018 Details    Sun Mon Tue Wed Thu Fri Sat     1               2               3               4               5               6               7                 8               9               10               11               12               13      7.5 mg   See details      14      7.5 mg           15      7.5 mg         16      7.5 mg         17      10 mg         18      7.5 mg         19      10 mg         20      7.5 mg         21      7.5 mg           22      7.5 mg         23            24               25               26               27               28                 29               30               31                    Date Details   07/13 This INR check       Date of next INR:  7/23/2018         How to take your warfarin dose     To take:  7.5 mg Take 1.5 of the 5 mg tablets.    To take:  10 mg Take 2 of the 5 mg tablets.

## 2018-07-19 ENCOUNTER — OFFICE VISIT (OUTPATIENT)
Dept: ORTHOPEDICS | Facility: CLINIC | Age: 78
End: 2018-07-19
Payer: MEDICARE

## 2018-07-19 VITALS
WEIGHT: 211.8 LBS | SYSTOLIC BLOOD PRESSURE: 137 MMHG | HEART RATE: 107 BPM | HEIGHT: 68 IN | BODY MASS INDEX: 32.1 KG/M2 | DIASTOLIC BLOOD PRESSURE: 65 MMHG

## 2018-07-19 DIAGNOSIS — M75.101 ROTATOR CUFF TEAR ARTHROPATHY OF RIGHT SHOULDER: Primary | ICD-10-CM

## 2018-07-19 DIAGNOSIS — M12.811 ROTATOR CUFF TEAR ARTHROPATHY OF RIGHT SHOULDER: Primary | ICD-10-CM

## 2018-07-19 PROCEDURE — 20610 DRAIN/INJ JOINT/BURSA W/O US: CPT | Mod: RT | Performed by: ORTHOPAEDIC SURGERY

## 2018-07-19 PROCEDURE — 99204 OFFICE O/P NEW MOD 45 MIN: CPT | Mod: 25 | Performed by: ORTHOPAEDIC SURGERY

## 2018-07-19 RX ORDER — TRIAMCINOLONE ACETONIDE 40 MG/ML
80 INJECTION, SUSPENSION INTRA-ARTICULAR; INTRAMUSCULAR
Status: DISCONTINUED | OUTPATIENT
Start: 2018-07-19 | End: 2019-01-10

## 2018-07-19 RX ORDER — BUPIVACAINE HYDROCHLORIDE 2.5 MG/ML
3 INJECTION, SOLUTION INFILTRATION; PERINEURAL
Status: SHIPPED | OUTPATIENT
Start: 2018-07-19

## 2018-07-19 RX ORDER — LIDOCAINE HYDROCHLORIDE 10 MG/ML
4 INJECTION, SOLUTION INFILTRATION; PERINEURAL
Status: SHIPPED | OUTPATIENT
Start: 2018-07-19

## 2018-07-19 RX ADMIN — TRIAMCINOLONE ACETONIDE 80 MG: 40 INJECTION, SUSPENSION INTRA-ARTICULAR; INTRAMUSCULAR at 14:21

## 2018-07-19 RX ADMIN — BUPIVACAINE HYDROCHLORIDE 3 ML: 2.5 INJECTION, SOLUTION INFILTRATION; PERINEURAL at 14:21

## 2018-07-19 RX ADMIN — LIDOCAINE HYDROCHLORIDE 4 ML: 10 INJECTION, SOLUTION INFILTRATION; PERINEURAL at 14:21

## 2018-07-19 ASSESSMENT — PAIN SCALES - GENERAL: PAINLEVEL: SEVERE PAIN (7)

## 2018-07-19 NOTE — LETTER
7/19/2018         RE: Rigoberto Holguin  [id number] [facility] [address]        Dear Colleague,    Thank you for referring your patient, Rigoberto Holguin, to the [facility] [place]. Please see a copy of my visit note below.    CHIEF COMPLAINT:   Chief Complaint   Patient presents with     Right Shoulder - Pain     Onset: 8 months. Last injection with Jamie Meraz 4/24/18, helped but didn't last. Patient states he fell on the ice when taking the garbage can out, hand was behind him. Pain is anterior/top and in upper arm. Denies any N/T. Had some physical therapy.       Rigoberto Holguin is seen today in the [facility] for evaluation of right shoulder pain at the request of Jamie Meraz PA-C.      HISTORY:  Rigoberto Holguin is a 77 year old male, right -hand dominant, who is seen for right shoulder pain that has been present for 8 months, November 2017. He fell onto the ice while taking out his trash can. His hand was behind him, dragging the can, when he fell. On 4/24/2018, he was seen by Jamie Meraz PA-C and was given a cortisone injection. This injection helped, but did not last. He has severe pain, rated a 7/10, with raising the shoulder. Pain is located over the anterior and superior shoulder. Treatment includes: physical therapy for 6 weeks and cortisone injections. Denies numbness and tingling.     History: 10 years status post left shoulder rotator cuff repair. At that time, he had right shoulder pain, which was managed with cortisone injections. Per his wife, present today, was told at that time to have his right shoulder repaired at well, but he never did due to the pain/recovery of his left shoulder that he never wanted to have that surgery again.    Onset: fall 11/2017, but noted >10 years ago likely tear and told to have repair back then but didn't  Symptoms have been worsening since that time.  Aggravated by: with weightbearing activities and lifting  Relieved by: rest,  cortisone injection  Present symptoms: pain with overhead activities,  pain reaching behind back,  pain lifting,  weakness with lifting,  Pain location: anterior and superior shoulder  Pain severity: 7/10   Pain quality: aching  Frequency of symptoms: frequently  Associated symptoms: none    Treatment up to this point: cortisone injection, physical therapy   Has not tried: surgery reverse total shoulder arthroplasty   Prior history of related problems: previous shoulder pain in past, 10 years ago. Treated with cortisone injections    Significant Orthopedic past medical history: history of left shoulder rotator cuff repair 10 years ago   Usual level of recreational activity: occasional light sports  Usual level of work activity: retired    Other PMH:  has a past medical history of Actinic keratosis; Basal cell carcinoma; Bladder cancer (H) (2003); DJD (degenerative joint disease), lumbar; DVT (deep venous thrombosis) (H) (2007); GERD (gastroesophageal reflux disease); Hypothyroidism; Polio; and S/P appendectomy.  Patient Active Problem List    Diagnosis Date Noted     Acute pain of right shoulder 04/27/2018     Priority: Medium     Blepharoptosis 01/09/2018     Priority: Medium     Status post lumbar surgery 05/02/2017     Priority: Medium     Aftercare following surgery of the musculoskeletal system 05/02/2017     Priority: Medium     Hypothyroidism due to Hashimoto's thyroiditis 02/09/2017     Priority: Medium     Lumbar radicular pain 10/23/2016     Priority: Medium     Long-term (current) use of anticoagulants [Z79.01] 01/25/2016     Priority: Medium     Vitamin D deficiency 01/04/2016     Priority: Medium     Personal history of malignant neoplasm of bladder 06/24/2015     Priority: Medium     Peripheral vascular disease (H) 12/05/2014     Priority: Medium     Problem list name updated by automated process. Provider to review       Factor V Leiden mutation (H) 06/03/2013     Priority: Medium     History of DVT  of lower extremity 06/03/2013     Priority: Medium     Postphlebitic syndrome 06/03/2013     Priority: Medium     Chronic anticoagulation 06/03/2013     Priority: Medium     ACP (advance care planning) 01/23/2013     Priority: Medium     Discussed advance care planning with patient; information given to patient to review. 1/23/2013          CARDIOVASCULAR SCREENING; LDL GOAL LESS THAN 160 10/31/2010     Priority: Medium     Stenosis, spinal, lumbar 09/01/2010     Priority: Medium     Nonallopathic lesion of lumbar region 07/02/2009     Priority: Medium     Problem list name updated by automated process. Provider to review       Other and unspecified coagulation defects 12/27/2007     Priority: Medium     Encounter for long-term current use of medication 08/16/2006     Priority: Medium     On coumadin since Bilateral LE DVT in 1996-while a . No pulmonary Emboli or Cardiac reason.  Problem list name updated by automated process. Provider to review       Malignant neoplasm of overlapping sites of bladder (H) 08/16/2006     Priority: Medium     DX 2003. Surgery.         Surgical Hx:  has a past surgical history that includes REPAIR ROTATOR CUFF,ACUTE (Left, 2007); photodynamic therapy - (pdt) (2/13/2015); surgical history of -  (2003); Appendectomy; surgical history of - ; and Foraminotomy Lumbar Posterior Two Levels (Left, 3/2/2017).    Medications:   Current Outpatient Prescriptions:      calcium carbonate (TUMS) 500 MG chewable tablet, Take 1 chew tab by mouth daily as needed Reported on 4/18/2017, Disp: , Rfl:      cholecalciferol (VITAMIN D) 1000 UNIT tablet, Take 1 tablet (1,000 Units) by mouth daily, Disp: 100 tablet, Rfl: 0     levothyroxine (SYNTHROID/LEVOTHROID) 112 MCG tablet, Take 1 tablet (112 mcg) by mouth every morning (before breakfast), Disp: 90 tablet, Rfl: 3     LUMIGAN 0.01 % SOLN ophthalmic solution, , Disp: , Rfl:      melatonin 5 MG tablet, Take 10 mg by mouth At Bedtime Reported on  "2/21/2017, Disp: , Rfl:      metroNIDAZOLE (METROCREAM) 0.75 % cream, every day, once daily to face, Disp: 60 g, Rfl: 3     Multiple Vitamin (DAILY MULTIVITAMIN PO), Take 1 chew tab by mouth daily , Disp: , Rfl:      Multiple Vitamins-Minerals (EYE VITAMINS PO), Take 1 tablet by mouth daily, Disp: , Rfl:      Nutritional Supplements (JUICE PLUS FIBRE PO), , Disp: , Rfl:      sildenafil (REVATIO) 20 MG tablet, Take 2-5 tabs daily prn, Disp: 30 tablet, Rfl: 11     timolol (TIMOPTIC) 0.5 % ophthalmic solution, , Disp: , Rfl:      warfarin (COUMADIN) 5 MG tablet, 10 mg(2 tabs) on Tue & Thu; 7.5 mg (1.5 tabs) all other days or as directed, Disp: 135 tablet, Rfl: 0    Allergies: No Known Allergies    Social Hx: retired.  reports that he quit smoking about 47 years ago. His smoking use included Cigarettes. He has never used smokeless tobacco. He reports that he drinks alcohol. He reports that he does not use illicit drugs.    Family Hx: family history includes Cancer in his brother and father; Neurologic Disorder in his sister..    REVIEW OF SYSTEMS: 10 point ROS neg other than the symptoms noted above in the HPI and PMH. Notables include  CONSTITUTIONAL:NEGATIVE for fever, chills, change in weight  INTEGUMENTARY/SKIN: NEGATIVE for worrisome rashes, moles or lesions  MUSCULOSKELETAL:See HPI above  NEURO: NEGATIVE for weakness, dizziness or paresthesias    This document serves as a record of the services and decisions personally performed and made by Demetrio Sagastume MD. It was created on his behalf by Corrine Schafer, a trained medical scribe. The creation of this document is based the provider's statements to the medical scribe.    Kelseyibamparo Schafer 1:26 PM 7/19/2018    PHYSICAL EXAM:  /65  Pulse 107  Ht 1.727 m (5' 8\")  Wt 96.1 kg (211 lb 12.8 oz)  BMI 32.2 kg/m2   GENERAL APPEARANCE: healthy, alert, no distress; accompanied by his wife.  SKIN: no suspicious lesions or rashes  NEURO: Normal strength and tone, " mentation intact and speech normal  PSYCH:  mentation appears normal and affect normal, not anxious  RESPIRATORY: No increased work of breathing.  VASCULAR: Radial pulses 2+ and brisk cappillary refill     MUSCULOSKELETAL:    NECK:  Cervical range of motion: full, pain-free, and does not cause shoulder pain or reproduce shoulder pain.  Posterior cervical spine nontender to palpation over midline bony prominences  There is no tenderness to palpation along neck paraspinals and trapezius muscles  No palpable cervical lymphadenopathy.    RIGHT UPPER EXTREMITY:  Sensation intact to light touch in median, radial, ulnar and axillary nerve distributions  Palpable 2+ radial pulse, brisk capillary refill to all fingers, wwp  Intact epl fpl fdp edc wrist flexion/extension biceps triceps deltoid    RIGHT SHOULDER:  Shoulder Inspection: no swelling, bruising, discoloration, or obvious deformity, mild right sided supraspinatus atrophy.   Tender: greater tuberosity   Non-tender: AC joint, acromion, supraspinatus , infraspinatus, upper trapezius muscle and rhomboids  Range of Motion:   Active: forward flexion 150 degrees, external rotation 45 degrees, internal rotation T10  Strength: forward flexion 3+/5, External rotation 3+/5  Impingement: negative  Special tests: Yergason's: negative, Empty can: positive     LEFT UPPER EXTREMITY:  Sensation intact to light touch in median, radial, ulnar and axillary nerve distributions  Palpable 2+ radial pulse, brisk capillary refill to all fingers, wwp  Intact epl fpl fdp edc wrist flexion/extension biceps triceps deltoid    LEFT SHOULDER:  Shoulder Inspection: no swelling, bruising, discoloration, or obvious deformity or asymmetry  Tender: nontender to palpation   Range of Motion:   Active: forward flexion 150 degrees, external rotation 50 degrees, internal rotation T10  Strength: forward flexion 4+/5, External rotation 4+/5  Impingement: negative   Special tests: Empty can: negative      X-RAY INTERPRETATION: 3 views right shoulder obtained 2/1/2018 were reviewed personally in clinic today with the patient. On my review, No obvious fracture or dislocation. No obvious bony abnormality or lesion. Moderate acromio-clavicular degenerative changes. Mild gleno-humeral degenerative changes.    MRI right  Shoulder from 7/5/2018  Impression:  1. Massive rotator cuff tear with complete tear of supraspinatus and  infraspinatus and near complete tear of subscapularis.   a. Associated at least moderate muscle bulk loss though not fully  assessed due to provided sequences.   b. Medial retraction of torn fibers to the level of joint line.  2. Medial dislocation of long head of biceps secondary to  subscapularis tear.  3. Superior and posterosuperior labral tears.    ASSESSMENT: Rigoberto Holguin is a 77 year old male, right-hand dominant with chronic right shoulder pain, cuff tear arthropathy    PLAN:   * reviewed xrays with patient. Exam and xrays consistent with chronic, long-standing rotator cuff tear with underlying degenerative changes. There has been elevation of humeral head under the acromion due to lack of constraint from rotator cuff tear. This likely dates back to 10+ years ago when told he should have the right shoulder repaired as well. Now, I feel this is not repairable and aggravated by recent incident last fall.    * this is most likely reason for shoulder pain and decreased range of motion, weakness.   * treatment options depend on how symptomatic as well has how aggressive patient wants to be. If not overly symptomatic, we can try a cortisone injection as well as Physical Therapy for deltoid based shoulder exercises, pain control with tylenol and NSAIDS.  * surgical options would be a cuff tear hemiarthroplasty versus reverse total shoulder arthroplasty. Conventional total shoulder arthroplasty would not work in the absence of an intact, functional rotator cuff, only doomed for failure.  * risks  and perceived benefits of nonsurgical and surgical options of each discussed.  * the primary goal of surgery is pain relief, with return or improvement of some function secondary as a bonus. The arm will never work as normal, but hopefully we can get better function than current.  * risks of surgery include, but not limited to, bleeding, infection, pain, scar, damage to adjacent structures (such as nerves, vessels), temporary versus permanent nerve injury, failure to relieve or improve symptoms or function, recurrence of symptoms, stiffness, implant dislocation, implant failure, implant infection, need for further surgery, blood clots, risks of anesthesia and death.  * overall he has fairly good range of motion, and possibly reverse total shoulder arthroplasty could decrease his range of motion, but goal is pain control reduction.    * at this time, patient would like an injection. He is considering surgery in the future, October or November 2018.  * advised patient to call ~6 weeks before elected date of surgery to properly set up pre-op with CT scan right shoulder using [employer] signature glenoid protocol.    * Physical Therapy for deltoid based shoulder exercises  * rest  * activity modification  * tyelnol, NSAIDS  * risks and perceived benefits of cortisone injections discussed. Patient elects to proceed. See procedure note below.  * return to clinic as needed.      PROCEDURE NOTE:  The risks, perceived benefits and potential complications (including but not limited to: bleeding, infection, pain, scar, damage to adjacent structures, atrophy or necrosis of soft tissue, skin blanching, failure to relieve symptoms, worsening of symptoms, allergic reaction) of injection were discussed with the patient. Questions were addressed and answered.The patient elected to proceed. Written informed consent was obtained. The correct procedural site was identified and confirmed. A RIGHT shoulder subacromial injection was performed  using 2mL Kenalog-40 40mg per mL and 7mL (4mL 1% lidocaine, 3mL 0.25% marcaine)  of local anesthetic after sterile prep, to the correct procedural site. Sterile bandaid applied. This was tolerated well by the patient. No apparent complications. Did also discuss that if diabetic, recommend close monitoring of blood sugars over the next week as cortisone injections can temporarily elevate blood sugars.         The information in this document, created by a scribe for me, accurately reflects the services I personally performed and the decisions made by me. I have reviewed and approved this document for accuracy.     Demetrio Sagastume M.D., M.S.  Dept. of Orthopaedic Surgery  Health system    Large Joint Injection/Arthocentesis  Date/Time: 7/19/2018 2:21 PM  Performed by: ROCKY DAVIS  Authorized by: DEMETRIO SAGASTUME     Indications:  Pain and osteoarthritis  Needle Size:  22 G  Guidance: landmark guided    Approach:  Posterolateral  Location:  Shoulder  Site:  R subacromial bursa  Medications:  80 mg triamcinolone acetonide 40 MG/ML; 3 mL bupivacaine 0.25 %; 4 mL lidocaine 1 %  Outcome:  Tolerated well, no immediate complications  Procedure discussed: discussed risks, benefits, and alternatives    Consent Given by:  Patient  Prep: patient was prepped and draped in usual sterile fashion            Again, thank you for allowing me to participate in the care of your patient.        Sincerely,        Demetrio Sagastume MD

## 2018-07-19 NOTE — PROGRESS NOTES
CHIEF COMPLAINT:   Chief Complaint   Patient presents with     Right Shoulder - Pain     Onset: 8 months. Last injection with Jamie Meraz 4/24/18, helped but didn't last. Patient states he fell on the ice when taking the garbage can out, hand was behind him. Pain is anterior/top and in upper arm. Denies any N/T. Had some physical therapy.       Rigoberto Holguin is seen today in the [facility] for evaluation of right shoulder pain at the request of Jamie Meraz PA-C.      HISTORY:  Rigoberto Holguin is a 77 year old male, right -hand dominant, who is seen for right shoulder pain that has been present for 8 months, November 2017. He fell onto the ice while taking out his trash can. His hand was behind him, dragging the can, when he fell. On 4/24/2018, he was seen by Jamie Meraz PA-C and was given a cortisone injection. This injection helped, but did not last. He has severe pain, rated a 7/10, with raising the shoulder. Pain is located over the anterior and superior shoulder. Treatment includes: physical therapy for 6 weeks and cortisone injections. Denies numbness and tingling.     History: 10 years status post left shoulder rotator cuff repair. At that time, he had right shoulder pain, which was managed with cortisone injections. Per his wife, present today, was told at that time to have his right shoulder repaired at well, but he never did due to the pain/recovery of his left shoulder that he never wanted to have that surgery again.    Onset: fall 11/2017, but noted >10 years ago likely tear and told to have repair back then but didn't  Symptoms have been worsening since that time.  Aggravated by: with weightbearing activities and lifting  Relieved by: rest, cortisone injection  Present symptoms: pain with overhead activities,  pain reaching behind back,  pain lifting,  weakness with lifting,  Pain location: anterior and superior shoulder  Pain severity: 7/10   Pain quality: aching  Frequency of symptoms:  frequently  Associated symptoms: none    Treatment up to this point: cortisone injection, physical therapy   Has not tried: surgery reverse total shoulder arthroplasty   Prior history of related problems: previous shoulder pain in past, 10 years ago. Treated with cortisone injections    Significant Orthopedic past medical history: history of left shoulder rotator cuff repair 10 years ago   Usual level of recreational activity: occasional light sports  Usual level of work activity: retired    Other PMH:  has a past medical history of Actinic keratosis; Basal cell carcinoma; Bladder cancer (H) (2003); DJD (degenerative joint disease), lumbar; DVT (deep venous thrombosis) (H) (2007); GERD (gastroesophageal reflux disease); Hypothyroidism; Polio; and S/P appendectomy.  Patient Active Problem List    Diagnosis Date Noted     Acute pain of right shoulder 04/27/2018     Priority: Medium     Blepharoptosis 01/09/2018     Priority: Medium     Status post lumbar surgery 05/02/2017     Priority: Medium     Aftercare following surgery of the musculoskeletal system 05/02/2017     Priority: Medium     Hypothyroidism due to Hashimoto's thyroiditis 02/09/2017     Priority: Medium     Lumbar radicular pain 10/23/2016     Priority: Medium     Long-term (current) use of anticoagulants [Z79.01] 01/25/2016     Priority: Medium     Vitamin D deficiency 01/04/2016     Priority: Medium     Personal history of malignant neoplasm of bladder 06/24/2015     Priority: Medium     Peripheral vascular disease (H) 12/05/2014     Priority: Medium     Problem list name updated by automated process. Provider to review       Factor V Leiden mutation (H) 06/03/2013     Priority: Medium     History of DVT of lower extremity 06/03/2013     Priority: Medium     Postphlebitic syndrome 06/03/2013     Priority: Medium     Chronic anticoagulation 06/03/2013     Priority: Medium     ACP (advance care planning) 01/23/2013     Priority: Medium     Discussed  advance care planning with patient; information given to patient to review. 1/23/2013          CARDIOVASCULAR SCREENING; LDL GOAL LESS THAN 160 10/31/2010     Priority: Medium     Stenosis, spinal, lumbar 09/01/2010     Priority: Medium     Nonallopathic lesion of lumbar region 07/02/2009     Priority: Medium     Problem list name updated by automated process. Provider to review       Other and unspecified coagulation defects 12/27/2007     Priority: Medium     Encounter for long-term current use of medication 08/16/2006     Priority: Medium     On coumadin since Bilateral LE DVT in 1996-while a . No pulmonary Emboli or Cardiac reason.  Problem list name updated by automated process. Provider to review       Malignant neoplasm of overlapping sites of bladder (H) 08/16/2006     Priority: Medium     DX 2003. Surgery.         Surgical Hx:  has a past surgical history that includes REPAIR ROTATOR CUFF,ACUTE (Left, 2007); photodynamic therapy - (pdt) (2/13/2015); surgical history of -  (2003); Appendectomy; surgical history of - ; and Foraminotomy Lumbar Posterior Two Levels (Left, 3/2/2017).    Medications:   Current Outpatient Prescriptions:      calcium carbonate (TUMS) 500 MG chewable tablet, Take 1 chew tab by mouth daily as needed Reported on 4/18/2017, Disp: , Rfl:      cholecalciferol (VITAMIN D) 1000 UNIT tablet, Take 1 tablet (1,000 Units) by mouth daily, Disp: 100 tablet, Rfl: 0     levothyroxine (SYNTHROID/LEVOTHROID) 112 MCG tablet, Take 1 tablet (112 mcg) by mouth every morning (before breakfast), Disp: 90 tablet, Rfl: 3     LUMIGAN 0.01 % SOLN ophthalmic solution, , Disp: , Rfl:      melatonin 5 MG tablet, Take 10 mg by mouth At Bedtime Reported on 2/21/2017, Disp: , Rfl:      metroNIDAZOLE (METROCREAM) 0.75 % cream, every day, once daily to face, Disp: 60 g, Rfl: 3     Multiple Vitamin (DAILY MULTIVITAMIN PO), Take 1 chew tab by mouth daily , Disp: , Rfl:      Multiple Vitamins-Minerals (EYE VITAMINS  "PO), Take 1 tablet by mouth daily, Disp: , Rfl:      Nutritional Supplements (JUICE PLUS FIBRE PO), , Disp: , Rfl:      sildenafil (REVATIO) 20 MG tablet, Take 2-5 tabs daily prn, Disp: 30 tablet, Rfl: 11     timolol (TIMOPTIC) 0.5 % ophthalmic solution, , Disp: , Rfl:      warfarin (COUMADIN) 5 MG tablet, 10 mg(2 tabs) on Tue & Thu; 7.5 mg (1.5 tabs) all other days or as directed, Disp: 135 tablet, Rfl: 0    Allergies: No Known Allergies    Social Hx: retired.  reports that he quit smoking about 47 years ago. His smoking use included Cigarettes. He has never used smokeless tobacco. He reports that he drinks alcohol. He reports that he does not use illicit drugs.    Family Hx: family history includes Cancer in his brother and father; Neurologic Disorder in his sister..    REVIEW OF SYSTEMS: 10 point ROS neg other than the symptoms noted above in the HPI and PMH. Notables include  CONSTITUTIONAL:NEGATIVE for fever, chills, change in weight  INTEGUMENTARY/SKIN: NEGATIVE for worrisome rashes, moles or lesions  MUSCULOSKELETAL:See HPI above  NEURO: NEGATIVE for weakness, dizziness or paresthesias    This document serves as a record of the services and decisions personally performed and made by Demetrio Sagastume MD. It was created on his behalf by Corrine Schafer, a trained medical scribe. The creation of this document is based the provider's statements to the medical scribe.    Scribe Corrine Schafer 1:26 PM 7/19/2018    PHYSICAL EXAM:  /65  Pulse 107  Ht 1.727 m (5' 8\")  Wt 96.1 kg (211 lb 12.8 oz)  BMI 32.2 kg/m2   GENERAL APPEARANCE: healthy, alert, no distress; accompanied by his wife.  SKIN: no suspicious lesions or rashes  NEURO: Normal strength and tone, mentation intact and speech normal  PSYCH:  mentation appears normal and affect normal, not anxious  RESPIRATORY: No increased work of breathing.  VASCULAR: Radial pulses 2+ and brisk cappillary refill     MUSCULOSKELETAL:    NECK:  Cervical range of motion: full, " pain-free, and does not cause shoulder pain or reproduce shoulder pain.  Posterior cervical spine nontender to palpation over midline bony prominences  There is no tenderness to palpation along neck paraspinals and trapezius muscles  No palpable cervical lymphadenopathy.    RIGHT UPPER EXTREMITY:  Sensation intact to light touch in median, radial, ulnar and axillary nerve distributions  Palpable 2+ radial pulse, brisk capillary refill to all fingers, wwp  Intact epl fpl fdp edc wrist flexion/extension biceps triceps deltoid    RIGHT SHOULDER:  Shoulder Inspection: no swelling, bruising, discoloration, or obvious deformity, mild right sided supraspinatus atrophy.   Tender: greater tuberosity   Non-tender: AC joint, acromion, supraspinatus , infraspinatus, upper trapezius muscle and rhomboids  Range of Motion:   Active: forward flexion 150 degrees, external rotation 45 degrees, internal rotation T10  Strength: forward flexion 3+/5, External rotation 3+/5  Impingement: negative  Special tests: Yergason's: negative, Empty can: positive     LEFT UPPER EXTREMITY:  Sensation intact to light touch in median, radial, ulnar and axillary nerve distributions  Palpable 2+ radial pulse, brisk capillary refill to all fingers, wwp  Intact epl fpl fdp edc wrist flexion/extension biceps triceps deltoid    LEFT SHOULDER:  Shoulder Inspection: no swelling, bruising, discoloration, or obvious deformity or asymmetry  Tender: nontender to palpation   Range of Motion:   Active: forward flexion 150 degrees, external rotation 50 degrees, internal rotation T10  Strength: forward flexion 4+/5, External rotation 4+/5  Impingement: negative   Special tests: Empty can: negative     X-RAY INTERPRETATION: 3 views right shoulder obtained 2/1/2018 were reviewed personally in clinic today with the patient. On my review, No obvious fracture or dislocation. No obvious bony abnormality or lesion. Moderate acromio-clavicular degenerative changes. Mild  gleno-humeral degenerative changes.    MRI right  Shoulder from 7/5/2018  Impression:  1. Massive rotator cuff tear with complete tear of supraspinatus and  infraspinatus and near complete tear of subscapularis.   a. Associated at least moderate muscle bulk loss though not fully  assessed due to provided sequences.   b. Medial retraction of torn fibers to the level of joint line.  2. Medial dislocation of long head of biceps secondary to  subscapularis tear.  3. Superior and posterosuperior labral tears.    ASSESSMENT: Rigoberto Holguin is a 77 year old male, right-hand dominant with chronic right shoulder pain, cuff tear arthropathy    PLAN:   * reviewed xrays with patient. Exam and xrays consistent with chronic, long-standing rotator cuff tear with underlying degenerative changes. There has been elevation of humeral head under the acromion due to lack of constraint from rotator cuff tear. This likely dates back to 10+ years ago when told he should have the right shoulder repaired as well. Now, I feel this is not repairable and aggravated by recent incident last fall.    * this is most likely reason for shoulder pain and decreased range of motion, weakness.   * treatment options depend on how symptomatic as well has how aggressive patient wants to be. If not overly symptomatic, we can try a cortisone injection as well as Physical Therapy for deltoid based shoulder exercises, pain control with tylenol and NSAIDS.  * surgical options would be a cuff tear hemiarthroplasty versus reverse total shoulder arthroplasty. Conventional total shoulder arthroplasty would not work in the absence of an intact, functional rotator cuff, only doomed for failure.  * risks and perceived benefits of nonsurgical and surgical options of each discussed.  * the primary goal of surgery is pain relief, with return or improvement of some function secondary as a bonus. The arm will never work as normal, but hopefully we can get better function than  current.  * risks of surgery include, but not limited to, bleeding, infection, pain, scar, damage to adjacent structures (such as nerves, vessels), temporary versus permanent nerve injury, failure to relieve or improve symptoms or function, recurrence of symptoms, stiffness, implant dislocation, implant failure, implant infection, need for further surgery, blood clots, risks of anesthesia and death.  * overall he has fairly good range of motion, and possibly reverse total shoulder arthroplasty could decrease his range of motion, but goal is pain control reduction.    * at this time, patient would like an injection. He is considering surgery in the future, October or November 2018.  * advised patient to call ~6 weeks before elected date of surgery to properly set up pre-op with CT scan right shoulder using [employer] signature glenoid protocol.    * Physical Therapy for deltoid based shoulder exercises  * rest  * activity modification  * tyelnol, NSAIDS  * risks and perceived benefits of cortisone injections discussed. Patient elects to proceed. See procedure note below.  * return to clinic as needed.      PROCEDURE NOTE:  The risks, perceived benefits and potential complications (including but not limited to: bleeding, infection, pain, scar, damage to adjacent structures, atrophy or necrosis of soft tissue, skin blanching, failure to relieve symptoms, worsening of symptoms, allergic reaction) of injection were discussed with the patient. Questions were addressed and answered.The patient elected to proceed. Written informed consent was obtained. The correct procedural site was identified and confirmed. A RIGHT shoulder subacromial injection was performed using 2mL Kenalog-40 40mg per mL and 7mL (4mL 1% lidocaine, 3mL 0.25% marcaine)  of local anesthetic after sterile prep, to the correct procedural site. Sterile bandaid applied. This was tolerated well by the patient. No apparent complications. Did also discuss that if  diabetic, recommend close monitoring of blood sugars over the next week as cortisone injections can temporarily elevate blood sugars.         The information in this document, created by a scribe for me, accurately reflects the services I personally performed and the decisions made by me. I have reviewed and approved this document for accuracy.     Demetrio Sagastume M.D., M.S.  Dept. of Orthopaedic Surgery  [facility]    Large Joint Injection/Arthocentesis  Date/Time: 7/19/2018 2:21 PM  Performed by: ROCKY DAVIS  Authorized by: DEMETRIO SAGASTUME MELODY     Indications:  Pain and osteoarthritis  Needle Size:  22 G  Guidance: landmark guided    Approach:  Posterolateral  Location:  Shoulder  Site:  R subacromial bursa  Medications:  80 mg triamcinolone acetonide 40 MG/ML; 3 mL bupivacaine 0.25 %; 4 mL lidocaine 1 %  Outcome:  Tolerated well, no immediate complications  Procedure discussed: discussed risks, benefits, and alternatives    Consent Given by:  Patient  Prep: patient was prepped and draped in usual sterile fashion

## 2018-07-19 NOTE — MR AVS SNAPSHOT
After Visit Summary   7/19/2018    Rigoberto Holguin    MRN: [id number]           Patient Information     Date Of Birth          1940        Visit Information        Provider Department      7/19/2018 1:30 PM Demetrio Sagastume MD [facility] [place]        Today's Diagnoses     Rotator cuff tear arthropathy of right shoulder    -  1       Follow-ups after your visit        Follow-up notes from your care team     Return if symptoms worsen or fail to improve.      Your next 10 appointments already scheduled     Jul 23, 2018  1:00 PM CDT   Anticoagulation Visit with BE ANTI COAG   [facility] [place] ([facility])    [address]  [place] [id number] [phone]            Jul 26, 2018  8:30 AM CDT   PROCEDURE with Anibal Pacheco MD   [facility] ([facility]    [address] [place] [id number] [phone]            Dec 11, 2018  3:30 PM CST   Return Visit with Vickie Whalen MD   [facility] ([facility])    [address] [place] [id number] [phone]            Jun 25, 2019  9:00 AM CDT   Return Visit with Vincent Rucker MD, SAM[email or web address] ROOM   [facility] ([facility])    [address] [place] [id number] [phone]              Who to contact     If you have questions or need follow up information about today's clinic visit or your schedule please contact [facility] [place] directly at [phone].  Normal or non-critical lab and imaging results will be communicated to you by MyChart, letter or phone within 4 business days after the clinic has received the results. If you do not hear from us within 7 days, please contact the clinic through MyChart or phone. If you have a critical or abnormal lab result, we will notify you by phone as soon as possible.  Submit refill  "requests through [employer] or call your pharmacy and they will forward the refill request to us. Please allow 3 business days for your refill to be completed.          Additional Information About Your Visit        [employer] Information     [employer] gives you secure access to your electronic health record. If you see a primary care provider, you can also send messages to your care team and make appointments. If you have questions, please call your primary care clinic.  If you do not have a primary care provider, please call [phone] and they will assist you.        Care EveryWhere ID     This is your Care EveryWhere ID. This could be used by other organizations to access your [place] medical records  [id number]        Your Vitals Were     Pulse Height BMI (Body Mass Index)             107 5' 8\" (1.727 m) 32.2 kg/m2          Blood Pressure from Last 3 Encounters:   07/19/18 137/65   07/06/18 118/71   07/05/18 139/67    Weight from Last 3 Encounters:   07/19/18 211 lb 12.8 oz (96.1 kg)   07/06/18 208 lb (94.3 kg)   06/25/18 209 lb (94.8 kg)              We Performed the Following     Large Joint Injection/Arthocentesis        Primary Care Provider Office Phone # Fax #    Jamie Meraz PA-C [phone]       [address] [place] [address]        Equal Access to Services     [facility]: Hadii aad ku hadasho Soomaali, waaxda luqadaha, qaybta kaalmada adeegyada, tacho garcia . So [facility] [phone].    ATENCIÓN: Si habla español, tiene a thomas disposición servicios gratuitos de asistencia lingüística. Pauline al [phone].    We comply with applicable federal civil rights laws and [place] laws. We do not discriminate on the basis of race, color, national origin, age, disability, sex, sexual orientation, or gender identity.            Thank you!     Thank you for choosing [facility]  for your care. Our goal is always to provide you " with excellent care. Hearing back from our patients is one way we can continue to improve our services. Please take a few minutes to complete the written survey that you may receive in the mail after your visit with us. Thank you!             Your Updated Medication List - Protect others around you: Learn how to safely use, store and throw away your medicines at www.disposemymeds.org.          This list is accurate as of 7/19/18  2:58 PM.  Always use your most recent med list.                   Brand Name Dispense Instructions for use Diagnosis    cholecalciferol 1000 UNIT tablet    vitamin D3    100 tablet    Take 1 tablet (1,000 Units) by mouth daily    Vitamin D deficiency       EYE VITAMINS PO      Take 1 tablet by mouth daily        DAILY MULTIVITAMIN PO      Take 1 chew tab by mouth daily        JUICE PLUS FIBRE PO           levothyroxine 112 MCG tablet    SYNTHROID/LEVOTHROID    90 tablet    Take 1 tablet (112 mcg) by mouth every morning (before breakfast)    Hypothyroidism due to Hashimoto's thyroiditis       LUMIGAN 0.01 % Soln   Generic drug:  bimatoprost       Actinic keratosis       melatonin 5 MG tablet      Take 10 mg by mouth At Bedtime Reported on 2/21/2017        metroNIDAZOLE 0.75 % cream    METROCREAM    60 g    every day, once daily to face    Rosacea       sildenafil 20 MG tablet    REVATIO    30 tablet    Take 2-5 tabs daily prn    Erectile dysfunction, unspecified erectile dysfunction type       timolol 0.5 % ophthalmic solution    TIMOPTIC      Actinic keratosis       TUMS 500 MG chewable tablet   Generic drug:  calcium carbonate      Take 1 chew tab by mouth daily as needed Reported on 4/18/2017        warfarin 5 MG tablet    COUMADIN    135 tablet    10 mg(2 tabs) on Tue & Thu; 7.5 mg (1.5 tabs) all other days or as directed    History of DVT of lower extremity, Chronic anticoagulation

## 2018-07-23 ENCOUNTER — ANTICOAGULATION THERAPY VISIT (OUTPATIENT)
Dept: NURSING | Facility: CLINIC | Age: 78
End: 2018-07-23
Payer: MEDICARE

## 2018-07-23 DIAGNOSIS — Z79.01 LONG-TERM (CURRENT) USE OF ANTICOAGULANTS: ICD-10-CM

## 2018-07-23 LAB — INR POINT OF CARE: 1.6 (ref 0.86–1.14)

## 2018-07-23 PROCEDURE — 85610 PROTHROMBIN TIME: CPT | Mod: QW

## 2018-07-23 PROCEDURE — 36416 COLLJ CAPILLARY BLOOD SPEC: CPT

## 2018-07-23 PROCEDURE — 99207 ZZC NO CHARGE NURSE ONLY: CPT

## 2018-07-23 NOTE — PROGRESS NOTES
ANTICOAGULATION FOLLOW-UP CLINIC VISIT    Patient Name:  Rigoberto Holguin  Date:  7/23/2018  Contact Type:  Face to Face    SUBJECTIVE:     Patient Findings     Positives Intentional hold of therapy, No Problem Findings    Comments Has only had 5 doses of coumadin. Will give small bump recheck in 1 week           OBJECTIVE    INR Protime   Date Value Ref Range Status   07/23/2018 1.6 (A) 0.86 - 1.14 Final       ASSESSMENT / PLAN  INR assessment SUB    Recheck INR In: 1 WEEK    INR Location Clinic      Anticoagulation Summary as of 7/23/2018     INR goal 2.0-3.0   Today's INR 1.6!   Warfarin maintenance plan 10 mg (5 mg x 2) on Tue, Thu; 7.5 mg (5 mg x 1.5) all other days   Full warfarin instructions 7/23: 10 mg; Otherwise 10 mg on Tue, Thu; 7.5 mg all other days   Weekly warfarin total 57.5 mg   Plan last modified Aparna Robison (5/31/2018)   Next INR check 7/30/2018   Target end date     Indications   Long-term (current) use of anticoagulants [Z79.01] [Z79.01]         Anticoagulation Episode Summary     INR check location     Preferred lab     Send INR reminders to [facility]    Comments       Anticoagulation Care Providers     Provider Role Specialty Phone number    Jamie Meraz PA-C Responsible Physician Assistant [phone]            See the Encounter Report to view Anticoagulation Flowsheet and Dosing Calendar (Go to Encounters tab in chart review, and find the Anticoagulation Therapy Visit)        APARNA ROBISON

## 2018-07-23 NOTE — MR AVS SNAPSHOT
Rigoberto Holguin   7/23/2018 1:00 PM   Anticoagulation Therapy Visit    Description:  77 year old male   Provider:  ALANA ANTI COAG   Department:  Be Nurse           INR as of 7/23/2018     Today's INR 1.6!      Anticoagulation Summary as of 7/23/2018     INR goal 2.0-3.0   Today's INR 1.6!   Full warfarin instructions 7/23: 10 mg; Otherwise 10 mg on Tue, Thu; 7.5 mg all other days   Next INR check 7/30/2018    Indications   Long-term (current) use of anticoagulants [Z79.01] [Z79.01]         Your next Anticoagulation Clinic appointment(s)     Jul 30, 2018  1:30 PM CDT   Anticoagulation Visit with BE ANTI COAG   [facility] [place] ([facility] [place])    [address]  [place] [id number] [phone]              Contact Numbers     [facility]  Please call [phone] with any problems or questions regarding your therapy, or to cancel or reschedule your appointment.          July 2018 Details    Sun Mon Tue Wed Thu Fri Sat     1               2               3               4               5               6               7                 8               9               10               11               12               13               14                 15               16               17               18               19               20               21                 22               23      10 mg   See details      24      10 mg         25      7.5 mg         26      10 mg         27      7.5 mg         28      7.5 mg           29      7.5 mg         30            31                    Date Details   07/23 This INR check       Date of next INR:  7/30/2018         How to take your warfarin dose     To take:  7.5 mg Take 1.5 of the 5 mg tablets.    To take:  10 mg Take 2 of the 5 mg tablets.

## 2018-07-26 ENCOUNTER — OFFICE VISIT (OUTPATIENT)
Dept: DERMATOLOGY | Facility: CLINIC | Age: 78
End: 2018-07-26
Payer: MEDICARE

## 2018-07-26 VITALS — DIASTOLIC BLOOD PRESSURE: 76 MMHG | HEART RATE: 57 BPM | SYSTOLIC BLOOD PRESSURE: 137 MMHG | OXYGEN SATURATION: 96 %

## 2018-07-26 DIAGNOSIS — D04.4 SQUAMOUS CELL CARCINOMA IN SITU OF SCALP: Primary | ICD-10-CM

## 2018-07-26 PROCEDURE — 17311 MOHS 1 STAGE H/N/HF/G: CPT | Performed by: DERMATOLOGY

## 2018-07-26 PROCEDURE — 12032 INTMD RPR S/A/T/EXT 2.6-7.5: CPT | Performed by: DERMATOLOGY

## 2018-07-26 ASSESSMENT — PAIN SCALES - GENERAL: PAINLEVEL: NO PAIN (0)

## 2018-07-26 NOTE — NURSING NOTE
@Rigoberto Holguin's goals for this visit include:   Chief Complaint   Patient presents with     Procedure     Mohs - Right Mid Frontal Scalp       He requests these members of his care team be copied on today's visit information: NO    PCP: Jamie Meraz    Referring Provider:  No referring provider defined for this encounter.    /76 (BP Location: Right arm, Patient Position: Chair, Cuff Size: Adult Regular)  Pulse 57  SpO2 96%    Do you need any medication refills at today's visit? NO    Mariah Otero CMA

## 2018-07-26 NOTE — PATIENT INSTRUCTIONS
MOHS Wound Care Instructions  I will experience scar, altered skin color, bleeding, swelling, pain, crusting and redness. I may experience altered sensation. Risks are excessive bleeding, infection, muscle weakness, thick (hypertrophic or keloidal) scar, and recurrence,. A second procedure may be recommended to obtain the best cosmetic or functional result.  Possible complications of any surgical procedure are bleeding, infection, scarring, alteration in skin color and sensation, muscle weakness in the area, wound dehiscence or seperation, or recurrence of the lesion or disease. On occasion, after healing, a secondary procedure or revision may be recommended in order to obtain the best cosmetic or functional result.   After your surgery, a pressure bandage will be placed over the area that has sutures. This will help prevent bleeding. Please follow these instructions as they will help you to prevent complications as your wound heals.  For the First 48 hours After Surgery:  1. Leave the pressure bandage on and keep it dry. If it should come loose, you may retape it, but do not take it off.  2. Relax and take it easy. Do not do any vigorous exercise, heavy lifting, or bending forward. This could cause the wound to bleed.  3. Post-operative pain is usually mild. You may take plain or extra strength Tylenol every 4 hours as needed (do not take more than 4,000mg in one day). Do not take any medicine that contains aspirin, ibuprofen or motrin unless you have been recommended these by a doctor.  Avoid alcohol and vitamin E as these may increase your tendency to bleed.  4. You may put an ice pack around the bandaged area for 20 minutes every 2-3 hours. This may help reduce swelling, bruising, and pain. Make sure the ice pack is waterproof so that the pressure bandage does not get wet.   5. You may see a small amount of drainage or blood on your pressure bandage. This is normal. However, if drainage or bleeding continues or  saturates the bandage, you will need to apply firm pressure over the bandage with a washcloth for 15 minutes. If bleeding continues after applying pressure for 15 minutes then go to the nearest emergency room.  48 Hours After Surgery  Carefully remove the bandage and start daily wound care and dressing changes. You may also now shower and get the wound wet. Wash wound with a mild soap and water.  Use caution when washing the wound. Be gentle and do not let the forceful shower stream hit the wound directly.  PAT dry.  Daily Wound Care:  1. Wash wound with a mild soap and water.  Use caution when washing the wound, be gentle and do not let the forceful shower stream hit the wound directly.  2. PAT DRY.  3. Apply Vaseline (from a new container or tube) over the suture line with a Q-tip. It is very important to keep the wound continuously moist, as wounds heal best in a moist environment.  4.  Keep the site covered until sutures are removed, you can cover it with a Telfa (non-stick) dressing and tape or a band-aid.    5. If you are unable to keep wound covered, you must apply Vaseline every 2 - 3 hours (while awake) to ensure it is being kept moist for optimal healing. A dressing overnight is recommended to keep the area moist.   Call Us If:  1. You have pain that is not controlled with Tylenol.  2. You have signs or symptoms of an infection, such as: fever over 100 degrees F, redness, warmth, or foul-smelling or yellow/creamy drainage from the wound.  Who should I call with questions?    [facility]: [phone]     [facility]: [phone]    For urgent needs outside of business hours call the [facility] at [phone] and ask for the dermatology resident on call

## 2018-07-26 NOTE — LETTER
7/26/2018         RE: Rigoberto Holguin  [address] [facility] [facility] [address]        Dear Colleague,    Thank you for referring your patient, Rigoberto Holguin, to the [facility]. Please see a copy of my visit note below.    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  Because of its size, location and morphology, Mohs surgery was indicated.  After appropriate discussion and informed consent the patient underwent Mohs surgery using the fresh tissue technique as follows:    STAGE I:  The patient was placed on the operating room table.  The area was infiltrated with 1% lidocaine and epinephrine. Using a #15-blade, complete excision was made around the tumor in one section.  Hemostasis was obtained by electrodessication.  A dressing was placed.  Tissue was kept as one tissue block which was subsequently mapped, color coded at their margins and processed in the Mohs Laboratory.  Microscopic tumor was not found in the tissue blocks.    With the lesion clear of micrographic tumor, surgery was considered complete.  The defect extended to the fascia and measured 1.8 cm x 1.6 cm. After discussion with patient, the defect was reconstructed.    Anibal Pacheco DO             Test Performed at:   Dept. of Dermatology   [facility]    [facility]    [address] [place] [address]          [phone]    DERMATOLOGIC SURGERY REPORT    NAME OF PROCEDURE: INTERMEDIATE LINEAR LAYERED CLOSURE    Surgeon: Anibal Pacheco DO  Fellow:   None  Resident: None    PREOPERATIVE DIAGNOSIS:   Status post Mohs excision of a primary squamous cell carcinoma in situ  POSTOPERATIVE DIAGNOSIS:  Same  FINAL REPAIR LENGTH: 3.3 cm    INDICATIONS:  This patient was left with a 1.8 cm x 1.6 cm defect involving the right mid frontal scalp following Mohs excision of a primary squamous cell carcinoma in situ. In order to repair this defect while maintaining the normal anatomic relations and function, we elected to utilize an intermediate linear closure.  We discussed the principles of treatment and most likely complications including bleeding, infection, wound dehiscence and scarring.  Informed consent was obtained and the patient underwent the procedure as follows.    PROCEDURE:  The patient was taken to the operative suite and placed on the operating room table.  The treatment area was anesthetized with 1% Lidocaine and epinephrine.  The area was washed with Hibiclens, rinsed with saline and draped with sterile towels.  The wound edges were undermined.  Hemostasis was obtained by electrocoagulation.  Closure was oriented so that the wound was in the patient's natural skin tension lines.  Deep dermal and subcutaneous closure was performed using 4-0 Vicryl deep, intradermal and subcutaneous sutures.  Two standing cones were removed by triangulation.  Final cutaneous approximation was achieved with 5-0 Fast absorbing gut simple running sutures.       The final repair length was 3.3 cm.  Total anesthesia used was 3.0 ml and estimated blood loss was less than 5.0 ml.  A sterile pressure dressing was applied and wound care instructions, with a written handout, were given.  The patient was discharged from the [facility] alert and ambulatory.    Anibal Pacheco DO             Test Performed at:   Dept. of Dermatology   [facility]    Dermatologic Surgery & Laser Center    [address] [place]  [place] [address]          [phone]        Again, thank you for allowing me to participate in the care of your patient.        Sincerely,        Anibal Pacheco MD

## 2018-07-26 NOTE — NURSING NOTE
Vaseline and pressure dressing applied to Mohs site on right mid frontal scalp.  Wound care instructions reviewed with patient and AVS provided.  Patient verbalized understanding. No further questions or concerns at this time.    Yumiko Mendoza LPN

## 2018-07-26 NOTE — PROGRESS NOTES
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  Because of its size, location and morphology, Mohs surgery was indicated.  After appropriate discussion and informed consent the patient underwent Mohs surgery using the fresh tissue technique as follows:    STAGE I:  The patient was placed on the operating room table.  The area was infiltrated with 1% lidocaine and epinephrine. Using a #15-blade, complete excision was made around the tumor in one section.  Hemostasis was obtained by electrodessication.  A dressing was placed.  Tissue was kept as one tissue block which was subsequently mapped, color coded at their margins and processed in the Mohs Laboratory.  Microscopic tumor was not found in the tissue blocks.    With the lesion clear of micrographic tumor, surgery was considered complete.  The defect extended to the fascia and measured 1.8 cm x 1.6 cm. After discussion with patient, the defect was reconstructed.    Anibal Pacheco DO             Test Performed at:   Dept. of Dermatology   [facility]    Dermatologic Surgery & [facility] Center    [address] [place] [address]          [phone]    DERMATOLOGIC SURGERY REPORT    NAME OF PROCEDURE: INTERMEDIATE LINEAR LAYERED CLOSURE    Surgeon: Anibal Pacheco DO  Fellow:  None  Resident: None    PREOPERATIVE DIAGNOSIS:   Status post Mohs excision of a primary squamous cell carcinoma in situ  POSTOPERATIVE DIAGNOSIS:  Same  FINAL REPAIR LENGTH: 3.3 cm    INDICATIONS:  This patient was left with a 1.8 cm x 1.6 cm defect  involving the right mid frontal scalp following Mohs excision of a primary squamous cell carcinoma in situ. In order to repair this defect while maintaining the normal anatomic relations and function, we elected to utilize an intermediate linear closure.  We discussed the principles of treatment and most likely complications including bleeding, infection, wound dehiscence and scarring.  Informed consent was obtained and the patient underwent the procedure as follows.    PROCEDURE:  The patient was taken to the operative suite and placed on the operating room table.  The treatment area was anesthetized with 1% Lidocaine and epinephrine.  The area was washed with Hibiclens, rinsed with saline and draped with sterile towels.  The wound edges were undermined.  Hemostasis was obtained by electrocoagulation.  Closure was oriented so that the wound was in the patient's natural skin tension lines.  Deep dermal and subcutaneous closure was performed using 4-0 Vicryl deep, intradermal and subcutaneous sutures.  Two standing cones were removed by triangulation.  Final cutaneous approximation was achieved with 5-0 Fast absorbing gut simple running sutures.       The final repair length was 3.3 cm.  Total anesthesia used was 3.0 ml and estimated blood loss was less than 5.0 ml.  A sterile pressure dressing was applied and wound care instructions, with a written handout, were given.  The patient was discharged from the [facility] alert and ambulatory.    Anibal Pacheco,              Test Performed at:   Dept. of Dermatology   [facility]    Dermatologic Surgery & Laser Center    [address] [place] [address]          [phone]

## 2018-07-26 NOTE — MR AVS SNAPSHOT
After Visit Summary   7/26/2018    Rigoberto Holguin    MRN: [id number]           Patient Information     Date Of Birth          1940        Visit Information        Provider Department      7/26/2018 8:30 AM Anibal Pacheco MD [facility]        Care Instructions    MOHS Wound Care Instructions  I will experience scar, altered skin color, bleeding, swelling, pain, crusting and redness. I may experience altered sensation. Risks are excessive bleeding, infection, muscle weakness, thick (hypertrophic or keloidal) scar, and recurrence,. A second procedure may be recommended to obtain the best cosmetic or functional result.  Possible complications of any surgical procedure are bleeding, infection, scarring, alteration in skin color and sensation, muscle weakness in the area, wound dehiscence or seperation, or recurrence of the lesion or disease. On occasion, after healing, a secondary procedure or revision may be recommended in order to obtain the best cosmetic or functional result.   After your surgery, a pressure bandage will be placed over the area that has sutures. This will help prevent bleeding. Please follow these instructions as they will help you to prevent complications as your wound heals.  For the First 48 hours After Surgery:  1. Leave the pressure bandage on and keep it dry. If it should come loose, you may retape it, but do not take it off.  2. Relax and take it easy. Do not do any vigorous exercise, heavy lifting, or bending forward. This could cause the wound to bleed.  3. Post-operative pain is usually mild. You may take plain or extra strength Tylenol every 4 hours as needed (do not take more than 4,000mg in one day). Do not take any medicine that contains aspirin, ibuprofen or motrin unless you have been recommended these by a doctor.  Avoid alcohol and vitamin E as these may increase your tendency to bleed.  4. You may put an ice pack around the bandaged area for 20  minutes every 2-3 hours. This may help reduce swelling, bruising, and pain. Make sure the ice pack is waterproof so that the pressure bandage does not get wet.   5. You may see a small amount of drainage or blood on your pressure bandage. This is normal. However, if drainage or bleeding continues or saturates the bandage, you will need to apply firm pressure over the bandage with a washcloth for 15 minutes. If bleeding continues after applying pressure for 15 minutes then go to the nearest emergency room.  48 Hours After Surgery  Carefully remove the bandage and start daily wound care and dressing changes. You may also now shower and get the wound wet. Wash wound with a mild soap and water.  Use caution when washing the wound. Be gentle and do not let the forceful shower stream hit the wound directly.  PAT dry.  Daily Wound Care:  1. Wash wound with a mild soap and water.  Use caution when washing the wound, be gentle and do not let the forceful shower stream hit the wound directly.  2. PAT DRY.  3. Apply Vaseline (from a new container or tube) over the suture line with a Q-tip. It is very important to keep the wound continuously moist, as wounds heal best in a moist environment.  4.  Keep the site covered until sutures are removed, you can cover it with a Telfa (non-stick) dressing and tape or a band-aid.    5. If you are unable to keep wound covered, you must apply Vaseline every 2 - 3 hours (while awake) to ensure it is being kept moist for optimal healing. A dressing overnight is recommended to keep the area moist.   Call Us If:  1. You have pain that is not controlled with Tylenol.  2. You have signs or symptoms of an infection, such as: fever over 100 degrees F, redness, warmth, or foul-smelling or yellow/creamy drainage from the wound.  Who should I call with questions?    [facility]: [phone]     [facility]: [phone]    For urgent  needs outside of business hours call the [facility] at [phone] and ask for the dermatology resident on call              Follow-ups after your visit        Follow-up notes from your care team     Return in about 6 months (around 1/26/2019) for skin check.      Your next 10 appointments already scheduled     Jul 30, 2018  1:30 PM CDT   Anticoagulation Visit with ALANA ANTI COAG   [facility] [place] [facility]    [address] [facility] [id number] [phone]            Dec 11, 2018  3:30 PM CST   [facility]    [address] [place] [id number] [phone]            Jun 25, 2019  9:00 AM CDT   Return Visit with Vincent Rucker MD, SAMUEL CYSTO PROC ROOM   [facility]    [address] [place] [id number] [phone]              Who to contact     If you have questions or need follow up information about today's clinic visit or your schedule please contact [facility] directly at [phone].  Normal or non-critical lab and imaging results will be communicated to you by [employer], letter or phone within 4 business days after the clinic has received the results. If you do not hear from us within 7 days, please contact the clinic through [employer] or phone. If you have a critical or abnormal lab result, we will notify you by phone as soon as possible.  Submit refill requests through [employer] or call your pharmacy and they will forward the refill request to us. Please allow 3 business days for your refill to be completed.          Additional Information About Your Visit        [employer] Information     [employer] gives you secure access to your electronic health record. If you see a primary care provider, you can also send messages to your care team and make appointments. If you have questions, please call your  primary care clinic.  If you do not have a primary care provider, please call [phone] and they will assist you.      [employer] is an electronic gateway that provides easy, online access to your medical records. With [employer], you can request a clinic appointment, read your test results, renew a prescription or communicate with your care team.     To access your existing account, please contact your [facility] or call [phone] for assistance.        Care EveryWhere ID     This is your Care EveryWhere ID. This could be used by other organizations to access your [place] medical records  [id number]        Your Vitals Were     Pulse Pulse Oximetry                57 96%           Blood Pressure from Last 3 Encounters:   07/26/18 137/76   07/19/18 137/65   07/06/18 118/71    Weight from Last 3 Encounters:   07/19/18 96.1 kg (211 lb 12.8 oz)   07/06/18 94.3 kg (208 lb)   06/25/18 94.8 kg (209 lb)              Today, you had the following     No orders found for display       Primary Care Provider Office Phone # Fax #    Jamie Meraz PA-C [phone]       [address] [facility] [address]        Equal Access to Services     ALOK TOLEDO : Hadii aad ku hadasho Soomaali, waaxda luqadaha, qaybta kaalmada adeegyada, waxay idiin hayaan adeeg khbernice la'lashell . So [facility] [phone].    ATENCIÓN: Si habla español, tiene a thomas disposición servicios gratuitos de asistencia lingüística. Llame al [phone].    We comply with applicable federal civil rights laws and [place] laws. We do not discriminate on the basis of race, color, national origin, age, disability, sex, sexual orientation, or gender identity.            Thank you!     Thank you for choosing [facility]  for your care. Our goal is always to provide you with excellent care. Hearing back from our patients is one way we can continue to improve our services. Please take a few minutes to  complete the written survey that you may receive in the mail after your visit with us. Thank you!             Your Updated Medication List - Protect others around you: Learn how to safely use, store and throw away your medicines at www.disposemymeds.org.          This list is accurate as of 7/26/18 11:03 AM.  Always use your most recent med list.                   Brand Name Dispense Instructions for use Diagnosis    cholecalciferol 1000 UNIT tablet    vitamin D3    100 tablet    Take 1 tablet (1,000 Units) by mouth daily    Vitamin D deficiency       EYE VITAMINS PO      Take 1 tablet by mouth daily        DAILY MULTIVITAMIN PO      Take 1 chew tab by mouth daily        JUICE PLUS FIBRE PO           levothyroxine 112 MCG tablet    SYNTHROID/LEVOTHROID    90 tablet    Take 1 tablet (112 mcg) by mouth every morning (before breakfast)    Hypothyroidism due to Hashimoto's thyroiditis       LUMIGAN 0.01 % Soln   Generic drug:  bimatoprost       Actinic keratosis       melatonin 5 MG tablet      Take 10 mg by mouth At Bedtime Reported on 2/21/2017        metroNIDAZOLE 0.75 % cream    METROCREAM    60 g    every day, once daily to face    Rosacea       sildenafil 20 MG tablet    REVATIO    30 tablet    Take 2-5 tabs daily prn    Erectile dysfunction, unspecified erectile dysfunction type       timolol 0.5 % ophthalmic solution    TIMOPTIC      Actinic keratosis       TUMS 500 MG chewable tablet   Generic drug:  calcium carbonate      Take 1 chew tab by mouth daily as needed Reported on 4/18/2017        warfarin 5 MG tablet    COUMADIN    135 tablet    10 mg(2 tabs) on Tue & Thu; 7.5 mg (1.5 tabs) all other days or as directed    History of DVT of lower extremity, Chronic anticoagulation

## 2018-07-30 ENCOUNTER — ANTICOAGULATION THERAPY VISIT (OUTPATIENT)
Dept: NURSING | Facility: CLINIC | Age: 78
End: 2018-07-30
Payer: MEDICARE

## 2018-07-30 DIAGNOSIS — Z79.01 LONG-TERM (CURRENT) USE OF ANTICOAGULANTS: ICD-10-CM

## 2018-07-30 LAB — INR POINT OF CARE: 2.6 (ref 0.86–1.14)

## 2018-07-30 PROCEDURE — 36416 COLLJ CAPILLARY BLOOD SPEC: CPT

## 2018-07-30 PROCEDURE — 85610 PROTHROMBIN TIME: CPT | Mod: QW

## 2018-07-30 PROCEDURE — 99207 ZZC NO CHARGE NURSE ONLY: CPT

## 2018-07-30 NOTE — MR AVS SNAPSHOT
Rigoberto LORENZO Holguin   7/30/2018 1:30 PM   Anticoagulation Therapy Visit    Description:  77 year old male   Provider:  ALANA ANTI COAG   Department:  [facility]           INR as of 7/30/2018     Today's INR 2.6      Anticoagulation Summary as of 7/30/2018     INR goal 2.0-3.0   Today's INR 2.6   Full warfarin instructions 10 mg on Tue, Thu; 7.5 mg all other days   Next INR check 9/17/2018    Indications   Long-term (current) use of anticoagulants [Z79.01] [Z79.01]         Your next Anticoagulation Clinic appointment(s)     Sep 17, 2018 10:30 AM CDT   Anticoagulation Visit with ALANA ANTI GARRISON   [facility] [place] ([facility] [place])    [address]  [place] [id number] [phone]              Contact Numbers     [facility]  Please call [phone] with any problems or questions regarding your therapy, or to cancel or reschedule your appointment.          July 2018 Details    Sun Mon Tue Wed Thu Fri Sat     1               2               3               4               5               6               7                 8               9               10               11               12               13               14                 15               16               17               18               19               20               21                 22               23               24               25               26               27               28                 29               30      7.5 mg   See details      31      10 mg              Date Details   07/30 This INR check               How to take your warfarin dose     To take:  7.5 mg Take 1.5 of the 5 mg tablets.    To take:  10 mg Take 2 of the 5 mg tablets.           August 2018 Details    Sun Mon Tue Wed Thu Fri Sat        1      7.5 mg         2      10 mg         3      7.5 mg         4      7.5 mg           5      7.5 mg         6      7.5 mg         7      10 mg         8      7.5 mg         9      10 mg         10       7.5 mg         11      7.5 mg           12      7.5 mg         13      7.5 mg         14      10 mg         15      7.5 mg         16      10 mg         17      7.5 mg         18      7.5 mg           19      7.5 mg         20      7.5 mg         21      10 mg         22      7.5 mg         23      10 mg         24      7.5 mg         25      7.5 mg           26      7.5 mg         27      7.5 mg         28      10 mg         29      7.5 mg         30      10 mg         31      7.5 mg           Date Details   No additional details            How to take your warfarin dose     To take:  7.5 mg Take 1.5 of the 5 mg tablets.    To take:  10 mg Take 2 of the 5 mg tablets.           September 2018 Details    Sun Mon Tue Wed Thu Fri Sat           1      7.5 mg           2      7.5 mg         3      7.5 mg         4      10 mg         5      7.5 mg         6      10 mg         7      7.5 mg         8      7.5 mg           9      7.5 mg         10      7.5 mg         11      10 mg         12      7.5 mg         13      10 mg         14      7.5 mg         15      7.5 mg           16      7.5 mg         17            18               19               20               21               22                 23               24               25               26               27               28               29                 30                      Date Details   No additional details    Date of next INR:  9/17/2018         How to take your warfarin dose     To take:  7.5 mg Take 1.5 of the 5 mg tablets.    To take:  10 mg Take 2 of the 5 mg tablets.

## 2018-07-30 NOTE — PROGRESS NOTES
ANTICOAGULATION FOLLOW-UP CLINIC VISIT    Patient Name:  Rigoberto Holguin  Date:  7/30/2018  Contact Type:  Face to Face    SUBJECTIVE:     Patient Findings     Positives No Problem Findings           OBJECTIVE    INR Protime   Date Value Ref Range Status   07/30/2018 2.6 (A) 0.86 - 1.14 Final       ASSESSMENT / PLAN  INR assessment THER    Recheck INR In: 7 WEEKS due to schedule   INR Location Clinic      Anticoagulation Summary as of 7/30/2018     INR goal 2.0-3.0   Today's INR 2.6   Warfarin maintenance plan 10 mg (5 mg x 2) on Tue, Thu; 7.5 mg (5 mg x 1.5) all other days   Full warfarin instructions 10 mg on Tue, Thu; 7.5 mg all other days   Weekly warfarin total 57.5 mg   No change documented Aparna Robison   Plan last modified Aparna Robison (5/31/2018)   Next INR check 9/17/2018   Target end date     Indications   Long-term (current) use of anticoagulants [Z79.01] [Z79.01]         Anticoagulation Episode Summary     INR check location     Preferred lab     Send INR reminders to [facility]    Comments       Anticoagulation Care Providers     Provider Role Specialty Phone number    Jamie Meraz PA-C Responsible Physician Assistant [phone]            See the Encounter Report to view Anticoagulation Flowsheet and Dosing Calendar (Go to Encounters tab in chart review, and find the Anticoagulation Therapy Visit)        APARNA ROBISON

## 2018-08-08 ENCOUNTER — PRE VISIT (OUTPATIENT)
Dept: NEUROSURGERY | Facility: CLINIC | Age: 78
End: 2018-08-08

## 2018-08-11 ENCOUNTER — PRE VISIT (OUTPATIENT)
Dept: ORTHOPEDICS | Facility: CLINIC | Age: 78
End: 2018-08-11

## 2018-08-11 NOTE — TELEPHONE ENCOUNTER
FUTURE VISIT INFORMATION      FUTURE VISIT INFORMATION:    Date: 8/20    Time: 8:50    Location: [facility]  REFERRAL INFORMATION:    Referring provider:  Demetrio Sagastume    Referring providers clinic:   [facility]    Reason for visit/diagnosis  Rt shoulder rotator cuff tear    RECORDS REQUESTED FROM:       Clinic name Comments Records Status Imaging Status                                         RECORDS STATUS      All records internal

## 2018-08-15 ENCOUNTER — TELEPHONE (OUTPATIENT)
Dept: ORTHOPEDICS | Facility: CLINIC | Age: 78
End: 2018-08-15

## 2018-08-15 NOTE — TELEPHONE ENCOUNTER
Patient requested a change in the date of his consultation with Dr Diaz.  Message left on his voicemail to call the clinic.  Appointment can be changed to 08/27/18 at 1030.

## 2018-08-27 ENCOUNTER — RADIANT APPOINTMENT (OUTPATIENT)
Dept: GENERAL RADIOLOGY | Facility: CLINIC | Age: 78
End: 2018-08-27
Attending: ORTHOPAEDIC SURGERY
Payer: MEDICARE

## 2018-08-27 ENCOUNTER — OFFICE VISIT (OUTPATIENT)
Dept: ORTHOPEDICS | Facility: CLINIC | Age: 78
End: 2018-08-27
Payer: MEDICARE

## 2018-08-27 VITALS — HEIGHT: 68 IN | BODY MASS INDEX: 30.69 KG/M2 | WEIGHT: 202.5 LBS

## 2018-08-27 DIAGNOSIS — G89.29 CHRONIC RIGHT SHOULDER PAIN: ICD-10-CM

## 2018-08-27 DIAGNOSIS — M25.511 CHRONIC RIGHT SHOULDER PAIN: Primary | ICD-10-CM

## 2018-08-27 DIAGNOSIS — G89.29 CHRONIC RIGHT SHOULDER PAIN: Primary | ICD-10-CM

## 2018-08-27 DIAGNOSIS — M25.511 CHRONIC RIGHT SHOULDER PAIN: ICD-10-CM

## 2018-08-27 NOTE — NURSING NOTE
"Reason For Visit:   Chief Complaint   Patient presents with     Consult     Right shoulder rotator cuff tear.  2nd opinion.         PCP: Jamie Meraz  Ref: Self    ?  No  Occupation none.  Currently working? No.  Work status?  Retired.    Date of surgery: 1087-0256 with   Type of surgery: Left shoulder rotator cuff repair.  Smoker: No  Request smoking cessation information: No    Right hand dominant    SANE score  Affected shoulder: Right  Right shoulder SANE: 85-90 with Cortizone shot  Left shoulder SANE: 100    Dynamometer  Forward Elevation:  R = 10 pounds,  L = 10 pounds  External Rotation:   R = 3 pounds,  L = 12 pounds    Ht 1.725 m (5' 7.91\")  Wt 91.9 kg (202 lb 8 oz)  BMI 30.87 kg/m2      Pain Assessment  Patient Currently in Pain: Ami Torres, ATC        "

## 2018-08-27 NOTE — LETTER
8/27/2018       RE: Rigoberto Holguin  [address] [address]     Dear Colleague,    Thank you for referring your patient, Rigoberto Holguin, to the [facility]. Please see a copy of my visit note below.    CHIEF COMPLAINT:  Right shoulder pain, known rotator cuff tear.      HISTORY OF PRESENT ILLNESS:  Mr. Holguin is a very pleasant 77-year-old male with history of pain and disability in his shoulder.  He was seen by Jamie Meraz, a physician's assistant, on 04/24/2018.  Mr. Meraz's note documents that he had pain; and ultimately, Mr. Meraz ordered an MRI arthrogram.  The indication for the arthrogram is not well documented.      This study showed a full thickness retracted rotator cuff tear with fatty atrophy.  Ultimately, he was seen by Dr. Demetrio Muniz.  Dr. Muniz gave him another injection into his shoulder on 07/19.  This note was hidden somewhere in Epic but was ultimately found by my excellent clinic staff despite the fact that it was not under the normal date of 07/19/2018 when notes were reviewed the first time.      Mr. Holguin notes since that injection he is able to golf.  He has no night pain.  He cannot raise the arm above shoulder height without pain.  He denies erythema or drainage.      PAST SURGICAL HISTORY:  Left rotator cuff repair in 2005, which alleviated his pain but left him with substantial weakness.      PAST MEDICAL HISTORY:  Reviewed in the note.      REVIEW OF SYSTEMS:  Reviewed and addended below.      FAMILY HISTORY:  Negative for dislocating shoulders, dislocating kneecaps, rotator cuff tears or rheumatoid arthritis.      IMAGING:  I reviewed his x-rays which show proximal migration of the humerus relative to the glenoid.  There is no evidence of glenohumeral arthrosis.  AC arthritis is noted.      His MRI scan shows a massive rotator cuff tear involving the supraspinatus, infraspinatus and teres minor.  He has fatty  atrophy of grade 3 of the supraspinatus and infraspinatus and grade 2 of the subscapularis.      PHYSICAL EXAMINATION:  On exam today, he is a well-developed male in obvious distress.  He articulates and communicates well with a normal affect.  His breathing is nonlabored.  His sensation is intact in the axillary nerve distribution.  He has warm and perfused hand with palpable radial pulse.  He has normal hair and sweat patterns without evidence of skin breakdown and no swelling or palpable lymphadenopathy in the shoulder and arm region.      Dynamometer testing is listed on the face sheet.      ASSESSMENT:  Right shoulder known massive irreparable rotator cuff tear.      PLAN:  I had a lengthy talk with Mr. Holguin and his wife today.  We talked at length about surgical and nonsurgical treatment of his shoulder.  I told him that he had an irreparable rotator cuff tear and that I thought his best bet would be to continue without any additional injections unless we were simply to treat a flare-up.  I told him that in my opinion, treatment of baseline shoulder pain with injections was not likely to yield durable relief.      I told him if his symptoms became worse - particularly if his pain became lifestyle-limiting or interfered with his sleep - he would be a candidate for reverse total shoulder arthroplasty.  I told him that in my patients who have reverse total shoulder arthroplasty I advise them that it is an 8-pound lifting restriction and encouraged them to avoid golf or other impact activities.  I told him there are no guarantees with an operation and he should consider his options and see me back in another year with repeat radiographs or sooner should any additional problems arise.         Again, thank you for allowing me to participate in the care of your patient.      Sincerely,    Jaiden Diaz MD

## 2018-08-27 NOTE — PROGRESS NOTES
CHIEF COMPLAINT:  Right shoulder pain, known rotator cuff tear.      HISTORY OF PRESENT ILLNESS:  Mr. Holguin is a very pleasant 77-year-old male with history of pain and disability in his shoulder.  He was seen by Jamie Meraz, a physician's assistant, on 04/24/2018.  Mr. Meraz's note documents that he had pain; and ultimately, Mr. Meraz ordered an MRI arthrogram.  The indication for the arthrogram is not well documented.      This study showed a full thickness retracted rotator cuff tear with fatty atrophy.  Ultimately, he was seen by Dr. Demetrio Muniz.  Dr. Muniz gave him another injection into his shoulder on 07/19.  This note was hidden somewhere in [facility] but was ultimately found by my excellent clinic staff despite the fact that it was not under the normal date of 07/19/2018 when notes were reviewed the first time.      Mr. Holguin notes since that injection he is able to golf.  He has no night pain.  He cannot raise the arm above shoulder height without pain.  He denies erythema or drainage.      PAST SURGICAL HISTORY:  Left rotator cuff repair in 2005, which alleviated his pain but left him with substantial weakness.      PAST MEDICAL HISTORY:  Reviewed in the note.      REVIEW OF SYSTEMS:  Reviewed and addended below.      FAMILY HISTORY:  Negative for dislocating shoulders, dislocating kneecaps, rotator cuff tears or rheumatoid arthritis.      IMAGING:  I reviewed his x-rays which show proximal migration of the humerus relative to the glenoid.  There is no evidence of glenohumeral arthrosis.  AC arthritis is noted.      His MRI scan shows a massive rotator cuff tear involving the supraspinatus, infraspinatus and teres minor.  He has fatty atrophy of grade 3 of the supraspinatus and infraspinatus and grade 2 of the subscapularis.      PHYSICAL EXAMINATION:  On exam today, he is a well-developed male in obvious distress.  He articulates and communicates well with a normal affect.  His breathing is nonlabored.  His  sensation is intact in the axillary nerve distribution.  He has warm and perfused hand with palpable radial pulse.  He has normal hair and sweat patterns without evidence of skin breakdown and no swelling or palpable lymphadenopathy in the shoulder and arm region.      Dynamometer testing is listed on the face sheet.      ASSESSMENT:  Right shoulder known massive irreparable rotator cuff tear.      PLAN:  I had a lengthy talk with Mr. Holguin and his wife today.  We talked at length about surgical and nonsurgical treatment of his shoulder.  I told him that he had an irreparable rotator cuff tear and that I thought his best bet would be to continue without any additional injections unless we were simply to treat a flare-up.  I told him that in my opinion, treatment of baseline shoulder pain with injections was not likely to yield durable relief.      I told him if his symptoms became worse - particularly if his pain became lifestyle-limiting or interfered with his sleep - he would be a candidate for reverse total shoulder arthroplasty.  I told him that in my patients who have reverse total shoulder arthroplasty I advise them that it is an 8-pound lifting restriction and encouraged them to avoid golf or other impact activities.  I told him there are no guarantees with an operation and he should consider his options and see me back in another year with repeat radiographs or sooner should any additional problems arise.

## 2018-08-27 NOTE — MR AVS SNAPSHOT
After Visit Summary   8/27/2018    Rigoberto Holguin    MRN: [id number]           Patient Information     Date Of Birth          1940        Visit Information        Provider Department      8/27/2018 10:30 AM Jaiden Diaz MD [facility]        Today's Diagnoses     Chronic right shoulder pain    -  1       Follow-ups after your visit        Your next 10 appointments already scheduled     Sep 10, 2018  2:00 PM CDT   (Arrive by 1:45 PM)   Return Visit with Anna Marie Cruz PA-C   [facility] ([facility])    [address]  [address] [facility] [address]   [phone]            Sep 17, 2018 10:30 AM CDT   Anticoagulation Visit with BE ANTI COAG   [facility]    [address] [address] [id number] [phone]            Dec 11, 2018  3:30 PM CST   Return Visit with Vickie Whalen MD   [facility] ([facility])    [address] [place] [id number] [phone]            Jun 25, 2019  9:00 AM CDT   Return Visit with Vincent Rucker MD, [facility] RO[facility]   [facility] ([address] [address] [phone]              Who to contact     Please call your clinic at [phone] to:    Ask questions about your health    Make or cancel appointments    Discuss your medicines    Learn about your test results    Speak to your doctor            Additional Information About Your Visit        MyChart Information     [employer] gives you secure access to your electronic health record. If you see a primary care provider, you can also send messages to your care team and make appointments. If you have questions, please call your primary care clinic.  If you do not have a primary care provider, please call [phone] and they will assist you.      [employer] [facility] is an electronic  "gateway that provides easy, online access to your medical records. With [facility], you can request a clinic appointment, read your test results, renew a prescription or communicate with your care team.     To access your existing account, please contact your [facility] or call [phone] for assistance.        Care EveryWhere ID     This is your Care EveryWhere ID. This could be used by other organizations to access your [place] medical records  [id number]        Your Vitals Were     Height BMI (Body Mass Index)                1.725 m (5' 7.91\") 30.87 kg/m2           Blood Pressure from Last 3 Encounters:   07/26/18 137/76   07/19/18 137/65   07/06/18 118/71    Weight from Last 3 Encounters:   08/27/18 91.9 kg (202 lb 8 oz)   07/19/18 96.1 kg (211 lb 12.8 oz)   07/06/18 94.3 kg (208 lb)               Primary Care Provider Office Phone # Fax #    Jamie Meraz PA-C [phone]       [address] [facility] [address]        Equal Access to Services     [facility]: Hadii aad ku hadasho Soomaali, waaxda luqadaha, qaybta kaalmada adeegyada, tacho robertsonin hayaan adeharini garcia . So [facility] [phone].    ATENCIÓN: Si habla español, tiene a thomas disposición servicios gratuitos de asistencia lingüística. [facility] [phone].    We comply with applicable federal civil rights laws and [place] laws. We do not discriminate on the basis of race, color, national origin, age, disability, sex, sexual orientation, or gender identity.            Thank you!     Thank you for choosing [facility]  for your care. Our goal is always to provide you with excellent care. Hearing back from our patients is one way we can continue to improve our services. Please take a few minutes to complete the written survey that you may receive in the mail after your visit with us. Thank you!             Your Updated Medication List - Protect others around you: Learn how to " safely use, store and throw away your medicines at www.disposemymeds.org.          This list is accurate as of 8/27/18 12:23 PM.  Always use your most recent med list.                   Brand Name Dispense Instructions for use Diagnosis    cholecalciferol 1000 UNIT tablet    vitamin D3    100 tablet    Take 1 tablet (1,000 Units) by mouth daily    Vitamin D deficiency       EYE VITAMINS PO      Take 1 tablet by mouth daily        DAILY MULTIVITAMIN PO      Take 1 chew tab by mouth daily        JUICE PLUS FIBRE PO           levothyroxine 112 MCG tablet    SYNTHROID/LEVOTHROID    90 tablet    Take 1 tablet (112 mcg) by mouth every morning (before breakfast)    Hypothyroidism due to Hashimoto's thyroiditis       LUMIGAN 0.01 % Soln   Generic drug:  bimatoprost       Actinic keratosis       melatonin 5 MG tablet      Take 10 mg by mouth At Bedtime Reported on 2/21/2017        metroNIDAZOLE 0.75 % cream    METROCREAM    60 g    every day, once daily to face    Rosacea       sildenafil 20 MG tablet    REVATIO    30 tablet    Take 2-5 tabs daily prn    Erectile dysfunction, unspecified erectile dysfunction type       timolol 0.5 % ophthalmic solution    TIMOPTIC      Actinic keratosis       TUMS 500 MG chewable tablet   Generic drug:  calcium carbonate      Take 1 chew tab by mouth daily as needed Reported on 4/18/2017        warfarin 5 MG tablet    COUMADIN    135 tablet    10 mg(2 tabs) on Tue & Thu; 7.5 mg (1.5 tabs) all other days or as directed    History of DVT of lower extremity, Chronic anticoagulation

## 2018-09-10 ENCOUNTER — RADIANT APPOINTMENT (OUTPATIENT)
Dept: GENERAL RADIOLOGY | Facility: CLINIC | Age: 78
End: 2018-09-10
Attending: PHYSICIAN ASSISTANT
Payer: MEDICARE

## 2018-09-10 ENCOUNTER — OFFICE VISIT (OUTPATIENT)
Dept: NEUROSURGERY | Facility: CLINIC | Age: 78
End: 2018-09-10
Payer: MEDICARE

## 2018-09-10 VITALS
BODY MASS INDEX: 31.69 KG/M2 | RESPIRATION RATE: 18 BRPM | WEIGHT: 209.1 LBS | OXYGEN SATURATION: 97 % | HEART RATE: 68 BPM | TEMPERATURE: 97 F | HEIGHT: 68 IN | DIASTOLIC BLOOD PRESSURE: 74 MMHG | SYSTOLIC BLOOD PRESSURE: 116 MMHG

## 2018-09-10 DIAGNOSIS — M47.26 OTHER SPONDYLOSIS WITH RADICULOPATHY, LUMBAR REGION: Primary | ICD-10-CM

## 2018-09-10 ASSESSMENT — PAIN SCALES - GENERAL: PAINLEVEL: NO PAIN (0)

## 2018-09-10 NOTE — PROGRESS NOTES
NEUROSURGERY  DATE OF VISIT:  9/10/18    Date of Surgery 3/2/17  Dr Mclaughlin  DIAGNOSIS: Left-sided L5 radiculopathy. .     PROCEDURES PERFORMED:   1. Left-sided L4-5 hemilaminectomy and foraminotomy.  2. Left-sided L5-S1 hemilaminectomy and foraminotomy.       INDICATIONS FOR PROCEDURE and HPI   Mr. Rigoberto Holguin is a 76-year-old gentleman who presented to the Neurosurgery Clinic at the [facility] with concerns of low back pain with radicular symptoms primarily which was thought to be an L5 distribution. On imaging, he was found to have significant stenosis at the L4-5 and L5-S1 foramen, for which the above-mentioned procedure was recommended. The procedure itself was without incident.  He recovered well and was discharged home  in good condition. By 3 months post op his surgical pain was resolved but he noted not changed in pre op symptoms.  He returns today with complaints of ongoing low back pain, centered in the mid to low lumbar spine, worse when he is standing and walking, eases almost completely when he sits or lies down.  He can only walk about half a block before he has to stop.  The pain has become severe enough that it is impacting his life dramatically.  He cannot go shopping with his wife Clara, cannot do most social things, in fact his life is truncated to the point that just about the only things he can do or things allow him to sit, he can still golf, but only if he uses a cart.  He has little to no leg pain.  His left leg is still weak and numb, that is not much changed.  No loss of bowel or bladder control.  He self presented to neurosurgery for evaluation.  No imaging has been performed for this visit..    Prior to his lumbar decompression he had been seen by an outside neurosurgeon who had recommended a lumbar decompression and fusion to address both his leg issues as well as his back pain.  He had declined to the fusion at the time as being too much surgery, and too aggressive.  Now he  wonders if he should consider that.    STATUS REPORT  Patient Supplied Answers To the [facility] Pain Questionnaire  [facility] Pain -  Patient Entered Questionnaire/Answers 9/10/2018   What number best describes your pain right now:  0 = No pain  to  10 = Worst pain imaginable 2   How would you describe the pain? burning   Which of the following worsen your pain? standing, walking   Which of the following improve or reduce your pain?  lying down, sitting   What number best describes your average pain for the past week:  0 = No pain  to  10 = Worst pain imaginable 3   What number best describes your LOWEST pain in past 24 hours:  0 = No pain  to  10 = Worst pain imaginable 0   What number best describes your WORST pain in past 24 hours:  0 = No pain  to  10 = Worst pain imaginable 8   When is your pain worst? AM, PM   What non-medicine treatments have you already had for your pain? spine injections (shots), surgery   Have you tried treating your pain with medication?  No   Are you currently taking medications for your pain? No       Current Outpatient Prescriptions:      calcium carbonate (TUMS) 500 MG chewable tablet, Take 1 chew tab by mouth daily as needed Reported on 4/18/2017, Disp: , Rfl:      cholecalciferol (VITAMIN D) 1000 UNIT tablet, Take 1 tablet (1,000 Units) by mouth daily, Disp: 100 tablet, Rfl: 0     levothyroxine (SYNTHROID/LEVOTHROID) 112 MCG tablet, Take 1 tablet (112 mcg) by mouth every morning (before breakfast), Disp: 90 tablet, Rfl: 3     LUMIGAN 0.01 % SOLN ophthalmic solution, , Disp: , Rfl:      melatonin 5 MG tablet, Take 10 mg by mouth At Bedtime Reported on 2/21/2017, Disp: , Rfl:      metroNIDAZOLE (METROCREAM) 0.75 % cream, every day, once daily to face, Disp: 60 g, Rfl: 3     Multiple Vitamin (DAILY MULTIVITAMIN PO), Take 1 chew tab by mouth daily , Disp: , Rfl:      Multiple Vitamins-Minerals (EYE VITAMINS PO), Take 1 tablet by mouth daily, Disp: , Rfl:      Nutritional Supplements (JUICE PLUS FIBRE  "PO), , Disp: , Rfl:      sildenafil (REVATIO) 20 MG tablet, Take 2-5 tabs daily prn, Disp: 30 tablet, Rfl: 11     timolol (TIMOPTIC) 0.5 % ophthalmic solution, , Disp: , Rfl:      warfarin (COUMADIN) 5 MG tablet, 10 mg(2 tabs) on Tue & Thu; 7.5 mg (1.5 tabs) all other days or as directed, Disp: 135 tablet, Rfl: 0    Current Facility-Administered Medications:      bupivacaine (MARCAINE) 0.25 % injection 3 mL, 3 mL, , , Demetrio Sagastume MD, 3 mL at 07/19/18 1421     lidocaine 1 % injection 4 mL, 4 mL, , , Demetrio Sagastume MD, 4 mL at 07/19/18 1421     triamcinolone acetonide (KENALOG-40) injection 80 mg, 80 mg, , , Demetrio Sagastume MD, 80 mg at 07/19/18 1421  No Known Allergies  PMH, SOC HIST, FAM HIST, PROBLEM LIST:  All reviewed in EPIC.    OBJECTIVE:  /74  Pulse 68  Temp 97  F (36.1  C) (Oral)  Resp 18  Ht 1.727 m (5' 8\")  Wt 94.8 kg (209 lb 1.6 oz)  SpO2 97%  BMI 31.79 kg/m2    Imaging:  None new.  Standing lumbar spine films from 2009  I reviewed these and I see multiple levels of spondylosis, and L3 superior endplate fracture, multiple levels of disc space height loss.  Reversal of lumbar lordosis.  It is assumed these are standing images, it is not marked if they are.  MRI lumbar 7/21/16  L2-L3:  Severe degenerative disc disease and grade 1 retrolisthesis.  Mild spinal canal stenosis. Moderate right foraminal stenosis mild  left foraminal stenosis. Bilateral mild degenerative facet  arthropathy.     L3-L4: Moderate to severe degenerative disc disease more on the right  than on the left. Circumferential disc bulge. Mild degenerative facet  arthropathy. Moderate right foraminal stenosis.     L4-L5:  Severe degenerative disc disease with marked loss of disc  height, circumferential disc bulge and vertebral endplate osteophytes.  Severe degenerative facet arthropathy on the left and moderate  degenerative facet arthropathy on the right with hypertrophy of the  adjacent ligamentum flavum. Findings " cause severe spinal canal  stenosis. There is tortuosity of nerve roots an intrathecal blood  vessels inferior to this level. Moderate right foraminal stenosis and  severe left foraminal stenosis.     L5-S1: Bilateral severe degenerative facet arthropathy with  hypertrophied ligamentum flavum causing side-to-side mild spinal canal  stenosis. Moderate left foraminal stenosis and mild right foraminal  stenosis.  EXAM:  Well developed well nourished male found seated comfortably in exam chair.  No apparent distress. He is accompanied by wife.  A&O X3.  Mood and affect WNL. Language and fund of knowledge intact.  Is able to sit and rise independently. He is able to rise up to tip-toe with both feet, but unable to sustain with just the left.  I'd grade his gastroc at 4/5 (3/5 preop).  All other muscle groups 5/5.  He has a nicely healed incision.  Spinous processes of approximately L3 is gibbus, and prominent.    Assessment/Plan:  1. Other spondylosis with radiculopathy, lumbar region      Rigoberto Holguin is a little over a year status post two-level decompression for left leg weakness.  Unfortunately it did not improve over time, and now his back pain is getting worse.  I think he is continuing to collapse at his old L3 fracture, or possibly moving.  I am going to get dynamic images, standing full-length spine films, lumbar films, and MRI and have him return.  I have not ordered a CT scan yet but will if I think he might need a multilevel fusion.    I answered his questions, questions indicated good understanding of situation, he knows he can call if anything else comes up.  I will see him once all the images are completed       We appreciate the opportunity to be of service in the care of this pleasant patient.  Please do call if there is anything more we can do    Anna Marie Cruz PA-C  [facility]  Department of Neurosurgery  Phone: [phone]  Fax: [phone]

## 2018-09-10 NOTE — LETTER
9/10/2018       RE: Rigoberto Holguin  [id number] [facility] [id number]     Dear Colleague,    Thank you for referring your patient, Rigoberto Holguin, to the [facility] NEUROSURGERY at [facility]. Please see a copy of my visit note below.      NEUROSURGERY  DATE OF VISIT:  9/10/18    Date of Surgery 3/2/17  Dr Mclaughlin  DIAGNOSIS: Left-sided L5 radiculopathy. .     PROCEDURES PERFORMED:   1. Left-sided L4-5 hemilaminectomy and foraminotomy.  2. Left-sided L5-S1 hemilaminectomy and foraminotomy.       INDICATIONS FOR PROCEDURE and HPI   Mr. Rigoberto Holguin is a 76-year-old gentleman who presented to the Neurosurgery Clinic at the [facility] with concerns of low back pain with radicular symptoms primarily which was thought to be an L5 distribution. On imaging, he was found to have significant stenosis at the L4-5 and L5-S1 foramen, for which the above-mentioned procedure was recommended. The procedure itself was without incident.  He recovered well and was discharged home  in good condition. By 3 months post op his surgical pain was resolved but he noted not changed in pre op symptoms.  He returns today with complaints of ongoing low back pain, centered in the mid to low lumbar spine, worse when he is standing and walking, eases almost completely when he sits or lies down.  He can only walk about half a block before he has to stop.  The pain has become severe enough that it is impacting his life dramatically.  He cannot go shopping with his wife Clara, cannot do most social things, in fact his life is truncated to the point that just about the only things he can do or things allow him to sit, he can still golf, but only if he uses a cart.  He has little to no leg pain.  His left leg is still weak and numb, that is not much changed.  No loss of bowel or bladder control.  He self presented to neurosurgery for evaluation.  No imaging has been performed for this visit..    Prior to his  lumbar decompression he had been seen by an outside neurosurgeon who had recommended a lumbar decompression and fusion to address both his leg issues as well as his back pain.  He had declined to the fusion at the time as being too much surgery, and too aggressive.  Now he wonders if he should consider that.    STATUS REPORT  Patient Supplied Answers To the [facility] Pain Questionnaire  [facility] Pain -  Patient Entered Questionnaire/Answers 9/10/2018   What number best describes your pain right now:  0 = No pain  to  10 = Worst pain imaginable 2   How would you describe the pain? burning   Which of the following worsen your pain? standing, walking   Which of the following improve or reduce your pain?  lying down, sitting   What number best describes your average pain for the past week:  0 = No pain  to  10 = Worst pain imaginable 3   What number best describes your LOWEST pain in past 24 hours:  0 = No pain  to  10 = Worst pain imaginable 0   What number best describes your WORST pain in past 24 hours:  0 = No pain  to  10 = Worst pain imaginable 8   When is your pain worst? AM, PM   What non-medicine treatments have you already had for your pain? spine injections (shots), surgery   Have you tried treating your pain with medication?  No   Are you currently taking medications for your pain? No       Current Outpatient Prescriptions:      calcium carbonate (TUMS) 500 MG chewable tablet, Take 1 chew tab by mouth daily as needed Reported on 4/18/2017, Disp: , Rfl:      cholecalciferol (VITAMIN D) 1000 UNIT tablet, Take 1 tablet (1,000 Units) by mouth daily, Disp: 100 tablet, Rfl: 0     levothyroxine (SYNTHROID/LEVOTHROID) 112 MCG tablet, Take 1 tablet (112 mcg) by mouth every morning (before breakfast), Disp: 90 tablet, Rfl: 3     LUMIGAN 0.01 % SOLN ophthalmic solution, , Disp: , Rfl:      melatonin 5 MG tablet, Take 10 mg by mouth At Bedtime Reported on 2/21/2017, Disp: , Rfl:      metroNIDAZOLE (METROCREAM) 0.75 % cream, every  "day, once daily to face, Disp: 60 g, Rfl: 3     Multiple Vitamin (DAILY MULTIVITAMIN PO), Take 1 chew tab by mouth daily , Disp: , Rfl:      Multiple Vitamins-Minerals (EYE VITAMINS PO), Take 1 tablet by mouth daily, Disp: , Rfl:      Nutritional Supplements (JUICE PLUS FIBRE PO), , Disp: , Rfl:      sildenafil (REVATIO) 20 MG tablet, Take 2-5 tabs daily prn, Disp: 30 tablet, Rfl: 11     timolol (TIMOPTIC) 0.5 % ophthalmic solution, , Disp: , Rfl:      warfarin (COUMADIN) 5 MG tablet, 10 mg(2 tabs) on Tue & Thu; 7.5 mg (1.5 tabs) all other days or as directed, Disp: 135 tablet, Rfl: 0    Current Facility-Administered Medications:      bupivacaine (MARCAINE) 0.25 % injection 3 mL, 3 mL, , , Demetrio Sagastume MD, 3 mL at 07/19/18 1421     lidocaine 1 % injection 4 mL, 4 mL, , , Demetrio Sagastume MD, 4 mL at 07/19/18 1421     triamcinolone acetonide (KENALOG-40) injection 80 mg, 80 mg, , , Demetrio Sagastume MD, 80 mg at 07/19/18 1421  No Known Allergies  PMH, SOC HIST, FAM HIST, PROBLEM LIST:  All reviewed in EPIC.    OBJECTIVE:  /74  Pulse 68  Temp 97  F (36.1  C) (Oral)  Resp 18  Ht 1.727 m (5' 8\")  Wt 94.8 kg (209 lb 1.6 oz)  SpO2 97%  BMI 31.79 kg/m2    Imaging:  None new.  Standing lumbar spine films from 2009  I reviewed these and I see multiple levels of spondylosis, and L3 superior endplate fracture, multiple levels of disc space height loss.  Reversal of lumbar lordosis.  It is assumed these are standing images, it is not marked if they are.  MRI lumbar 7/21/16  L2-L3:  Severe degenerative disc disease and grade 1 retrolisthesis.  Mild spinal canal stenosis. Moderate right foraminal stenosis mild  left foraminal stenosis. Bilateral mild degenerative facet  arthropathy.     L3-L4: Moderate to severe degenerative disc disease more on the right  than on the left. Circumferential disc bulge. Mild degenerative facet  arthropathy. Moderate right foraminal stenosis.     L4-L5:  Severe degenerative " disc disease with marked loss of disc  height, circumferential disc bulge and vertebral endplate osteophytes.  Severe degenerative facet arthropathy on the left and moderate  degenerative facet arthropathy on the right with hypertrophy of the  adjacent ligamentum flavum. Findings cause severe spinal canal  stenosis. There is tortuosity of nerve roots an intrathecal blood  vessels inferior to this level. Moderate right foraminal stenosis and  severe left foraminal stenosis.     L5-S1: Bilateral severe degenerative facet arthropathy with  hypertrophied ligamentum flavum causing side-to-side mild spinal canal  stenosis. Moderate left foraminal stenosis and mild right foraminal  stenosis.  EXAM:  Well developed well nourished male found seated comfortably in exam chair.  No apparent distress. He is accompanied by wife.  A&O X3.  Mood and affect WNL. Language and fund of knowledge intact.  Is able to sit and rise independently. He is able to rise up to tip-toe with both feet, but unable to sustain with just the left.  I'd grade his gastroc at 4/5 (3/5 preop).  All other muscle groups 5/5.  He has a nicely healed incision.  Spinous processes of approximately L3 is gibbus, and prominent.    Assessment/Plan:  1. Other spondylosis with radiculopathy, lumbar region      Rigoberto Holguin is a little over a year status post two-level decompression for left leg weakness.  Unfortunately it did not improve over time, and now his back pain is getting worse.  I think he is continuing to collapse at his old L3 fracture, or possibly moving.  I am going to get dynamic images, standing full-length spine films, lumbar films, and MRI and have him return.  I have not ordered a CT scan yet but will if I think he might need a multilevel fusion.    I answered his questions, questions indicated good understanding of situation, he knows he can call if anything else comes up.  I will see him once all the images are completed       We appreciate the  opportunity to be of service in the care of this pleasant patient.  Please do call if there is anything more we can do      Again, thank you for allowing me to participate in the care of your patient.      Sincerely,    Anna Marie Cruz PA-C

## 2018-09-10 NOTE — PATIENT INSTRUCTIONS
At your visit today, we discussed your risk for falls and preventive options.    Fall Prevention  Falls often occur due to slipping, tripping or losing your balance. Millions of people fall every year and injure themselves. Here are ways to reduce your risk of falling again.    Think about your fall, was there anything that caused your fall that can be fixed, removed, or replaced?    Make your home safe by keeping walkways clear of objects you may trip over.    Use non-slip pads under rugs. Do not use area rugs or small throw rugs.    Use non-slip mats in bathtubs and showers.    Install handrails and lights on staircases.    Do not walk in poorly lit areas.    Do not stand on chairs or wobbly ladders.    Use caution when reaching overhead or looking upward. This position can cause a loss of balance.    Be sure your shoes fit properly, have non-slip bottoms and are in good condition.     Wear shoes both inside and out. Avoid going barefoot or wearing slippers.    Be cautious when going up and down stairs, curbs, and when walking on uneven sidewalks.    If your balance is poor, consider using a cane or walker.    If your fall was related to alcohol use, stop or limit alcohol intake.     If your fall was related to use of sleeping medicines, talk to your doctor about this. You may need to reduce your dosage at bedtime if you awaken during the night to go to the bathroom.      To reduce the need for nighttime bathroom trips:  ? Avoid drinking fluids for several hours before going to bed  ? Empty your bladder before going to bed  ? Men can keep a urinal at the bedside    Stay as active as you can. Balance, flexibility, strength, and endurance all come from exercise. They all play a role in preventing falls. Ask your healthcare provider which types of activity are right for you.    Get your vision checked on a regular basis.    If you have pets, know where they are before you stand up or walk so you don't trip over  them.    Use night lights.  Date Last Reviewed: 11/5/2015 2000-2017 [employer]. [address] [place] [address], [place] [address]. All rights reserved. This information is not intended as a substitute for professional medical care. Always follow your healthcare professional's instructions.

## 2018-09-10 NOTE — NURSING NOTE
Chief Complaint   Patient presents with     RECHECK     P RETURN PATIENT VISIT FOR CHRONIC LUMBAR BACK PAIN        Savanna So MA

## 2018-09-10 NOTE — MR AVS SNAPSHOT
After Visit Summary   9/10/2018    Rigoberto Holguin    MRN: [id number]           Patient Information     Date Of Birth          1940        Visit Information        Provider Department      9/10/2018 2:00 PM Anna Marie Cruz PA-C [facility] Neurosurgery        Today's Diagnoses     Other spondylosis with radiculopathy, lumbar region    -  1      Care Instructions      At your visit today, we discussed your risk for falls and preventive options.    Fall Prevention  Falls often occur due to slipping, tripping or losing your balance. Millions of people fall every year and injure themselves. Here are ways to reduce your risk of falling again.    Think about your fall, was there anything that caused your fall that can be fixed, removed, or replaced?    Make your home safe by keeping walkways clear of objects you may trip over.    Use non-slip pads under rugs. Do not use area rugs or small throw rugs.    Use non-slip mats in bathtubs and showers.    Install handrails and lights on staircases.    Do not walk in poorly lit areas.    Do not stand on chairs or wobbly ladders.    Use caution when reaching overhead or looking upward. This position can cause a loss of balance.    Be sure your shoes fit properly, have non-slip bottoms and are in good condition.     Wear shoes both inside and out. Avoid going barefoot or wearing slippers.    Be cautious when going up and down stairs, curbs, and when walking on uneven sidewalks.    If your balance is poor, consider using a cane or walker.    If your fall was related to alcohol use, stop or limit alcohol intake.     If your fall was related to use of sleeping medicines, talk to your doctor about this. You may need to reduce your dosage at bedtime if you awaken during the night to go to the bathroom.      To reduce the need for nighttime bathroom trips:  ? Avoid drinking fluids for several hours before going to bed  ? Empty your bladder before going to bed  ? Men can  keep a urinal at the bedside    Stay as active as you can. Balance, flexibility, strength, and endurance all come from exercise. They all play a role in preventing falls. Ask your healthcare provider which types of activity are right for you.    Get your vision checked on a regular basis.    If you have pets, know where they are before you stand up or walk so you don't trip over them.    Use night lights.  Date Last Reviewed: 11/5/2015 2000-2017 [employer]. [address] [place] [address]. All rights reserved. This information is not intended as a substitute for professional medical care. Always follow your healthcare professional's instructions.                Follow-ups after your visit        Your next 10 appointments already scheduled     Sep 11, 2018  4:15 PM CDT   (Arrive by 4:00 PM)   MR LUMBAR SPINE W/O CONTRAST with BEMR1   [facility] ([facility])    [address] [id number] [phone]           Take your medicines as usual, unless your doctor tells you not to. Bring a list of your current medicines to your exam (including vitamins, minerals and over-the-counter drugs). Also bring the results of similar scans you may have had.  Please remove any body piercings and hair extensions before you arrive.  Follow your doctor s orders. If you do not, we may have to postpone your exam.  You may or may not receive IV contrast for this exam pending the discretion of the Radiologist.  You do not need to do anything special to prepare.  The MRI machine uses a strong magnet. Please wear clothes without metal (snaps, zippers). A sweatsuit works well, or we may give you a hospital gown.   **IMPORTANT** THE INSTRUCTIONS BELOW ARE ONLY FOR THOSE PATIENTS WHO HAVE BEEN PRESCRIBED SEDATION OR GENERAL ANESTHESIA DURING THEIR MRI PROCEDURE:  IF YOUR DOCTOR PRESCRIBED ORAL SEDATION (take medicine to help you relax during your exam):   You must get  the medicine from your doctor (oral medication) before you arrive. Bring the medicine to the exam. Do not take it at home. You ll be told when to take it upon arriving for your exam.   Arrive one hour early. Bring someone who can take you home after the test. Your medicine will make you sleepy. After the exam, you may not drive, take a bus or take a taxi by yourself.  IF YOUR DOCTOR PRESCRIBED IV SEDATION:   Arrive one hour early. Bring someone who can take you home after the test. Your medicine will make you sleepy. After the exam, you may not drive, take a bus or take a taxi by yourself.   No eating 6 hours before your exam. You may have clear liquids up until 4 hours before your exam. (Clear liquids include water, clear tea, black coffee and fruit juice without pulp.)  IF YOUR DOCTOR PRESCRIBED ANESTHESIA (be asleep for your exam):   Arrive 1 1/2 hours early. Bring someone who can take you home after the test. You may not drive, take a bus or take a taxi by yourself.   No eating 8 hours before your exam. You may have clear liquids up until 4 hours before your exam. (Clear liquids include water, clear tea, black coffee and fruit juice without pulp.)   You will spend four to five hours in the recovery room.  Please call the Imaging Department at your exam site with any questions.            Sep 17, 2018 10:30 AM CDT   Anticoagulation Visit with BE ANTI COAG   [facility]    [address] [place] [id number] [phone]            Sep 18, 2018  2:00 PM CDT   (Arrive by 1:45 PM)   Return Visit with Anna Marie Cruz PA-C   [facility] ([facility])    [address]  3rd Floor  [facility] [address] [phone]            Dec 11, 2018  3:30 PM CST   Return Visit with Vickie Whalen MD   [facility])    [address] [place] [id number] [phone]          "   Jun 25, 2019  9:00 AM CDT   Return Visit with Vincent Rucker MD, [place] PROC ROOM   [facility]    [address]  [place] [id number] [phone]              Future tests that were ordered for you today     Open Future Orders        Priority Expected Expires Ordered    MRI Lumbar spine without gadolinium [KXZ851] Routine  9/10/2019 9/10/2018            Who to contact     Please call your clinic at [phone] to:    Ask questions about your health    Make or cancel appointments    Discuss your medicines    Learn about your test results    Speak to your doctor            Additional Information About Your Visit        [employer] Information     [employer] gives you secure access to your electronic health record. If you see a primary care provider, you can also send messages to your care team and make appointments. If you have questions, please call your primary care clinic.  If you do not have a primary care provider, please call [phone] and they will assist you.      [employer] is an electronic gateway that provides easy, online access to your medical records. With [employer], you can request a clinic appointment, read your test results, renew a prescription or communicate with your care team.     To access your existing account, please contact your [facility] or call [phone] for assistance.        Care EveryWhere ID     This is your Care EveryWhere ID. This could be used by other organizations to access your [place] medical records  [id number]        Your Vitals Were     Pulse Temperature Respirations Height Pulse Oximetry BMI (Body Mass Index)    68 97  F (36.1  C) (Oral) 18 1.727 m (5' 8\") 97% 31.79 kg/m2       Blood Pressure from Last 3 Encounters:   09/10/18 116/74   07/26/18 137/76   07/19/18 137/65    Weight from Last 3 Encounters:   09/10/18 94.8 kg (209 lb 1.6 oz)   08/27/18 91.9 kg (202 lb 8 oz)   07/19/18 " 96.1 kg (211 lb 12.8 oz)              We Performed the Following     X-ray Lumbar spine G/E 4 views (AP, lateral, flexion, extension - standing views preferred) [IMG69]     X-ray Spine complete (Cervicothoracolumbar AP and lateral - standing views preferred) [[id number]]        Primary Care Provider Office Phone # Fax #    Jamie Samuelconner Meraz PA-C [phone]       [address]  [place] [address]        Equal Access to Services     [facility]: Hadii aad ku hadasho Soomaali, waaxda luqadaha, qaybta kaalmada adeegyada, waxay idiin hayaan alessandro garcia . So [facility] [phone].    ATENCIÓN: Si habla español, tiene a thomas disposición servicios gratuitos de asistencia lingüística. [facility] [phone].    We comply with applicable federal civil rights laws and [place] laws. We do not discriminate on the basis of race, color, national origin, age, disability, sex, sexual orientation, or gender identity.            Thank you!     Thank you for choosing [facility]  for your care. Our goal is always to provide you with excellent care. Hearing back from our patients is one way we can continue to improve our services. Please take a few minutes to complete the written survey that you may receive in the mail after your visit with us. Thank you!             Your Updated Medication List - Protect others around you: Learn how to safely use, store and throw away your medicines at www.disposemymeds.org.          This list is accurate as of 9/10/18  3:16 PM.  Always use your most recent med list.                   Brand Name Dispense Instructions for use Diagnosis    cholecalciferol 1000 UNIT tablet    vitamin D3    100 tablet    Take 1 tablet (1,000 Units) by mouth daily    Vitamin D deficiency       EYE VITAMINS PO      Take 1 tablet by mouth daily        DAILY MULTIVITAMIN PO      Take 1 chew tab by mouth daily        JUICE PLUS FIBRE PO           levothyroxine 112 MCG tablet     SYNTHROID/LEVOTHROID    90 tablet    Take 1 tablet (112 mcg) by mouth every morning (before breakfast)    Hypothyroidism due to Hashimoto's thyroiditis       LUMIGAN 0.01 % Soln   Generic drug:  bimatoprost       Actinic keratosis       melatonin 5 MG tablet      Take 10 mg by mouth At Bedtime Reported on 2/21/2017        metroNIDAZOLE 0.75 % cream    METROCREAM    60 g    every day, once daily to face    Rosacea       sildenafil 20 MG tablet    REVATIO    30 tablet    Take 2-5 tabs daily prn    Erectile dysfunction, unspecified erectile dysfunction type       timolol 0.5 % ophthalmic solution    TIMOPTIC      Actinic keratosis       TUMS 500 MG chewable tablet   Generic drug:  calcium carbonate      Take 1 chew tab by mouth daily as needed Reported on 4/18/2017        warfarin 5 MG tablet    COUMADIN    135 tablet    10 mg(2 tabs) on Tue & Thu; 7.5 mg (1.5 tabs) all other days or as directed    History of DVT of lower extremity, Chronic anticoagulation

## 2018-09-11 ENCOUNTER — RADIANT APPOINTMENT (OUTPATIENT)
Dept: MRI IMAGING | Facility: CLINIC | Age: 78
End: 2018-09-11
Attending: PHYSICIAN ASSISTANT
Payer: MEDICARE

## 2018-09-11 DIAGNOSIS — M47.26 OTHER SPONDYLOSIS WITH RADICULOPATHY, LUMBAR REGION: ICD-10-CM

## 2018-09-11 LAB
CREAT BLD-MCNC: 0.8 MG/DL (ref 0.5–1.2)
GFR SERPL CREATININE-BSD FRML MDRD: 86 ML/MIN/{1.73_M2}
GFRB: 94

## 2018-09-11 PROCEDURE — 72158 MRI LUMBAR SPINE W/O & W/DYE: CPT | Mod: TC

## 2018-09-11 PROCEDURE — A9585 GADOBUTROL INJECTION: HCPCS

## 2018-09-11 PROCEDURE — 82565 ASSAY OF CREATININE: CPT

## 2018-09-11 RX ORDER — GADOBUTROL 604.72 MG/ML
10 INJECTION INTRAVENOUS ONCE
Status: COMPLETED | OUTPATIENT
Start: 2018-09-11 | End: 2018-09-11

## 2018-09-11 RX ADMIN — GADOBUTROL 10 ML: 604.72 INJECTION INTRAVENOUS at 16:43

## 2018-09-11 ASSESSMENT — PATIENT HEALTH QUESTIONNAIRE - PHQ9: SUM OF ALL RESPONSES TO PHQ QUESTIONS 1-9: 0

## 2018-09-17 ENCOUNTER — ANTICOAGULATION THERAPY VISIT (OUTPATIENT)
Dept: NURSING | Facility: CLINIC | Age: 78
End: 2018-09-17
Payer: MEDICARE

## 2018-09-17 DIAGNOSIS — Z79.01 LONG-TERM (CURRENT) USE OF ANTICOAGULANTS: ICD-10-CM

## 2018-09-17 LAB — INR POINT OF CARE: 2.2 (ref 0.86–1.14)

## 2018-09-17 PROCEDURE — 99207 ZZC NO CHARGE NURSE ONLY: CPT

## 2018-09-17 PROCEDURE — 36416 COLLJ CAPILLARY BLOOD SPEC: CPT

## 2018-09-17 PROCEDURE — 85610 PROTHROMBIN TIME: CPT | Mod: QW

## 2018-09-17 NOTE — MR AVS SNAPSHOT
Rigoberto VENTURA Ezio   9/17/2018 10:30 AM   Anticoagulation Therapy Visit    Description:  77 year old male   Provider:  ALANA ANTI COAG   Department:  Be Nurse           INR as of 9/17/2018     Today's INR 2.2      Anticoagulation Summary as of 9/17/2018     INR goal 2.0-3.0   Today's INR 2.2   Full warfarin instructions 10 mg on Tue, Thu; 7.5 mg all other days   Next INR check 10/29/2018    Indications   Long-term (current) use of anticoagulants [Z79.01] [Z79.01]         Your next Anticoagulation Clinic appointment(s)     Sep 17, 2018 10:30 AM CDT   Anticoagulation Visit with BE ANTI COAG   [facility] [place] ([facility]    [address]  [place] [address] [phone]            Oct 29, 2018  8:30 AM CDT   Anticoagulation Visit with BE ANTI COAG   [facility] [place] ([facility]    [address]  [place] [address]   [phone]              Contact Numbers     [facility]  Please call [phone] with any problems or questions regarding your therapy, or to cancel or reschedule your appointment.          September 2018 Details    Sun Mon Tue Wed Thu Fri Sat           1                 2               3               4               5               6               7               8                 9               10               11               12               13               14               15                 16               17      7.5 mg   See details      18      10 mg         19      7.5 mg         20      10 mg         21      7.5 mg         22      7.5 mg           23      7.5 mg         24      7.5 mg         25      10 mg         26      7.5 mg         27      10 mg         28      7.5 mg         29      7.5 mg           30      7.5 mg                Date Details   09/17 This INR check               How to take your warfarin dose     To take:  7.5 mg Take 1.5 of the 5 mg tablets.    To take:  10 mg Take 2 of the 5 mg tablets.            October 2018 Details    Sun Mon Tue Wed Thu Fri Sat      1      7.5 mg         2      10 mg         3      7.5 mg         4      10 mg         5      7.5 mg         6      7.5 mg           7      7.5 mg         8      7.5 mg         9      10 mg         10      7.5 mg         11      10 mg         12      7.5 mg         13      7.5 mg           14      7.5 mg         15      7.5 mg         16      10 mg         17      7.5 mg         18      10 mg         19      7.5 mg         20      7.5 mg           21      7.5 mg         22      7.5 mg         23      10 mg         24      7.5 mg         25      10 mg         26      7.5 mg         27      7.5 mg           28      7.5 mg         29            30               31                   Date Details   No additional details    Date of next INR:  10/29/2018         How to take your warfarin dose     To take:  7.5 mg Take 1.5 of the 5 mg tablets.    To take:  10 mg Take 2 of the 5 mg tablets.

## 2018-09-17 NOTE — PROGRESS NOTES
ANTICOAGULATION FOLLOW-UP CLINIC VISIT    Patient Name:  Rigoberto Holguin  Date:  9/17/2018  Contact Type:  Face to Face    SUBJECTIVE:     Patient Findings     Positives No Problem Findings           OBJECTIVE    INR Protime   Date Value Ref Range Status   09/17/2018 2.2 (A) 0.86 - 1.14 Final       ASSESSMENT / PLAN  INR assessment THER    Recheck INR In: 6 WEEKS    INR Location Clinic      Anticoagulation Summary as of 9/17/2018     INR goal 2.0-3.0   Today's INR 2.2   Warfarin maintenance plan 10 mg (5 mg x 2) on Tue, Thu; 7.5 mg (5 mg x 1.5) all other days   Full warfarin instructions 10 mg on Tue, Thu; 7.5 mg all other days   Weekly warfarin total 57.5 mg   No change documented Aparna Robiosn   Plan last modified Aparna Robison (5/31/2018)   Next INR check 10/29/2018   Target end date     Indications   Long-term (current) use of anticoagulants [Z79.01] [Z79.01]         Anticoagulation Episode Summary     INR check location     Preferred lab     Send INR reminders to [facility] ANTICOAG CLINIC    Comments       Anticoagulation Care Providers     Provider Role Specialty Phone number    Jamie Meraz PA-C Responsible Physician Assistant [phone]            See the Encounter Report to view Anticoagulation Flowsheet and Dosing Calendar (Go to Encounters tab in chart review, and find the Anticoagulation Therapy Visit)        APARNA ROBISON

## 2018-09-18 ENCOUNTER — OFFICE VISIT (OUTPATIENT)
Dept: NEUROSURGERY | Facility: CLINIC | Age: 78
End: 2018-09-18
Payer: MEDICARE

## 2018-09-18 VITALS
WEIGHT: 201 LBS | DIASTOLIC BLOOD PRESSURE: 64 MMHG | HEIGHT: 68 IN | OXYGEN SATURATION: 97 % | HEART RATE: 66 BPM | SYSTOLIC BLOOD PRESSURE: 124 MMHG | BODY MASS INDEX: 30.46 KG/M2

## 2018-09-18 DIAGNOSIS — M47.26 OSTEOARTHRITIS OF SPINE WITH RADICULOPATHY, LUMBAR REGION: Primary | ICD-10-CM

## 2018-09-18 ASSESSMENT — PAIN SCALES - GENERAL: PAINLEVEL: NO PAIN (0)

## 2018-09-18 NOTE — MR AVS SNAPSHOT
After Visit Summary   9/18/2018    Rigoberto Holguin    MRN: [id number]           Patient Information     Date Of Birth          1940        Visit Information        Provider Department      9/18/2018 2:00 PM Anna Marie Cruz PA-C [facility]        Today's Diagnoses     Osteoarthritis of spine with radiculopathy, lumbar region    -  1       Follow-ups after your visit        Additional Services     [facility]       Your provider has referred you to: [facility], located on the 4th Floor of the [facility]. Please call [phone] to make an appointment.     Clinic is located at:   [facility] [place] [address]      Please complete the following questions:    Procedure/Referral: Referral Only -  Comprehensive Evaluation and Management    What is your diagnosis for the patient's pain? Facet pain lumbar    What are your specific questions for the pain specialist? eval and treat  Please.  He will come to surgery I think eventually but please try to treat for as long as possible to avoid that    Are there any red flags that may impact the assessment or management of the patient? None    REGARDING OPIOID MEDICATIONS:  The discussion of opioids management, appropriateness of therapy, and dosing will be discussed in patients being seen for evaluation.  The pain management clinics are not long-term prescribing clinics, with transition of prescribing of medications ultimately going back to the referring provider/PCP.  If prescribing is taken over at the pain clinic, it is in actively involved patients whom are appropriate for opioids, urine drug screening is completed, and long-term prescribing plan has been determined.  Therefore, we will not be automatically taking over prescribing at the patient's first visit.  Is this agreeable to you? agrees.    Please be aware that coverage  of these services is subject to the terms and limitations of your health insurance plan.  Call member services at your health plan with any benefit or coverage questions.      Please bring the following with you to your appointment or have sent to the [facility]:    (1) Any X-Rays, CTs or MRIs which have been performed that are not in Epic.  Contact the facility where they were done to arrange for  prior to your scheduled appointment.  Any new CT, MRI or other procedures ordered by your specialist must be performed at a [facility] facility or coordinated by your clinic's referral office.    (2) List of current medications   (3) This referral request   (4) Any documents/labs given to you for this referral                  Your next 10 appointments already scheduled     Oct 16, 2018  8:20 AM CDT   (Arrive by 8:05 AM)   New Patient Visit with TENA Lal [facility] ([facility] [place])    [address] [place] [address]  [address] [facility] [address] [phone]            Oct 29, 2018  8:30 AM CDT   Anticoagulation Visit with BE ANTI COAG   [facility] [place] ([facility] [place])    [address] [place] [address]  [place] [address] [phone]            Dec 10, 2018  8:30 AM CST   (Arrive by 8:15 AM)   Return Visit with Ricky Mclaughlin MD   [facility] ([facility])    [address] [place] [address]  3rd [facility] [address] [phone]            Dec 11, 2018  3:30 PM CST   [facility]   [facility]    [address] [place] [id number] [phone]            Jun 25, 2019  9:00 AM CDT   Return Visit with Vincent Rucker MD, [place] [facility]ST[facility] ROOM   [facility] [place] ([facility] [place])    [address] [place] [address]  [place] [address] [phone]              Who to contact     Please  "call your clinic at [phone] to:    Ask questions about your health    Make or cancel appointments    Discuss your medicines    Learn about your test results    Speak to your doctor            Additional Information About Your Visit        [facility] Information     [employer] gives you secure access to your electronic health record. If you see a primary care provider, you can also send messages to your care team and make appointments. If you have questions, please call your primary care clinic.  If you do not have a primary care provider, please call [phone] and they will assist you.      [employer] is an electronic gateway that provides easy, online access to your medical records. With [employer], you can request a clinic appointment, read your test results, renew a prescription or communicate with your care team.     To access your existing account, please contact your [facility] or call [phone] for assistance.        Care EveryWhere ID     This is your Care EveryWhere ID. This could be used by other organizations to access your [place] medical records  [id number]        Your Vitals Were     Pulse Height Pulse Oximetry BMI (Body Mass Index)          66 1.727 m (5' 8\") 97% 30.56 kg/m2         Blood Pressure from Last 3 Encounters:   09/18/18 124/64   09/10/18 116/74   07/26/18 137/76    Weight from Last 3 Encounters:   09/18/18 91.2 kg (201 lb)   09/10/18 94.8 kg (209 lb 1.6 oz)   08/27/18 91.9 kg (202 lb 8 oz)              We Performed the Following     [facility]        Primary Care Provider Office Phone # Fax #    Jamie Meraz PA-C [phone]       [address] [place] [address]        Equal Access to Services     ALOK TOLEDO : Hadii angelita Mercado, waaxda luqadaha, qaybta kaalmaharper uriostegui, tacho drummond. So [facility] [phone].    ATENCIÓN: Si habla español, tiene a thomas disposición " servicios gratuitos de asistencia lingüística. Pauline martinez [phone].    We comply with applicable federal civil rights laws and [place] laws. We do not discriminate on the basis of race, color, national origin, age, disability, sex, sexual orientation, or gender identity.            Thank you!     Thank you for choosing [facility]  for your care. Our goal is always to provide you with excellent care. Hearing back from our patients is one way we can continue to improve our services. Please take a few minutes to complete the written survey that you may receive in the mail after your visit with us. Thank you!             Your Updated Medication List - Protect others around you: Learn how to safely use, store and throw away your medicines at www.disposemymeds.org.          This list is accurate as of 9/18/18  4:08 PM.  Always use your most recent med list.                   Brand Name Dispense Instructions for use Diagnosis    cholecalciferol 1000 UNIT tablet    vitamin D3    100 tablet    Take 1 tablet (1,000 Units) by mouth daily    Vitamin D deficiency       EYE VITAMINS PO      Take 1 tablet by mouth daily        DAILY MULTIVITAMIN PO      Take 1 chew tab by mouth daily        JUICE PLUS FIBRE PO           levothyroxine 112 MCG tablet    SYNTHROID/LEVOTHROID    90 tablet    Take 1 tablet (112 mcg) by mouth every morning (before breakfast)    Hypothyroidism due to Hashimoto's thyroiditis       LUMIGAN 0.01 % Soln   Generic drug:  bimatoprost       Actinic keratosis       melatonin 5 MG tablet      Take 10 mg by mouth At Bedtime Reported on 2/21/2017        metroNIDAZOLE 0.75 % cream    METROCREAM    60 g    every day, once daily to face    Rosacea       sildenafil 20 MG tablet    REVATIO    30 tablet    Take 2-5 tabs daily prn    Erectile dysfunction, unspecified erectile dysfunction type       timolol 0.5 % ophthalmic solution    TIMOPTIC      Actinic keratosis       TUMS 500 MG chewable tablet    Generic drug:  calcium carbonate      Take 1 chew tab by mouth daily as needed Reported on 4/18/2017        warfarin 5 MG tablet    COUMADIN    135 tablet    10 mg(2 tabs) on Tue & Thu; 7.5 mg (1.5 tabs) all other days or as directed    History of DVT of lower extremity, Chronic anticoagulation

## 2018-09-18 NOTE — PROGRESS NOTES
NEUROSURGERY  DATE OF VISIT:  9/18/18    Date of Surgery 3/2/17  Dr Mclaughlin  DIAGNOSIS: Left-sided L5 radiculopathy. .     PROCEDURES PERFORMED:   1. Left-sided L4-5 hemilaminectomy and foraminotomy.  2. Left-sided L5-S1 hemilaminectomy and foraminotomy.       INDICATIONS FOR PROCEDURE and HPI   Mr. Rigoberto Holguin is a 76-year-old gentleman who presented to the Neurosurgery Clinic at the [facility] with concerns of low back pain with radicular symptoms primarily which was thought to be an L5 distribution. On imaging, he was found to have significant stenosis at the L4-5 and L5-S1 foramen, for which the above-mentioned procedure was recommended. The procedure itself was without incident.  He recovered well and was discharged home  in good condition. By 3 months post op his surgical pain was resolved but he noted not changed in pre op symptoms.  He returns today with complaints of ongoing low back pain, centered in the mid to low lumbar spine, worse when he is standing and walking, eases almost completely when he sits or lies down.  He can only walk about half a block before he has to stop.  The pain has become severe enough that it is impacting his life dramatically.  He cannot go shopping with his wife Clara, cannot do most social things, in fact his life is truncated to the point that just about the only things he can do or things allow him to sit, he can still golf, but only if he uses a cart.  He has little to no leg pain.  His left leg is still weak and numb, that is not much changed.  No loss of bowel or bladder control.  He self presented to neurosurgery for evaluation.  No imaging has been performed for this visit..    Prior to his lumbar decompression he had been seen by an outside neurosurgeon who had recommended a lumbar decompression and fusion to address both his leg issues as well as his back pain.  He had declined to the fusion at the time as being too much surgery, and too aggressive.  Now he  wonders if he should consider that.  At last visit I thought he had very flat back, and rather rigid.  I requested full-length standing films, flexion-extension films, and an updated MRI all of which have been done.    STATUS REPORT  Patient Supplied Answers To the [facility] Pain Questionnaire  [facility] Pain -  Patient Entered Questionnaire/Answers 9/17/2018   What number best describes your pain right now:  0 = No pain  to  10 = Worst pain imaginable 0   How would you describe the pain? burning   Which of the following worsen your pain? standing, walking   Which of the following improve or reduce your pain?  lying down, sitting   What number best describes your average pain for the past week:  0 = No pain  to  10 = Worst pain imaginable 4   What number best describes your LOWEST pain in past 24 hours:  0 = No pain  to  10 = Worst pain imaginable 0   What number best describes your WORST pain in past 24 hours:  0 = No pain  to  10 = Worst pain imaginable 8   When is your pain worst? AM, PM   What non-medicine treatments have you already had for your pain? physical therapy, spine injections (shots), surgery   Have you tried treating your pain with medication?  No   Are you currently taking medications for your pain? No       Current Outpatient Prescriptions:      calcium carbonate (TUMS) 500 MG chewable tablet, Take 1 chew tab by mouth daily as needed Reported on 4/18/2017, Disp: , Rfl:      cholecalciferol (VITAMIN D) 1000 UNIT tablet, Take 1 tablet (1,000 Units) by mouth daily, Disp: 100 tablet, Rfl: 0     levothyroxine (SYNTHROID/LEVOTHROID) 112 MCG tablet, Take 1 tablet (112 mcg) by mouth every morning (before breakfast), Disp: 90 tablet, Rfl: 3     LUMIGAN 0.01 % SOLN ophthalmic solution, , Disp: , Rfl:      melatonin 5 MG tablet, Take 10 mg by mouth At Bedtime Reported on 2/21/2017, Disp: , Rfl:      metroNIDAZOLE (METROCREAM) 0.75 % cream, every day, once daily to face, Disp: 60 g, Rfl: 3     Multiple Vitamin (DAILY  "MULTIVITAMIN PO), Take 1 chew tab by mouth daily , Disp: , Rfl:      Multiple Vitamins-Minerals (EYE VITAMINS PO), Take 1 tablet by mouth daily, Disp: , Rfl:      Nutritional Supplements (JUICE PLUS FIBRE PO), , Disp: , Rfl:      sildenafil (REVATIO) 20 MG tablet, Take 2-5 tabs daily prn, Disp: 30 tablet, Rfl: 11     timolol (TIMOPTIC) 0.5 % ophthalmic solution, , Disp: , Rfl:      warfarin (COUMADIN) 5 MG tablet, 10 mg(2 tabs) on Tue & Thu; 7.5 mg (1.5 tabs) all other days or as directed, Disp: 135 tablet, Rfl: 0    Current Facility-Administered Medications:      bupivacaine (MARCAINE) 0.25 % injection 3 mL, 3 mL, , , Demetrio Sagastume MD, 3 mL at 07/19/18 1421     lidocaine 1 % injection 4 mL, 4 mL, , , Demetrio Sagastume MD, 4 mL at 07/19/18 1421     triamcinolone acetonide (KENALOG-40) injection 80 mg, 80 mg, , , Demetrio Sagastume MD, 80 mg at 07/19/18 1421  No Known Allergies  PMH, SOC HIST, FAM HIST, PROBLEM LIST:  All reviewed in EPIC.    OBJECTIVE:  /64 (BP Location: Right arm, Cuff Size: Adult Regular)  Pulse 66  Ht 1.727 m (5' 8\")  Wt 91.2 kg (201 lb)  SpO2 97%  BMI 30.56 kg/m2    Imaging:  Standing full 9/10/18  Impression:  1. Mild levocurvature of the lumbar spine with moderate to severe  multilevel spondylosis.   2. No global sagittal imbalance.  3. Chronic L4 anterior compression deformity with focal L3-4 kyphosis.  If surgery is planned, the Tee's angle and other measurements will be  deferred to surgical clinician.  Lumbar F/E 9/10/18  Impression:   1. Mild levocurvature of the lumbar spine with the apex at L3.  2. Chronic L3 superior endplate compression deformity with focal  kyphosis at this level. Additionally, there is mild L2-3  retrolisthesis without dynamic instability.  3. Moderate-severe multilevel spondylosis, worst at L2-3, L3-4, and  L4-5.  4. Infrarenal aortic ectasia with a maximum diameter of 2.6 cm  MRI lumbar 9/11/18  1. Postoperative changes at L4-L5 and L5-S1. The " patient is status  post left laminectomies at these levels. Enhancing scar tissue in the  left ventral and lateral epidural space at L4-L5 and L5-S1.  2. Multilevel degenerative changes as described with persistent severe  left L4-L5 and L5-S1 foraminal stenosis.  3. Scoliosis convex to the left. Reversal of the cervical lordosis.  Standing lumbar spine films from 2009  I reviewed these and I see multiple levels of spondylosis, and L3 superior endplate fracture, multiple levels of disc space height loss.  Reversal of lumbar lordosis.  It is assumed these are standing images, it is not marked if they are.  MRI lumbar 7/21/16  L2-L3:  Severe degenerative disc disease and grade 1 retrolisthesis.  Mild spinal canal stenosis. Moderate right foraminal stenosis mild  left foraminal stenosis. Bilateral mild degenerative facet  arthropathy.     L3-L4: Moderate to severe degenerative disc disease more on the right  than on the left. Circumferential disc bulge. Mild degenerative facet  arthropathy. Moderate right foraminal stenosis.     L4-L5:  Severe degenerative disc disease with marked loss of disc  height, circumferential disc bulge and vertebral endplate osteophytes.  Severe degenerative facet arthropathy on the left and moderate  degenerative facet arthropathy on the right with hypertrophy of the  adjacent ligamentum flavum. Findings cause severe spinal canal  stenosis. There is tortuosity of nerve roots an intrathecal blood  vessels inferior to this level. Moderate right foraminal stenosis and  severe left foraminal stenosis.     L5-S1: Bilateral severe degenerative facet arthropathy with  hypertrophied ligamentum flavum causing side-to-side mild spinal canal  stenosis. Moderate left foraminal stenosis and mild right foraminal  stenosis.  EXAM:  Well developed well nourished male found seated comfortably in exam chair.  No apparent distress. He is accompanied by wife.  A&O X3.  Mood and affect WNL. Language and fund of  knowledge intact.  Is able to sit and rise independently. He is able to rise up to tip-toe with both feet, but unable to sustain with just the left.  I'd grade his gastroc at 4/5 (3/5 preop).  All other muscle groups 5/5.  He has a nicely healed incision.  Spinous processes of approximately L3 is gibbus, and prominent.    Assessment/Plan:  1. Osteoarthritis of spine with radiculopathy, lumbar region      Rigoberto Holguin is a little over a year status post two-level decompression for left leg weakness.  Unfortunately it did not improve over time, and now his back pain is getting worse.  I think he is continuing to collapse at his old L3 fracture, or possibly moving.  I am going to get dynamic images, standing full-length spine films, lumbar films, and MRI and have him return.  I have not ordered a CT scan yet but will if I think he might need a multilevel fusion.    He has reversal of normal lumbar lordosis, very rigid lumbar spine, it only moves a few degrees in flexion and extension.  And that above the level of the old fracture.  There is no abnormal slippage.  His spinal alignment is overall balanced but at the complete cost of normal lumbar lordosis and thoracic kyphosis.  I suspect a good bit of his back pain is muscular, but a good bit is also facet mediated.      We talked about surgical and nonsurgical options.  Nonsurgical options would be first determining if he has facet mediated pain, and if so which levels.  This is often done by a series of facet injections done in a stepwise fashion first 1 in the other first 1 set then another, in an effort to determine which are the pain generators.  Once those have been identified then moving on to medial branch blocks and  potentially rhizotomies.  This coupled with physical therapy, medication management if needed.  I would recommend he do this for at least 3 months and if he fails to respond, or is not responding enough and he decides to move forward on a surgical  track he can see Dr. Mclaughlin.  Dr. Mclaughlin's schedule is generally full so we will request that slot for him now in the clinic.  However if he does well with this program he can always cancel the Dr. Mclaughlin appointment.    I answered his questions, questions indicated good understanding of situation, he knows he can call if anything else comes up.  I will see him once all the images are completed       We appreciate the opportunity to be of service in the care of this pleasant patient.  Please do call if there is anything more we can do    Anna Marie Cruz PA-C  [facility]  Department of Neurosurgery  Phone: [phone]  Fax: [phone]

## 2018-09-18 NOTE — LETTER
9/18/2018       RE: Rigoberto Holguin  [id number] [address] [id number]     Dear Colleague,    Thank you for referring your patient, Rigoberto Holguin, to the [facility] NEUROSURGERY at [facility]. Please see a copy of my visit note below.      NEUROSURGERY  DATE OF VISIT:  9/18/18    Date of Surgery 3/2/17  Dr Mclaughlin  DIAGNOSIS: Left-sided L5 radiculopathy. .     PROCEDURES PERFORMED:   1. Left-sided L4-5 hemilaminectomy and foraminotomy.  2. Left-sided L5-S1 hemilaminectomy and foraminotomy.       INDICATIONS FOR PROCEDURE and HPI   Mr. Rigoberto Holguin is a 76-year-old gentleman who presented to the Neurosurgery Clinic at the [facility] with concerns of low back pain with radicular symptoms primarily which was thought to be an L5 distribution. On imaging, he was found to have significant stenosis at the L4-5 and L5-S1 foramen, for which the above-mentioned procedure was recommended. The procedure itself was without incident.  He recovered well and was discharged home  in good condition. By 3 months post op his surgical pain was resolved but he noted not changed in pre op symptoms.  He returns today with complaints of ongoing low back pain, centered in the mid to low lumbar spine, worse when he is standing and walking, eases almost completely when he sits or lies down.  He can only walk about half a block before he has to stop.  The pain has become severe enough that it is impacting his life dramatically.  He cannot go shopping with his wife Clara, cannot do most social things, in fact his life is truncated to the point that just about the only things he can do or things allow him to sit, he can still golf, but only if he uses a cart.  He has little to no leg pain.  His left leg is still weak and numb, that is not much changed.  No loss of bowel or bladder control.  He self presented to neurosurgery for evaluation.  No imaging has been performed for this visit..    Prior to his  lumbar decompression he had been seen by an outside neurosurgeon who had recommended a lumbar decompression and fusion to address both his leg issues as well as his back pain.  He had declined to the fusion at the time as being too much surgery, and too aggressive.  Now he wonders if he should consider that.  At last visit I thought he had very flat back, and rather rigid.  I requested full-length standing films, flexion-extension films, and an updated MRI all of which have been done.    STATUS REPORT  Patient Supplied Answers To the  Pain Questionnaire  [facility] Pain -  Patient Entered Questionnaire/Answers 9/17/2018   What number best describes your pain right now:  0 = No pain  to  10 = Worst pain imaginable 0   How would you describe the pain? burning   Which of the following worsen your pain? standing, walking   Which of the following improve or reduce your pain?  lying down, sitting   What number best describes your average pain for the past week:  0 = No pain  to  10 = Worst pain imaginable 4   What number best describes your LOWEST pain in past 24 hours:  0 = No pain  to  10 = Worst pain imaginable 0   What number best describes your WORST pain in past 24 hours:  0 = No pain  to  10 = Worst pain imaginable 8   When is your pain worst? AM, PM   What non-medicine treatments have you already had for your pain? physical therapy, spine injections (shots), surgery   Have you tried treating your pain with medication?  No   Are you currently taking medications for your pain? No       Current Outpatient Prescriptions:      calcium carbonate (TUMS) 500 MG chewable tablet, Take 1 chew tab by mouth daily as needed Reported on 4/18/2017, Disp: , Rfl:      cholecalciferol (VITAMIN D) 1000 UNIT tablet, Take 1 tablet (1,000 Units) by mouth daily, Disp: 100 tablet, Rfl: 0     levothyroxine (SYNTHROID/LEVOTHROID) 112 MCG tablet, Take 1 tablet (112 mcg) by mouth every morning (before breakfast), Disp: 90 tablet, Rfl: 3     LUMIGAN  "0.01 % SOLN ophthalmic solution, , Disp: , Rfl:      melatonin 5 MG tablet, Take 10 mg by mouth At Bedtime Reported on 2/21/2017, Disp: , Rfl:      metroNIDAZOLE (METROCREAM) 0.75 % cream, every day, once daily to face, Disp: 60 g, Rfl: 3     Multiple Vitamin (DAILY MULTIVITAMIN PO), Take 1 chew tab by mouth daily , Disp: , Rfl:      Multiple Vitamins-Minerals (EYE VITAMINS PO), Take 1 tablet by mouth daily, Disp: , Rfl:      Nutritional Supplements (JUICE PLUS FIBRE PO), , Disp: , Rfl:      sildenafil (REVATIO) 20 MG tablet, Take 2-5 tabs daily prn, Disp: 30 tablet, Rfl: 11     timolol (TIMOPTIC) 0.5 % ophthalmic solution, , Disp: , Rfl:      warfarin (COUMADIN) 5 MG tablet, 10 mg(2 tabs) on Tue & Thu; 7.5 mg (1.5 tabs) all other days or as directed, Disp: 135 tablet, Rfl: 0    Current Facility-Administered Medications:      bupivacaine (MARCAINE) 0.25 % injection 3 mL, 3 mL, , , Demetrio Sagastume MD, 3 mL at 07/19/18 1421     lidocaine 1 % injection 4 mL, 4 mL, , , Demetrio Sagastume MD, 4 mL at 07/19/18 1421     triamcinolone acetonide (KENALOG-40) injection 80 mg, 80 mg, , , Demetrio Sagastume MD, 80 mg at 07/19/18 1421  No Known Allergies  PMH, SOC HIST, FAM HIST, PROBLEM LIST:  All reviewed in EPIC.    OBJECTIVE:  /64 (BP Location: Right arm, Cuff Size: Adult Regular)  Pulse 66  Ht 1.727 m (5' 8\")  Wt 91.2 kg (201 lb)  SpO2 97%  BMI 30.56 kg/m2    Imaging:  Standing full 9/10/18  Impression:  1. Mild levocurvature of the lumbar spine with moderate to severe  multilevel spondylosis.   2. No global sagittal imbalance.  3. Chronic L4 anterior compression deformity with focal L3-4 kyphosis.  If surgery is planned, the Tee's angle and other measurements will be  deferred to surgical clinician.  Lumbar F/E 9/10/18  Impression:   1. Mild levocurvature of the lumbar spine with the apex at L3.  2. Chronic L3 superior endplate compression deformity with focal  kyphosis at this level. Additionally, there is " mild L2-3  retrolisthesis without dynamic instability.  3. Moderate-severe multilevel spondylosis, worst at L2-3, L3-4, and  L4-5.  4. Infrarenal aortic ectasia with a maximum diameter of 2.6 cm  MRI lumbar 9/11/18  1. Postoperative changes at L4-L5 and L5-S1. The patient is status  post left laminectomies at these levels. Enhancing scar tissue in the  left ventral and lateral epidural space at L4-L5 and L5-S1.  2. Multilevel degenerative changes as described with persistent severe  left L4-L5 and L5-S1 foraminal stenosis.  3. Scoliosis convex to the left. Reversal of the cervical lordosis.  Standing lumbar spine films from 2009  I reviewed these and I see multiple levels of spondylosis, and L3 superior endplate fracture, multiple levels of disc space height loss.  Reversal of lumbar lordosis.  It is assumed these are standing images, it is not marked if they are.  MRI lumbar 7/21/16  L2-L3:  Severe degenerative disc disease and grade 1 retrolisthesis.  Mild spinal canal stenosis. Moderate right foraminal stenosis mild  left foraminal stenosis. Bilateral mild degenerative facet  arthropathy.     L3-L4: Moderate to severe degenerative disc disease more on the right  than on the left. Circumferential disc bulge. Mild degenerative facet  arthropathy. Moderate right foraminal stenosis.     L4-L5:  Severe degenerative disc disease with marked loss of disc  height, circumferential disc bulge and vertebral endplate osteophytes.  Severe degenerative facet arthropathy on the left and moderate  degenerative facet arthropathy on the right with hypertrophy of the  adjacent ligamentum flavum. Findings cause severe spinal canal  stenosis. There is tortuosity of nerve roots an intrathecal blood  vessels inferior to this level. Moderate right foraminal stenosis and  severe left foraminal stenosis.     L5-S1: Bilateral severe degenerative facet arthropathy with  hypertrophied ligamentum flavum causing side-to-side mild spinal  canal  stenosis. Moderate left foraminal stenosis and mild right foraminal  stenosis.  EXAM:  Well developed well nourished male found seated comfortably in exam chair.  No apparent distress. He is accompanied by wife.  A&O X3.  Mood and affect WNL. Language and fund of knowledge intact.  Is able to sit and rise independently. He is able to rise up to tip-toe with both feet, but unable to sustain with just the left.  I'd grade his gastroc at 4/5 (3/5 preop).  All other muscle groups 5/5.  He has a nicely healed incision.  Spinous processes of approximately L3 is gibbus, and prominent.    Assessment/Plan:  1. Osteoarthritis of spine with radiculopathy, lumbar region      Rigoberto Holguin is a little over a year status post two-level decompression for left leg weakness.  Unfortunately it did not improve over time, and now his back pain is getting worse.  I think he is continuing to collapse at his old L3 fracture, or possibly moving.  I am going to get dynamic images, standing full-length spine films, lumbar films, and MRI and have him return.  I have not ordered a CT scan yet but will if I think he might need a multilevel fusion.    He has reversal of normal lumbar lordosis, very rigid lumbar spine, it only moves a few degrees in flexion and extension.  And that above the level of the old fracture.  There is no abnormal slippage.  His spinal alignment is overall balanced but at the complete cost of normal lumbar lordosis and thoracic kyphosis.  I suspect a good bit of his back pain is muscular, but a good bit is also facet mediated.      We talked about surgical and nonsurgical options.  Nonsurgical options would be first determining if he has facet mediated pain, and if so which levels.  This is often done by a series of facet injections done in a stepwise fashion first 1 in the other first 1 set then another, in an effort to determine which are the pain generators.  Once those have been identified then moving on to  medial branch blocks and  potentially rhizotomies.  This coupled with physical therapy, medication management if needed.  I would recommend he do this for at least 3 months and if he fails to respond, or is not responding enough and he decides to move forward on a surgical track he can see Dr. Mclaughlin.  Dr. Mclaughlin's schedule is generally full so we will request that slot for him now in the clinic.  However if he does well with this program he can always cancel the Dr. Mclaughlin appointment.    I answered his questions, questions indicated good understanding of situation, he knows he can call if anything else comes up.  I will see him once all the images are completed       We appreciate the opportunity to be of service in the care of this pleasant patient.  Please do call if there is anything more we can do    Again, thank you for allowing me to participate in the care of your patient.      Sincerely,    Anna Marie Cruz PA-C

## 2018-09-18 NOTE — NURSING NOTE
Chief Complaint   Patient presents with     RECHECK     LUMBAR PAIN. MRI FOLLOW UP       Rosalina Cohen MA

## 2018-09-19 ENCOUNTER — ALLIED HEALTH/NURSE VISIT (OUTPATIENT)
Dept: NURSING | Facility: CLINIC | Age: 78
End: 2018-09-19
Payer: MEDICARE

## 2018-09-19 DIAGNOSIS — Z23 NEED FOR PROPHYLACTIC VACCINATION AND INOCULATION AGAINST INFLUENZA: Primary | ICD-10-CM

## 2018-09-19 PROCEDURE — 90662 IIV NO PRSV INCREASED AG IM: CPT

## 2018-09-19 PROCEDURE — G0008 ADMIN INFLUENZA VIRUS VAC: HCPCS

## 2018-09-19 NOTE — MR AVS SNAPSHOT
After Visit Summary   9/19/2018    Rigoberto Holguin    MRN: [id number]           Patient Information     Date Of Birth          1940        Visit Information        Provider Department      9/19/2018 11:30 AM [facility]        Today's Diagnoses     Need for prophylactic vaccination and inoculation against influenza    -  1       Follow-ups after your visit        Your next 10 appointments already scheduled     Sep 19, 2018 11:30 AM CDT   Nurse Only with BE ANCILLARY   [facility] [place] ([facility] [place])    [address]  [place] [address]   [phone]            Sep 20, 2018  1:00 PM CDT   Office Visit with Jamie Meraz PA-C   [facility] [place] ([facility] [place])    [address]  [place] [address]   [phone]           Bring a current list of meds and any records pertaining to this visit. For Physicals, please bring immunization records and any forms needing to be filled out. Please arrive 10 minutes early to complete paperwork.            Oct 16, 2018  8:20 AM CDT   (Arrive by 8:05 AM)   New Patient Visit with TENA Lal CNP   [facility] ([facility])    [address] [place] [address]  [address] [facility] [place] [address]   [phone]            Oct 29, 2018  8:30 AM CDT   Anticoagulation Visit with BE ANTI COAG   [facility] [place] ([facility] [place])    [address]  [place] [address]   [phone]            Dec 10, 2018  8:30 AM CST   (Arrive by 8:15 AM)   Return Visit with Ricky Mclaughlin MD   [facility] ([facility])    [address]  3rd Floor  [facility] [address]   [phone]            Dec 11, 2018  3:30 PM CST   Return Visit with Vickie Whalen MD   [facility] ([facility])    [id number] [place] [address]  [address]   [phone]            Jun 25, 2019  9:00 AM CDT   Return Visit with Vincent Rucker MD, [place] [facility] [facility]   [facility] [place] [facility]  [place] [address] [phone]              Who to contact     If you have questions or need follow up information about today's clinic visit or your schedule please contact [facility] directly at [phone].  Normal or non-critical lab and imaging results will be communicated to you by [employer], letter or phone within 4 business days after the clinic has received the results. If you do not hear from us within 7 days, please contact the clinic through [employer] or phone. If you have a critical or abnormal lab result, we will notify you by phone as soon as possible.  Submit refill requests through [employer] or call your pharmacy and they will forward the refill request to us. Please allow 3 business days for your refill to be completed.          Additional Information About Your Visit        [employer] Information     [employer] gives you secure access to your electronic health record. If you see a primary care provider, you can also send messages to your care team and make appointments. If you have questions, please call your primary care clinic.  If you do not have a primary care provider, please call [phone] and they will assist you.        Care EveryWhere ID     This is your Care EveryWhere ID. This could be used by other organizations to access your [facility] medical records  [id number]         Blood Pressure from Last 3 Encounters:   09/18/18 124/64   09/10/18 116/74   07/26/18 137/76    Weight from Last 3 Encounters:   09/18/18 201 lb (91.2 kg)   09/10/18 209 lb 1.6 oz (94.8 kg)   08/27/18 202 lb 8 oz (91.9 kg)              We Performed the Following     ADMIN INFLUENZA (For MEDICARE Patients ONLY) []     FLU VACCINE, INCREASED ANTIGEN, PRESV FREE, AGE 65+ [36309]        Primary Care  Provider Office Phone # Fax #    Jamie Meraz PA-C [phone]       [address] [facility] [address]        Equal Access to Services     NATALIE TOLEDO : Hadii angelita ku hadluceroo Soomaali, waaxda luqadaha, qaybta kaalmada adeegyada, tacho crouchn alessandro conley laAnnielashell drummond. So [facility] [phone].    ATENCIÓN: Si habla español, tiene a thomas disposición servicios gratuitos de asistencia lingüística. LlCleveland [facility] [phone].    We comply with applicable federal civil rights laws and [place] laws. We do not discriminate on the basis of race, color, national origin, age, disability, sex, sexual orientation, or gender identity.            Thank you!     Thank you for choosing [facility]  for your care. Our goal is always to provide you with excellent care. Hearing back from our patients is one way we can continue to improve our services. Please take a few minutes to complete the written survey that you may receive in the mail after your visit with us. Thank you!             Your Updated Medication List - Protect others around you: Learn how to safely use, store and throw away your medicines at www.disposemymeds.org.          This list is accurate as of 9/19/18 11:19 AM.  Always use your most recent med list.                   Brand Name Dispense Instructions for use Diagnosis    cholecalciferol 1000 UNIT tablet    vitamin D3    100 tablet    Take 1 tablet (1,000 Units) by mouth daily    Vitamin D deficiency       EYE VITAMINS PO      Take 1 tablet by mouth daily        DAILY MULTIVITAMIN PO      Take 1 chew tab by mouth daily        JUICE PLUS FIBRE PO           levothyroxine 112 MCG tablet    SYNTHROID/LEVOTHROID    90 tablet    Take 1 tablet (112 mcg) by mouth every morning (before breakfast)    Hypothyroidism due to Hashimoto's thyroiditis       LUMIGAN 0.01 % Soln   Generic drug:  bimatoprost       Actinic keratosis       melatonin 5 MG tablet      Take 10 mg by mouth At Bedtime Reported  on 2/21/2017        metroNIDAZOLE 0.75 % cream    METROCREAM    60 g    every day, once daily to face    Rosacea       sildenafil 20 MG tablet    REVATIO    30 tablet    Take 2-5 tabs daily prn    Erectile dysfunction, unspecified erectile dysfunction type       timolol 0.5 % ophthalmic solution    TIMOPTIC      Actinic keratosis       TUMS 500 MG chewable tablet   Generic drug:  calcium carbonate      Take 1 chew tab by mouth daily as needed Reported on 4/18/2017        warfarin 5 MG tablet    COUMADIN    135 tablet    10 mg(2 tabs) on Tue & Thu; 7.5 mg (1.5 tabs) all other days or as directed    History of DVT of lower extremity, Chronic anticoagulation

## 2018-09-19 NOTE — PROGRESS NOTES

## 2018-09-20 ENCOUNTER — OFFICE VISIT (OUTPATIENT)
Dept: FAMILY MEDICINE | Facility: CLINIC | Age: 78
End: 2018-09-20
Payer: MEDICARE

## 2018-09-20 VITALS
OXYGEN SATURATION: 100 % | TEMPERATURE: 98.1 F | SYSTOLIC BLOOD PRESSURE: 104 MMHG | BODY MASS INDEX: 30.92 KG/M2 | WEIGHT: 204 LBS | HEART RATE: 86 BPM | DIASTOLIC BLOOD PRESSURE: 67 MMHG | HEIGHT: 68 IN | RESPIRATION RATE: 16 BRPM

## 2018-09-20 DIAGNOSIS — G89.29 CHRONIC BILATERAL LOW BACK PAIN WITHOUT SCIATICA: Primary | ICD-10-CM

## 2018-09-20 DIAGNOSIS — Z86.718 HISTORY OF DVT OF LOWER EXTREMITY: ICD-10-CM

## 2018-09-20 DIAGNOSIS — Z79.01 CHRONIC ANTICOAGULATION: ICD-10-CM

## 2018-09-20 DIAGNOSIS — M54.50 CHRONIC BILATERAL LOW BACK PAIN WITHOUT SCIATICA: Primary | ICD-10-CM

## 2018-09-20 PROCEDURE — 99213 OFFICE O/P EST LOW 20 MIN: CPT | Performed by: PHYSICIAN ASSISTANT

## 2018-09-20 RX ORDER — WARFARIN SODIUM 5 MG/1
TABLET ORAL
Qty: 135 TABLET | Refills: 1 | Status: SHIPPED | OUTPATIENT
Start: 2018-09-20 | End: 2018-12-27

## 2018-09-20 NOTE — PROGRESS NOTES
SUBJECTIVE:   Rigoberto Holguin is a 77 year old male who presents to clinic today for the following health issues:      Back pain follow up-requesting disability parking certificate due to chronic back pain     Chronic low back pain . Really limits his mobility. Pain limits him to the degree he can walk 100-150 feet and has to stop.     Problem list and histories reviewed & adjusted, as indicated.  Additional history: as documented    BP Readings from Last 3 Encounters:   09/20/18 104/67   09/18/18 124/64   09/10/18 116/74    Wt Readings from Last 3 Encounters:   09/20/18 204 lb (92.5 kg)   09/18/18 201 lb (91.2 kg)   09/10/18 209 lb 1.6 oz (94.8 kg)                    Reviewed and updated as needed this visit by clinical staff  Tobacco  Allergies  Meds       Reviewed and updated as needed this visit by Provider         All other systems negative except as outline above  OBJECTIVE:  BACK: Lumbosacral spine area reveals local tenderness.  Painful and reduced LS ROM noted. Straight leg raise is neg .  DTR's, motor strength and sensation normal, including heel and toe gait.  Perifpheral pulses are palpable.  Hipes and knees have full range of motion without pain.  No abdominal tenderness, mass or organomegaly.    Rigoberto was seen today for back pain.    Diagnoses and all orders for this visit:    Chronic bilateral low back pain without sciatica    History of DVT of lower extremity  -     warfarin (COUMADIN) 5 MG tablet; 10 mg(2 tabs) on Tue & Thu; 7.5 mg (1.5 tabs) all other days or as directed    Chronic anticoagulation  -     warfarin (COUMADIN) 5 MG tablet; 10 mg(2 tabs) on Tue & Thu; 7.5 mg (1.5 tabs) all other days or as directed      Advised supportive and symptomatic treatment.  Follow up with Provider - if condition persists or worsens.   work on lifestyle modification  Paperwork completed for parking sticker.

## 2018-10-15 ASSESSMENT — ENCOUNTER SYMPTOMS
TROUBLE SWALLOWING: 0
NUMBNESS: 1
HEMATURIA: 0
WEAKNESS: 1
DISTURBANCES IN COORDINATION: 0
SINUS CONGESTION: 0
ARTHRALGIAS: 0
MUSCLE CRAMPS: 0
NECK MASS: 0
TREMORS: 0
LOSS OF CONSCIOUSNESS: 0
SPEECH CHANGE: 0
MEMORY LOSS: 0
SORE THROAT: 0
TINGLING: 1
HOARSE VOICE: 0
NECK PAIN: 0
SINUS PAIN: 0
HEADACHES: 0
BACK PAIN: 1
DIZZINESS: 0
DYSURIA: 0
JOINT SWELLING: 0
SEIZURES: 0
MUSCLE WEAKNESS: 1
TASTE DISTURBANCE: 0
FLANK PAIN: 0
STIFFNESS: 0
DIFFICULTY URINATING: 0
MYALGIAS: 0
PARALYSIS: 0
SMELL DISTURBANCE: 0

## 2018-10-15 ASSESSMENT — ANXIETY QUESTIONNAIRES
6. BECOMING EASILY ANNOYED OR IRRITABLE: NOT AT ALL
GAD7 TOTAL SCORE: 0
3. WORRYING TOO MUCH ABOUT DIFFERENT THINGS: NOT AT ALL
GAD7 TOTAL SCORE: 0
7. FEELING AFRAID AS IF SOMETHING AWFUL MIGHT HAPPEN: NOT AT ALL
4. TROUBLE RELAXING: NOT AT ALL
5. BEING SO RESTLESS THAT IT IS HARD TO SIT STILL: NOT AT ALL
7. FEELING AFRAID AS IF SOMETHING AWFUL MIGHT HAPPEN: NOT AT ALL
2. NOT BEING ABLE TO STOP OR CONTROL WORRYING: NOT AT ALL
1. FEELING NERVOUS, ANXIOUS, OR ON EDGE: NOT AT ALL

## 2018-10-16 ENCOUNTER — OFFICE VISIT (OUTPATIENT)
Dept: ANESTHESIOLOGY | Facility: CLINIC | Age: 78
End: 2018-10-16
Payer: MEDICARE

## 2018-10-16 VITALS
BODY MASS INDEX: 31.07 KG/M2 | SYSTOLIC BLOOD PRESSURE: 121 MMHG | RESPIRATION RATE: 16 BRPM | HEART RATE: 59 BPM | WEIGHT: 205 LBS | DIASTOLIC BLOOD PRESSURE: 69 MMHG | HEIGHT: 68 IN

## 2018-10-16 DIAGNOSIS — M47.16 LUMBAR SPONDYLOSIS WITH MYELOPATHY: ICD-10-CM

## 2018-10-16 DIAGNOSIS — Z98.890 S/P SPINAL SURGERY: ICD-10-CM

## 2018-10-16 DIAGNOSIS — M47.816 LUMBAR FACET ARTHROPATHY: Primary | ICD-10-CM

## 2018-10-16 ASSESSMENT — PAIN SCALES - GENERAL: PAINLEVEL: SEVERE PAIN (7)

## 2018-10-16 ASSESSMENT — ANXIETY QUESTIONNAIRES: GAD7 TOTAL SCORE: 0

## 2018-10-16 NOTE — NURSING NOTE
LPN reviewed AVS with Pt.  Pt verbalized an understanding of information, and was asked to contact clinic with questions.    LPN read through the instructions with pt for the recommended procedure: Bilateral Lumbar Medial Branch Block.   Pt verbalized understanding to holding appropriate medication per protocol, and was agreeable to NPO policy and needing a .      Ashley Shaw LPN

## 2018-10-16 NOTE — PROGRESS NOTES
I had the pleasure of meeting Mr. Rigoberto Holguin on 10/16/2018 in the outpatient pain clinic in consult for Dr. Cruz with regards to his lumbar facet arthropathy.     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 Mclaughlin. He no longer has leg pain, but does continue to note numbness and weakness that has remained unchanged post-operatively. Most bothersome, is his persistent low back pain, which he locates at midline lumbar area. He describes as an aching, fatigue sensation. Symptoms aggravated by sitting and walking; nearly total alleviation with sitting or lying flat. Interestingly, patient notes that he experiences relief when resting on grocery cart at the store.   New standing films obtained that indicate multi-level spondylosis, L3 endplate fracture, and reversal of lumbar lordosis. It is thought by neurosurgery that he will eventually require a multi-level fusion, however, he would like to avoid this for as long as possible.    He is here today for evaluation of lumbar facet arthropathy and additional interventional pain management options. He has not completed any physical therapy since post-operatively in 2017. No prescription or over-the-counter medications taken to manage his pain.   ?  Past Medical History:   Diagnosis Date     Actinic keratosis      Basal cell carcinoma      Bladder cancer (H) 2003     DJD (degenerative joint disease), lumbar     S/P epidural x 2     DVT (deep venous thrombosis) (H) 2007    Factor V ( bilateral legs)      GERD (gastroesophageal reflux disease)      Hypothyroidism      Polio     Age 4, weakness of right arm and leg      S/P appendectomy     past medical history reviewed with patient.   Past Surgical History:   Procedure Laterality Date     Appendectomy       FORAMINOTOMY LUMBAR  POSTERIOR TWO LEVELS Left 3/2/2017    Procedure: FORAMINOTOMY LUMBAR POSTERIOR TWO LEVELS;  Surgeon: Ricky Mclaughlin MD;  Location: [facility] OR     HC REPAIR ROTATOR CUFF,ACUTE Left 2007     PHOTODYNAMIC THERAPY - (PDT)  2/13/2015     SURGICAL HISTORY OF -   2003    Urinary Bladder Cancer treatment     SURGICAL HISTORY OF -       Gall Bladder    past surgical history reviewed with patient.   Medications:  Current Outpatient Prescriptions   Medication     calcium carbonate (TUMS) 500 MG chewable tablet     cholecalciferol (VITAMIN D) 1000 UNIT tablet     levothyroxine (SYNTHROID/LEVOTHROID) 112 MCG tablet     LUMIGAN 0.01 % SOLN ophthalmic solution     melatonin 5 MG tablet     metroNIDAZOLE (METROCREAM) 0.75 % cream     Multiple Vitamin (DAILY MULTIVITAMIN PO)     Multiple Vitamins-Minerals (EYE VITAMINS PO)     Nutritional Supplements (JUICE PLUS FIBRE PO)     sildenafil (REVATIO) 20 MG tablet     timolol (TIMOPTIC) 0.5 % ophthalmic solution     warfarin (COUMADIN) 5 MG tablet     Current Facility-Administered Medications   Medication     bupivacaine (MARCAINE) 0.25 % injection 3 mL     lidocaine 1 % injection 4 mL     triamcinolone acetonide (KENALOG-40) injection 80 mg     A multi-state Prescription Monitoring Program reviewed and no aberrancies noted.     Allergies:   No Known Allergies  Family History:  family history includes Cancer in his brother and father; Neurologic Disorder in his sister.  Social history: he lives in [place] with his wife, Nakia. He is a retired  from [employer].     Smoking: former smoker. Alcohol: rare. Street drugs: denies.     ROS:  A 14 point review of systems was completed; results are listed at end of note.     Objective:   There were no vitals taken for this visit.  There is no height or weight on file to calculate BMI.  General: In no apparent distress  Mental status: Normal mood and affect, pleasant  Neuro: Alert, oriented. No sensory deficits.   Head: Atraumatic,  normocephalic  Eyes: Extra-ocular movements grossly intact, no scleral icterus  Neck: Supple, Range of motion full  Respiratory: Non-labored breathing; No respiratory distress  Skin: No rashes or lesions noted on exposed areas of skin  Msk:   Lumbar Spine:  No spinous process tenderness with palpation of L3, L4, L5 vertebrae.  Unable to complete heel/toe walk without difficulty on left.   FROM with flexion, extension; + facet loading and grocery cart +.  Strength 5/5 bilaterally: dorsiflexion, plantarflexion, hamstrings, quadriceps.  Patellar reflexes 1+ on left; 2+ bilaterally.   Negative straight leg raise.   Gait is slow; antalgic, but symmetrical.    Imaging:   MRI LUMBAR SPINE WITHOUT AND WITH CONTRAST September 11, 2018 4:58 PM      HISTORY: Spondylosis with radiculopathy, lumbar region.     TECHNIQUE: Multiplanar multisequence MRI of the lumbar spine without  and with 10 mL Gadavist.     COMPARISON: Scan performed on 7/21/2016.     FINDINGS: The report is dictated assuming five lumbar-type vertebral  bodies, and radiographic correlation may be necessary. The distal  spinal cord and cauda equina appear normal. There is scoliosis of the  lumbar spine convex towards the left. There is reversal of the lumbar  lordosis.     T12-L1: Degeneration of the disc. Minimal annular disc bulge.     L1-L2: Degeneration of the disc. Minimal annular disc bulge.     L2-L3: Degeneration of the disc. Loss of disc space height. Mild  retrolisthesis of L2 on L3. Diffuse annular disc bulge. Central canal  is mildly narrowed. Mild-moderate degenerative change in the facet  joints. Mild right and left foraminal stenosis. No significant change  since 2016.     L3-L4: Degeneration of the disc. Loss of disc space height. Moderate  degenerative change in the facet joints. Central canal is mildly  narrowed. Moderate right and left foraminal stenosis due to loss of  disc space height and degenerative spurs off the facet joints.  No  significant change since 2016.     L4-L5: Previous left laminectomy. Laminectomy is new since 2016. There  is enhancing tissue along the left lateral aspect of the dural sac.  This is consistent with scar tissue. Degeneration of the disc. Loss of  disc space height. Diffuse annular disc bulge. Severe degenerative  change in the facet joints. Central canal is moderately narrowed due  to these degenerative changes. The central spinal stenosis does not  appear to be quite as severe as on the previous study. This appears to  be secondary to the left laminectomy. Moderate left foraminal stenosis  due to the degenerative changes.     L5-S1: Degeneration of the disc. Loss of disc space height. Diffuse  annular bulge. Left laminectomy at this level. Small amount of  enhancing tissue is seen in the left lateral epidural space and around  the left L5 nerve root as it enters the neural foramen. Severe  degenerative change in the facet joints. Mild right and  moderate-severe left foraminal stenosis due to the bulging disc and  degenerative change off the left facet joint.     Paraspinous soft tissues: Normal.       Bone marrow:  Normal. No bone metastasis are identified.         IMPRESSION:    1. Postoperative changes at L4-L5 and L5-S1. The patient is status  post left laminectomies at these levels. Enhancing scar tissue in the  left ventral and lateral epidural space at L4-L5 and L5-S1.  2. Multilevel degenerative changes as described with persistent severe  left L4-L5 and L5-S1 foraminal stenosis.  3. Scoliosis convex to the left. Reversal of the cervical lordosis.     SABAS MARAVILLA MD     Assessment:  Mr. Rigoberto Holguin is a 78 yo male seen today for lumbar facet arthropathy and lumbar spondylosis with myelopathy s/p left-sided L4-5, L5-S1 hemilaminectomy and foraminotomy 3/27/17 with persistent left foraminal stenosis at these levels.  Plan:   1. Patient education: I went over the above diagnoses and treatment plan with him  and answered all of his questions.  2. Interventions:  -Bilateral medial branch blocks L3/4, L4/5, L5/S1; will proceed to RFA if appropriate. PCP messaged for notification of need for anticoagulant hold.   3. Exercise program:   -Physical therapy ordered; 2-3x/week for 6-8 weeks.   4. Follow up: RTC 4-6 weeks following procedure.     Total time spent was 35 minutes, and more than 50% of face to face time was spent in counseling and/or coordination of care regarding the above plan.    Thank you for the consult.   TENA Lal, [facility]  Department of Anesthesiology             Answers for HPI/ROS submitted by the patient on 10/15/2018   MARIFER 7 TOTAL SCORE: 0  PHQ-2 Score: 0  General Symptoms: No  Skin Symptoms: No  HENT Symptoms: Yes  EYE SYMPTOMS: No  HEART SYMPTOMS: No  LUNG SYMPTOMS: No  INTESTINAL SYMPTOMS: No  URINARY SYMPTOMS: Yes  REPRODUCTIVE SYMPTOMS: Yes  SKELETAL SYMPTOMS: Yes  BLOOD SYMPTOMS: No  NERVOUS SYSTEM SYMPTOMS: Yes  MENTAL HEALTH SYMPTOMS: No  Ear pain: No  Ear discharge: No  Hearing loss: No  Tinnitus: Yes  Nosebleeds: No  Congestion: No  Sinus pain: No  Trouble swallowing: No   Voice hoarseness: No  Mouth sores: No  Sore throat: No  Tooth pain: No  Gum tenderness: No  Bleeding gums: No  Change in taste: No  Change in sense of smell: No  Dry mouth: No  Hearing aid used: No  Neck lump: No  Trouble holding urine or incontinence: Yes  Pain or burning: No  Trouble starting or stopping: No  Increased frequency of urination: No  Blood in urine: No  Decreased frequency of urination: No  Frequent nighttime urination: Yes  Flank pain: No  Difficulty emptying bladder: No  Back pain: Yes  Muscle aches: No  Neck pain: No  Swollen joints: No  Joint pain: No  Bone pain: No  Muscle cramps: No  Muscle weakness: Yes  Joint stiffness: No  Bone fracture: No  Trouble with coordination: No  Dizziness or trouble with balance: No  Fainting or black-out spells:  No  Memory loss: No  Headache: No  Seizures: No  Speech problems: No  Tingling: Yes  Tremor: No  Weakness: Yes  Difficulty walking: No  Paralysis: No  Numbness: Yes  Scrotal pain or swelling: No  Erectile dysfunction: Yes  Penile discharge: No  Genital ulcers: No  Reduced libido: No

## 2018-10-16 NOTE — PATIENT INSTRUCTIONS
1. Call to schedule your Bilateral Lumbar Medial Branch Blocks.     2. Physical therapy referral placed.     If you have not heard from the scheduling office within 2 business days, please call [phone] for all locations    3. The provider will send a message to your PCP asking for permission for you to hold your Coumadin before the procedure. We ask that you hold this medication for 5 days before each injection.     Hold Coumadin  (medication) 5 days before your procedure.    Please call [phone] to schedule, reschedule, or cancel your procedure appointment.   Phones are answered Monday - Friday from 7:30 - 4:00pm.  Leave a voicemail with your name, birth date, and phone number if no one is available to take your call.     Your procedure: Bilateral Lumbar Medial Branch Blocks.     On the day of the procedure  1. Arrive 1 hour earlier than your scheduled time, to the [facility]  Address: [address] [place] [address]  2. Check in on the 5th floor for your procedure    For your diagnostic procedure, please fax in your Pain Diary.  Our fax number is [phone]    If you must reschedule your procedure more than two times, you must follow up in clinic before rescheduling again.        Preparing for your procedure    CAUTION - FAILURE TO FOLLOW THESE PRE-PROCEDURE INSTRUCTIONS WILL RESULT IN YOUR PROCEDURE BEING RESCHEDULED.            You must have a  take you home after your procedure. Transportation by taxi or para-transit is okay as long as you have a responsible adult accompany you. You must provide your 's full name and contact number at time of check in.     Fasting Protocol You may have NOTHING SOLID TO EAT 8 HOURS prior to arrival at the procedure area.     You may have CLEAR LIQUIDS UP TO 2 HOURS prior to arrival.    Broth and candy are considered solid food and require an eight hour fast.     Clear liquids include water, clear fruit juice (no pulp),  carbonated beverages, ice, black coffee, black tea, clear jello. No alcohol containing beverages.   Medications If you take any medications, DO NOT STOP. Take your medications as usual the day of your procedure with a sip of water AT LEAST 2 HOURS PRIOR TO ARRIVAL.    Antibiotics If you are currently taking antibiotics, you must complete the entire dose 7 days prior to your scheduled procedure. You must be clear of any signs or symptoms of infection. If you begin antibiotics, please contact our clinic for instructions.     Fever, Chills, or Rash If you experience a fever of higher than 100 degrees, chills, rash, or open wounds during the one week before your procedure, please call the clinic to see if you may proceed with your procedure.      Medication Hold List  **Patients under Cardiology/Neurology care should consult their provider prior to the pain procedure to verify pre-procedure medication instructions. The information below contains general guidelines.**    Blood Thinners If you are taking daily ASPIRIN, PLAVIX, OR OTHER BLOOD THINNERS SUCH AS COUMADIN/WARFARIN, we will need your prescribing doctor to sign a release permitting you to stop these medications. Once approved by your prescribing doctor - STOP ALL BLOOD THINNERS BASED ON THE TIME TABLE BELOW PRIOR TO YOUR PROCEDURE. If you have been instructed to stop WARFARIN(COUMADIN), you must have an INR lab drawn the day before your procedure. . Your INR must be within normal limits before we can perform your injection. MEDICATIONS CAN BE RESTARTED AFTER YOUR PROCEDURE.    14 DAY HOLD  Ticlid (ticlopidine)    10 DAY HOLD  Effient (Prasugel)    3 DAY HOLD  Xarelto (rivaroxaban) 7 DAY HOLD  Anacin, Bufferin, Ecotrin, Excedrin, Aggrenox (Aspirin)  Brilinta (ticagrelor)  Coumadin (Warfarin)  Pradexa (Dabigatran)  Elmiron (Pentosan)  Plavix (Clopidogrel Bisulfate)  Pletal (Cilostazol)    24 HOUR HOLD  Lovenox (enoxaparin)  Agrylin (Anagrelide)         Non-steroidal Anti-inflammatories (NSAIDs) DO NOT TAKE any non-steroidal anti-inflammatory medications (NSAIDs) listed on the table below. MEDICATIONS CAN BE RESTARTED AFTER YOUR PROCEDURE. Celebrex is OK to take and does not need to be discontinued.     Medications to stop:  3 DAY HOLD  Advil, Motrin (Ibuprofen)  Arthrotec (diciofenac sodium/misoprostol)  Clinoril (Sulindac)  Indocin (Indomethacin)  Lodine (Etodolac)  Toradol (Ketorolac)  Vicoprofen (Hydrocodone and Ibuprofen)  Voltaren (Diclotenac)    14 DAY HOLD  Daypro (Oxaprozin)  Feldene (Piroxicam)   7 DAY HOLD  Aleve (Naproxen sodium)  Darvon compound (contains aspirin)  Naprosyn (Naproxen)  Norgesic Forte (contains aspirin)  Mobic (Meloxicam)  Oruvall (Ketoprofen)  Percodan (contains aspirin)  Relafen (Nabumetone)  Salsalate  Trilisate  Vitamin E (more than 400 mg per day)  Any medication containing aspirin                To speak with a nurse, schedule/reschedule/cancel a clinic appointment, or request a medication refill call: [phone]     You can also reach us by [employer]: [email or web address]    For refills, please call on Monday, 1 week before your medication runs out. The doctors are not always in clinic, so this gives us time to get your prescriptions ready.  Please let us know the name of the medication you are requesting a refill of.

## 2018-10-16 NOTE — LETTER
10/16/2018       RE: Rigoberto Holguin  [address] [facility] [address]     Dear Colleague,    Thank you for referring your patient, Rigoberto Holguin, to the [facility] at [facility]. Please see a copy of my visit note below.    I had the pleasure of meeting Mr. Rigoberto Holguin on 10/16/2018 in the outpatient pain clinic in consult for Dr. Cruz with regards to his lumbar facet arthropathy.     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 Mclaughlin. He no longer has leg pain, but does continue to note numbness and weakness that has remained unchanged post-operatively. Most bothersome, is his persistent low back pain, which he locates at midline lumbar area. He describes as an aching, fatigue sensation. Symptoms aggravated by sitting and walking; nearly total alleviation with sitting or lying flat. Interestingly, patient notes that he experiences relief when resting on grocery cart at the store.   New standing films obtained that indicate multi-level spondylosis, L3 endplate fracture, and reversal of lumbar lordosis. It is thought by neurosurgery that he will eventually require a multi-level fusion, however, he would like to avoid this for as long as possible.    He is here today for evaluation of lumbar facet arthropathy and additional interventional pain management options. He has not completed any physical therapy since post-operatively in 2017. No prescription or over-the-counter medications taken to manage his pain.   ?  Past Medical History:   Diagnosis Date     Actinic keratosis      Basal cell carcinoma      Bladder cancer (H) 2003     DJD (degenerative joint disease), lumbar     S/P epidural x 2     DVT (deep venous thrombosis) (H) 2007    Factor V (  bilateral legs)      GERD (gastroesophageal reflux disease)      Hypothyroidism      Polio     Age 4, weakness of right arm and leg      S/P appendectomy     past medical history reviewed with patient.   Past Surgical History:   Procedure Laterality Date     Appendectomy       FORAMINOTOMY LUMBAR POSTERIOR TWO LEVELS Left 3/2/2017    Procedure: FORAMINOTOMY LUMBAR POSTERIOR TWO LEVELS;  Surgeon: Ricky Mclaughlin MD;  Location: [facility] OR     HC REPAIR ROTATOR CUFF,ACUTE Left 2007     PHOTODYNAMIC THERAPY - (PDT)  2/13/2015     SURGICAL HISTORY OF -   2003    Urinary Bladder Cancer treatment     SURGICAL HISTORY OF -       Gall Bladder    past surgical history reviewed with patient.   Medications:  Current Outpatient Prescriptions   Medication     calcium carbonate (TUMS) 500 MG chewable tablet     cholecalciferol (VITAMIN D) 1000 UNIT tablet     levothyroxine (SYNTHROID/LEVOTHROID) 112 MCG tablet     LUMIGAN 0.01 % SOLN ophthalmic solution     melatonin 5 MG tablet     metroNIDAZOLE (METROCREAM) 0.75 % cream     Multiple Vitamin (DAILY MULTIVITAMIN PO)     Multiple Vitamins-Minerals (EYE VITAMINS PO)     Nutritional Supplements (JUICE PLUS FIBRE PO)     sildenafil (REVATIO) 20 MG tablet     timolol (TIMOPTIC) 0.5 % ophthalmic solution     warfarin (COUMADIN) 5 MG tablet     Current Facility-Administered Medications   Medication     bupivacaine (MARCAINE) 0.25 % injection 3 mL     lidocaine 1 % injection 4 mL     triamcinolone acetonide (KENALOG-40) injection 80 mg     A multi-state Prescription Monitoring Program reviewed and no aberrancies noted.     Allergies:   No Known Allergies  Family History:  family history includes Cancer in his brother and father; Neurologic Disorder in his sister.  Social history: he lives in [place] with his wife, Nakia. He is a retired  from [employer].     Smoking: former smoker. Alcohol: rare. Street drugs: denies.     ROS:  A 14 point review of systems was completed;  results are listed at end of note.     Objective:   There were no vitals taken for this visit.  There is no height or weight on file to calculate BMI.  General: In no apparent distress  Mental status: Normal mood and affect, pleasant  Neuro: Alert, oriented. No sensory deficits.   Head: Atraumatic, normocephalic  Eyes: Extra-ocular movements grossly intact, no scleral icterus  Neck: Supple, Range of motion full  Respiratory: Non-labored breathing; No respiratory distress  Skin: No rashes or lesions noted on exposed areas of skin  Msk:   Lumbar Spine:  No spinous process tenderness with palpation of L3, L4, L5 vertebrae.  Unable to complete heel/toe walk without difficulty on left.   FROM with flexion, extension; + facet loading and grocery cart +.  Strength 5/5 bilaterally: dorsiflexion, plantarflexion, hamstrings, quadriceps.  Patellar reflexes 1+ on left; 2+ bilaterally.   Negative straight leg raise.   Gait is slow; antalgic, but symmetrical.    Imaging:   MRI LUMBAR SPINE WITHOUT AND WITH CONTRAST September 11, 2018 4:58 PM      HISTORY: Spondylosis with radiculopathy, lumbar region.     TECHNIQUE: Multiplanar multisequence MRI of the lumbar spine without  and with 10 mL Gadavist.     COMPARISON: Scan performed on 7/21/2016.     FINDINGS: The report is dictated assuming five lumbar-type vertebral  bodies, and radiographic correlation may be necessary. The distal  spinal cord and cauda equina appear normal. There is scoliosis of the  lumbar spine convex towards the left. There is reversal of the lumbar  lordosis.     T12-L1: Degeneration of the disc. Minimal annular disc bulge.     L1-L2: Degeneration of the disc. Minimal annular disc bulge.     L2-L3: Degeneration of the disc. Loss of disc space height. Mild  retrolisthesis of L2 on L3. Diffuse annular disc bulge. Central canal  is mildly narrowed. Mild-moderate degenerative change in the facet  joints. Mild right and left foraminal stenosis. No significant  change  since 2016.     L3-L4: Degeneration of the disc. Loss of disc space height. Moderate  degenerative change in the facet joints. Central canal is mildly  narrowed. Moderate right and left foraminal stenosis due to loss of  disc space height and degenerative spurs off the facet joints. No  significant change since 2016.     L4-L5: Previous left laminectomy. Laminectomy is new since 2016. There  is enhancing tissue along the left lateral aspect of the dural sac.  This is consistent with scar tissue. Degeneration of the disc. Loss of  disc space height. Diffuse annular disc bulge. Severe degenerative  change in the facet joints. Central canal is moderately narrowed due  to these degenerative changes. The central spinal stenosis does not  appear to be quite as severe as on the previous study. This appears to  be secondary to the left laminectomy. Moderate left foraminal stenosis  due to the degenerative changes.     L5-S1: Degeneration of the disc. Loss of disc space height. Diffuse  annular bulge. Left laminectomy at this level. Small amount of  enhancing tissue is seen in the left lateral epidural space and around  the left L5 nerve root as it enters the neural foramen. Severe  degenerative change in the facet joints. Mild right and  moderate-severe left foraminal stenosis due to the bulging disc and  degenerative change off the left facet joint.     Paraspinous soft tissues: Normal.       Bone marrow:  Normal. No bone metastasis are identified.         IMPRESSION:    1. Postoperative changes at L4-L5 and L5-S1. The patient is status  post left laminectomies at these levels. Enhancing scar tissue in the  left ventral and lateral epidural space at L4-L5 and L5-S1.  2. Multilevel degenerative changes as described with persistent severe  left L4-L5 and L5-S1 foraminal stenosis.  3. Scoliosis convex to the left. Reversal of the cervical lordosis.     SABAS MARAVILLA MD     Assessment:  Mr. Rigoberto Holguin is a 76 yo male seen  today for lumbar facet arthropathy and lumbar spondylosis with myelopathy s/p left-sided L4-5, L5-S1 hemilaminectomy and foraminotomy 3/27/17 with persistent left foraminal stenosis at these levels.  Plan:   1. Patient education: I went over the above diagnoses and treatment plan with him and answered all of his questions.  2. Interventions:  -Bilateral medial branch blocks L3/4, L4/5, L5/S1; will proceed to RFA if appropriate. PCP messaged for notification of need for anticoagulant hold.   3. Exercise program:   -Physical therapy ordered; 2-3x/week for 6-8 weeks.   4. Follow up: RTC 4-6 weeks following procedure.     Total time spent was 35 minutes, and more than 50% of face to face time was spent in counseling and/or coordination of care regarding the above plan.      Again, thank you for allowing me to participate in the care of your patient.      Sincerely,    TENA Lal CNP

## 2018-10-16 NOTE — MR AVS SNAPSHOT
After Visit Summary   10/16/2018    Rigoberto Holguin    MRN: [id number]           Patient Information     Date Of Birth          1940        Visit Information        Provider Department      10/16/2018 8:20 AM Christina Palma APRN [facility]        Care Instructions    1. Call to schedule your Bilateral Lumbar Medial Branch Blocks.     2. Physical therapy referral placed.     If you have not heard from the scheduling office within 2 business days, please call [phone] for all locations    3. The provider will send a message to your PCP asking for permission for you to hold your Coumadin before the procedure. We ask that you hold this medication for 5 days before each injection.     Hold Coumadin  (medication) 5 days before your procedure.    Please call [phone] to schedule, reschedule, or cancel your procedure appointment.   Phones are answered Monday - Friday from 7:30 - 4:00pm.  Leave a voicemail with your name, birth date, and phone number if no one is available to take your call.     Your procedure: Bilateral Lumbar Medial Branch Blocks.     On the day of the procedure  1. Arrive 1 hour earlier than your scheduled time, to the [facility]  Address: [address] [place] [address]  2. Check in on the 5th floor for your procedure    For your diagnostic procedure, please fax in your Pain Diary.  Our fax number is [phone]    If you must reschedule your procedure more than two times, you must follow up in clinic before rescheduling again.        Preparing for your procedure    CAUTION - FAILURE TO FOLLOW THESE PRE-PROCEDURE INSTRUCTIONS WILL RESULT IN YOUR PROCEDURE BEING RESCHEDULED.            You must have a  take you home after your procedure. Transportation by taxi or para-transit is okay as long as you have a responsible adult accompany you. You must provide your 's full name and contact number  at time of check in.     Fasting Protocol You may have NOTHING SOLID TO EAT 8 HOURS prior to arrival at the procedure area.     You may have CLEAR LIQUIDS UP TO 2 HOURS prior to arrival.    Broth and candy are considered solid food and require an eight hour fast.     Clear liquids include water, clear fruit juice (no pulp), carbonated beverages, ice, black coffee, black tea, clear jello. No alcohol containing beverages.   Medications If you take any medications, DO NOT STOP. Take your medications as usual the day of your procedure with a sip of water AT LEAST 2 HOURS PRIOR TO ARRIVAL.    Antibiotics If you are currently taking antibiotics, you must complete the entire dose 7 days prior to your scheduled procedure. You must be clear of any signs or symptoms of infection. If you begin antibiotics, please contact our clinic for instructions.     Fever, Chills, or Rash If you experience a fever of higher than 100 degrees, chills, rash, or open wounds during the one week before your procedure, please call the clinic to see if you may proceed with your procedure.      Medication Hold List  **Patients under Cardiology/Neurology care should consult their provider prior to the pain procedure to verify pre-procedure medication instructions. The information below contains general guidelines.**    Blood Thinners If you are taking daily ASPIRIN, PLAVIX, OR OTHER BLOOD THINNERS SUCH AS COUMADIN/WARFARIN, we will need your prescribing doctor to sign a release permitting you to stop these medications. Once approved by your prescribing doctor - STOP ALL BLOOD THINNERS BASED ON THE TIME TABLE BELOW PRIOR TO YOUR PROCEDURE. If you have been instructed to stop WARFARIN(COUMADIN), you must have an INR lab drawn the day before your procedure. . Your INR must be within normal limits before we can perform your injection. MEDICATIONS CAN BE RESTARTED AFTER YOUR PROCEDURE.    14 DAY HOLD  Ticlid (ticlopidine)    10 DAY HOLD  Effient  (Prasugel)    3 DAY HOLD  Xarelto (rivaroxaban) 7 DAY HOLD  Anacin, Bufferin, Ecotrin, Excedrin, Aggrenox (Aspirin)  Brilinta (ticagrelor)  Coumadin (Warfarin)  Pradexa (Dabigatran)  Elmiron (Pentosan)  Plavix (Clopidogrel Bisulfate)  Pletal (Cilostazol)    24 HOUR HOLD  Lovenox (enoxaparin)  Agrylin (Anagrelide)        Non-steroidal Anti-inflammatories (NSAIDs) DO NOT TAKE any non-steroidal anti-inflammatory medications (NSAIDs) listed on the table below. MEDICATIONS CAN BE RESTARTED AFTER YOUR PROCEDURE. Celebrex is OK to take and does not need to be discontinued.     Medications to stop:  3 DAY HOLD  Advil, Motrin (Ibuprofen)  Arthrotec (diciofenac sodium/misoprostol)  Clinoril (Sulindac)  Indocin (Indomethacin)  Lodine (Etodolac)  Toradol (Ketorolac)  Vicoprofen (Hydrocodone and Ibuprofen)  Voltaren (Diclotenac)    14 DAY HOLD  Daypro (Oxaprozin)  Feldene (Piroxicam)   7 DAY HOLD  Aleve (Naproxen sodium)  Darvon compound (contains aspirin)  Naprosyn (Naproxen)  Norgesic Forte (contains aspirin)  Mobic (Meloxicam)  Oruvall (Ketoprofen)  Percodan (contains aspirin)  Relafen (Nabumetone)  Salsalate  Trilisate  Vitamin E (more than 400 mg per day)  Any medication containing aspirin                To speak with a nurse, schedule/reschedule/cancel a clinic appointment, or request a medication refill call: [phone]     You can also reach us by MedNews: [email or web address]    For refills, please call on Monday, 1 week before your medication runs out. The doctors are not always in clinic, so this gives us time to get your prescriptions ready.  Please let us know the name of the medication you are requesting a refill of.                                     Follow-ups after your visit        Your next 10 appointments already scheduled     Oct 29, 2018  8:30 AM CDT   Anticoagulation Visit with BE ANTI COAG   [place] [facility] ([facility])    [address] at the [facility]  [place]  [address]   [phone]            Dec 10, 2018  8:30 AM CST   (Arrive by 8:15 AM)   Return Visit with Ricky Mclaughlin MD   [facility] ([facility])    [address]  3rd [facility] [id number] [phone]            Dec 11, 2018  3:30 PM CST   Return Visit with Vickie Whalen MD   [facility]    [address] [place] [id number] [phone]            Jun 25, 2019  9:00 AM CDT   Return Visit with Vincent Rucker MD, [facility] CYSTO [facility] ROOM   [facility] ([address] [id number] [phone]              Who to contact     Please call your clinic at [phone] to:    Ask questions about your health    Make or cancel appointments    Discuss your medicines    Learn about your test results    Speak to your doctor            Additional Information About Your Visit        [employer] Information     [employer] gives you secure access to your electronic health record. If you see a primary care provider, you can also send messages to your care team and make appointments. If you have questions, please call your primary care clinic.  If you do not have a primary care provider, please call [phone] and they will assist you.      D[employer] is an electronic gateway that provides easy, online access to your medical records. With [employer], you can request a clinic appointment, read your test results, renew a prescription or communicate with your care team.     To access your existing account, please contact your [facility] or call [phone] for assistance.        Care EveryWhere ID     This is your Care EveryWhere ID. This could be used by other organizations to access your [facility] medical records  [id number]        Your Vitals Were     Pulse Respirations Height BMI (Body Mass Index)          59 16 1.727 m (5'  "8\") 31.17 kg/m2         Blood Pressure from Last 3 Encounters:   10/16/18 121/69   09/20/18 104/67   09/18/18 124/64    Weight from Last 3 Encounters:   10/16/18 93 kg (205 lb)   09/20/18 92.5 kg (204 lb)   09/18/18 91.2 kg (201 lb)              Today, you had the following     No orders found for display       Primary Care Provider Office Phone # Fax #    Jamie Meraz PA-C [phone]       [address]  [place] [address]        Equal Access to Services     [facility]: Hadii aad alissa hadluceroo Sojax, waaxda luqadaha, qaybta kaalmada adehariniyada, tacho garcia . So [facility] [phone].    ATENCIÓN: Si habla español, tiene a thomas disposición servicios gratuitos de asistencia lingüística. [facility] [phone].    We comply with applicable federal civil rights laws and [place] laws. We do not discriminate on the basis of race, color, national origin, age, disability, sex, sexual orientation, or gender identity.            Thank you!     Thank you for choosing [facility]  for your care. Our goal is always to provide you with excellent care. Hearing back from our patients is one way we can continue to improve our services. Please take a few minutes to complete the written survey that you may receive in the mail after your visit with us. Thank you!             Your Updated Medication List - Protect others around you: Learn how to safely use, store and throw away your medicines at www.disposemymeds.org.          This list is accurate as of 10/16/18  9:11 AM.  Always use your most recent med list.                   Brand Name Dispense Instructions for use Diagnosis    cholecalciferol 1000 UNIT tablet    vitamin D3    100 tablet    Take 1 tablet (1,000 Units) by mouth daily    Vitamin D deficiency       EYE VITAMINS PO      Take 1 tablet by mouth daily        DAILY MULTIVITAMIN PO      Take 1 chew tab by mouth daily        JUICE PLUS " FIBRE PO           levothyroxine 112 MCG tablet    SYNTHROID/LEVOTHROID    90 tablet    Take 1 tablet (112 mcg) by mouth every morning (before breakfast)    Hypothyroidism due to Hashimoto's thyroiditis       LUMIGAN 0.01 % Soln   Generic drug:  bimatoprost       Actinic keratosis       melatonin 5 MG tablet      Take 10 mg by mouth At Bedtime Reported on 2/21/2017        metroNIDAZOLE 0.75 % cream    METROCREAM    60 g    every day, once daily to face    Rosacea       sildenafil 20 MG tablet    REVATIO    30 tablet    Take 2-5 tabs daily prn    Erectile dysfunction, unspecified erectile dysfunction type       timolol 0.5 % ophthalmic solution    TIMOPTIC      Actinic keratosis       TUMS 500 MG chewable tablet   Generic drug:  calcium carbonate      Take 1 chew tab by mouth daily as needed Reported on 4/18/2017        warfarin 5 MG tablet    COUMADIN    135 tablet    10 mg(2 tabs) on Tue & Thu; 7.5 mg (1.5 tabs) all other days or as directed    History of DVT of lower extremity, Chronic anticoagulation

## 2018-10-23 ENCOUNTER — THERAPY VISIT (OUTPATIENT)
Dept: PHYSICAL THERAPY | Facility: CLINIC | Age: 78
End: 2018-10-23
Attending: NURSE PRACTITIONER
Payer: MEDICARE

## 2018-10-23 DIAGNOSIS — Z98.890 S/P SPINAL SURGERY: ICD-10-CM

## 2018-10-23 DIAGNOSIS — M47.16 LUMBAR SPONDYLOSIS WITH MYELOPATHY: ICD-10-CM

## 2018-10-23 DIAGNOSIS — M47.816 LUMBAR FACET ARTHROPATHY: ICD-10-CM

## 2018-10-23 DIAGNOSIS — E06.3 HYPOTHYROIDISM DUE TO HASHIMOTO'S THYROIDITIS: ICD-10-CM

## 2018-10-23 PROCEDURE — G8978 MOBILITY CURRENT STATUS: HCPCS | Mod: GP | Performed by: PHYSICAL THERAPIST

## 2018-10-23 PROCEDURE — 97161 PT EVAL LOW COMPLEX 20 MIN: CPT | Mod: GP | Performed by: PHYSICAL THERAPIST

## 2018-10-23 PROCEDURE — 97110 THERAPEUTIC EXERCISES: CPT | Mod: GP | Performed by: PHYSICAL THERAPIST

## 2018-10-23 PROCEDURE — G8979 MOBILITY GOAL STATUS: HCPCS | Mod: GP | Performed by: PHYSICAL THERAPIST

## 2018-10-23 RX ORDER — LEVOTHYROXINE SODIUM 112 UG/1
TABLET ORAL
Qty: 90 TABLET | Refills: 3 | Status: SHIPPED | OUTPATIENT
Start: 2018-10-23

## 2018-10-23 NOTE — PROGRESS NOTES
[facility] Initial Evaluation -- Lumbar    Date: October 23, 2018  Rigoberto Holguin is a 78 year old male with a low back pain condition.   Referral: TENA Lal CNP  Work mechanical stresses:  none  Employment status:  retired  Leisure mechanical stresses: golfing 1-2 times per week; small projects  Functional disability score (CEE/STarT Back):  20% CEE; klever medium  VAS score (0-10): 0/10 currently; 8/10  Patient goals/expectations:  Reduce pain - walk farther, lift more, be able to help around the house more    HISTORY:    Present symptoms: none  Pain quality (sharp/shooting/stabbing/aching/burning/cramping):  Aching, fatigued   Paresthesia (yes/no):  Numbness in toes of L>R foot; tingling at night in tips of toes on both feet, L>R; weakness in left foot (plantarflexors)    Present since (onset date): date of order 10/16/18.     Symptoms (improving/unchanging/worsening):  Unchanged for the last 6 months.     Symptoms commenced as a result of: trauma 2004 fell off a ladder, pain for 2 weeks then fine for many years; 3-4 years ago started having pain with activities; noted weakness 2 years ago  Condition occurred in the following environment:   home     Symptoms at onset (back/thigh/leg): left foot/ankle weakness  Constant symptoms (back/thigh/leg): none  Intermittent symptoms (back/thigh/leg): bilateral low back     Symptoms are made worse with the following: always walking, always leaning forward to do the dishes  Symptoms are made better with the following: always sitting or lying down, resting on grocery cart at the store, playing golf    Disturbed sleep (yes/no):  no Sleeping postures (prone/sup/side R/L): varies    Previous episodes (0/1-5/6-10/11+): 5+ Year of first episode: 2014    Previous history: see below for surgical history - no benefit per patient  Previous treatments: PT, most recently 2017 post-surgically      Specific Questions:  Cough/Sneeze/Strain (pos/neg):  "neg  Bowel/Bladder (normal/abnormal): normal  Gait (normal/abnormal): abnormal; drags L foot and occasionally trips; leans forward  Medications (nil/NSAIDS/analg/steroids/anticoag/other):  Other - Thyroid and coumadin  Medical allergies:  none  General health (excellent/good/fair/poor):  good  Pertinent medical history:  Cancer and Thyroid problems polio, bladder cancer, BCC, and 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 It is thought by neurosurgery that he will eventually require a multi-level fusion, however, he would like to avoid this for as long as possible.\"    Recent or major surgery (yes/no):   left-sided L4-5, L5-S1 hemilaminectomy and foraminotomy 3/1/17, due to significant stenosis, with Dr. Mclaughlin.  Night pain (yes/no): no  Accidents (yes/no): no  Unexplained weight loss (yes/no): no  Barriers at home: none  Other red flags: none    EXAMINATION    Posture:   Sitting (good/fair/poor): fair  Standing (good/fair/poor): fair  Lordosis (red/acc/normal): red  Correction of posture (better/worse/no effect): NE    Lateral Shift (right/left/nil): nil  Relevant (yes/no):  NA  Other Observations: gait - walks in slight flexion, left hip drop during right stance phase, decreased push off on left     Neurological:    Motor deficit:  L pf 3/5, bilat hip flexion 4/5  Reflexes:  2+ patellar bilat; absent achilles bilat  Sensory deficit:  Hypo L4 L   Dural signs:  Slump negative for back pain, positive for L calf pain    Movement Loss:   [place] Mod Min Nil Pain   Flexion   X - hands to mid shin  no   Extension  X   no   Side Gliding R   X  Yes - left low back   Side Gliding L   X  no     Test Movements:   During: produces, abolishes, increases, decreases, no effect, centralizing, peripheralizing   After: better, worse, no better, no worse, no " effect, centralized, peripheralized    Pretest symptoms standing: no pain   Symptoms During Symptoms After ROM increased ROM decreased No Effect   FIS Produces strain No Worse      Rep FIS Produces strain No Worse X - very slightly increased extension     EIS Produces strain No Worse      Rep EIS Produces strain No Worse X - increased flexion and extension         Static Tests:  Sitting slouched:  NT  Sitting erect:  NT  Standing slouched NT  Standing erect:  NT  Lying prone in extension:  NT Long sitting:  NT    Other Tests: fair TrA setting, poor tonic holding; MMT lower abd 2/5, hip abd 3+/5 bilaterally    Provisional Classification:  Inconclusive/Other - Mechanically Inconclusive - improved ROM but no change in pain    Principle of Management:  Education:  Role of exercise in management of chronic problems  Equipment provided:  none  Mechanical therapy (Y/N):  Y   Extension principle:  Trial of standing extension X 10 every 3 hours  Lateral Principle:  no  Flexion principle:  no  Other:  Postural strengthening    ASSESSMENT/PLAN:    Patient is a 78 year old male with lumbar complaints.    Patient has the following significant findings with corresponding treatment plan.                Diagnosis 1:  Chronic low back pain with L LE weakness    Pain -  hot/cold therapy, manual therapy, education, directional preference exercise and home program  Decreased ROM/flexibility - manual therapy, therapeutic exercise and home program  Decreased strength - therapeutic exercise, therapeutic activities and home program  Impaired gait - gait training and home program  Impaired muscle performance - neuro re-education and home program  Impaired posture - neuro re-education and home program    Therapy Evaluation Codes:   1) History comprised of:   Personal factors that impact the plan of care:      Age, Coping style, Overall behavior pattern, Past/current experiences and Time since onset of symptoms.    Comorbidity factors that  impact the plan of care are:      None.     Medications impacting care: None.  2) Examination of Body Systems comprised of:   Body structures and functions that impact the plan of care:      Lumbar spine.   Activity limitations that impact the plan of care are:      Standing and Walking.  3) Clinical presentation characteristics are:   Stable/Uncomplicated.  4) Decision-Making    Low complexity using standardized patient assessment instrument and/or measureable assessment of functional outcome.  Cumulative Therapy Evaluation is: Low complexity.    Previous and current functional limitations:  (See Goal Flow Sheet for this information)    Short term and Long term goals: (See Goal Flow Sheet for this information)     Communication ability:  Patient appears to be able to clearly communicate and understand verbal and written communication and follow directions correctly.  Treatment Explanation - The following has been discussed with the patient:   RX ordered/plan of care  Anticipated outcomes  Possible risks and side effects  This patient would benefit from PT intervention to resume normal activities.   Rehab potential is good.    Frequency:  1 X week, once daily  Duration:  for 6 weeks  Discharge Plan:  Achieve all LTG.  Independent in home treatment program.  Reach maximal therapeutic benefit.    Please refer to the daily flowsheet for treatment today, total treatment time and time spent performing 1:1 timed codes.

## 2018-10-23 NOTE — TELEPHONE ENCOUNTER
"Requested Prescriptions   Pending Prescriptions Disp Refills     levothyroxine (SYNTHROID/LEVOTHROID) 112 MCG tablet [Pharmacy Med Name: LEVOTHYROXINE SODIUM 112 MCG Tablet] 90 tablet 3    Last Written Prescription Date:  8-21-18  Last Fill Quantity: 90,  # refills: 3   Last office visit: 9/20/2018 with prescribing provider:  9-20-18   Future Office Visit:   Next 5 appointments (look out 90 days)     Dec 11, 2018  3:30 PM CST   [facility] Visit with Vickie [facility]alen MD   [facility]    [address] [place] [id number] [phone]                Sig: TAKE 1 TABLET EVERY MORNING BEFORE BREAKFAST    Thyroid Protocol Passed    10/23/2018  8:45 AM       Passed - Patient is 12 years or older       Passed - Recent (12 mo) or future (30 days) visit within the authorizing provider's specialty    Patient had office visit in the last 12 months or has a visit in the next 30 days with authorizing provider or within the authorizing provider's specialty.  See \"Patient Info\" tab in inbasket, or \"Choose Columns\" in Meds & Orders section of the refill encounter.             Passed - Normal TSH on file in past 12 months    Recent Labs   Lab Test  12/04/17   1311   TSH  0.63              "

## 2018-10-23 NOTE — MR AVS SNAPSHOT
After Visit Summary   10/23/2018    Rigoberto Holguin    MRN: [id number]           Patient Information     Date Of Birth          1940        Visit Information        Provider Department      10/23/2018 12:30 PM Justin Angeles, PT [facility] [place] [facility]        Today's Diagnoses     Lumbar facet arthropathy        Lumbar spondylosis with myelopathy        S/P spinal surgery           Follow-ups after your visit        Your next 10 appointments already scheduled     Oct 29, 2018  8:30 AM CDT   Anticoagulation Visit with BE ANTI COAG   [facility] [place] ([facility] [place])    [address]  [place] [address]   [phone]            Oct 30, 2018  1:10 PM CDT   ROVERTO Spine with Justin Angeles PT   [facility] [place] [facility] ([facility] [place])    [address]  [address]  [place] [address]   [phone]            Nov 06, 2018  1:10 PM CST   ROVERTO Spine with Justin Angeles PT   [facility] ([facility] [place])    [address]  [address]  [place] [address]   [phone]            Nov 13, 2018  1:10 PM CST   ROVERTO Spine with Justin Angeles PT   [facility] [place] [facility] ([facility] [place])    [address]  [address]  [place] [address]   [phone]            Nov 20, 2018  1:10 PM CST   ROVERTO Spine with Justin Angeles PT   [facility] [place] [facility] ([facility] [place])    [address]  [place] [address]   [phone]            Nov 21, 2018   Procedure with Augusto Pugh MD   [facility] ([facility])    [address] [place] [address]  5th [facility] [address] [phone]           Located in the Clinics and Surgery Center at [address] [place] [address].   parking is very convenient and highly recommended.  is a $6 flat rate fee.  Both  and self  parkers should enter the main arrival plaza from [facility]; parking attendants will direct you based on your parking preference.            Nov 27, 2018  1:10 PM CST   ROVERTO Spine with Justin Angeles PT   [facility] [place] [facility] ([facility] [place])    [address]  [address]  [place] [address] [phone]            Dec 10, 2018  8:30 AM CST   (Arrive by 8:15 AM)   Return Visit with Ricky Mclaughlin MD   [facility] ([facility])    [address] [place] [address]  3rd Floor  [facility] [address] [phone]            Dec 11, 2018  3:30 PM CST   [facility]    [address] [place] [id number] [phone]            Jun 25, 2019  9:00 AM CDT   [facility]    [address] [place] [address] [id number] [phone]              Who to contact     If you have questions or need follow up information about today's clinic visit or your schedule please contact [facility] directly at [phone].  Normal or non-critical lab and imaging results will be communicated to you by IS Decisionshart, letter or phone within 4 business days after the clinic has received the results. If you do not hear from us within 7 days, please contact the clinic through MyChart or phone. If you have a critical or abnormal lab result, we will notify you by phone as soon as possible.  Submit refill requests through [employer] Reporter or call your pharmacy and they will forward the refill request to us. Please allow 3 business days for your refill to be completed.          Additional Information About Your Visit        [employer] Information     [employer] Reporter gives you secure access to your electronic health record. If you see a primary care provider, you can also send messages to your care  team and make appointments. If you have questions, please call your primary care clinic.  If you do not have a primary care provider, please call [phone] and they will assist you.        Care EveryWhere ID     This is your Care EveryWhere ID. This could be used by other organizations to access your [facility] medical records  [id number]         Blood Pressure from Last 3 Encounters:   10/16/18 121/69   09/20/18 104/67   09/18/18 124/64    Weight from Last 3 Encounters:   10/16/18 93 kg (205 lb)   09/20/18 92.5 kg (204 lb)   09/18/18 91.2 kg (201 lb)              We Performed the Following     HC PT EVAL, LOW COMPLEXITY     ROVERTO CERT REPORT     PHYSICAL THERAPY REFERRAL     THERAPEUTIC EXERCISES          Today's Medication Changes          These changes are accurate as of 10/23/18  2:31 PM.  If you have any questions, ask your nurse or doctor.               These medicines have changed or have updated prescriptions.        Dose/Directions    levothyroxine 112 MCG tablet   Commonly known as:  SYNTHROID/LEVOTHROID   This may have changed:  See the new instructions.   Used for:  Hypothyroidism due to Hashimoto's thyroiditis   Changed by:  Jamie Meraz PA-C        TAKE 1 TABLET EVERY MORNING BEFORE BREAKFAST   Quantity:  90 tablet   Refills:  3            Where to get your medicines      These medications were sent to [facility] - [facility] [address]  [address], [facility] [address]     Phone:  [phone]     levothyroxine 112 MCG tablet                Primary Care Provider Office Phone # Fax #    Jamie Meraz PA-C [phone]       [address]  [place] [address]        Equal Access to Services     [facility] CenterSRINIVASAN : Hadii angelita martinez Sojax, waaxda luqadaha, qaybta kaalmatcaho garcia. So [facility] [phone].    ATENCIÓN: Si habla español, tiene a thomas disposición servicios gratuitos de asistencia  lingüística. Pauline al [phone].    We comply with applicable federal civil rights laws and [place] laws. We do not discriminate on the basis of race, color, national origin, age, disability, sex, sexual orientation, or gender identity.            Thank you!     Thank you for choosing [facility]  for your care. Our goal is always to provide you with excellent care. Hearing back from our patients is one way we can continue to improve our services. Please take a few minutes to complete the written survey that you may receive in the mail after your visit with us. Thank you!             Your Updated Medication List - Protect others around you: Learn how to safely use, store and throw away your medicines at www.disposemymeds.org.          This list is accurate as of 10/23/18  2:31 PM.  Always use your most recent med list.                   Brand Name Dispense Instructions for use Diagnosis    cholecalciferol 1000 UNIT tablet    vitamin D3    100 tablet    Take 1 tablet (1,000 Units) by mouth daily    Vitamin D deficiency       EYE VITAMINS PO      Take 1 tablet by mouth daily        DAILY MULTIVITAMIN PO      Take 1 chew tab by mouth daily        JUICE PLUS FIBRE PO           levothyroxine 112 MCG tablet    SYNTHROID/LEVOTHROID    90 tablet    TAKE 1 TABLET EVERY MORNING BEFORE BREAKFAST    Hypothyroidism due to Hashimoto's thyroiditis       LUMIGAN 0.01 % Soln   Generic drug:  bimatoprost       Actinic keratosis       melatonin 5 MG tablet      Take 10 mg by mouth At Bedtime Reported on 2/21/2017        metroNIDAZOLE 0.75 % cream    METROCREAM    60 g    every day, once daily to face    Rosacea       sildenafil 20 MG tablet    REVATIO    30 tablet    Take 2-5 tabs daily prn    Erectile dysfunction, unspecified erectile dysfunction type       timolol 0.5 % ophthalmic solution    TIMOPTIC      Actinic keratosis       TUMS 500 MG chewable tablet   Generic drug:  calcium carbonate       Take 1 chew tab by mouth daily as needed Reported on 4/18/2017        warfarin 5 MG tablet    COUMADIN    135 tablet    10 mg(2 tabs) on Tue & Thu; 7.5 mg (1.5 tabs) all other days or as directed    History of DVT of lower extremity, Chronic anticoagulation

## 2018-10-23 NOTE — LETTER
DEPARTMENT OF HEALTH AND HUMAN SERVICES  CENTERS FOR MEDICARE & MEDICAID SERVICES    PLAN/UPDATED PLAN OF PROGRESS FOR OUTPATIENT REHABILITATION    PATIENTS NAME:  Rigoberto Holguin   : 1940  PROVIDER NUMBER:    [id number]  HICN:  [id number]  PROVIDER NAME: [facility]  MEDICAL RECORD NUMBER: [id number]   START OF CARE DATE:  SOC Date: 10/23/18   TYPE:  PT  PRIMARY/TREATMENT DIAGNOSIS: (Pertinent Medical Diagnosis)  Lumbar facet arthropathy  Lumbar spondylosis with myelopathy  S/P spinal surgery    VISITS FROM START OF CARE:  Rxs Used: 1     [employer] Initial Evaluation -- Lumbar  Date: 2018  Rigoberto Holguin is a 78 year old male with a low back pain condition.   Referral: TENA Lal CNP  Work mechanical stresses:  none  Employment status:  retired  Leisure mechanical stresses: golfing 1-2 times per week; small projects  Functional disability score (CEE/STarT Back):  20% CEE; klever medium  VAS score (0-10): 0/10 currently; 8/10  Patient goals/expectations:  Reduce pain - walk farther, lift more, be able to help around the house more    HISTORY:  Present symptoms: none  Pain quality (sharp/shooting/stabbing/aching/burning/cramping):  Aching, fatigued   Paresthesia (yes/no):  Numbness in toes of L>R foot; tingling at night in tips of toes on both feet, L>R; weakness in left foot (plantarflexors)  Present since (onset date): date of order 10/16/18.     Symptoms (improving/unchanging/worsening):  Unchanged for the last 6 months.   Symptoms commenced as a result of: trauma  fell off a ladder, pain for 2 weeks then fine for many years; 3-4 years ago started having pain with activities; noted weakness 2 years ago  Condition occurred in the following environment:   home   Symptoms at onset (back/thigh/leg): left foot/ankle weakness  Constant symptoms (back/thigh/leg): none  Intermittent symptoms (back/thigh/leg): bilateral low back   Symptoms are  "made worse with the following: always walking, always leaning forward to do the dishes  Symptoms are made better with the following: always sitting or lying down, resting on grocery cart at the store, playing golf  Disturbed sleep (yes/no):  no Sleeping postures (prone/sup/side R/L): varies  Previous episodes (0/1-5/6-10/11+): 5+ Year of first episode:   Previous history: see below for surgical history - no benefit per patient  Previous treatments: PT, most recently 2017 post-surgically    PATIENTS NAME:  Rigoberto Holguin   : 1940    Specific Questions:  Cough/Sneeze/Strain (pos/neg): neg  Bowel/Bladder (normal/abnormal): normal  Gait (normal/abnormal): abnormal; drags L foot and occasionally trips; leans forward  Medications (nil/NSAIDS/analg/steroids/anticoag/other):  Other - Thyroid and coumadin  Medical allergies:  none  General health (excellent/good/fair/poor):  good  Pertinent medical history:  Cancer and Thyroid problems polio, bladder cancer, BCC, and 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 It is thought by neurosurgery that he will eventually require a multi-level fusion, however, he would like to avoid this for as long as possible.\"    Recent or major surgery (yes/no):   left-sided L4-5, L5-S1 hemilaminectomy and foraminotomy 3/1/17, due to significant stenosis, with Dr. Mclaughlin.  Night pain (yes/no): no  Accidents (yes/no): no  Unexplained weight loss (yes/no): no  Barriers at home: none  Other red flags: none    EXAMINATION  Posture:   Sitting (good/fair/poor): fair  Standing (good/fair/poor): fair  Lordosis (red/acc/normal): red  Correction of posture (better/worse/no effect): NE  Lateral Shift (right/left/nil): nil  Relevant (yes/no):  NA  Other Observations: gait - walks in slight flexion, left hip drop during right " stance phase, decreased push off on left   Neurological:  Motor deficit:  L pf 3/5, bilat hip flexion 4/5  Reflexes:  2+ patellar bilat; absent achilles bilat  Sensory deficit:  Hypo L4 L   Dural signs:  Slump negative for back pain, positive for L calf pain  Movement Loss:   [place]o Mod Min Nil Pain   Flexion   X - hands to mid shin  no   Extension  X   no   Side Gliding R   X  Yes - left low back   Side Gliding L   X  no     Test Movements:     PATIENTS NAME:  Rigoberto Holguin   : 1940    During: produces, abolishes, increases, decreases, no effect, centralizing, peripheralizing   After: better, worse, no better, no worse, no effect, centralized, peripheralized  Pretest symptoms standing: no pain   Symptoms During Symptoms After ROM increased ROM decreased No Effect   FIS Produces strain No Worse      Rep FIS Produces strain No Worse X - very slightly increased extension     EIS Produces strain No Worse      Rep EIS Produces strain No Worse X - increased flexion and extension       Static Tests:  Sitting slouched:  NT  Sitting erect:  NT  Standing slouched NT  Standing erect:  NT  Lying prone in extension:  NT Long sitting:  NT  Other Tests: fair TrA setting, poor tonic holding; MMT lower abd 2/5, hip abd 3+/5 bilaterally  Provisional Classification:  Inconclusive/Other - Mechanically Inconclusive - improved ROM but no change in pain  Principle of Management:  Education:  Role of exercise in management of chronic problems  Equipment provided:  none  Mechanical therapy (Y/N):  Y   Extension principle:  Trial of standing extension X 10 every 3 hours  Lateral Principle:  no  Flexion principle:  no  Other:  Postural strengthening    ASSESSMENT/PLAN:  Patient is a 78 year old male with lumbar complaints.    Patient has the following significant findings with corresponding treatment plan.                Diagnosis 1:  Chronic low back pain with L LE weakness    Pain -  hot/cold therapy, manual therapy, education,  directional preference exercise and home program  Decreased ROM/flexibility - manual therapy, therapeutic exercise and home program  Decreased strength - therapeutic exercise, therapeutic activities and home program  Impaired gait - gait training and home program  Impaired muscle performance - neuro re-education and home program  Impaired posture - neuro re-education and home program    Therapy Evaluation Codes:   1) History comprised of:   Personal factors that impact the plan of care:      PATIENTS NAME:  Rigoberto Holguin   : 1940    Age, Coping style, Overall behavior pattern, Past/current experiences and Time since onset of symptoms.    Comorbidity factors that impact the plan of care are:      None.     Medications impacting care: None.  2) Examination of Body Systems comprised of:   Body structures and functions that impact the plan of care:      Lumbar spine.   Activity limitations that impact the plan of care are:      Standing and Walking.  3) Clinical presentation characteristics are:   Stable/Uncomplicated.  4) Decision-Making    Low complexity using standardized patient assessment instrument and/or measureable assessment of functional outcome.  Cumulative Therapy Evaluation is: Low complexity.  Previous and current functional limitations:  (See Goal Flow Sheet for this information)    Short term and Long term goals: (See Goal Flow Sheet for this information)   Communication ability:  Patient appears to be able to clearly communicate and understand verbal and written communication and follow directions correctly.  Treatment Explanation - The following has been discussed with the patient:   RX ordered/plan of care  Anticipated outcomes  Possible risks and side effects  This patient would benefit from PT intervention to resume normal activities.   Rehab potential is good.  Frequency:  1 X week, once daily  Duration:  for 6 weeks  Discharge Plan:  Achieve all LTG.  Independent in home treatment  "program.  Reach maximal therapeutic benefit.  Please refer to the daily flowsheet for treatment today, total treatment time and time spent performing 1:1 timed codes.           Caregiver Signature/Credentials _____________________________ Date ________      Treating Provider: Justin Angeles DPT   I have reviewed and certified the need for these services and plan of treatment while under my care.        PHYSICIAN'S SIGNATURE:   _________________________________________  Date___________   Christina Palma CNP    Certification period:  Beginning of Cert date period: 10/23/18 to  End of Cert period date: 18     Functional Level Progress Report: Please see attached \"Goal Flow sheet for Functional level.\"  PATIENTS NAME:  Rigoberto Holguin   : 1940      ____X____ Continue Services or       ________ DC Services                Service dates: From  SOC Date: 10/23/18 date to present                         "

## 2018-10-29 ENCOUNTER — ANTICOAGULATION THERAPY VISIT (OUTPATIENT)
Dept: NURSING | Facility: CLINIC | Age: 78
End: 2018-10-29
Payer: MEDICARE

## 2018-10-29 LAB — INR POINT OF CARE: 2.8 (ref 0.86–1.14)

## 2018-10-29 PROCEDURE — 36416 COLLJ CAPILLARY BLOOD SPEC: CPT

## 2018-10-29 PROCEDURE — 99207 ZZC NO CHARGE NURSE ONLY: CPT

## 2018-10-29 PROCEDURE — 85610 PROTHROMBIN TIME: CPT | Mod: QW

## 2018-10-29 NOTE — MR AVS SNAPSHOT
Rigoberto LORENZO Holguin   10/29/2018 8:30 AM   Anticoagulation Therapy Visit    Description:  78 year old male   Provider:  ALANA ANTI COAG   Department:  [facility] Nurse           INR as of 10/29/2018     Today's INR 2.8      Anticoagulation Summary as of 10/29/2018     INR goal 2.0-3.0   Today's INR 2.8   Full warfarin instructions 10 mg on Tue, Thu; 7.5 mg all other days   Next INR check 12/11/2018    Indications   Long-term (current) use of anticoagulants [Z79.01] [Z79.01]         Your next Anticoagulation Clinic appointment(s)     Dec 11, 2018  8:30 AM CST   Anticoagulation Visit with ALANA ANTI GARRISON   [facility] [place] ([facility]    [address]  [place] [phone]              Contact Numbers     [facility]  Please call [phone] with any problems or questions regarding your therapy, or to cancel or reschedule your appointment.          October 2018 Details    Sun Mon Tue Wed Thu Fri Sat      1               2               3               4               5               6                 7               8               9               10               11               12               13                 14               15               16               17               18               19               20                 21               22               23               24               25               26               27                 28               29      7.5 mg   See details      30      10 mg         31      7.5 mg             Date Details   10/29 This INR check               How to take your warfarin dose     To take:  7.5 mg Take 1.5 of the 5 mg tablets.    To take:  10 mg Take 2 of the 5 mg tablets.           November 2018 Details    Sun Mon Tue Wed Thu Fri Sat         1      10 mg         2      7.5 mg         3      7.5 mg           4      7.5 mg         5      7.5 mg         6      10 mg         7      7.5 mg         8      10 mg         9      7.5 mg          10      7.5 mg           11      7.5 mg         12      7.5 mg         13      10 mg         14      7.5 mg         15      10 mg         16      7.5 mg         17      7.5 mg           18      7.5 mg         19      7.5 mg         20      10 mg         21      7.5 mg         22      10 mg         23      7.5 mg         24      7.5 mg           25      7.5 mg         26      7.5 mg         27      10 mg         28      7.5 mg         29      10 mg         30      7.5 mg           Date Details   No additional details            How to take your warfarin dose     To take:  7.5 mg Take 1.5 of the 5 mg tablets.    To take:  10 mg Take 2 of the 5 mg tablets.           December 2018 Details    Sun Mon Tue Wed Thu Fri Sat           1      7.5 mg           2      7.5 mg         3      7.5 mg         4      10 mg         5      7.5 mg         6      10 mg         7      7.5 mg         8      7.5 mg           9      7.5 mg         10      7.5 mg         11            12               13               14               15                 16               17               18               19               20               21               22                 23               24               25               26               27               28               29                 30               31                     Date Details   No additional details    Date of next INR:  12/11/2018         How to take your warfarin dose     To take:  7.5 mg Take 1.5 of the 5 mg tablets.    To take:  10 mg Take 2 of the 5 mg tablets.

## 2018-10-29 NOTE — PROGRESS NOTES
ANTICOAGULATION FOLLOW-UP CLINIC VISIT    Patient Name:  Rigoberto Holguin  Date:  10/29/2018  Contact Type:  Face to Face    SUBJECTIVE:     Patient Findings     Positives No Problem Findings    Comments Has upcoming pain procedures, will discuss hold orders with provider and schedule INR rechecks once this is determined by pain providers/pcp           OBJECTIVE    INR Protime   Date Value Ref Range Status   10/29/2018 2.8 (A) 0.86 - 1.14 Final       ASSESSMENT / PLAN  INR assessment THER    Recheck INR In: 6 WEEKS    INR Location Clinic      Anticoagulation Summary as of 10/29/2018     INR goal 2.0-3.0   Today's INR 2.8   Warfarin maintenance plan 10 mg (5 mg x 2) on Tue, Thu; 7.5 mg (5 mg x 1.5) all other days   Full warfarin instructions 10 mg on Tue, Thu; 7.5 mg all other days   Weekly warfarin total 57.5 mg   No change documented Aparna Robison   Plan last modified Aparna Robison (5/31/2018)   Next INR check 12/11/2018   Target end date     Indications   Long-term (current) use of anticoagulants [Z79.01] [Z79.01]         Anticoagulation Episode Summary     INR check location     Preferred lab     Send INR reminders to [facility]    Comments       Anticoagulation Care Providers     Provider Role Specialty Phone number    Jamie Meraz PA-C Responsible Physician Assistant [phone]            See the Encounter Report to view Anticoagulation Flowsheet and Dosing Calendar (Go to Encounters tab in chart review, and find the Anticoagulation Therapy Visit)        APARNA ROBISON

## 2018-11-01 ENCOUNTER — THERAPY VISIT (OUTPATIENT)
Dept: PHYSICAL THERAPY | Facility: CLINIC | Age: 78
End: 2018-11-01
Payer: MEDICARE

## 2018-11-01 DIAGNOSIS — M47.817 LUMBOSACRAL SPONDYLOSIS WITHOUT MYELOPATHY: ICD-10-CM

## 2018-11-01 DIAGNOSIS — M47.816 LUMBAR FACET ARTHROPATHY: ICD-10-CM

## 2018-11-01 PROCEDURE — 97140 MANUAL THERAPY 1/> REGIONS: CPT | Mod: GP | Performed by: PHYSICAL THERAPIST

## 2018-11-01 PROCEDURE — 97110 THERAPEUTIC EXERCISES: CPT | Mod: GP | Performed by: PHYSICAL THERAPIST

## 2018-11-01 NOTE — MR AVS SNAPSHOT
After Visit Summary   11/1/2018    Rigoberto Holguin    MRN: [id number]           Patient Information     Date Of Birth          1940        Visit Information        Provider Department      11/1/2018 8:40 AM Justin Angeles PT [facility] [place] [facility]        Today's Diagnoses     Lumbar facet arthropathy        Lumbosacral spondylosis without myelopathy           Follow-ups after your visit        Your next 10 appointments already scheduled     Nov 06, 2018  1:10 PM CST   ROVERTO Spine with Justin Angeles PT   [facility] [place] [facility] ([facility] [place])    [address]  [place] [address]   [phone]            Nov 06, 2018  2:00 PM CST   Office Visit with Jamie Meraz PA-C   [facility] [place] ([facility] [place])    [address]  [place] [address]   [phone]           Bring a current list of meds and any records pertaining to this visit. For Physicals, please bring immunization records and any forms needing to be filled out. Please arrive 10 minutes early to complete paperwork.            Nov 13, 2018  1:10 PM CST   ROVERTO Spine with Justin Angeles PT   [facility] [place] [facility] ([facility] [place])    [address]  [place] [address]   [phone]            Nov 20, 2018  1:10 PM CST   ROVERTO Spine with Justin Angeles PT   [facility] [place] [facility] ([facility] [place])    [address]  [place] [address]   [phone]            Nov 21, 2018   Procedure with Augusto Pugh MD   [facility] ([facility])    [address] [place] [address]  5th [facility] [address] [phone]           Located in the Clinics and Surgery Center at [address] [place] [address].   parking is very convenient and highly recommended.  is a $6 flat rate fee.  Both  and self  parkers should enter the main arrival plaza from [facility]; parking attendants will direct you based on your parking preference.            Nov 27, 2018  1:10 PM CST   ROVERTO Spine with Justin Angeles PT   [facility] [place] [facility] ([facility] [place])    [address]  [place] [address]   [phone]            Dec 10, 2018  8:30 AM CST   (Arrive by 8:15 AM)   Return Visit with Ricky Mclaughlin MD   [facility] ([facility])    [address]  3rd Floor  [facility] [address] [phone]            Dec 11, 2018  8:30 AM CST   Anticoagulation Visit with BE ANTI COAG   [facility] ([facility] [place])    [address]  [place] [address]   [phone]            Dec 11, 2018  3:30 PM CST   Return Visit with Vickie Whalen MD   [facility]    [address] [place] [address] [phone]            Jun 25, 2019  9:00 AM CDT   Return Visit with Vincent Rucker MD   [facility]    [address] [place] [address] [phone]              Who to contact     If you have questions or need follow up information about today's clinic visit or your schedule please contact [facility] [place] [facility] directly at [phone].  Normal or non-critical lab and imaging results will be communicated to you by MyChart, letter or phone within 4 business days after the clinic has received the results. If you do not hear from us within 7 days, please contact the clinic through MyChart or phone. If you have a critical or abnormal lab result, we will notify you by phone as soon as possible.  Submit refill requests through AlertMe or call your pharmacy and they will forward the refill request to us. Please allow 3 business days for your refill to be completed.          Additional  Information About Your Visit        Jobbrhart Information     Tower[employer] gives you secure access to your electronic health record. If you see a primary care provider, you can also send messages to your care team and make appointments. If you have questions, please call your primary care clinic.  If you do not have a primary care provider, please call [phone] and they will assist you.        Care EveryWhere ID     This is your Care EveryWhere ID. This could be used by other organizations to access your [facility] medical records  [id number]         Blood Pressure from Last 3 Encounters:   10/16/18 121/69   09/20/18 104/67   09/18/18 124/64    Weight from Last 3 Encounters:   10/16/18 93 kg (205 lb)   09/20/18 92.5 kg (204 lb)   09/18/18 91.2 kg (201 lb)              We Performed the Following     MANUAL THER TECH,1+REGIONS,EA 15 MIN     THERAPEUTIC EXERCISES        Primary Care Provider Office Phone # Fax #    Jamie Meraz PA-C [phone]       [address] [place] [address]        Equal Access to Services     [facility]: Hadii aad ku hadasho Soomaali, waaxda luqadaha, qaybta kaalmada adehariniyada, tacho garcia . So [facility] [phone].    ATENCIÓN: Si habla español, tiene a thomas disposición servicios gratEZ-Appsos de asistencia lingüística. [facility] [phone].    We comply with applicable federal civil rights laws and [place] laws. We do not discriminate on the basis of race, color, national origin, age, disability, sex, sexual orientation, or gender identity.            Thank you!     Thank you for choosing [facility]  for your care. Our goal is always to provide you with excellent care. Hearing back from our patients is one way we can continue to improve our services. Please take a few minutes to complete the written survey that you may receive in the mail after your visit with us. Thank you!             Your Updated Medication List  - Protect others around you: Learn how to safely use, store and throw away your medicines at www.disposemymeds.org.          This list is accurate as of 11/1/18 11:59 PM.  Always use your most recent med list.                   Brand Name Dispense Instructions for use Diagnosis    cholecalciferol 1000 UNIT tablet    vitamin D3    100 tablet    Take 1 tablet (1,000 Units) by mouth daily    Vitamin D deficiency       EYE VITAMINS PO      Take 1 tablet by mouth daily        DAILY MULTIVITAMIN PO      Take 1 chew tab by mouth daily        JUICE PLUS FIBRE PO           levothyroxine 112 MCG tablet    SYNTHROID/LEVOTHROID    90 tablet    TAKE 1 TABLET EVERY MORNING BEFORE BREAKFAST    Hypothyroidism due to Hashimoto's thyroiditis       LUMIGAN 0.01 % Soln   Generic drug:  bimatoprost       Actinic keratosis       melatonin 5 MG tablet      Take 10 mg by mouth At Bedtime Reported on 2/21/2017        metroNIDAZOLE 0.75 % cream    METROCREAM    60 g    every day, once daily to face    Rosacea       sildenafil 20 MG tablet    REVATIO    30 tablet    Take 2-5 tabs daily prn    Erectile dysfunction, unspecified erectile dysfunction type       timolol 0.5 % ophthalmic solution    TIMOPTIC      Actinic keratosis       TUMS 500 MG chewable tablet   Generic drug:  calcium carbonate      Take 1 chew tab by mouth daily as needed Reported on 4/18/2017        warfarin 5 MG tablet    COUMADIN    135 tablet    10 mg(2 tabs) on Tue & Thu; 7.5 mg (1.5 tabs) all other days or as directed    History of DVT of lower extremity, Chronic anticoagulation

## 2018-11-06 ENCOUNTER — OFFICE VISIT (OUTPATIENT)
Dept: FAMILY MEDICINE | Facility: CLINIC | Age: 78
End: 2018-11-06
Payer: MEDICARE

## 2018-11-06 ENCOUNTER — THERAPY VISIT (OUTPATIENT)
Dept: PHYSICAL THERAPY | Facility: CLINIC | Age: 78
End: 2018-11-06
Payer: MEDICARE

## 2018-11-06 ENCOUNTER — TELEPHONE (OUTPATIENT)
Dept: FAMILY MEDICINE | Facility: CLINIC | Age: 78
End: 2018-11-06

## 2018-11-06 VITALS
RESPIRATION RATE: 16 BRPM | HEART RATE: 73 BPM | WEIGHT: 205 LBS | SYSTOLIC BLOOD PRESSURE: 114 MMHG | HEIGHT: 68 IN | DIASTOLIC BLOOD PRESSURE: 69 MMHG | OXYGEN SATURATION: 97 % | BODY MASS INDEX: 31.07 KG/M2

## 2018-11-06 DIAGNOSIS — M47.817 LUMBOSACRAL SPONDYLOSIS WITHOUT MYELOPATHY: ICD-10-CM

## 2018-11-06 DIAGNOSIS — Z86.718 HISTORY OF DVT OF LOWER EXTREMITY: ICD-10-CM

## 2018-11-06 DIAGNOSIS — D68.51 FACTOR V LEIDEN MUTATION (H): ICD-10-CM

## 2018-11-06 DIAGNOSIS — M47.817 LUMBOSACRAL SPONDYLOSIS WITHOUT MYELOPATHY: Primary | ICD-10-CM

## 2018-11-06 DIAGNOSIS — M47.816 LUMBAR FACET ARTHROPATHY: ICD-10-CM

## 2018-11-06 PROCEDURE — 97140 MANUAL THERAPY 1/> REGIONS: CPT | Mod: GP | Performed by: PHYSICAL THERAPIST

## 2018-11-06 PROCEDURE — 97110 THERAPEUTIC EXERCISES: CPT | Mod: GP | Performed by: PHYSICAL THERAPIST

## 2018-11-06 PROCEDURE — 99214 OFFICE O/P EST MOD 30 MIN: CPT | Performed by: PHYSICIAN ASSISTANT

## 2018-11-06 NOTE — MR AVS SNAPSHOT
After Visit Summary   11/6/2018    Rigoberto Holguin    MRN: [id number]           Patient Information     Date Of Birth          1940        Visit Information        Provider Department      11/6/2018 1:10 PM Justin Angeles PT [facility] [place] [facility]        Today's Diagnoses     Lumbar facet arthropathy        Lumbosacral spondylosis without myelopathy           Follow-ups after your visit        Your next 10 appointments already scheduled     Nov 06, 2018  2:00 PM CST   Office Visit with Jamie Meraz PA-C   [facility] [place] ([facility] [place])    [address]  [place] [address]   [phone]           Bring a current list of meds and any records pertaining to this visit. For Physicals, please bring immunization records and any forms needing to be filled out. Please arrive 10 minutes early to complete paperwork.            Nov 13, 2018  1:10 PM CST   ROVERTO Spine with [facility] [place] [facility] ([facility] [place])    [address]  [place] [address]   [phone]            Nov 20, 2018  2:30 PM CST   ROVERTO Spine with Justin Angeles PT   [facility] [place] [facility] ([facility] [place])    [address]  [place] [address]   [phone]            Nov 21, 2018   Procedure with Augusto Pugh MD   [facility] ([facility])    [address] [place] [address]  5th [facility] [id number] [phone]           Located in the Clinics and Surgery Center at [address] [place] [address].   parking is very convenient and highly recommended.  is a $6 flat rate fee.  Both  and self parkers should enter the main arrival plaza from [facility]; parking attendants will direct you based on your parking preference.            Nov 27, 2018  1:10 PM CST   ROVERTO Spine with Justin Angeles PT   [facility] [place] [facility] ([facility] [place])    [address]  [address]  [place] [address]   [phone]            Dec 10, 2018  8:30 AM CST   (Arrive by 8:15 AM)   Return Visit with Ricky Mclaughlin MD   [facility] ([facility])    [address]  3rd [facility] [address] [phone]            Dec 11, 2018  8:30 AM CST   Anticoagulation Visit with BE ANTI COAG   [facility] [place] ([facility] [place])    [address]  [place] [address]   [phone]            Dec 11, 2018  3:30 PM CST   Return Visit with Vickie Whalen MD   [facility]    [address] [place] [address]   [phone]            Jun 25, 2019  9:00 AM CDT   Return Visit with Vincent Rucker MD, [facility] CYSTO PROC ROOM   [facility] ([facility])    [address] [place], [place] [address] [address] [phone]              Who to contact     If you have questions or need follow up information about today's clinic visit or your schedule please contact [facility] directly at [phone].  Normal or non-critical lab and imaging results will be communicated to you by Banyanhart, letter or phone within 4 business days after the clinic has received the results. If you do not hear from us within 7 days, please contact the clinic through Banyanhart or phone. If you have a critical or abnormal lab result, we will notify you by phone as soon as possible.  Submit refill requests through [employer] or call your pharmacy and they will forward the refill request to us. Please allow 3 business days for your refill to be completed.          Additional Information About Your Visit        Banyanhart Information     [employer] gives you secure access to your electronic health record. If you see a primary care provider, you can also send messages to your  care team and make appointments. If you have questions, please call your primary care clinic.  If you do not have a primary care provider, please call [phone] and they will assist you.        Care EveryWhere ID     This is your Care EveryWhere ID. This could be used by other organizations to access your [facility] medical records  [id number]         Blood Pressure from Last 3 Encounters:   10/16/18 121/69   09/20/18 104/67   09/18/18 124/64    Weight from Last 3 Encounters:   10/16/18 93 kg (205 lb)   09/20/18 92.5 kg (204 lb)   09/18/18 91.2 kg (201 lb)              We Performed the Following     MANUAL THER TECH,1+REGIONS,EA 15 MIN     THERAPEUTIC EXERCISES        Primary Care Provider Office Phone # Fax #    Jamie Meraz PA-C [phone]       [address] [place] [address]        Equal Access to Services     [facility] : Hadii aad ku hadasho Soomaali, waaxda luqadaha, qaybta kaalmada adeegyada, waxay idiin hayaan jacekeg kharash radha . So [facility] [phone].    ATENCIÓN: Si kathryn rodriguez, tiene a thomas disposición servicios gratuitos de asistencia lingüística. Llame al [phone].    We comply with applicable federal civil rights laws and [place] laws. We do not discriminate on the basis of race, color, national origin, age, disability, sex, sexual orientation, or gender identity.            Thank you!     Thank you for choosing [facility]  for your care. Our goal is always to provide you with excellent care. Hearing back from our patients is one way we can continue to improve our services. Please take a few minutes to complete the written survey that you may receive in the mail after your visit with us. Thank you!             Your Updated Medication List - Protect others around you: Learn how to safely use, store and throw away your medicines at www.disposemymeds.org.          This list is accurate as of 11/6/18  1:50 PM.  Always use your most  recent med list.                   Brand Name Dispense Instructions for use Diagnosis    cholecalciferol 1000 UNIT tablet    vitamin D3    100 tablet    Take 1 tablet (1,000 Units) by mouth daily    Vitamin D deficiency       EYE VITAMINS PO      Take 1 tablet by mouth daily        DAILY MULTIVITAMIN PO      Take 1 chew tab by mouth daily        JUICE PLUS FIBRE PO           levothyroxine 112 MCG tablet    SYNTHROID/LEVOTHROID    90 tablet    TAKE 1 TABLET EVERY MORNING BEFORE BREAKFAST    Hypothyroidism due to Hashimoto's thyroiditis       LUMIGAN 0.01 % Soln   Generic drug:  bimatoprost       Actinic keratosis       melatonin 5 MG tablet      Take 10 mg by mouth At Bedtime Reported on 2/21/2017        metroNIDAZOLE 0.75 % cream    METROCREAM    60 g    every day, once daily to face    Rosacea       sildenafil 20 MG tablet    REVATIO    30 tablet    Take 2-5 tabs daily prn    Erectile dysfunction, unspecified erectile dysfunction type       timolol 0.5 % ophthalmic solution    TIMOPTIC      Actinic keratosis       TUMS 500 MG chewable tablet   Generic drug:  calcium carbonate      Take 1 chew tab by mouth daily as needed Reported on 4/18/2017        warfarin 5 MG tablet    COUMADIN    135 tablet    10 mg(2 tabs) on Tue & Thu; 7.5 mg (1.5 tabs) all other days or as directed    History of DVT of lower extremity, Chronic anticoagulation

## 2018-11-06 NOTE — PATIENT INSTRUCTIONS
Hold warfarin 5 days prior to the first injection.  Have your inr checked the day before the first injection (at inr clinic)    3 days prior to first injection start administering lovenox. This will be twice a day.   Hold the lovenox the night before and day of the injections and ablation procedures.   You are to restart lovenox the morning after the two test injections and first ablation procedure.   You will restart lovenox and warfarin the morning after the second ablation procedure.   I want your inr rechecked 4-5 days after your second ablation procedure.

## 2018-11-06 NOTE — PROGRESS NOTES
Subjective:  HPI                    Objective:  System    Physical Exam    General     ROS    Assessment/Plan:    SUBJECTIVE  Subjective changes as noted by pt: Patient reports he feels the exercises are helping a bit. The exercises are getting easier and there is a little less pain with moving around.   Current pain level: 0/10 (no pain with walking today, short distances only)   Changes in function:  Yes     Adverse reaction to treatment or activity:  None    OBJECTIVE  Changes in objective findings: Lumbar AROM flexion to ankles, extension mod loss; SG mod loss with ERP on L with R SG only.      ASSESSMENT  Rigoberto continues to require intervention to meet STG and LTG's: PT  Patient is progressing as expected.  Response to therapy has shown an improvement in  pain level and ROM   Progress made towards STG/LTG?  Yes    PLAN  Current treatment program is being advanced to more complex exercises.    PTA/ATC plan:  N/A    Please refer to the daily flowsheet for treatment today, total treatment time and time spent performing 1:1 timed codes.

## 2018-11-06 NOTE — MR AVS SNAPSHOT
After Visit Summary   11/6/2018    Rigoberto Holguin    MRN: [id number]           Patient Information     Date Of Birth          1940        Visit Information        Provider Department      11/6/2018 2:00 PM Jamie Meraz PA-C [facility]        Today's Diagnoses     Lumbosacral spondylosis without myelopathy    -  1    Factor V Leiden mutation (H)        History of DVT of lower extremity          Care Instructions    Hold warfarin 5 days prior to the first injection.  Have your inr checked the day before the first injection (at inr clinic)    3 days prior to first injection start administering lovenox. This will be twice a day.   Hold the lovenox the night before and day of the injections and ablation procedures.   You are to restart lovenox the morning after the two test injections and first ablation procedure.   You will restart lovenox and warfarin the morning after the second ablation procedure.   I want your inr rechecked 4-5 days after your second ablation procedure.           Follow-ups after your visit        Additional Services     SPINE SURGERY REFERRAL       You have been referred to: Spine Lumbar: Spine Surgeon: [facility]  [place] [phone]   [email or web address]    Please be aware that coverage of these services is subject to the terms and limitations of your health insurance plan.  Call member services at your health plan with any benefit or coverage questions.      Please bring the following to your appointment:    **Any x-rays, CTs or MRIs which have been performed.  Contact the facility where they were done to arrange for  prior to your scheduled appointment.    **List of current medications   **This referral request   **Any documents/labs given to you regarding this referral                  Your next 10 appointments already scheduled     Nov 13, 2018  1:10 PM HEMAL LAYNE Spine with Justin [place], PT   [facility] [place] [facility] ([facility] [place])    [address]  [address]  [place] [address]   [phone]            Nov 20, 2018  2:30 PM CST   ROVERTO Spine with Justin [place] PT   [facility] [place] [facility] ([facility] [place])    [address]  [address]  [place] [address]   [phone]            Nov 21, 2018   Procedure with Augusto Pugh MD   [facility] ([facility] [place])    [address] [place] [address]  5th [facility] [address]   [phone]           Located in the Clinics and Surgery Center at [address] [place] [address].   parking is very convenient and highly recommended.  is a $6 flat rate fee.  Both  and self parkers should enter the main arrival plaza from [facility]; parking attendants will direct you based on your parking preference.            Nov 27, 2018  1:10 PM CST   ROVERTO Spine with Justin [place] PT   [facility] [place] [facility] ([facility] [place])    [address]  [address]  [place] [address]   [phone]            Dec 10, 2018  8:30 AM CST   (Arrive by 8:15 AM)   Return Visit with Ricky Mclaughlin MD   [facility] ([facility])    [address] [place] [address]  3rd [facility] [address] [phone]            Dec 11, 2018  8:30 AM CST   Anticoagulation Visit with BE ANTI COAG   [facility] [place] ([facility] [place])    [address]  [place] [address]   [phone]            Dec 11, 2018  3:30 PM CST   Return Visit with Vickie Whalen MD   [facility] ([facility])    [address] [place] [address] [phone]            Jun 25, 2019  9:00 AM CDT   Return Visit with Vincent Rucker MD, SAMUEL CYSTO PROC ROOM   [facility] ([facility])    [address] [place] [place] [address] [id number] [phone]  "             Who to contact     Normal or non-critical lab and imaging results will be communicated to you by Aliopartishart, letter or phone within 4 business days after the clinic has received the results. If you do not hear from us within 7 days, please contact the clinic through [employer] or phone. If you have a critical or abnormal lab result, we will notify you by phone as soon as possible.  Submit refill requests through imgfave or call your pharmacy and they will forward the refill request to us. Please allow 3 business days for your refill to be completed.          If you need to speak with a  for additional information , please call: [phone]             Additional Information About Your Visit        imgfave Information     imgfave gives you secure access to your electronic health record. If you see a primary care provider, you can also send messages to your care team and make appointments. If you have questions, please call your primary care clinic.  If you do not have a primary care provider, please call [phone] and they will assist you.        Care EveryWhere ID     This is your Care EveryWhere ID. This could be used by other organizations to access your [place] medical records  [id number]        Your Vitals Were     Pulse Respirations Height Pulse Oximetry BMI (Body Mass Index)       73 16 5' 8\" (1.727 m) 97% 31.17 kg/m2        Blood Pressure from Last 3 Encounters:   11/06/18 114/69   10/16/18 121/69   09/20/18 104/67    Weight from Last 3 Encounters:   11/06/18 205 lb (93 kg)   10/16/18 205 lb (93 kg)   09/20/18 204 lb (92.5 kg)              We Performed the Following     SPINE SURGERY REFERRAL          Today's Medication Changes          These changes are accurate as of 11/6/18  2:47 PM.  If you have any questions, ask your nurse or doctor.               Start taking these medicines.        Dose/Directions    enoxaparin 100 MG/ML injection   Commonly known as:  LOVENOX   Used " for:  Factor V Leiden mutation (H), History of DVT of lower extremity   Started by:  Jamie Meraz PA-C        Dose:  100 mg   Inject 1 mL (100 mg) Subcutaneous 2 times daily   Quantity:  60 Syringe   Refills:  0            Where to get your medicines      These medications were sent to [facility] - [address] [facility]  [address] [facility] [address]     Phone:  [phone]     enoxaparin 100 MG/ML injection                Primary Care Provider Office Phone # Fax #    Jamie Meraz PA-C [phone]       [address]  [place] [address]        Equal Access to Services     [facility]: Hadii angelita ku hadasho Soomaali, waaxda luqadaha, qaybta kaalmada adeegyada, tacho garcia . So [facility] [phone].    ATENCIÓN: Si habla español, tiene a thomas disposición servicios gratuitos de asistencia lingüística. [facility] [phone].    We comply with applicable federal civil rights laws and [place] laws. We do not discriminate on the basis of race, color, national origin, age, disability, sex, sexual orientation, or gender identity.            Thank you!     Thank you for choosing [facility]  for your care. Our goal is always to provide you with excellent care. Hearing back from our patients is one way we can continue to improve our services. Please take a few minutes to complete the written survey that you may receive in the mail after your visit with us. Thank you!             Your Updated Medication List - Protect others around you: Learn how to safely use, store and throw away your medicines at www.disposemymeds.org.          This list is accurate as of 11/6/18  2:47 PM.  Always use your most recent med list.                   Brand Name Dispense Instructions for use Diagnosis    cholecalciferol 1000 UNIT tablet    vitamin D3    100 tablet    Take 1 tablet (1,000 Units) by mouth daily    Vitamin D deficiency        EYE VITAMINS PO      Take 1 tablet by mouth daily        DAILY MULTIVITAMIN PO      Take 1 chew tab by mouth daily        enoxaparin 100 MG/ML injection    LOVENOX    60 Syringe    Inject 1 mL (100 mg) Subcutaneous 2 times daily    Factor V Leiden mutation (H), History of DVT of lower extremity       JUICE PLUS FIBRE PO           levothyroxine 112 MCG tablet    SYNTHROID/LEVOTHROID    90 tablet    TAKE 1 TABLET EVERY MORNING BEFORE BREAKFAST    Hypothyroidism due to Hashimoto's thyroiditis       LUMIGAN 0.01 % Soln   Generic drug:  bimatoprost       Actinic keratosis       melatonin 5 MG tablet      Take 10 mg by mouth At Bedtime Reported on 2/21/2017        metroNIDAZOLE 0.75 % cream    METROCREAM    60 g    every day, once daily to face    Rosacea       sildenafil 20 MG tablet    REVATIO    30 tablet    Take 2-5 tabs daily prn    Erectile dysfunction, unspecified erectile dysfunction type       timolol 0.5 % ophthalmic solution    TIMOPTIC      Actinic keratosis       TUMS 500 MG chewable tablet   Generic drug:  calcium carbonate      Take 1 chew tab by mouth daily as needed Reported on 4/18/2017        warfarin 5 MG tablet    COUMADIN    135 tablet    10 mg(2 tabs) on Tue & Thu; 7.5 mg (1.5 tabs) all other days or as directed    History of DVT of lower extremity, Chronic anticoagulation

## 2018-11-08 ENCOUNTER — TELEPHONE (OUTPATIENT)
Dept: ANESTHESIOLOGY | Facility: CLINIC | Age: 78
End: 2018-11-08

## 2018-11-08 ENCOUNTER — TELEPHONE (OUTPATIENT)
Dept: FAMILY MEDICINE | Facility: CLINIC | Age: 78
End: 2018-11-08

## 2018-11-08 NOTE — TELEPHONE ENCOUNTER
Patient needs to discuss all of his medications and if he should be on all of them.  Please call.    Thank you.

## 2018-11-08 NOTE — TELEPHONE ENCOUNTER
LPN called to discuss pt's procedure questions. Pt is going to be holding coumadin and bridging with Lovenox. They are wondering how long they need to continue bridging with Lovenox, after their initial block.     Pt was advised by their PCP to continue Lovenox, if they could have all their injections performed (2 MBB, and 2 RFA) within 2 weeks.     Ashley Shaw LPN

## 2018-11-08 NOTE — TELEPHONE ENCOUNTER
[facility] Call Center    Phone Message    May a detailed message be left on voicemail: yes    Reason for Call: Other: Pt has procedure coming up with Dr Pugh - has questions on running his procedures closer together - said he needs four procedures - said he has to start Lovenox and doesn't want to be on it longer than he has too - had to stop Warfarin five days prior to procedure - has questions about all of this - please return his call - Thanks     Action Taken: Message routed to:  [facility] ([facility]): Pain Clinic

## 2018-11-08 NOTE — TELEPHONE ENCOUNTER
Patient wanted to discuss only his bridging for upcoming procedure.  Patient met with PCP and was given bridging instructions.  Patient stating he would like to talk with Jamie about bridging.  Per patient he is having a procedure November 21 st and initially thought he would have the second procedure shortly after first procedure.  Now it sounds like the second procedure will be December 10 th at the earliest.  Patent does not want to bridge with Lovenox at all unless if provider is highly recommending but definitely does not want to bridge with Lovenox in between procedures due to length in between procedures.  Please advise or call patient per his request.

## 2018-11-13 ENCOUNTER — THERAPY VISIT (OUTPATIENT)
Dept: PHYSICAL THERAPY | Facility: CLINIC | Age: 78
End: 2018-11-13
Payer: MEDICARE

## 2018-11-13 DIAGNOSIS — M47.816 LUMBAR FACET ARTHROPATHY: ICD-10-CM

## 2018-11-13 DIAGNOSIS — M47.817 LUMBOSACRAL SPONDYLOSIS WITHOUT MYELOPATHY: ICD-10-CM

## 2018-11-13 PROCEDURE — 97140 MANUAL THERAPY 1/> REGIONS: CPT | Mod: GP | Performed by: PHYSICAL THERAPIST

## 2018-11-13 PROCEDURE — 97112 NEUROMUSCULAR REEDUCATION: CPT | Mod: GP | Performed by: PHYSICAL THERAPIST

## 2018-11-13 PROCEDURE — 97110 THERAPEUTIC EXERCISES: CPT | Mod: GP | Performed by: PHYSICAL THERAPIST

## 2018-11-13 NOTE — PROGRESS NOTES
Subjective:  HPI                    Objective:  System    Physical Exam    General     ROS    Assessment/Plan:    SUBJECTIVE  Subjective changes as noted by pt: Patient reports the exercises are getting easier. There is no signficant change in pain this week.    Current pain level: 0/10   Changes in function:  None     Adverse reaction to treatment or activity:  None    OBJECTIVE  Changes in objective findings: Lumbar AROM flexion to ankles, extension mod loss, SG mod loss bilat. End range low back pain with extension and bilat SG (contralateral pain).     ASSESSMENT  Rigoberto continues to require intervention to meet STG and LTG's: PT  Patient is progressing as expected.  Response to therapy has shown an improvement in  pain level  Progress made towards STG/LTG?  Yes (See Goal flowsheet attached for updates on achievement of STG and LTG)    PLAN  Current treatment program is being advanced to more complex exercises.    PTA/ATC plan:  N/A    Please refer to the daily flowsheet for treatment today, total treatment time and time spent performing 1:1 timed codes.

## 2018-11-13 NOTE — MR AVS SNAPSHOT
After Visit Summary   11/13/2018    Rigoberto Holguin    MRN: [id number]           Patient Information     Date Of Birth          1940        Visit Information        Provider Department      11/13/2018 1:10 PM Justin [place] PT [facility] [place] [facility]        Today's Diagnoses     Lumbar facet arthropathy        Lumbosacral spondylosis without myelopathy           Follow-ups after your visit        Your next 10 appointments already scheduled     Nov 20, 2018  2:15 PM CST   Anticoagulation Visit with BE ANTI COAG   [facility] [place] ([facility] [place])    [address]  [place] [address]   [phone]            Nov 20, 2018  2:30 PM CST   ROVERTO Spine with Justin [place] PT   [facility] [place] [facility] ([facility] [place])    [address]  [address]  [place] [address]   [phone]            Nov 21, 2018   Procedure with Augusto Pugh MD   [facility] ([facility])    [address] [place] [address]  5th [facility] [address] [phone]           Located in the Clinics and Surgery Center at [address] [place] [address].   parking is very convenient and highly recommended.  is a $6 flat rate fee.  Both  and self parkers should enter the main arrival plaza from [facility]; parking attendants will direct you based on your parking preference.            Nov 27, 2018  1:10 PM CST   ROVERTO Spine with Justin [place] PT   [facility] [place] [facility] ([facility] [place])    [address]  [place] [address]   [phone]            Dec 10, 2018  8:30 AM CST   (Arrive by 8:15 AM)   Return Visit with Ricky Mclaughlin MD   [facility] ([facility])    [address] [place] [address]  3rd [facility] [address] [phone]            Dec 11, 2018  8:30 AM CST   Anticoagulation Visit with  BE ANTI COAG   [facility] [place] ([facility] [place])    [address]  [place] [address] [phone]            Dec 11, 2018  3:30 PM CST   Return Visit with Vickie Whalen MD   [facility] ([facility])    [address] [place] [address]   [phone]            Jun 25, 2019  9:00 AM CDT   Return Visit with Vincent Rucker MD, [place] CYSTO PROC ROOM   [facility]    [address] [place] [address]  [place] [address] [phone]              Who to contact     If you have questions or need follow up information about today's clinic visit or your schedule please contact [facility] [place] [facility] directly at [phone].  Normal or non-critical lab and imaging results will be communicated to you by MyChart, letter or phone within 4 business days after the clinic has received the results. If you do not hear from us within 7 days, please contact the clinic through [employer] or phone. If you have a critical or abnormal lab result, we will notify you by phone as soon as possible.  Submit refill requests through [employer] or call your pharmacy and they will forward the refill request to us. Please allow 3 business days for your refill to be completed.          Additional Information About Your Visit        B[employer]Infectious Information     [employer] gives you secure access to your electronic health record. If you see a primary care provider, you can also send messages to your care team and make appointments. If you have questions, please call your primary care clinic.  If you do not have a primary care provider, please call [phone] and they will assist you.        Care EveryWhere ID     This is your Care EveryWhere ID. This could be used by other organizations to access your [place] medical records  [id number]         Blood Pressure from Last 3 Encounters:   11/06/18 114/69   10/16/18 121/69    09/20/18 104/67    Weight from Last 3 Encounters:   11/06/18 93 kg (205 lb)   10/16/18 93 kg (205 lb)   09/20/18 92.5 kg (204 lb)              We Performed the Following     MANUAL THER TECH,1+REGIONS,EA 15 MIN     NEUROMUSCULAR RE-EDUCATION     THERAPEUTIC EXERCISES        Primary Care Provider Office Phone # Fax #    Jamie Jenkins DENAE Meraz [phone]       [address] [place] [address]        Equal Access to Services     [facility]: Hadii aad ku hadasho Soomaali, waaxda luqadaha, qaybta kaalmada adeegyada, waxay idiin hayaan adeeg kharash laAnnieaan . So [facility] [phone].    ATENCIÓN: Si habla español, tiene a thomas disposición servicios gratuitos de asistencia lingüística. [facility] [phone].    We comply with applicable federal civil rights laws and [place] laws. We do not discriminate on the basis of race, color, national origin, age, disability, sex, sexual orientation, or gender identity.            Thank you!     Thank you for choosing [facility]  for your care. Our goal is always to provide you with excellent care. Hearing back from our patients is one way we can continue to improve our services. Please take a few minutes to complete the written survey that you may receive in the mail after your visit with us. Thank you!             Your Updated Medication List - Protect others around you: Learn how to safely use, store and throw away your medicines at www.disposemymeds.org.          This list is accurate as of 11/13/18  3:00 PM.  Always use your most recent med list.                   Brand Name Dispense Instructions for use Diagnosis    cholecalciferol 1000 UNIT tablet    vitamin D3    100 tablet    Take 1 tablet (1,000 Units) by mouth daily    Vitamin D deficiency       EYE VITAMINS PO      Take 1 tablet by mouth daily        DAILY MULTIVITAMIN PO      Take 1 chew tab by mouth daily        enoxaparin 100 MG/ML injection    LOVENOX    60 Syringe     Inject 1 mL (100 mg) Subcutaneous 2 times daily    Factor V Leiden mutation (H), History of DVT of lower extremity       JUICE PLUS FIBRE PO           levothyroxine 112 MCG tablet    SYNTHROID/LEVOTHROID    90 tablet    TAKE 1 TABLET EVERY MORNING BEFORE BREAKFAST    Hypothyroidism due to Hashimoto's thyroiditis       LUMIGAN 0.01 % Soln   Generic drug:  bimatoprost       Actinic keratosis       melatonin 5 MG tablet      Take 10 mg by mouth At Bedtime Reported on 2/21/2017        metroNIDAZOLE 0.75 % cream    METROCREAM    60 g    every day, once daily to face    Rosacea       sildenafil 20 MG tablet    REVATIO    30 tablet    Take 2-5 tabs daily prn    Erectile dysfunction, unspecified erectile dysfunction type       timolol 0.5 % ophthalmic solution    TIMOPTIC      Actinic keratosis       TUMS 500 MG chewable tablet   Generic drug:  calcium carbonate      Take 1 chew tab by mouth daily as needed Reported on 4/18/2017        warfarin 5 MG tablet    COUMADIN    135 tablet    10 mg(2 tabs) on Tue & Thu; 7.5 mg (1.5 tabs) all other days or as directed    History of DVT of lower extremity, Chronic anticoagulation

## 2018-11-15 NOTE — TELEPHONE ENCOUNTER
Due to the fact, that by the time his inr would return to a therapeutic range upon restarting it after the first procedure on nov 21st, he would likely only be off of lovenox for a week or less. A such, I'd like him to simply remain on the lovenox injections between procedures. Also, it would help him avoid coming in for several inr checks to monitor his inr during that time frame.

## 2018-11-15 NOTE — TELEPHONE ENCOUNTER
Spoke with patient and discussed below, he will follow the hold and bridging orders given for the procedure on 11/21.  Patient reports since at this time he does not know for sure when second procedure will be scheduled (and does not want to be on Lovenox for longer than needed) he will come in on 11/21after the injection and at that time he should know when he will have the second procedure.  Then he and provider can discuss the next hold and bridging orders at the visit.  appt scheduled.  Aparna Vicente RN

## 2018-11-20 ENCOUNTER — THERAPY VISIT (OUTPATIENT)
Dept: PHYSICAL THERAPY | Facility: CLINIC | Age: 78
End: 2018-11-20
Payer: MEDICARE

## 2018-11-20 ENCOUNTER — ANTICOAGULATION THERAPY VISIT (OUTPATIENT)
Dept: NURSING | Facility: CLINIC | Age: 78
End: 2018-11-20
Payer: MEDICARE

## 2018-11-20 DIAGNOSIS — M47.817 LUMBOSACRAL SPONDYLOSIS WITHOUT MYELOPATHY: ICD-10-CM

## 2018-11-20 DIAGNOSIS — M47.816 LUMBAR FACET ARTHROPATHY: ICD-10-CM

## 2018-11-20 LAB — INR POINT OF CARE: 1.2 (ref 0.86–1.14)

## 2018-11-20 PROCEDURE — 85610 PROTHROMBIN TIME: CPT | Mod: QW

## 2018-11-20 PROCEDURE — 97110 THERAPEUTIC EXERCISES: CPT | Mod: GP | Performed by: PHYSICAL THERAPIST

## 2018-11-20 PROCEDURE — 97112 NEUROMUSCULAR REEDUCATION: CPT | Mod: GP | Performed by: PHYSICAL THERAPIST

## 2018-11-20 PROCEDURE — 36416 COLLJ CAPILLARY BLOOD SPEC: CPT

## 2018-11-20 PROCEDURE — 97140 MANUAL THERAPY 1/> REGIONS: CPT | Mod: GP | Performed by: PHYSICAL THERAPIST

## 2018-11-20 NOTE — MR AVS SNAPSHOT
After Visit Summary   11/20/2018    Rigoberto Holguin    MRN: [id number]           Patient Information     Date Of Birth          1940        Visit Information        Provider Department      11/20/2018 2:30 PM Justin Angeles, [facility] [place] [facility]        Today's Diagnoses     Lumbar facet arthropathy        Lumbosacral spondylosis without myelopathy           Follow-ups after your visit        Your next 10 appointments already scheduled     Nov 21, 2018   Procedure with Augusto Pugh MD   [facility] ([facility] [place])    [address] [place] [address]  5th [facility] [id number] [phone]           Located in the Clinics and Surgery Center at [address] [place] [address].   parking is very convenient and highly recommended.  is a $6 flat rate fee.  Both  and self parkers should enter the main arrival plaza from [facility]; parking attendants will direct you based on your parking preference.            Nov 21, 2018  1:20 PM CST   Office Visit with Jamie Meraz PA-C   [facility]    [address] [facility] [id number] [phone]           Bring a current list of meds and any records pertaining to this visit. For Physicals, please bring immunization records and any forms needing to be filled out. Please arrive 10 minutes early to complete paperwork.            Nov 27, 2018  1:10 PM CST   [facility] with [facility] [place] [facility] ([facility] [place])    [address] [facility]  [address]  [facility] [address]   [phone]            Dec 10, 2018  8:30 AM CST   (Arrive by 8:15 AM)   Return Visit with Ricky Mclaughlin MD   [facility] ([facility])    [address] [place] [address]  3rd [facility] [id number] [phone]            Dec 11, 2018  8:30 AM CST    Anticoagulation Visit with BE ANTI COAG   [facility] [place] ([facility]    [address] [facility]  [place] [id number] [phone]            Dec 11, 2018  3:30 PM CST   Return Visit with Vickie Whalen MD   [facility] ([facility]    [address] [address] [place] [address] [phone]            Jun 25, 2019  9:00 AM CDT   Return Visit with Vincent Rucker MD, [place] [facility] PROC ROOM   [facility]    [address] [place] [address]  [place] [address] [phone]              Who to contact     If you have questions or need follow up information about today's clinic visit or your schedule please contact [facility] directly at [phone].  Normal or non-critical lab and imaging results will be communicated to you by MyChart, letter or phone within 4 business days after the clinic has received the results. If you do not hear from us within 7 days, please contact the clinic through [employer] or phone. If you have a critical or abnormal lab result, we will notify you by phone as soon as possible.  Submit refill requests through [employer] or call your pharmacy and they will forward the refill request to us. Please allow 3 business days for your refill to be completed.          Additional Information About Your Visit        MyChart Information     [employer] gives you secure access to your electronic health record. If you see a primary care provider, you can also send messages to your care team and make appointments. If you have questions, please call your primary care clinic.  If you do not have a primary care provider, please call [phone] and they will assist you.        Care EveryWhere ID     This is your Care EveryWhere ID. This could be used by other organizations to access your [facility] medical records  [id number]         Blood Pressure from Last 3 Encounters:   11/06/18  114/69   10/16/18 121/69   09/20/18 104/67    Weight from Last 3 Encounters:   11/06/18 93 kg (205 lb)   10/16/18 93 kg (205 lb)   09/20/18 92.5 kg (204 lb)              We Performed the Following     MANUAL THER TECH,1+REGIONS,EA 15 MIN     NEUROMUSCULAR RE-EDUCATION     THERAPEUTIC EXERCISES        Primary Care Provider Office Phone # Fax #    Jamie Meraz PA-C [phone]       [address] [place] [address]        Equal Access to Services     [facility]: Hadii aad ku hadasho Soomaali, waaxda luqadaha, qaybta kaalmada adeegyada, waxay ailynin hayolen alessandro garcia . So [facility] [phone].    ATENCIÓN: Si habla español, tiene a thomas disposición servicios gratuitos de asistencia lingüística. [facility] [phone].    We comply with applicable federal civil rights laws and [place] laws. We do not discriminate on the basis of race, color, national origin, age, disability, sex, sexual orientation, or gender identity.            Thank you!     Thank you for choosing [facility]  for your care. Our goal is always to provide you with excellent care. Hearing back from our patients is one way we can continue to improve our services. Please take a few minutes to complete the written survey that you may receive in the mail after your visit with us. Thank you!             Your Updated Medication List - Protect others around you: Learn how to safely use, store and throw away your medicines at www.disposemymeds.org.          This list is accurate as of 11/20/18  3:30 PM.  Always use your most recent med list.                   Brand Name Dispense Instructions for use Diagnosis    cholecalciferol 1000 UNIT tablet    vitamin D3    100 tablet    Take 1 tablet (1,000 Units) by mouth daily    Vitamin D deficiency       EYE VITAMINS PO      Take 1 tablet by mouth daily        DAILY MULTIVITAMIN PO      Take 1 chew tab by mouth daily        enoxaparin 100 MG/ML injection     LOVENOX    60 Syringe    Inject 1 mL (100 mg) Subcutaneous 2 times daily    Factor V Leiden mutation (H), History of DVT of lower extremity       JUICE PLUS FIBRE PO           levothyroxine 112 MCG tablet    SYNTHROID/LEVOTHROID    90 tablet    TAKE 1 TABLET EVERY MORNING BEFORE BREAKFAST    Hypothyroidism due to Hashimoto's thyroiditis       LUMIGAN 0.01 % Soln   Generic drug:  bimatoprost       Actinic keratosis       melatonin 5 MG tablet      Take 10 mg by mouth At Bedtime Reported on 2/21/2017        metroNIDAZOLE 0.75 % cream    METROCREAM    60 g    every day, once daily to face    Rosacea       sildenafil 20 MG tablet    REVATIO    30 tablet    Take 2-5 tabs daily prn    Erectile dysfunction, unspecified erectile dysfunction type       timolol 0.5 % ophthalmic solution    TIMOPTIC      Actinic keratosis       TUMS 500 MG chewable tablet   Generic drug:  calcium carbonate      Take 1 chew tab by mouth daily as needed Reported on 4/18/2017        warfarin 5 MG tablet    COUMADIN    135 tablet    10 mg(2 tabs) on Tue & Thu; 7.5 mg (1.5 tabs) all other days or as directed    History of DVT of lower extremity, Chronic anticoagulation

## 2018-11-20 NOTE — PROGRESS NOTES
ANTICOAGULATION FOLLOW-UP CLINIC VISIT    Patient Name:  Rigoberto Holguin  Date:  11/20/2018  Contact Type:  Face to Face    SUBJECTIVE:     Patient Findings     Positives Intentional hold of therapy    Comments Patient having procedure tomorrow Bilateral Lumbar Medial Branch Nerve Block.  Patient has been holding coumadin and doing Lovenox injections as instructed.  Patient will be following up with PCP tomorrow after procedure to go over coumadin/Lovenox instructions depending on when next procedure will take place.           OBJECTIVE    INR Protime   Date Value Ref Range Status   11/20/2018 1.2 (A) 0.86 - 1.14 Final       ASSESSMENT / PLAN  INR assessment SUB intentional hold   Recheck INR In: 1 DAY will be discussing with PCP   INR Location Clinic      Anticoagulation Summary as of 11/20/2018     INR goal 2.0-3.0   Today's INR 1.2!   Warfarin maintenance plan 10 mg (5 mg x 2) on Tue, Thu; 7.5 mg (5 mg x 1.5) all other days   Full warfarin instructions 11/20: Hold; 11/21: Hold; Otherwise 10 mg on Tue, Thu; 7.5 mg all other days   Weekly warfarin total 57.5 mg   Plan last modified Aparna Vicente (5/31/2018)   Next INR check 11/21/2018   Target end date     Indications   Long-term (current) use of anticoagulants [Z79.01] [Z79.01]         Anticoagulation Episode Summary     INR check location     Preferred lab     Send INR reminders to [facility]    Comments       Anticoagulation Care Providers     Provider Role Specialty Phone number    Jamie Meraz PA-C Responsible Physician Assistant [phone]            See the Encounter Report to view Anticoagulation Flowsheet and Dosing Calendar (Go to Encounters tab in chart review, and find the Anticoagulation Therapy Visit)    Dosage adjustment made based on physician directed care plan.    Larisa Hitchcock RN

## 2018-11-20 NOTE — PROGRESS NOTES
Subjective:  HPI                    Objective:  System    Physical Exam    General     ROS    Assessment/Plan:    SUBJECTIVE  Subjective changes as noted by pt: Patient reports that there haven't been any significant changes this week.   Current pain level: 0/10 (8/10 with activity)   Changes in function:  None     Adverse reaction to treatment or activity:  None    OBJECTIVE  Changes in objective findings: Lumbar AROM status quo - flexion to ankles, extension mod loss, SG mod loss bilat. End range low back pain with extension and bilat SG (contralateral pain).     ASSESSMENT  Rigoberto continues to require intervention to meet STG and LTG's: PT  Patient is progressing as expected.  Response to therapy has shown an improvement in pain level and ROM   Progress made towards STG/LTG?  None    PLAN  Current treatment program is being advanced to more complex exercises.    PTA/ATC plan:  N/A    Please refer to the daily flowsheet for treatment today, total treatment time and time spent performing 1:1 timed codes.

## 2018-11-20 NOTE — MR AVS SNAPSHOT
Rigoberto Holguin   11/20/2018 2:15 PM   Anticoagulation Therapy Visit    Description:  78 year old male   Provider:  ALANA ANTI COAG   Department:  [facility] Nurse           INR as of 11/20/2018     Today's INR 1.2!      Anticoagulation Summary as of 11/20/2018     INR goal 2.0-3.0   Today's INR 1.2!   Full warfarin instructions 11/20: Hold; 11/21: Hold; Otherwise 10 mg on Tue, Thu; 7.5 mg all other days   Next INR check 11/21/2018    Indications   Long-term (current) use of anticoagulants [Z79.01] [Z79.01]         Description     Started lovenox on Sunday 11-18-18      Your next Anticoagulation Clinic appointment(s)     Dec 11, 2018  8:30 AM CST   Anticoagulation Visit with BE ANTI COAG   [facility] [place] ([facility])    [address] [facility]  [place] [address] [phone]              Contact Numbers     [facility]  Please call [phone] with any problems or questions regarding your therapy, or to cancel or reschedule your appointment.          November 2018 Details    Sun Mon Tue Wed Thu Fri Sat         1               2               3                 4               5               6               7               8               9               10                 11               12               13               14               15               16               17                 18               19               20      Hold   See details      21      See details      22               23               24                 25               26               27               28               29               30                 Date Details   11/20 This INR check      11/21 Hold dose   procedure       Date of next INR:  11/21/2018         How to take your warfarin dose     Hold Do not take your warfarin dose. See the Details table to the right for additional instructions.

## 2018-11-21 ENCOUNTER — HOSPITAL ENCOUNTER (OUTPATIENT)
Facility: AMBULATORY SURGERY CENTER | Age: 78
End: 2018-11-21
Attending: ANESTHESIOLOGY
Payer: MEDICARE

## 2018-11-21 ENCOUNTER — SURGERY (OUTPATIENT)
Age: 78
End: 2018-11-21

## 2018-11-21 ENCOUNTER — RADIANT APPOINTMENT (OUTPATIENT)
Dept: RADIOLOGY | Facility: AMBULATORY SURGERY CENTER | Age: 78
End: 2018-11-21
Attending: ANESTHESIOLOGY
Payer: MEDICARE

## 2018-11-21 ENCOUNTER — TELEPHONE (OUTPATIENT)
Dept: FAMILY MEDICINE | Facility: CLINIC | Age: 78
End: 2018-11-21

## 2018-11-21 VITALS
HEIGHT: 68 IN | RESPIRATION RATE: 16 BRPM | BODY MASS INDEX: 30.31 KG/M2 | OXYGEN SATURATION: 99 % | DIASTOLIC BLOOD PRESSURE: 77 MMHG | SYSTOLIC BLOOD PRESSURE: 139 MMHG | WEIGHT: 200 LBS | TEMPERATURE: 97.3 F | HEART RATE: 59 BPM

## 2018-11-21 DIAGNOSIS — M47.816 LUMBAR FACET ARTHROPATHY: ICD-10-CM

## 2018-11-21 RX ORDER — LIDOCAINE HYDROCHLORIDE 20 MG/ML
INJECTION, SOLUTION INFILTRATION; PERINEURAL PRN
Status: DISCONTINUED | OUTPATIENT
Start: 2018-11-21 | End: 2018-11-21 | Stop reason: HOSPADM

## 2018-11-21 RX ORDER — LIDOCAINE HYDROCHLORIDE 10 MG/ML
INJECTION, SOLUTION EPIDURAL; INFILTRATION; INTRACAUDAL; PERINEURAL PRN
Status: DISCONTINUED | OUTPATIENT
Start: 2018-11-21 | End: 2018-11-21 | Stop reason: HOSPADM

## 2018-11-21 RX ADMIN — LIDOCAINE HYDROCHLORIDE 4 ML: 20 INJECTION, SOLUTION INFILTRATION; PERINEURAL at 09:40

## 2018-11-21 RX ADMIN — LIDOCAINE HYDROCHLORIDE 4 ML: 10 INJECTION, SOLUTION EPIDURAL; INFILTRATION; INTRACAUDAL; PERINEURAL at 09:40

## 2018-11-21 NOTE — OP NOTE
Patient: Rigoberto Holguin Age: 78 year old   MRN: [id number] Attending: Dr. Pugh     Date of Visit: November 21, 2018      PAIN MEDICINE CLINIC PROCEDURE NOTE    ATTENDING CLINICIAN:    Augusto Pugh MD    ASSISTANT CLINICIAN:  Isabel Hodge MD    PREPROCEDURE DIAGNOSES:  1. Lumbar facet arthropathy   2. Chronic low back pain     POSTPROCEDURE DIAGNOSES:  1. Lumbar facet arthropathy   2. Chronic low back pain     PROCEDURE(S) PERFORMED:  1. Bilateral L3 and L4 medial branch nerve and L5 dorsal ramus nerve blocks  2.  Fluoroscopic guidance for the above procedures    ANESTHESIA:  Local.    BLOOD LOSS:  Minimal.    DRAINS AND SPECIMENS:  None.    COMPLICATIONS:  None.    INDICATIONS:    Rigoberto Holguin is a 78 year old male with a history of low back pain secondary to lumar facet joint arthopathy .  The patient stated that the patient was in their usual state of health. He stopped coumadin 5 days ago and the recent INR is 1.2.  Therefore, the plan is to perform above mentioned procedure.     Procedure Details:    The patient was met in the procedure room, where the patient was identified by name, medical record number and date of birth.  All of the patient s last minute questions were answered. Written informed consent was obtained and saved in the electronic medical record, after the risks, benefits, and alternatives were discussed with the patient.      A formal time-out procedure was performed, as per protocol, including patient name, title of procedure, and site of procedure, and all in the room concurred.  Routine monitors were applied.      The patient was placed in the prone position on the procedure room table.  All pressure points were checked and comfortably padded.  Routine monitors were placed.  Vital signs were stable.    A chlorhexidine prep was completed followed by sterile draping per standard procedure.     We identified each lumbar vertebral body after first identifying the S1 vertebrae.  Fluoroscope was  then obliqued towards the patient's right in order to identify the pedicles of the L4, L5 and sacral ala .  The targets were recognized at the junction of the transverse process and the superior articular process (and sacral ala for L5 medial branch nerves). Skin and subcutaneous tissues overlying this area were anesthetized with a 1% lidocaine. Under fluoroscopic guidance, we directed the 3.5 inch spinal needle through the skin and subcutaneous tissues until osseous contact was achieved.   After a negative aspirate to make sure that there was no intravascular placement, Isovue was then injected to confirm no vascular runoff.   At that point, the stylets were removed 0.5 mL lidocaine was injected. The exact same procedure was repeated  on the left.       Light pressure was held at the puncture site(s) to prevent ecchymosis and oozing.  The patient's skin was cleansed, and hemostasis was confirmed.  Band-aids were applied to the needle injection site(s).      Condition:    The patient tolerated the procedure well and was monitored for approximately 15 minutes afterward in the post procedure area.  There were no immediate post procedure complications noted.  The patient was then discharged to home as per protocol.     Patient condition  stable.    Preprocedure pain score: 2/10  Postprocedure pain score: 0/10

## 2018-11-21 NOTE — IP AVS SNAPSHOT
[facility]    [address] [place] [address]    Phone:  [phone]    Fax:  [phone]                                       After Visit Summary   11/21/2018    Rigoberto Holguin    MRN: [id number]           After Visit Summary Signature Page     I have received my discharge instructions, and my questions have been answered. I have discussed any challenges I see with this plan with the nurse or doctor.    ..........................................................................................................................................  Patient/Patient Representative Signature      ..........................................................................................................................................  Patient Representative Print Name and Relationship to Patient    ..................................................               ................................................  Date                                   Time    ..........................................................................................................................................  Reviewed by Signature/Title    ...................................................              ..............................................  Date                                               Time          22EPIC Rev 08/18

## 2018-11-21 NOTE — DISCHARGE INSTRUCTIONS
Home Care Instructions after a Medial Branch Block      In a medial branch block, a local anesthetic (numbing medicine) is injected near the medial branch nerve. This stops the transmission of pain signals from the facet joint. If this reduces your pain and improves your mobility, it may tell the doctor which facet joint is causing the pain. This procedure is a diagnostic procedure and is typically short lasting. With this injection, a steroid to increase the longevity of the blocks effect may or may not be used.    Activity  -You may resume most normal activity levels with the exception of strenuous activity. It is important for us to know if your pain with normal activity is relieved after this injection.  -DO NOT shower for 24 hours  -DO NOT remove bandaid for 24 hours    Pain  -You may experience soreness at the injection site for one or two days  -You may use an ice pack for 20 minutes every 2 hours for the first 24 hours  -You may use a heating pad after the first 24 hours  -You may use Tylenol (acetaminophen) every 4 hours or other pain medicines as     directed by your physician    You may experience numbness radiating into your legs or arms (depending on the procedure location). This numbness may last several hours. Until sensation returns to normal; please use caution in walking, climbing stairs, and stepping out of your vehicle, etc.    DID YOU RECEIVE SEDATION TODAY?  No    DID YOU RECEIVE STEROIDS TODAY?  No        PLEASE KEEP TRACK OF YOUR SYMPTOMS AND NOTE YOUR IMPROVEMENT FOR YOUR DOCTOR.     Please contact us if you have:  -Severe pain  -Fever more than 101.5 degrees Fahrenheit  -Signs of infection at the injection site (redness, swelling, or drainage)    If you have questions, please contact our office at [phone] between the hours of 7:00 am and 3:00 pm Monday through Friday. After office hours you can contact the on call provider by dialing [phone]. If you need immediate attention, we  recommend that you go to a hospital emergency room or dial 911.

## 2018-11-21 NOTE — TELEPHONE ENCOUNTER
Spoke with patient and he was not able to get to the appt on time today after injection.  He reports he will not be able to do the next procedure until, the soonest would be end of December, possible not until Jan.  So patient will restart his Coumadin today (ok by provider who did injection) continue the lovenox (until back in goal range) and have his INR rechecked on Monday.  He has enough Lovenox until Friday and be pharmacy has still #50 syringes left from script sent on 11/6. (patient only filled #10)  Aparna Vicente RN

## 2018-11-21 NOTE — H&P
"Abbreviated History and Physical    Patient Name: Rigoberto Holguin  YOB: 1940    Reason for Procedure: Chronic low back pain    History:    Past Medical History:   Diagnosis Date     Actinic keratosis      Basal cell carcinoma      Bladder cancer (H) 2003     DJD (degenerative joint disease), lumbar     S/P epidural x 2     DVT (deep venous thrombosis) (H) 2007    Factor V ( bilateral legs)      GERD (gastroesophageal reflux disease)      Hypothyroidism      Polio     Age 4, weakness of right arm and leg      S/P appendectomy        History of obstructive sleep apnea? no    History of problems with sedation? no    Physical exam:  /74  Pulse 59  Temp 97.3  F (36.3  C) (Temporal)  Resp 18  Ht 1.727 m (5' 8\")  Wt 90.7 kg (200 lb)  SpO2 99%  BMI 30.41 kg/m2  General: in no apparent distress   Neuro: At least antigravity strength noted in all 4 extremities  Respiratory: Clear to ausculation bilaterally   Cardiac: Regular rate and rhythm  Skin: No rashes or lesions on exposed areas of skin    Medications:   Current Outpatient Prescriptions   Medication     cholecalciferol (VITAMIN D) 1000 UNIT tablet     enoxaparin (LOVENOX) 100 MG/ML injection     levothyroxine (SYNTHROID/LEVOTHROID) 112 MCG tablet     LUMIGAN 0.01 % SOLN ophthalmic solution     metroNIDAZOLE (METROCREAM) 0.75 % cream     Multiple Vitamin (DAILY MULTIVITAMIN PO)     Multiple Vitamins-Minerals (EYE VITAMINS PO)     Nutritional Supplements (JUICE PLUS FIBRE PO)     timolol (TIMOPTIC) 0.5 % ophthalmic solution     calcium carbonate (TUMS) 500 MG chewable tablet     melatonin 5 MG tablet     sildenafil (REVATIO) 20 MG tablet     warfarin (COUMADIN) 5 MG tablet     Current Facility-Administered Medications   Medication     bupivacaine (MARCAINE) 0.25 % injection 3 mL     lidocaine 1 % injection 4 mL     triamcinolone acetonide (KENALOG-40) injection 80 mg       Allergies:   No Known Allergies    ASA Classification: 3    OK for local " anesthetic use.     Augusto Pugh MD  Pain Medicine  [facility] Department of Anesthesia  11/21/2018

## 2018-11-21 NOTE — TELEPHONE ENCOUNTER
Pt came to the clinic after their initial MBB today.   Pt was informed that they could go back on their Coumadin, as the clinic will not be able to accommodate the second injection in a short enough period where they could stay on their Lovenox.     LPN informed pt that they would call them Friday 11/23/18 to follow up with them after their MBB.     Pt was agreeable to this.    Ashley Shaw LPN

## 2018-11-21 NOTE — IP AVS SNAPSHOT
MRN:[id number]                      After Visit Summary   11/21/2018    Rigoberto Holguin    MRN: [id number]           Thank you!     Thank you for choosing [facility] for your care. Our goal is always to provide you with excellent care. Hearing back from our patients is one way we can continue to improve our services. Please take a few minutes to complete the written survey that you may receive in the mail after you visit with us. Thank you!        Patient Information     Date Of Birth          1940        About your hospital stay     You were admitted on:  November 21, 2018 [facility]    You were discharged on:  November 21, 2018       Who to Call     For medical emergencies, please call 911.  For non-urgent questions about your medical care, please call your primary care provider or clinic, [phone]  For questions related to your surgery, please call your surgery clinic        Attending Provider     Provider Specialty    Augusto Pugh MD Anesthesiology       Primary Care Provider Office Phone # Fax #    Jamie Meraz PA-C [phone]      Your next 10 appointments already scheduled     Nov 21, 2018  1:20 PM CST   Office Visit with Jamie Meraz PA-C   [facility]    [address] [id number] [phone]           Bring a current list of meds and any records pertaining to this visit. For Physicals, please bring immunization records and any forms needing to be filled out. Please arrive 10 minutes early to complete paperwork.            Nov 27, 2018  1:10 PM CST   [facility] with Justin Angeles PT   [facility] [place] [facility] ([facility] [place])    [address]  [address]  [facility] [address]   [phone]            Dec 10, 2018  8:30 AM CST   (Arrive by 8:15 AM)   Return Visit with Ricky Mclaughlin MD   [facility]  Neurosurgery ([facility])    [address]  3rd Floor  [facility] [address]   [phone]            Dec 11, 2018  8:30 AM CST   Anticoagulation Visit with BE ANTI COAG   [facility] [place] [facility]    [address]  [place] [address]   [phone]            Dec 11, 2018  3:30 PM CST   Return Visit with Vickie Whalen MD   [facility]    [id number] [place] [address] [phone]            Jun 25, 2019  9:00 AM CDT   Return Visit with Vincent Rucker MD, [place] [facility] PROC ROOM   [facility] ([facility])    [address] [place] [address]  [place] [address]   [phone]              Further instructions from your care team       Home Care Instructions after a Medial Branch Block      In a medial branch block, a local anesthetic (numbing medicine) is injected near the medial branch nerve. This stops the transmission of pain signals from the facet joint. If this reduces your pain and improves your mobility, it may tell the doctor which facet joint is causing the pain. This procedure is a diagnostic procedure and is typically short lasting. With this injection, a steroid to increase the longevity of the blocks effect may or may not be used.    Activity  -You may resume most normal activity levels with the exception of strenuous activity. It is important for us to know if your pain with normal activity is relieved after this injection.  -DO NOT shower for 24 hours  -DO NOT remove bandaid for 24 hours    Pain  -You may experience soreness at the injection site for one or two days  -You may use an ice pack for 20 minutes every 2 hours for the first 24 hours  -You may use a heating pad after the first 24 hours  -You may use Tylenol (acetaminophen) every 4 hours or other pain medicines as     directed by your physician    You may experience numbness  "radiating into your legs or arms (depending on the procedure location). This numbness may last several hours. Until sensation returns to normal; please use caution in walking, climbing stairs, and stepping out of your vehicle, etc.    DID YOU RECEIVE SEDATION TODAY?  No    DID YOU RECEIVE STEROIDS TODAY?  No        PLEASE KEEP TRACK OF YOUR SYMPTOMS AND NOTE YOUR IMPROVEMENT FOR YOUR DOCTOR.     Please contact us if you have:  -Severe pain  -Fever more than 101.5 degrees Fahrenheit  -Signs of infection at the injection site (redness, swelling, or drainage)    If you have questions, please contact our office at [phone] between the hours of 7:00 am and 3:00 pm Monday through Friday. After office hours you can contact the on call provider by dialing [phone]. If you need immediate attention, we recommend that you go to a hospital emergency room or dial [phone].      Pending Results     Date and Time Order Name Status Description    11/21/2018 0848 XR SURGERY FRANK FLUORO LESS THAN 5 MIN W STILLS In process             Admission Information     Date & Time Provider Department Dept. Phone    11/21/2018 Augusto Pugh MD [facility] [phone]      Your Vitals Were     Blood Pressure Pulse Temperature Respirations Height Weight    133/74 59 97.3  F (36.3  C) (Temporal) 18 1.727 m (5' 8\") 90.7 kg (200 lb)    Pulse Oximetry BMI (Body Mass Index)                99% 30.41 kg/m2          [employer] Information     [employer] gives you secure access to your electronic health record. If you see a primary care provider, you can also send messages to your care team and make appointments. If you have questions, please call your primary care clinic.  If you do not have a primary care provider, please call [phone] and they will assist you.      [employer] is an electronic gateway that provides easy, online access to your medical records. With [employer], you can request a clinic appointment, read your test " results, renew a prescription or communicate with your care team.     To access your existing account, please contact your [facility] or call [phone] for assistance.        Care EveryWhere ID     This is your Care EveryWhere ID. This could be used by other organizations to access your [facility] medical records  [id number]        Equal Access to Services     NATALIE TOLEDO : Lex martinez Sojax, waaxda doronadaha, qaybta kaalmaharper uriostegui, tacho washingtongamalieldory drummond. So [facility] [phone].    ATENCIÓN: Si habla español, tiene a thomas disposición servicios gratuitos de asistencia lingüística. Pauline al [phone].    We comply with applicable federal civil rights laws and [place] laws. We do not discriminate on the basis of race, color, national origin, age, disability, sex, sexual orientation, or gender identity.               Review of your medicines      UNREVIEWED medicines. Ask your doctor about these medicines        Dose / Directions    cholecalciferol 1000 UNIT tablet   Commonly known as:  vitamin D3   Used for:  Vitamin D deficiency        Dose:  1000 Units   Take 1 tablet (1,000 Units) by mouth daily   Quantity:  100 tablet   Refills:  0       EYE VITAMINS PO        Dose:  1 tablet   Take 1 tablet by mouth daily   Refills:  0       DAILY MULTIVITAMIN PO        Dose:  1 chew tab   Take 1 chew tab by mouth daily   Refills:  0       enoxaparin 100 MG/ML injection   Commonly known as:  LOVENOX   Used for:  Factor V Leiden mutation (H), History of DVT of lower extremity        Dose:  100 mg   Inject 1 mL (100 mg) Subcutaneous 2 times daily   Quantity:  60 Syringe   Refills:  0       JUICE PLUS FIBRE PO        Refills:  0       levothyroxine 112 MCG tablet   Commonly known as:  SYNTHROID/LEVOTHROID   Used for:  Hypothyroidism due to Hashimoto's thyroiditis        TAKE 1 TABLET EVERY MORNING BEFORE BREAKFAST   Quantity:  90 tablet   Refills:  3       LUMIGAN  0.01 % Soln   Used for:  Actinic keratosis   Generic drug:  bimatoprost        Refills:  0       melatonin 5 MG tablet        Dose:  10 mg   Take 10 mg by mouth At Bedtime Reported on 2/21/2017   Refills:  0       metroNIDAZOLE 0.75 % cream   Commonly known as:  METROCREAM   Used for:  Rosacea        every day, once daily to face   Quantity:  60 g   Refills:  3       sildenafil 20 MG tablet   Commonly known as:  REVATIO   Used for:  Erectile dysfunction, unspecified erectile dysfunction type        Take 2-5 tabs daily prn   Quantity:  30 tablet   Refills:  11       timolol 0.5 % ophthalmic solution   Commonly known as:  TIMOPTIC   Used for:  Actinic keratosis        Refills:  0       TUMS 500 MG chewable tablet   Generic drug:  calcium carbonate        Dose:  1 chew tab   Take 1 chew tab by mouth daily as needed Reported on 4/18/2017   Refills:  0       warfarin 5 MG tablet   Commonly known as:  COUMADIN   Used for:  History of DVT of lower extremity, Chronic anticoagulation        10 mg(2 tabs) on Tue & Thu; 7.5 mg (1.5 tabs) all other days or as directed   Quantity:  135 tablet   Refills:  1                Protect others around you: Learn how to safely use, store and throw away your medicines at www.disposemymeds.org.             Medication List: This is a list of all your medications and when to take them. Check marks below indicate your daily home schedule. Keep this list as a reference.      Medications           Morning Afternoon Evening Bedtime As Needed    cholecalciferol 1000 UNIT tablet   Commonly known as:  vitamin D3   Take 1 tablet (1,000 Units) by mouth daily                                EYE VITAMINS PO   Take 1 tablet by mouth daily                                DAILY MULTIVITAMIN PO   Take 1 chew tab by mouth daily                                enoxaparin 100 MG/ML injection   Commonly known as:  LOVENOX   Inject 1 mL (100 mg) Subcutaneous 2 times daily                                JUICE PLUS  FIBRE PO                                levothyroxine 112 MCG tablet   Commonly known as:  SYNTHROID/LEVOTHROID   TAKE 1 TABLET EVERY MORNING BEFORE BREAKFAST                                LUMIGAN 0.01 % Soln   Generic drug:  bimatoprost                                melatonin 5 MG tablet   Take 10 mg by mouth At Bedtime Reported on 2/21/2017                                metroNIDAZOLE 0.75 % cream   Commonly known as:  METROCREAM   every day, once daily to face                                sildenafil 20 MG tablet   Commonly known as:  REVATIO   Take 2-5 tabs daily prn                                timolol 0.5 % ophthalmic solution   Commonly known as:  TIMOPTIC                                TUMS 500 MG chewable tablet   Take 1 chew tab by mouth daily as needed Reported on 4/18/2017   Generic drug:  calcium carbonate                                warfarin 5 MG tablet   Commonly known as:  COUMADIN   10 mg(2 tabs) on Tue & Thu; 7.5 mg (1.5 tabs) all other days or as directed

## 2018-11-23 ENCOUNTER — CARE COORDINATION (OUTPATIENT)
Dept: ANESTHESIOLOGY | Facility: CLINIC | Age: 78
End: 2018-11-23

## 2018-11-23 DIAGNOSIS — M47.816 LUMBAR FACET ARTHROPATHY: Primary | ICD-10-CM

## 2018-11-23 NOTE — PROGRESS NOTES
LPN called pt to follow up after procedure:    Procedure Performed:  Bilateral L3 and L4 medial branch nerve and L5 dorsal ramus nerve blocks    Doctor: Keaton    Diagnosis:  1. Lumbar facet arthropathy   2. Chronic low back pain     Date of Procedure:11/21/18     Patient stated their % of Overall Pain relief: 87.5-100%  Pt reports-   Pain before the procedure: 7-8   Pain after the procedure: 0-1     Comments:  Pt's pain is most present when standing/walking. (7-8/10 or higher) Pt stated that after the Medial Branch Block, they were able to stand at their sink and wash dishes, do physical therapy exercises 2 times through out the day, and walk around [place] and climb a latter or two with his wife.   Pt's wife stated I haven't seen him walk that much in months.      Follow up:  Because the pt is on Coumadin and per his age, Dr. Pugh and Christina Palma both recommend that the pt move to RFA after 1 Dx MBB.     LPN will work with Christina Shah and or Dr. Odom to get pt scheduled and approved for Bilateral RFA.     Pt will be contacted on Monday in assisting to schedule injection.     Ashley Shaw LPN

## 2018-11-26 ENCOUNTER — ANTICOAGULATION THERAPY VISIT (OUTPATIENT)
Dept: NURSING | Facility: CLINIC | Age: 78
End: 2018-11-26
Payer: MEDICARE

## 2018-11-26 LAB — INR POINT OF CARE: 1.7 (ref 0.86–1.14)

## 2018-11-26 PROCEDURE — 99207 ZZC NO CHARGE NURSE ONLY: CPT

## 2018-11-26 PROCEDURE — 85610 PROTHROMBIN TIME: CPT | Mod: QW

## 2018-11-26 PROCEDURE — 36416 COLLJ CAPILLARY BLOOD SPEC: CPT

## 2018-11-26 NOTE — PROGRESS NOTES
"  ANTICOAGULATION FOLLOW-UP CLINIC VISIT    Patient Name:  Rigoberto Holguin  Date:  11/26/2018  Contact Type:  Face to Face    SUBJECTIVE:     Patient Findings     Positives Dental/Other procedures, Intentional hold of therapy, No Problem Findings    Comments INR 1.7, given bump today and will continue on same maintenance and recheck in 2 weeks. Per patient he did 5 days of Lovenox injections and refuses to do more until INR therapeutic.  Had \"nerves blocked in my spine for pain control\". Per patient only gave pain relief for 8 hours.             OBJECTIVE    INR Protime   Date Value Ref Range Status   11/26/2018 1.7 (A) 0.86 - 1.14 Final       ASSESSMENT / PLAN  INR assessment SUB refuses to continue on Lovenox until therapeutic   Recheck INR In: 2 WEEKS    INR Location Clinic      Anticoagulation Summary as of 11/26/2018     INR goal 2.0-3.0   Today's INR 1.7!   Warfarin maintenance plan 10 mg (5 mg x 2) on Tue, Thu; 7.5 mg (5 mg x 1.5) all other days   Full warfarin instructions 11/26: 12.5 mg; Otherwise 10 mg on Tue, Thu; 7.5 mg all other days   Weekly warfarin total 57.5 mg   Plan last modified Aparna Vicente (5/31/2018)   Next INR check 12/11/2018   Target end date     Indications   Long-term (current) use of anticoagulants [Z79.01] [Z79.01]         Anticoagulation Episode Summary     INR check location     Preferred lab     Send INR reminders to [facility]    Comments       Anticoagulation Care Providers     Provider Role Specialty Phone number    Jamie Meraz PA-C Responsible Physician Assistant [phone]            See the Encounter Report to view Anticoagulation Flowsheet and Dosing Calendar (Go to Encounters tab in chart review, and find the Anticoagulation Therapy Visit)    Dosage adjustment made based on physician directed care plan.    Keely Corrales RN               "

## 2018-11-26 NOTE — MR AVS SNAPSHOT
Rigoberto LORENZO Holguin   11/26/2018 2:00 PM   Anticoagulation Therapy Visit    Description:  78 year old male   Provider:  ALANA ANTI COAG   Department:  [facility] Nurse           INR as of 11/26/2018     Today's INR 1.7!      Anticoagulation Summary as of 11/26/2018     INR goal 2.0-3.0   Today's INR 1.7!   Full warfarin instructions 11/26: 12.5 mg; Otherwise 10 mg on Tue, Thu; 7.5 mg all other days   Next INR check 12/11/2018    Indications   Long-term (current) use of anticoagulants [Z79.01] [Z79.01]         Your next Anticoagulation Clinic appointment(s)     Nov 26, 2018  2:00 PM CST   Anticoagulation Visit with BE ANTI COAG   [facility]    [address]  [place] [address]   [phone]            Dec 11, 2018  8:30 AM CST   Anticoagulation Visit with BE ANTI COAG   [facility] ([facility])    [address]  [place] [address]   [phone]              Contact Numbers     [facility]  Please call [phone] with any problems or questions regarding your therapy, or to cancel or reschedule your appointment.          November 2018 Details    Sun Mon Tue Wed Thu Fri Sat         1               2               3                 4               5               6               7               8               9               10                 11               12               13               14               15               16               17                 18               19               20               21               22               23               24                 25               26      12.5 mg   See details      27      10 mg         28      7.5 mg         29      10 mg         30      7.5 mg           Date Details   11/26 This INR check               How to take your warfarin dose     To take:  7.5 mg Take 1.5 of the 5 mg tablets.    To take:  10 mg Take 2 of the 5 mg tablets.    To take:  12.5 mg Take 2.5 of the 5  mg tablets.           December 2018 Details    Sun Mon Tue Wed Thu Fri Sat           1      7.5 mg           2      7.5 mg         3      7.5 mg         4      10 mg         5      7.5 mg         6      10 mg         7      7.5 mg         8      7.5 mg           9      7.5 mg         10      7.5 mg         11            12               13               14               15                 16               17               18               19               20               21               22                 23               24               25               26               27               28               29                 30               31                     Date Details   No additional details    Date of next INR:  12/11/2018         How to take your warfarin dose     To take:  7.5 mg Take 1.5 of the 5 mg tablets.    To take:  10 mg Take 2 of the 5 mg tablets.

## 2018-11-27 ENCOUNTER — THERAPY VISIT (OUTPATIENT)
Dept: PHYSICAL THERAPY | Facility: CLINIC | Age: 78
End: 2018-11-27
Payer: MEDICARE

## 2018-11-27 DIAGNOSIS — M47.817 LUMBOSACRAL SPONDYLOSIS WITHOUT MYELOPATHY: ICD-10-CM

## 2018-11-27 DIAGNOSIS — M47.816 LUMBAR FACET ARTHROPATHY: ICD-10-CM

## 2018-11-27 PROCEDURE — G8979 MOBILITY GOAL STATUS: HCPCS | Mod: GP | Performed by: PHYSICAL THERAPIST

## 2018-11-27 PROCEDURE — 97112 NEUROMUSCULAR REEDUCATION: CPT | Mod: GP | Performed by: PHYSICAL THERAPIST

## 2018-11-27 PROCEDURE — 97110 THERAPEUTIC EXERCISES: CPT | Mod: GP | Performed by: PHYSICAL THERAPIST

## 2018-11-27 PROCEDURE — 97140 MANUAL THERAPY 1/> REGIONS: CPT | Mod: GP | Performed by: PHYSICAL THERAPIST

## 2018-11-27 PROCEDURE — G8980 MOBILITY D/C STATUS: HCPCS | Mod: GP | Performed by: PHYSICAL THERAPIST

## 2018-11-27 NOTE — PROGRESS NOTES
Subjective:  HPI                    Objective:  System    Physical Exam    General     ROS    Assessment/Plan:    DISCHARGE REPORT    Progress reporting period is from 10/23/18 to 11/27/18.       SUBJECTIVE  Subjective changes noted by patient: Patient had his first medial branch block on 11/21. He reports he got some relief from the procedure. He reports he had about 12 hours of relief from the procedure. Generally speaking, he feels about the same as at onset of therapy. He is feeling ready to continue his home program independently.      Current pain level is 1/10 (up to 8/10 at times).     Previous pain level was 8/10.   Changes in function:  None  Adverse reaction to treatment or activity: None    OBJECTIVE  Changes noted in objective findings: Lumbar AROM flexion to floor, extension min loss; SG min to mod loss bilat - all motions without increased pain. Improving hip extension and abduction strength. Trendelenberg noted with ambulation, unchanged vs initial visit.     ASSESSMENT/PLAN  Updated problem list and treatment plan: Diagnosis 1:  Chronic low back pain    Pain -  home program  Decreased ROM/flexibility - home program  Decreased joint mobility - home program  Decreased strength - home program  Impaired gait - gait training and home program  Impaired muscle performance - home program  Impaired posture - home program  STG/LTGs have been met or progress has been made towards goals:  None  Assessment of Progress: The patient's condition is improving.  The patient's condition has potential to improve.  Self Management Plans:  Patient has been instructed in a home treatment program.  Patient is independent in a home treatment program.  I have re-evaluated this patient and find that the nature, scope, duration and intensity of the therapy is appropriate for the medical condition of the patient.  Rigoberto continues to require the following intervention to meet STG and LTG's:  PT intervention is no longer required  to meet STG/LTG.    Recommendations:  This patient is ready to be discharged from therapy and continue their home treatment program.    Please refer to the daily flowsheet for treatment today, total treatment time and time spent performing 1:1 timed codes.

## 2018-11-27 NOTE — MR AVS SNAPSHOT
After Visit Summary   11/27/2018    Rigoberto Holguin    MRN: [id number]           Patient Information     Date Of Birth          1940        Visit Information        Provider Department      11/27/2018 1:10 PM Justin Angeles, PT [facility]        Today's Diagnoses     Lumbar facet arthropathy        Lumbosacral spondylosis without myelopathy           Follow-ups after your visit        Your next 10 appointments already scheduled     Dec 10, 2018  8:30 AM CST   (Arrive by 8:15 AM)   Return Visit with Ricky Mclaughlin MD   [facility] ([facility])    [address] [address]   [phone]            Dec 11, 2018  8:30 AM CST   Anticoagulation Visit with BE ANTI COAG   [facility] ([facility])    [address] [place] [id number] [phone]            Dec 11, 2018  3:30 PM CST   Return Visit with Vickie Whalen MD   [facility] ([facility])    [address] [place] [id number] [phone]            Jun 25, 2019  9:00 AM CDT   Return Visit with Vincent Rucker MD, [facility] PROC ROOM   [facility] ([address] [id number] [phone]              Who to contact     If you have questions or need follow up information about today's clinic visit or your schedule please contact [facility] directly at [phone].  Normal or non-critical lab and imaging results will be communicated to you by MyChart, letter or phone within 4 business days after the clinic has received the results. If you do not hear from us within 7 days, please contact the clinic through MyChart or phone. If you have a critical or abnormal lab result, we will notify you by phone as soon as possible.  Submit refill requests through [email or web address] or call  your pharmacy and they will forward the refill request to us. Please allow 3 business days for your refill to be completed.          Additional Information About Your Visit        [employer] [facility] Information     [email or web address] gives you secure access to your electronic health record. If you see a primary care provider, you can also send messages to your care team and make appointments. If you have questions, please call your primary care clinic.  If you do not have a primary care provider, please call [phone] and they will assist you.        Care EveryWhere ID     This is your Care EveryWhere ID. This could be used by other organizations to access your [facility] medical records  [id number]         Blood Pressure from Last 3 Encounters:   11/21/18 139/77   11/06/18 114/69   10/16/18 121/69    Weight from Last 3 Encounters:   11/21/18 90.7 kg (200 lb)   11/06/18 93 kg (205 lb)   10/16/18 93 kg (205 lb)              We Performed the Following     [facility] PROGRESS NOTES REPORT     MANUAL THER TECH,1+REGIONS,EA 15 MIN     NEUROMUSCULAR RE-EDUCATION     THERAPEUTIC EXERCISES        Primary Care Provider Office Phone # Fax #    Jamie Meraz PA-C [phone]       [address] [place] [address]        Equal Access to Services     NATALIE TOLEDO : Hadii aad ku hadasho Soomaali, waaxda luqadaha, qaybta kaalmada adeegyada, waxay idiin hayolen jacekeg yael la'lashell drummond. So [facility] [phone].    ATENCIÓN: Si habla español, tiene a thomas disposición servicios gratuitos de asistencia lingüística. ame al [phone].    We comply with applicable federal civil rights laws and [place] laws. We do not discriminate on the basis of race, color, national origin, age, disability, sex, sexual orientation, or gender identity.            Thank you!     Thank you for choosing [facility]  for your care. Our goal is always to provide you with excellent care. Hearing back from our patients is one way we  can continue to improve our services. Please take a few minutes to complete the written survey that you may receive in the mail after your visit with us. Thank you!             Your Updated Medication List - Protect others around you: Learn how to safely use, store and throw away your medicines at www.disposemymeds.org.          This list is accurate as of 11/27/18  1:50 PM.  Always use your most recent med list.                   Brand Name Dispense Instructions for use Diagnosis    EYE VITAMINS PO      Take 1 tablet by mouth daily        DAILY MULTIVITAMIN PO      Take 1 chew tab by mouth daily        enoxaparin 100 MG/ML syringe    LOVENOX    60 Syringe    Inject 1 mL (100 mg) Subcutaneous 2 times daily    Factor V Leiden mutation (H), History of DVT of lower extremity       JUICE PLUS FIBRE PO           levothyroxine 112 MCG tablet    SYNTHROID/LEVOTHROID    90 tablet    TAKE 1 TABLET EVERY MORNING BEFORE BREAKFAST    Hypothyroidism due to Hashimoto's thyroiditis       LUMIGAN 0.01 % Soln   Generic drug:  bimatoprost       Actinic keratosis       melatonin 5 MG tablet      Take 10 mg by mouth At Bedtime Reported on 2/21/2017        metroNIDAZOLE 0.75 % cream    METROCREAM    60 g    every day, once daily to face    Rosacea       sildenafil 20 MG tablet    REVATIO    30 tablet    Take 2-5 tabs daily prn    Erectile dysfunction, unspecified erectile dysfunction type       timolol maleate 0.5 % ophthalmic solution    TIMOPTIC      Actinic keratosis       TUMS 500 MG chewable tablet   Generic drug:  calcium carbonate      Take 1 chew tab by mouth daily as needed Reported on 4/18/2017        vitamin D3 1000 UNIT tablet    CHOLECALCIFEROL    100 tablet    Take 1 tablet (1,000 Units) by mouth daily    Vitamin D deficiency       warfarin 5 MG tablet    COUMADIN    135 tablet    10 mg(2 tabs) on Tue & Thu; 7.5 mg (1.5 tabs) all other days or as directed    History of DVT of lower extremity, Chronic anticoagulation

## 2018-11-29 NOTE — PROGRESS NOTES
LPN called and left a VM for pt informing him that he can call to schedule his procedure: Bilateral Lumbar RFA (L3/4, L4/5, L5/S1) with Dr. Pugh.     LPN discussed injection with Dr. Pugh, who agreed to do the RFA bilaterally as the pt on Coumadin, and would like to accommodate the pt.   LPN placed the procedure order.      updated that pt will be calling to schedule.   LPN will follow up with pt, to be sure that they don't have other questions.     Ashley Shaw LPN

## 2018-12-04 ENCOUNTER — TELEPHONE (OUTPATIENT)
Dept: ANESTHESIOLOGY | Facility: CLINIC | Age: 78
End: 2018-12-04

## 2018-12-04 NOTE — TELEPHONE ENCOUNTER
Spoke with: Rigoberto     Date Scheduled: 1/14/19      Provider: Dr. Pugh     : Yes     F/U Appointment: Patient states he will call back to schedule      Patient was educated on pre-op nurse call: Yes

## 2018-12-11 ENCOUNTER — OFFICE VISIT (OUTPATIENT)
Dept: DERMATOLOGY | Facility: CLINIC | Age: 78
End: 2018-12-11
Payer: MEDICARE

## 2018-12-11 ENCOUNTER — ANTICOAGULATION THERAPY VISIT (OUTPATIENT)
Dept: NURSING | Facility: CLINIC | Age: 78
End: 2018-12-11
Payer: MEDICARE

## 2018-12-11 ENCOUNTER — TELEPHONE (OUTPATIENT)
Dept: DERMATOLOGY | Facility: CLINIC | Age: 78
End: 2018-12-11

## 2018-12-11 DIAGNOSIS — Z85.828 HISTORY OF NONMELANOMA SKIN CANCER: Primary | ICD-10-CM

## 2018-12-11 DIAGNOSIS — Z86.718 HISTORY OF DVT OF LOWER EXTREMITY: ICD-10-CM

## 2018-12-11 DIAGNOSIS — D48.5 NEOPLASM OF UNCERTAIN BEHAVIOR OF SKIN: ICD-10-CM

## 2018-12-11 DIAGNOSIS — L57.0 ACTINIC KERATOSIS: ICD-10-CM

## 2018-12-11 LAB — INR POINT OF CARE: 2.2 (ref 0.86–1.14)

## 2018-12-11 PROCEDURE — 17003 DESTRUCT PREMALG LES 2-14: CPT | Performed by: DERMATOLOGY

## 2018-12-11 PROCEDURE — 11100 HC BIOPSY SKIN/SUBQ/MUC MEM, SINGLE LESION: CPT | Mod: 59 | Performed by: DERMATOLOGY

## 2018-12-11 PROCEDURE — 85610 PROTHROMBIN TIME: CPT | Mod: QW

## 2018-12-11 PROCEDURE — 17000 DESTRUCT PREMALG LESION: CPT | Performed by: DERMATOLOGY

## 2018-12-11 PROCEDURE — 99213 OFFICE O/P EST LOW 20 MIN: CPT | Mod: 25 | Performed by: DERMATOLOGY

## 2018-12-11 PROCEDURE — 36416 COLLJ CAPILLARY BLOOD SPEC: CPT

## 2018-12-11 PROCEDURE — 99207 ZZC NO CHARGE NURSE ONLY: CPT

## 2018-12-11 PROCEDURE — 88305 TISSUE EXAM BY PATHOLOGIST: CPT | Mod: TC | Performed by: DERMATOLOGY

## 2018-12-11 RX ORDER — LIDOCAINE HYDROCHLORIDE AND EPINEPHRINE 10; 10 MG/ML; UG/ML
0.5 INJECTION, SOLUTION INFILTRATION; PERINEURAL ONCE
Status: DISPENSED | OUTPATIENT
Start: 2018-12-11

## 2018-12-11 RX ORDER — LIDOCAINE HYDROCHLORIDE AND EPINEPHRINE 10; 10 MG/ML; UG/ML
1 INJECTION, SOLUTION INFILTRATION; PERINEURAL ONCE
Status: DISPENSED | OUTPATIENT
Start: 2018-12-11

## 2018-12-11 NOTE — NURSING NOTE
Rigoberto Holguin's goals for this visit include:   Chief Complaint   Patient presents with     Derm Problem     6 month skin check- scaly spots on scalp        He requests these members of his care team be copied on today's visit information: yes    PCP: Jamie Meraz    Referring Provider:  No referring provider defined for this encounter.    There were no vitals taken for this visit.    Do you need any medication refills at today's visit? No     Amorrae VIJAYA Garrett

## 2018-12-11 NOTE — TELEPHONE ENCOUNTER
Financial Counselor Review:    Procedure to be performed (include CPT code): Photodynamic Therapy - 32281    Diagnosis code (include ICD-10 code): L57.0    Medication order (include J code):Yes Levulan -     Medication dose and frequency: 1-2 Levulan Sticks    Cosmetic procedure/medication: No    Coverage and patient financial responsibility information: YES    Does patient need to be contacted by Financial Counselor: YES    Route response back to the dermatology patient care [place] - [id number]

## 2018-12-11 NOTE — LETTER
"    12/11/2018         RE: Rigoberto Holguin  [address] [address] [facility] [address]        Dear Colleague,    Thank you for referring your patient, Rigoberto Holguin, to the [facility]. Please see a copy of my visit note below.    [facility] Dermatology Note      Dermatology Problem List:  1. 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 Actinic keratoses  -Previous Tx: script for efudex initiated but cost prohibitive, cryotherapy, PDT (X3 forehead/scalp early 2017) (X2 sessions 2015 with improvement) (1-2 treatments at outside facility)  3.Lesion, left ear, records from [facility] were reviewed  -s/p biopsy 06/2014 demonstrating ulceration with inflammation  -s/p biopsy 07/2014 demonstrating erosion and ulcer with hemorrhagic scale crust, impetiginized, [facility] was queried  4. Rosacea  -Current Tx: Metrocream with improvement.   5. Lesion to recheck on R upper arm. S/p bx (non-diagnostic) and cryotherapy x 1 (7/26/17)  6. Nodule, post auricular      Encounter Date: Dec 11, 2018    CC:  Chief Complaint   Patient presents with     Derm Problem     6 month skin check- scaly spots on scalp        History of Present Illness:  Mr. Rigoberto Holguin is a 78 year old male with a hx of NMSC presents as a follow up for lesion on the right arm s/p biopsy. The patient was last seen 7/26/18 with Dr. Pacheco for Mohs to remove an SCCIS on the right mid frontal scalp. Today, the pt reports that this site has healed fairly well, although there is still \"a bit of a scab\" there. He still has some rough, scaly spots on the scalp which he would like looked at today. Other than these sites, he denies any bleeding, crusting, or changing lesions.     No other skin concerns to address today.     Past Medical " History:   Patient Active Problem List   Diagnosis     Encounter for long-term current use of medication     Malignant neoplasm of overlapping sites of bladder (H)     Other and unspecified coagulation defects     Nonallopathic lesion of lumbar region     Stenosis, spinal, lumbar     CARDIOVASCULAR SCREENING; LDL GOAL LESS THAN 160     ACP (advance care planning)     Factor V Leiden mutation (H)     History of DVT of lower extremity     Postphlebitic syndrome     Chronic anticoagulation     Peripheral vascular disease (H)     Personal history of malignant neoplasm of bladder     Vitamin D deficiency     Long-term (current) use of anticoagulants [Z79.01]     Lumbar radicular pain     Hypothyroidism due to Hashimoto's thyroiditis     Status post lumbar surgery     Aftercare following surgery of the musculoskeletal system     Blepharoptosis     Acute pain of right shoulder     Lumbar facet arthropathy     Lumbosacral spondylosis without myelopathy     Past Medical History:   Diagnosis Date     Actinic keratosis      Basal cell carcinoma      Bladder cancer (H) 2003     DJD (degenerative joint disease), lumbar     S/P epidural x 2     DVT (deep venous thrombosis) (H) 2007    Factor V ( bilateral legs)      GERD (gastroesophageal reflux disease)      Hypothyroidism      Polio     Age 4, weakness of right arm and leg      S/P appendectomy      Past Surgical History:   Procedure Laterality Date     Appendectomy       FORAMINOTOMY LUMBAR POSTERIOR TWO LEVELS Left 3/2/2017    Procedure: FORAMINOTOMY LUMBAR POSTERIOR TWO LEVELS;  Surgeon: Ricky Mclaughlin MD;  Location: [facility] OR     HC REPAIR ROTATOR CUFF,ACUTE Left 2007     INJECT PARAVERTEBRAL FACET JOINT LUMBAR / SACRAL THIRD Bilateral 11/21/2018    Procedure: Bilateral Lumbar Medial Branch Nerve Block Injection, Lumbar 3/4, Lumbar 4/5, Lumbar 5/Sacral 1;  Surgeon: Augusto Pugh MD;  Location: [facility] OR     PHOTODYNAMIC THERAPY - (PDT)  2/13/2015     SURGICAL HISTORY OF -    2003    Urinary Bladder Cancer treatment     SURGICAL HISTORY OF -       Gall Bladder     Social History:  The patient is retired. He was a . He has 2 granddaughter. He plays golf.   Kept in chart for convenience.       Family History:  Reviewed and unchanged but kept in chart for clinician convenience  There is no family history of skin cancer.    Medications:  Current Outpatient Medications   Medication Sig Dispense Refill     calcium carbonate (TUMS) 500 MG chewable tablet Take 1 chew tab by mouth daily as needed Reported on 4/18/2017       cholecalciferol (VITAMIN D) 1000 UNIT tablet Take 1 tablet (1,000 Units) by mouth daily 100 tablet 0     enoxaparin (LOVENOX) 100 MG/ML injection Inject 1 mL (100 mg) Subcutaneous 2 times daily 60 Syringe 0     levothyroxine (SYNTHROID/LEVOTHROID) 112 MCG tablet TAKE 1 TABLET EVERY MORNING BEFORE BREAKFAST 90 tablet 3     LUMIGAN 0.01 % SOLN ophthalmic solution        melatonin 5 MG tablet Take 10 mg by mouth At Bedtime Reported on 2/21/2017       metroNIDAZOLE (METROCREAM) 0.75 % cream every day, once daily to face 60 g 3     Multiple Vitamin (DAILY MULTIVITAMIN PO) Take 1 chew tab by mouth daily        Multiple Vitamins-Minerals (EYE VITAMINS PO) Take 1 tablet by mouth daily       Nutritional Supplements (JUICE PLUS FIBRE PO)        sildenafil (REVATIO) 20 MG tablet Take 2-5 tabs daily prn 30 tablet 11     timolol (TIMOPTIC) 0.5 % ophthalmic solution        warfarin (COUMADIN) 5 MG tablet 10 mg(2 tabs) on Tue & Thu; 7.5 mg (1.5 tabs) all other days or as directed 135 tablet 1      No Known Allergies    Review of Systems:  -Const: Otherwise feeling well, in usual state of health.   -Skin: Ask per HPI     Physical exam:  There were no vitals taken for this visit.  -This is a well developed, well-nourished male in no acute distress, in a pleasant mood.    SKIN: Total skin excluding the undergarment areas was performed. The exam included the head/face, neck, both arms,  chest, back, abdomen, both legs, digits and/or nails. Excluding ankles and feet. Declines genital exam.   - There is no erythema, telangectasias, nodularity, or pigmentation on the sites of prior skin cancer.  - There are erythematous macules with overyling adherent scale on the mid frontal scalp, crown, right temple  - On the right mid frontal scalp, there is a 5 mm keratotic papule at the site of prior skin cancer.  -No other lesions of concern on areas examined.     Impression/Plan:  1. History of NMSC  Recommend sunscreens SPF #30 or greater, protective clothing and avoidance of tanning beds.    2. Nodule, post auricular, reviewed that this would need histopath to diagnosis. He declines further evaluation or treatment - unchanged-declines removal    3. Actinic keratoses-8 scalp and temples  Cryotherapy procedure note: After verbal consent and discussion of risks and benefits including but no limited to dyspigmentation/scar, blister, and pain, 8 was(were) treated with 1-2mm freeze border for 2 cycles with liquid nitrogen. Post cryotherapy instructions were provided.   For the scalp and temples, discussed risks and benefits of blue light therapy for acne including but not limited to discomfort and pain with procedure, time for procedure, need for avoidance of sun exposure after treatment, expected irritation after treatment, risk of exuberant reaction. Discussed need for treatments 2-3 times per week(10J/cm2).  Maximum total cumulative light exposure of 320J/cm2 (32 treatments).   No cosmetic procedures including laser resurfacing. No recent course of Accutane in the last 6 months.   Consent obtained in clinic today.     4. On the right mid frontal scalp, there is a 5 mm keratotic papule at the site of prior skin cancer. NUB. R/o recurrent SCCIS.     Shave biopsy:  After discussion of benefits and risks including but not limited to bleeding/bruising, pain/swelling, infection, scar, incomplete removal, nerve  damage/numbness, recurrence, and non-diagnostic biopsy, written consent, verbal consent and photographs were obtained. Time-out was performed. The area was cleaned with isopropyl alcohol. 0.5mL of 1% lidocaine with epinephrine was injected to obtain adequate anesthesia of the lesion on the m. A shave biopsy was performed. Hemostasis was achieved with aluminium chloride. Vaseline and a sterile dressing were applied. The patient tolerated the procedure and no complications were noted. The patient was provided with verbal and written post care instructions.     Follow up in 5 months with general derm    Staff Involved:  Scribe/Staff    Scribe Disclosure  I, Milo Mary, am serving as a scribe to document services personally performed by Dr. Vickie Whalen MD, based on data collection and the provider's statements to me.     Provider Disclosure:   The documentation recorded by the scribe accurately reflects the services I personally performed and the decisions made by me.    Vickie Whalen MD    Department of Dermatology  [facility]: Phone: [phone], Fax:[phone]  [facility]: Phone: [phone], Fax: [phone]        Again, thank you for allowing me to participate in the care of your patient.        Sincerely,        Vickie Whalen MD

## 2018-12-11 NOTE — PROGRESS NOTES
"[facility] Dermatology Note      Dermatology Problem List:  1. 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 Actinic keratoses  -Previous Tx: script for efudex initiated but cost prohibitive, cryotherapy, PDT (X3 forehead/scalp early 2017) (X2 sessions 2015 with improvement) (1-2 treatments at outside facility)  3.Lesion, left ear, records from [facility] were reviewed  -s/p biopsy 06/2014 demonstrating ulceration with inflammation  -s/p biopsy 07/2014 demonstrating erosion and ulcer with hemorrhagic scale crust, impetiginized, [facility] was queried  4. Rosacea  -Current Tx: Metrocream with improvement.   5. Lesion to recheck on R upper arm. S/p bx (non-diagnostic) and cryotherapy x 1 (7/26/17)  6. Nodule, post auricular      Encounter Date: Dec 11, 2018    CC:  Chief Complaint   Patient presents with     Derm Problem     6 month skin check- scaly spots on scalp        History of Present Illness:  Mr. Rigoberto Holguin is a 78 year old male with a hx of NMSC presents as a follow up for lesion on the right arm s/p biopsy. The patient was last seen 7/26/18 with Dr. Pacheco for Mohs to remove an SCCIS on the right mid frontal scalp. Today, the pt reports that this site has healed fairly well, although there is still \"a bit of a scab\" there. He still has some rough, scaly spots on the scalp which he would like looked at today. Other than these sites, he denies any bleeding, crusting, or changing lesions.     No other skin concerns to address today.     Past Medical History:   Patient Active Problem List   Diagnosis     Encounter for long-term current use of medication     Malignant neoplasm of overlapping sites of bladder (H)     Other and unspecified coagulation defects     Nonallopathic lesion of " lumbar region     Stenosis, spinal, lumbar     CARDIOVASCULAR SCREENING; LDL GOAL LESS THAN 160     ACP (advance care planning)     Factor V Leiden mutation (H)     History of DVT of lower extremity     Postphlebitic syndrome     Chronic anticoagulation     Peripheral vascular disease (H)     Personal history of malignant neoplasm of bladder     Vitamin D deficiency     Long-term (current) use of anticoagulants [Z79.01]     Lumbar radicular pain     Hypothyroidism due to Hashimoto's thyroiditis     Status post lumbar surgery     Aftercare following surgery of the musculoskeletal system     Blepharoptosis     Acute pain of right shoulder     Lumbar facet arthropathy     Lumbosacral spondylosis without myelopathy     Past Medical History:   Diagnosis Date     Actinic keratosis      Basal cell carcinoma      Bladder cancer (H) 2003     DJD (degenerative joint disease), lumbar     S/P epidural x 2     DVT (deep venous thrombosis) (H) 2007    Factor V ( bilateral legs)      GERD (gastroesophageal reflux disease)      Hypothyroidism      Polio     Age 4, weakness of right arm and leg      S/P appendectomy      Past Surgical History:   Procedure Laterality Date     Appendectomy       FORAMINOTOMY LUMBAR POSTERIOR TWO LEVELS Left 3/2/2017    Procedure: FORAMINOTOMY LUMBAR POSTERIOR TWO LEVELS;  Surgeon: Ricky Mclaughlin MD;  Location: [facility] OR     HC REPAIR ROTATOR CUFF,ACUTE Left 2007     INJECT PARAVERTEBRAL FACET JOINT LUMBAR / SACRAL THIRD Bilateral 11/21/2018    Procedure: Bilateral Lumbar Medial Branch Nerve Block Injection, Lumbar 3/4, Lumbar 4/5, Lumbar 5/Sacral 1;  Surgeon: Augusto Pugh MD;  Location:  OR     PHOTODYNAMIC THERAPY - (PDT)  2/13/2015     SURGICAL HISTORY OF -   2003    Urinary Bladder Cancer treatment     SURGICAL HISTORY OF -       Gall Bladder     Social History:  The patient is retired. He was a . He has 2 granddaughter. He plays golf.   Kept in chart for convenience.       Family  History:  Reviewed and unchanged but kept in chart for clinician convenience  There is no family history of skin cancer.    Medications:  Current Outpatient Medications   Medication Sig Dispense Refill     calcium carbonate (TUMS) 500 MG chewable tablet Take 1 chew tab by mouth daily as needed Reported on 4/18/2017       cholecalciferol (VITAMIN D) 1000 UNIT tablet Take 1 tablet (1,000 Units) by mouth daily 100 tablet 0     enoxaparin (LOVENOX) 100 MG/ML injection Inject 1 mL (100 mg) Subcutaneous 2 times daily 60 Syringe 0     levothyroxine (SYNTHROID/LEVOTHROID) 112 MCG tablet TAKE 1 TABLET EVERY MORNING BEFORE BREAKFAST 90 tablet 3     LUMIGAN 0.01 % SOLN ophthalmic solution        melatonin 5 MG tablet Take 10 mg by mouth At Bedtime Reported on 2/21/2017       metroNIDAZOLE (METROCREAM) 0.75 % cream every day, once daily to face 60 g 3     Multiple Vitamin (DAILY MULTIVITAMIN PO) Take 1 chew tab by mouth daily        Multiple Vitamins-Minerals (EYE VITAMINS PO) Take 1 tablet by mouth daily       Nutritional Supplements (JUICE PLUS FIBRE PO)        sildenafil (REVATIO) 20 MG tablet Take 2-5 tabs daily prn 30 tablet 11     timolol (TIMOPTIC) 0.5 % ophthalmic solution        warfarin (COUMADIN) 5 MG tablet 10 mg(2 tabs) on Tue & Thu; 7.5 mg (1.5 tabs) all other days or as directed 135 tablet 1      No Known Allergies    Review of Systems:  -Const: Otherwise feeling well, in usual state of health.   -Skin: Ask per HPI     Physical exam:  There were no vitals taken for this visit.  -This is a well developed, well-nourished male in no acute distress, in a pleasant mood.    SKIN: Total skin excluding the undergarment areas was performed. The exam included the head/face, neck, both arms, chest, back, abdomen, both legs, digits and/or nails. Excluding ankles and feet. Declines genital exam.   - There is no erythema, telangectasias, nodularity, or pigmentation on the sites of prior skin cancer.  - There are erythematous  macules with overyling adherent scale on the mid frontal scalp, crown, right temple  - On the right mid frontal scalp, there is a 5 mm keratotic papule at the site of prior skin cancer.  -No other lesions of concern on areas examined.     Impression/Plan:  1. History of NMSC  Recommend sunscreens SPF #30 or greater, protective clothing and avoidance of tanning beds.    2. Nodule, post auricular, reviewed that this would need histopath to diagnosis. He declines further evaluation or treatment - unchanged-declines removal    3. Actinic keratoses-8 scalp and temples  Cryotherapy procedure note: After verbal consent and discussion of risks and benefits including but no limited to dyspigmentation/scar, blister, and pain, 8 was(were) treated with 1-2mm freeze border for 2 cycles with liquid nitrogen. Post cryotherapy instructions were provided.   For the scalp and temples, discussed risks and benefits of blue light therapy for acne including but not limited to discomfort and pain with procedure, time for procedure, need for avoidance of sun exposure after treatment, expected irritation after treatment, risk of exuberant reaction. Discussed need for treatments 2-3 times per week(10J/cm2).  Maximum total cumulative light exposure of 320J/cm2 (32 treatments).   No cosmetic procedures including laser resurfacing. No recent course of Accutane in the last 6 months.   Consent obtained in clinic today.     4. On the right mid frontal scalp, there is a 5 mm keratotic papule at the site of prior skin cancer. NUB. R/o recurrent SCCIS.     Shave biopsy:  After discussion of benefits and risks including but not limited to bleeding/bruising, pain/swelling, infection, scar, incomplete removal, nerve damage/numbness, recurrence, and non-diagnostic biopsy, written consent, verbal consent and photographs were obtained. Time-out was performed. The area was cleaned with isopropyl alcohol. 0.5mL of 1% lidocaine with epinephrine was injected  to obtain adequate anesthesia of the lesion on the m. A shave biopsy was performed. Hemostasis was achieved with aluminium chloride. Vaseline and a sterile dressing were applied. The patient tolerated the procedure and no complications were noted. The patient was provided with verbal and written post care instructions.     Follow up in 5 months with general derm    Staff Involved:  Scribe/Staff    Scribe Disclosure  I, Milo Mary, am serving as a scribe to document services personally performed by Dr. Vickie Whalen MD, based on data collection and the provider's statements to me.     Provider Disclosure:   The documentation recorded by the scribe accurately reflects the services I personally performed and the decisions made by me.    Vickie Whalen MD    Department of Dermatology  [facility]: Phone: [phone], Fax:[phone]  [facility]: Phone: [phone], Fax: [phone]

## 2018-12-11 NOTE — PATIENT INSTRUCTIONS
Cryotherapy    What is it?    Use of a very cold liquid, such as liquid nitrogen, to freeze and destroy abnormal skin cells that need to be removed    What should I expect?    Tenderness and redness    A small blister that might grow and fill with dark purple blood. There may be crusting.    More than one treatment may be needed if the lesions do not go away.    How do I care for the treated area?    Gently wash the area with your hands when bathing.    Use a thin layer of Vaseline to help with healing. You may use a Band-Aid.     The area should heal within 7-10 days and may leave behind a pink or lighter color.     Do not use an antibiotic or Neosporin ointment.     You may take acetaminophen (Tylenol) for pain.     Call your Doctor if you have:    Severe pain    Signs of infection (warmth, redness, cloudy yellow drainage, and or a bad smell)    Questions or concerns    Who should I call with questions?       [facility]: [phone]       [facility]: [phone]       For urgent needs outside of business hours call the [facility] at [phone]        and ask for the dermatology resident on call    Wound Care After a Biopsy    What is a skin biopsy?  A skin biopsy allows the doctor to examine a very small piece of tissue under the microscope to determine the diagnosis and the best treatment for the skin condition. A local anesthetic (numbing medicine)  is injected with a very small needle into the skin area to be tested. A small piece of skin is taken from the area. Sometimes a suture (stitch) is used.     What are the risks of a skin biopsy?  I will experience scar, bleeding, swelling, pain, crusting and redness. I may experience incomplete removal or recurrence. Risks of this procedure are excessive bleeding, bruising, infection, nerve damage, numbness, thick (hypertrophic or keloidal) scar and non-diagnostic biopsy.    How should I care  for my wound for the first 24 hours?    Keep the wound dry and covered for 24 hours    If it bleeds, hold direct pressure on the area for 15 minutes. If bleeding does not stop then go to the emergency room    Avoid strenuous exercise the first 1-2 days or as your doctor instructs you    How should I care for the wound after 24 hours?    After 24 hours, remove the bandage    You may bathe or shower as normal    If you had a scalp biopsy, you can shampoo as usual and can use shower water to clean the biopsy site daily    Clean the wound twice a day with gentle soap and water    Do not scrub, be gentle    Apply white petroleum/Vaseline after cleaning the wound with a cotton swab or a clean finger, and keep the site covered with a Bandaid /bandage. Bandages are not necessary with a scalp biopsy    If you are unable to cover the site with a Bandaid /bandage, re-apply ointment 2-3 times a day to keep the site moist. Moisture will help with healing    Avoid strenuous activity for first 1-2 days    Avoid lakes, rivers, pools, and oceans until the stitches are removed or the site is healed    How do I clean my wound?    Wash hands thoroughly with soap or use hand  before all wound care    Clean the wound with gentle soap and water    Apply white petroleum/Vaseline  to wound after it is clean    Replace the Bandaid /bandage to keep the wound covered for the first few days or as instructed by your doctor    If you had a scalp biopsy, warm shower water to the area on a daily basis should suffice    What should I use to clean my wound?     Cotton-tipped applicators (Qtips )    White petroleum jelly (Vaseline ). Use a clean new container and use Q-tips to apply.    Bandaids   as needed    Gentle soap     How should I care for my wound long term?    Do not get your wound dirty    Keep up with wound care for one week or until the area is healed.    A small scab will form and fall off by itself when the area is completely  healed. The area will be red and will become pink in color as it heals. Sun protection is very important for how your scar will turn out. Sunscreen with an SPF 30 or greater is recommended once the area is healed.    You should have some soreness but it should be mild and slowly go away over several days. Talk to your doctor about using tylenol for pain,    When should I call my doctor?  If you have increased:     Pain or swelling    Pus or drainage (clear or slightly yellow drainage is ok)    Temperature over 100F    Spreading redness or warmth around wound    When will I hear about my results?  The biopsy results can take 2-3 weeks to come back. The clinic will call you with the results, send you a [employer] message, or have you schedule a follow-up clinic or phone time to discuss the results. Contact our clinics if you do not hear from us in 3 weeks.     Who should I call with questions?    [facility]: [phone]     [facility]: [phone]    For urgent needs outside of business hours call the [facility] at [phone] and ask for the dermatology resident on call    Cryotherapy    What is it?    Use of a very cold liquid, such as liquid nitrogen, to freeze and destroy abnormal skin cells that need to be removed    What should I expect?    Tenderness and redness    A small blister that might grow and fill with dark purple blood. There may be crusting.    More than one treatment may be needed if the lesions do not go away.    How do I care for the treated area?    Gently wash the area with your hands when bathing.    Use a thin layer of Vaseline to help with healing. You may use a Band-Aid.     The area should heal within 7-10 days and may leave behind a pink or lighter color.     Do not use an antibiotic or Neosporin ointment.     You may take acetaminophen (Tylenol) for pain.     Call your Doctor if you have:    Severe pain    Signs of  infection (warmth, redness, cloudy yellow drainage, and or a bad smell)    Questions or concerns    Who should I call with questions?       [facility]: [phone]       [facility]: [phone]       For urgent needs outside of business hours call the [facility] at [phone]        and ask for the dermatology resident on call  Wound Care After a Biopsy    What is a skin biopsy?  A skin biopsy allows the doctor to examine a very small piece of tissue under the microscope to determine the diagnosis and the best treatment for the skin condition. A local anesthetic (numbing medicine)  is injected with a very small needle into the skin area to be tested. A small piece of skin is taken from the area. Sometimes a suture (stitch) is used.     What are the risks of a skin biopsy?  I will experience scar, bleeding, swelling, pain, crusting and redness. I may experience incomplete removal or recurrence. Risks of this procedure are excessive bleeding, bruising, infection, nerve damage, numbness, thick (hypertrophic or keloidal) scar and non-diagnostic biopsy.    How should I care for my wound for the first 24 hours?    Keep the wound dry and covered for 24 hours    If it bleeds, hold direct pressure on the area for 15 minutes. If bleeding does not stop then go to the emergency room    Avoid strenuous exercise the first 1-2 days or as your doctor instructs you    How should I care for the wound after 24 hours?    After 24 hours, remove the bandage    You may bathe or shower as normal    If you had a scalp biopsy, you can shampoo as usual and can use shower water to clean the biopsy site daily    Clean the wound twice a day with gentle soap and water    Do not scrub, be gentle    Apply white petroleum/Vaseline after cleaning the wound with a cotton swab or a clean finger, and keep the site covered with a Bandaid /bandage. Bandages are not necessary with a  scalp biopsy    If you are unable to cover the site with a Bandaid /bandage, re-apply ointment 2-3 times a day to keep the site moist. Moisture will help with healing    Avoid strenuous activity for first 1-2 days    Avoid lakes, rivers, pools, and oceans until the stitches are removed or the site is healed    How do I clean my wound?    Wash hands thoroughly with soap or use hand  before all wound care    Clean the wound with gentle soap and water    Apply white petroleum/Vaseline  to wound after it is clean    Replace the Bandaid /bandage to keep the wound covered for the first few days or as instructed by your doctor    If you had a scalp biopsy, warm shower water to the area on a daily basis should suffice    What should I use to clean my wound?     Cotton-tipped applicators (Qtips )    White petroleum jelly (Vaseline ). Use a clean new container and use Q-tips to apply.    Bandaids   as needed    Gentle soap     How should I care for my wound long term?    Do not get your wound dirty    Keep up with wound care for one week or until the area is healed.    A small scab will form and fall off by itself when the area is completely healed. The area will be red and will become pink in color as it heals. Sun protection is very important for how your scar will turn out. Sunscreen with an SPF 30 or greater is recommended once the area is healed.      You should have some soreness but it should be mild and slowly go away over several days. Talk to your doctor about using tylenol for pain,    When should I call my doctor?  If you have increased:     Pain or swelling    Pus or drainage (clear or slightly yellow drainage is ok)    Temperature over 100F    Spreading redness or warmth around wound    When will I hear about my results?  The biopsy results can take 2-3 weeks to come back. The clinic will call you with the results, send you a [place] message, or have you schedule a follow-up clinic or phone time to  discuss the results. Contact our clinics if you do not hear from us in 3 weeks.     Who should I call with questions?    [facility]: [phone]     [facility]: [phone]    For urgent needs outside of business hours call the [facility] at [phone] and ask for the dermatology resident on call

## 2018-12-11 NOTE — PROGRESS NOTES
ANTICOAGULATION FOLLOW-UP CLINIC VISIT    Patient Name:  Rigoberto Holguin  Date:  2018  Contact Type:  Face to Face    SUBJECTIVE:     Patient Findings     Positives:   No Problem Findings    Comments:   Will recheck on , will be having second procedure on  am and will be on intentional hold           OBJECTIVE    INR Protime   Date Value Ref Range Status   2018 2.2 (A) 0.86 - 1.14 Final       ASSESSMENT / PLAN  INR assessment THER    Recheck INR In: 4 WEEKS    INR Location Clinic      Anticoagulation Summary  As of 2018    INR goal:   2.0-3.0   TTR:   76.1 % (2.9 y)   INR used for dosin.2 (2018)   Warfarin maintenance plan:   10 mg (5 mg x 2) every Tue, Thu; 7.5 mg (5 mg x 1.5) all other days   Full warfarin instructions:   10 mg every Tue, Thu; 7.5 mg all other days   Weekly warfarin total:   57.5 mg   No change documented:   Aparna Robison   Plan last modified:   Aparna Robison (2018)   Next INR check:   2019   Target end date:       Indications    Long-term (current) use of anticoagulants [Z79.01] [Z79.01]  History of DVT of lower extremity [Z86.718]             Anticoagulation Episode Summary     INR check location:       Preferred lab:       Send INR reminders to:   [facility]    Comments:         Anticoagulation Care Providers     Provider Role Specialty Phone number    Jamie Meraz PA-C Responsible Physician Assistant [phone]            See the Encounter Report to view Anticoagulation Flowsheet and Dosing Calendar (Go to Encounters tab in chart review, and find the Anticoagulation Therapy Visit)    Dosage adjustment made based on physician directed care plan.    APARNA ROBISON

## 2018-12-17 LAB — COPATH REPORT: NORMAL

## 2018-12-17 NOTE — TELEPHONE ENCOUNTER
Patient has Medicare Primary no secondary listed- upon checking [email or web address] Medicare compliance the codes listed no policy exist - patient would have 80/20 coverage on outpatient services

## 2018-12-18 ENCOUNTER — TELEPHONE (OUTPATIENT)
Dept: DERMATOLOGY | Facility: CLINIC | Age: 78
End: 2018-12-18

## 2018-12-18 NOTE — TELEPHONE ENCOUNTER
[facility] Call Center    Phone Message    May a detailed message be left on voicemail: yes    Reason for Call: Other: Patient would like a call to reschedule an appointment on 2/11/19. Please advise.      Action Taken: Message routed to:  Adult Clinics: Dermatology p [phone]

## 2018-12-18 NOTE — TELEPHONE ENCOUNTER
Called patient.  He is unable to come in the morning so he would like to reschedule for the afternoon.  I scheduled him for 1:30pm.    Mariah Otero CMA

## 2018-12-18 NOTE — TELEPHONE ENCOUNTER
Spoke with patient who states that he has supplement insurance that will  the rest of the bill so he would like to proceed with scheduling. Patient scheduled for first treatment on December 31, 2018. Reminded patient of sun protection before and after treatment to which patient states he has had the treatment in the past and remembers the care.    Yumiko Mendoza LPN

## 2018-12-18 NOTE — TELEPHONE ENCOUNTER
Notes recorded by Yumiko Mendoza LPN on 12/18/2018 at 8:33 AM CST  Called and informed patient of results. Patient verbalized understanding and was scheduled for PDT treatments starting December 31st in which mid frontal scalp will be treated. Patient will follow up with Dr. Vanessa post PDT treatments in June.     Yumiko Mendoza LPN    ------    Notes recorded by Dereck Vanessa MD on 12/17/2018 at 4:41 PM CST  Lesion is a precancer, please schedule within 4 months for possibly cryo   Dermatological path order and indications   Order: [id number]   Status:  Final result   Visible to patient:  Yes (MyChart) Dx:  Neoplasm of uncertain behavior of skin   Component 7d ago   Copath Report Patient Name: SAVANNAH GARZA   MR#: [id number]   Specimen #: [id number]   Collected: 12/11/2018   Received: 12/12/2018   Reported: 12/17/2018 10:21   Ordering Phy(s): DERECK VANESSA     For improved result formatting, select 'View Enhanced Report Format' under    Linked Documents section.     SPECIMEN(S):   Shave, right mid frontal scalp     FINAL DIAGNOSIS:   Shave, right mid frontal scalp:   - Actinic keratosis - (see description)               Yumiko Mendoza LPN

## 2018-12-31 ENCOUNTER — ALLIED HEALTH/NURSE VISIT (OUTPATIENT)
Dept: NURSING | Facility: CLINIC | Age: 78
End: 2018-12-31
Payer: MEDICARE

## 2018-12-31 DIAGNOSIS — L57.0 AK (ACTINIC KERATOSIS): Primary | ICD-10-CM

## 2018-12-31 DIAGNOSIS — L57.0 ACTINIC KERATOSIS: ICD-10-CM

## 2018-12-31 PROCEDURE — 96567 PDT DSTR PRMLG LES SKN: CPT

## 2018-12-31 NOTE — PATIENT INSTRUCTIONS
Photodynamic Therapy (PDT) Home Care Instructions  I will experience redness, swelling, pain and discomfort which will last 5-10 days. I may experience pinkness or redness that persists for 2-3 weeks but longer duration is possible in some individuals. Risks are infection, eye injury, blistering, reactivation of the cold sore virus, skin lightening, skin darkening or scarring. Multiple treatments may be required.   DAY OF TREATMENT  1) Wash treated area with mild cleanser.  Redness of the treated skin is expected and can vary significantly from person to person and treatment to treatment.  2) Apply SPF 50 before leaving your home/office and while driving to and from work.  3) Remain indoors if possible and avoid direct as well as indirect sunlight.  If your head or face were treated, wear a broad brimmed hat wile going to and from your car.  If your hands/arms were treated, were long sleeves and gloves.  4) Ice packs every 1-2 hours may be helpful as well.  5) In the evening, cleanse treated area gently.  Your skin will feel dry; keep treated area moisturized with a moisturizer.  DAY TWO  1) You may take a shower.  Men should NOT shave if their face was treated.  2) Cleanse treated area gently.  3) Apply SPF 50  4) Most discomfort usually subsides by Day 3.  5) You should avoid direct sunlight and try to remain indoors on Day 2.  The sensitivity to sunlight will significantly decrease after 48 hours.  6) You should soak the treated areas with as soft washcloth with cold water for 20 minutes every 4-6 hours.  Ice may be applied after the cold-water soaks, if this feels good.  The area should be patted dry and moisturized following the cold-water soaks.  7) Follow evening routine as you did on Day 1    DAYS 3- 7   1) Try to avoid direct sunlight and minimize incidental exposure for one week.  NO BEACHES!  Continue using SPF 50.  This is very important in protecting your newly rejuvenated skin.  2) You may begin  applying make-up once any crusting has healed.  The area may be slightly red for a few weeks.  3) The skin will feel dry and tightened.  Moisturize once to twice daily.  Please plan to be at your appointment for approximately 90 minutes.  Your treatments are scheduled as follows.  Who should I call with questions?    [facility]: [phone]     [facility]: [phone]    For urgent needs outside of business hours call the [facility] at [phone] and ask for the dermatology resident on call

## 2018-12-31 NOTE — NURSING NOTE
Photodynamic Therapy Intake:    1.Close monitoring for more significant reaction, and avoid saran wrap if YES to any of the following:  New medications since seen by physician? No (If yes, contact supervising physician)  NSAIDs, ibuprofen, aspirin, naproxen, piroxicam: No  Antiarrhythmics (amiodarone, quinidine): No  Hydrochlorothiazide:  No    2.Will hold PDT for YES to any of the following:  Pregnancy/breastfeeding/unknown pregnancy: No  Sun burn: No  Tetracyclines such as doxycycline: No  Ciprofloxacin: No   Methotrexate: No    3.Will hold LEVULAN for YES to any of the following:   Treatment today is for acne:No     The sites to be treated were verified and discussed with patient, sites verified were Scalp and temples .   Expected symptoms and/or complications of redness, peeling, stinging, and burning were reviewed.  Comfort measures including moisturizer, cool compresses, and sun protection measures were also reviewed with patient.  After review, patient verbalized understanding of procedure and consent form signed by patient(as per MD documentation) and patient agrees to proceed with treatment.     Applied 1 Levulan stick to the site/s.  Yes saran wrap applied to scalp and temples     MEDICATION: Levulan  DOSE: 1.5 mL  ROUTE: Topical  : [employer]  LOCATION GIVEN: scalp and temples   LOT NO: [id number]  EXP. DATE: 09/20  NDC: 58283-795-00    TREATMENT NUMBER(30 maximum treatments per site): 1      Photodynamic Therapy(PDT) Post Op Note    Patient successfully completed PDT treatment today. Patient tolerated treatment without complication.  Sites were cleansed with mild soap and water and SPF was applied after treatment.     Sun protection was reviewed with patient:  Patient brought personal hat    After care instructions were reviewed with patient. AVS printed with clinic contact information and given to patient. Patient verbalized understanding and had no further questions or concerns  at this time. Patient left clinic in good condition.

## 2019-01-10 ENCOUNTER — OFFICE VISIT (OUTPATIENT)
Dept: FAMILY MEDICINE | Facility: CLINIC | Age: 79
End: 2019-01-10
Payer: MEDICARE

## 2019-01-10 VITALS
BODY MASS INDEX: 31.52 KG/M2 | RESPIRATION RATE: 16 BRPM | WEIGHT: 208 LBS | HEART RATE: 72 BPM | DIASTOLIC BLOOD PRESSURE: 67 MMHG | OXYGEN SATURATION: 98 % | TEMPERATURE: 97.9 F | HEIGHT: 68 IN | SYSTOLIC BLOOD PRESSURE: 103 MMHG

## 2019-01-10 DIAGNOSIS — E06.3 HYPOTHYROIDISM DUE TO HASHIMOTO'S THYROIDITIS: ICD-10-CM

## 2019-01-10 DIAGNOSIS — H25.9 AGE-RELATED CATARACT OF BOTH EYES, UNSPECIFIED AGE-RELATED CATARACT TYPE: ICD-10-CM

## 2019-01-10 DIAGNOSIS — Z01.818 PREOP GENERAL PHYSICAL EXAM: Primary | ICD-10-CM

## 2019-01-10 DIAGNOSIS — Z79.899 ENCOUNTER FOR LONG-TERM CURRENT USE OF MEDICATION: ICD-10-CM

## 2019-01-10 LAB — TSH SERPL DL<=0.005 MIU/L-ACNC: 0.58 MU/L (ref 0.4–4)

## 2019-01-10 PROCEDURE — 36415 COLL VENOUS BLD VENIPUNCTURE: CPT | Performed by: PHYSICIAN ASSISTANT

## 2019-01-10 PROCEDURE — 84443 ASSAY THYROID STIM HORMONE: CPT | Performed by: PHYSICIAN ASSISTANT

## 2019-01-10 PROCEDURE — 99214 OFFICE O/P EST MOD 30 MIN: CPT | Performed by: PHYSICIAN ASSISTANT

## 2019-01-10 ASSESSMENT — MIFFLIN-ST. JEOR: SCORE: 1637.98

## 2019-01-10 NOTE — PROGRESS NOTES
[facility]  [address]  [place] [address]  [phone]  Dept: [phone]    PRE-OP EVALUATION:  Today's date: 1/10/2019    Rigoberto Holguin (: 1940) presents for pre-operative evaluation assessment as requested by Dr. Leggett.  He requires evaluation and anesthesia risk assessment prior to undergoing surgery/procedure for treatment of cataract .    Proposed Surgery/ Procedure: cataract removal  Date of Surgery/ Procedure: 19  Time of Surgery/ Procedure: cataract left eye  Hospital/Surgical Facility: [facility]  Fax number for surgical facility:   Primary Physician: Jamie Meraz  Type of Anesthesia Anticipated: to be determined    Patient has a Health Care Directive or Living Will:  YES     1. NO - Do you have a history of heart attack, stroke, stent, bypass or surgery on an artery in the head, neck, heart or legs?  2. NO - Do you ever have any pain or discomfort in your chest?  3. NO - Do you have a history of  Heart Failure?  4. NO - Are you troubled by shortness of breath when: walking on the level, up a slight hill or at night?  5. NO - Do you currently have a cold, bronchitis or other respiratory infection?  6. NO - Do you have a cough, shortness of breath or wheezing?  7. NO - Do you sometimes get pains in the calves of your legs when you walk?  8. YES - Do you or anyone in your family have previous history of blood clots?  9. NO - Do you or does anyone in your family have a serious bleeding problem such as prolonged bleeding following surgeries or cuts?  10. NO - Have you ever had problems with anemia or been told to take iron pills?  11. NO - Have you had any abnormal blood loss such as black, tarry or bloody stools, or abnormal vaginal bleeding?  12. NO - Have you ever had a blood transfusion?  13. NO - Have you or any of your relatives ever had problems with anesthesia?  14. NO - Do you have sleep apnea, excessive snoring or daytime  drowsiness?  15. NO - Do you have any prosthetic heart valves?  16. NO - Do you have prosthetic joints?  17. NO - Is there any chance that you may be pregnant?      HPI:     HPI related to upcoming procedure: bilateral and visually threatening cataracts      See problem list for active medical problems.  Problems all longstanding and stable, except as noted/documented.  See ROS for pertinent symptoms related to these conditions.                                                                                                                                                          .    MEDICAL HISTORY:     Patient Active Problem List    Diagnosis Date Noted     Lumbar facet arthropathy 11/01/2018     Priority: Medium     Lumbosacral spondylosis without myelopathy 11/01/2018     Priority: Medium     Acute pain of right shoulder 04/27/2018     Priority: Medium     Blepharoptosis 01/09/2018     Priority: Medium     Status post lumbar surgery 05/02/2017     Priority: Medium     Aftercare following surgery of the musculoskeletal system 05/02/2017     Priority: Medium     Hypothyroidism due to Hashimoto's thyroiditis 02/09/2017     Priority: Medium     Lumbar radicular pain 10/23/2016     Priority: Medium     Long-term (current) use of anticoagulants [Z79.01] 01/25/2016     Priority: Medium     Vitamin D deficiency 01/04/2016     Priority: Medium     Personal history of malignant neoplasm of bladder 06/24/2015     Priority: Medium     Peripheral vascular disease (H) 12/05/2014     Priority: Medium     Problem list name updated by automated process. Provider to review       Factor V Leiden mutation (H) 06/03/2013     Priority: Medium     History of DVT of lower extremity 06/03/2013     Priority: Medium     Postphlebitic syndrome 06/03/2013     Priority: Medium     Chronic anticoagulation 06/03/2013     Priority: Medium     ACP (advance care planning) 01/23/2013     Priority: Medium     Discussed advance care planning with  patient; information given to patient to review. 1/23/2013          CARDIOVASCULAR SCREENING; LDL GOAL LESS THAN 160 10/31/2010     Priority: Medium     Stenosis, spinal, lumbar 09/01/2010     Priority: Medium     Nonallopathic lesion of lumbar region 07/02/2009     Priority: Medium     Problem list name updated by automated process. Provider to review       Other and unspecified coagulation defects 12/27/2007     Priority: Medium     Encounter for long-term current use of medication 08/16/2006     Priority: Medium     On coumadin since Bilateral LE DVT in 1996-while a . No pulmonary Emboli or Cardiac reason.  Problem list name updated by automated process. Provider to review       Malignant neoplasm of overlapping sites of bladder (H) 08/16/2006     Priority: Medium     DX 2003. Surgery.        Past Medical History:   Diagnosis Date     Actinic keratosis      Basal cell carcinoma      Bladder cancer (H) 2003     DJD (degenerative joint disease), lumbar     S/P epidural x 2     DVT (deep venous thrombosis) (H) 2007    Factor V ( bilateral legs)      GERD (gastroesophageal reflux disease)      Hypothyroidism      Polio     Age 4, weakness of right arm and leg      S/P appendectomy      Past Surgical History:   Procedure Laterality Date     Appendectomy       FORAMINOTOMY LUMBAR POSTERIOR TWO LEVELS Left 3/2/2017    Procedure: FORAMINOTOMY LUMBAR POSTERIOR TWO LEVELS;  Surgeon: Ricky Mclaughlin MD;  Location: [facility] OR     HC REPAIR ROTATOR CUFF,ACUTE Left 2007     INJECT PARAVERTEBRAL FACET JOINT LUMBAR / SACRAL THIRD Bilateral 11/21/2018    Procedure: Bilateral Lumbar Medial Branch Nerve Block Injection, Lumbar 3/4, Lumbar 4/5, Lumbar 5/Sacral 1;  Surgeon: Augusto Pugh MD;  Location:  OR     PHOTODYNAMIC THERAPY - (PDT)  2/13/2015     SURGICAL HISTORY OF -   2003    Urinary Bladder Cancer treatment     SURGICAL HISTORY OF -       Gall Bladder     Current Outpatient Medications   Medication Sig Dispense  "Refill     calcium carbonate (TUMS) 500 MG chewable tablet Take 1 chew tab by mouth daily as needed Reported on 2017       cholecalciferol (VITAMIN D) 1000 UNIT tablet Take 1 tablet (1,000 Units) by mouth daily 100 tablet 0     enoxaparin (LOVENOX) 100 MG/ML injection Inject 1 mL (100 mg) Subcutaneous 2 times daily 60 Syringe 0     levothyroxine (SYNTHROID/LEVOTHROID) 112 MCG tablet TAKE 1 TABLET EVERY MORNING BEFORE BREAKFAST 90 tablet 3     LUMIGAN 0.01 % SOLN ophthalmic solution        melatonin 5 MG tablet Take 10 mg by mouth At Bedtime Reported on 2017       metroNIDAZOLE (METROCREAM) 0.75 % cream every day, once daily to face 60 g 3     Multiple Vitamin (DAILY MULTIVITAMIN PO) Take 1 chew tab by mouth daily        Multiple Vitamins-Minerals (EYE VITAMINS PO) Take 1 tablet by mouth daily       Nutritional Supplements (JUICE PLUS FIBRE PO)        sildenafil (REVATIO) 20 MG tablet Take 2-5 tabs daily prn 30 tablet 11     timolol (TIMOPTIC) 0.5 % ophthalmic solution        warfarin (COUMADIN) 5 MG tablet TAKE 10 mg on Tues and Thurs and  7.5 mg daily rest of week. Or as directed by INR clinic 144 tablet 0     OTC products: None, except as noted above    No Known Allergies   Latex Allergy: NO    Social History     Tobacco Use     Smoking status: Former Smoker     Types: Cigarettes     Last attempt to quit: 10/1/1970     Years since quittin.3     Smokeless tobacco: Never Used   Substance Use Topics     Alcohol use: Yes     Alcohol/week: 0.0 oz     Comment: Rarely drink 1-2 beers per week     History   Drug Use No       REVIEW OF SYSTEMS:   Constitutional, neuro, ENT, endocrine, pulmonary, cardiac, gastrointestinal, genitourinary, musculoskeletal, integument and psychiatric systems are negative, except as otherwise noted.    EXAM:   /67   Pulse 72   Temp 97.9  F (36.6  C) (Oral)   Resp 16   Ht 1.727 m (5' 8\")   Wt 94.3 kg (208 lb)   SpO2 98%   BMI 31.63 kg/m      GENERAL APPEARANCE: " healthy, alert and no distress     EYES: bilateral cataracts     HENT: ear canals and TM's normal and nose and mouth without ulcers or lesions     NECK: no adenopathy, no asymmetry, masses, or scars and thyroid normal to palpation     RESP: lungs clear to auscultation - no rales, rhonchi or wheezes     CV: regular rates and rhythm, normal S1 S2, no S3 or S4 and no murmur, click or rub     ABDOMEN:  soft, nontender, no HSM or masses and bowel sounds normal     MS: extremities normal- no gross deformities noted, no evidence of inflammation in joints, FROM in all extremities.     SKIN: no suspicious lesions or rashes     NEURO: Normal strength and tone, sensory exam grossly normal, mentation intact and speech normal     PSYCH: mentation appears normal. and affect normal/bright     LYMPHATICS: No cervical adenopathy    DIAGNOSTICS:   No labs or EKG required for low risk surgery (cataract, skin procedure, breast biopsy, etc)    Recent Labs   Lab Test 12/11/18 11/26/18 05/01/17  1301  02/21/17  1211  08/02/16  0740   HGB  --   --   --  14.2  --  14.3  --   --    PLT  --   --   --  169  --  145*  --   --    INR 2.2* 1.7*   < >  --    < > 3.09*   < >  --    NA  --   --   --   --   --  140  --  139   POTASSIUM  --   --   --   --   --  4.4  --  4.4   CR  --   --   --   --   --  1.02  --  0.81    < > = values in this interval not displayed.        IMPRESSION:       The proposed surgical procedure is considered LOW risk.    REVISED CARDIAC RISK INDEX  The patient has the following serious cardiovascular risks for perioperative complications such as (MI, PE, VFib and 3  AV Block):  No serious cardiac risks  INTERPRETATION: 1 risks: Class II (low risk - 0.9% complication rate)    The patient has the following additional risks for perioperative complications:  The 10-year ASCVD risk score (Lethaarelis MEZA Jr., et al., 2013) is: 20.3%    Values used to calculate the score:      Age: 78 years      Sex: Male      Is Non-   American: No      Diabetic: No      Tobacco smoker: No      Systolic Blood Pressure: 103 mmHg      Is BP treated: No      HDL Cholesterol: 51 mg/dL      Total Cholesterol: 169 mg/dL      ICD-10-CM    1. Preop general physical exam Z01.818    2. Age-related cataract of both eyes, unspecified age-related cataract type H25.9    3. Encounter for long-term current use of medication Z79.899 INR CLINIC REFERRAL   4. Hypothyroidism due to Hashimoto's thyroiditis E03.8 TSH    E06.3        RECOMMENDATIONS:         --Patient is to take all scheduled medications on the day of surgery     APPROVAL GIVEN to proceed with proposed procedure, without further diagnostic evaluation       Signed Electronically by: Jamie Meraz PA-C    Copy of this evaluation report is provided to requesting physician.    [facility] Preop Guidelines    Revised Cardiac Risk Index

## 2019-01-11 ENCOUNTER — ANTICOAGULATION THERAPY VISIT (OUTPATIENT)
Dept: NURSING | Facility: CLINIC | Age: 79
End: 2019-01-11
Payer: MEDICARE

## 2019-01-11 DIAGNOSIS — Z86.718 HISTORY OF DVT OF LOWER EXTREMITY: ICD-10-CM

## 2019-01-11 LAB — INR POINT OF CARE: 2.2 (ref 0.86–1.14)

## 2019-01-11 PROCEDURE — 85610 PROTHROMBIN TIME: CPT | Mod: QW

## 2019-01-11 PROCEDURE — 99207 ZZC NO CHARGE NURSE ONLY: CPT

## 2019-01-11 PROCEDURE — 36416 COLLJ CAPILLARY BLOOD SPEC: CPT

## 2019-01-11 NOTE — PROGRESS NOTES
ANTICOAGULATION FOLLOW-UP CLINIC VISIT    Patient Name:  Rigoberto Holguin  Date:  2019  Contact Type:  Face to Face    SUBJECTIVE:     Patient Findings     Comments:   Patient scheduled for surgery on 19.  Patient has been holding coumadin since 19 and will be starting Lovenox today.           OBJECTIVE    INR Protime   Date Value Ref Range Status   2019 2.2 (A) 0.86 - 1.14 Final       ASSESSMENT / PLAN  INR assessment THER    Recheck INR In: 1 WEEK post procedure   INR Location Clinic      Anticoagulation Summary  As of 2019    INR goal:   2.0-3.0   TTR:   76.8 % (2.9 y)   INR used for dosin.2 (2019)   Warfarin maintenance plan:   10 mg (5 mg x 2) every Tue, Thu; 7.5 mg (5 mg x 1.5) all other days   Full warfarin instructions:   : Hold; : Hold; : Hold; Otherwise 10 mg every Tue, Thu; 7.5 mg all other days   Weekly warfarin total:   57.5 mg   Plan last modified:   Aparna Vicente (2018)   Next INR check:   2019   Target end date:       Indications    Long-term (current) use of anticoagulants [Z79.01] [Z79.01]  History of DVT of lower extremity [Z86.718]             Anticoagulation Episode Summary     INR check location:       Preferred lab:       Send INR reminders to:   [facility]    Comments:         Anticoagulation Care Providers     Provider Role Specialty Phone number    Jamie Meraz PA-C Responsible Physician Assistant [phone]            See the Encounter Report to view Anticoagulation Flowsheet and Dosing Calendar (Go to Encounters tab in chart review, and find the Anticoagulation Therapy Visit)    Dosage adjustment made based on physician directed care plan.    Larisa Hitchcock RN

## 2019-01-13 ENCOUNTER — ANESTHESIA EVENT (OUTPATIENT)
Dept: SURGERY | Facility: AMBULATORY SURGERY CENTER | Age: 79
End: 2019-01-13

## 2019-01-14 ENCOUNTER — HOSPITAL ENCOUNTER (OUTPATIENT)
Facility: AMBULATORY SURGERY CENTER | Age: 79
End: 2019-01-14
Attending: ANESTHESIOLOGY
Payer: MEDICARE

## 2019-01-14 ENCOUNTER — ANCILLARY PROCEDURE (OUTPATIENT)
Dept: RADIOLOGY | Facility: AMBULATORY SURGERY CENTER | Age: 79
End: 2019-01-14
Payer: MEDICARE

## 2019-01-14 ENCOUNTER — ANESTHESIA (OUTPATIENT)
Dept: SURGERY | Facility: AMBULATORY SURGERY CENTER | Age: 79
End: 2019-01-14

## 2019-01-14 VITALS
WEIGHT: 200 LBS | TEMPERATURE: 98.9 F | HEIGHT: 68 IN | RESPIRATION RATE: 18 BRPM | SYSTOLIC BLOOD PRESSURE: 144 MMHG | BODY MASS INDEX: 30.31 KG/M2 | DIASTOLIC BLOOD PRESSURE: 75 MMHG | OXYGEN SATURATION: 97 %

## 2019-01-14 DIAGNOSIS — R52 PAIN: ICD-10-CM

## 2019-01-14 RX ORDER — LIDOCAINE HYDROCHLORIDE 10 MG/ML
INJECTION, SOLUTION EPIDURAL; INFILTRATION; INTRACAUDAL; PERINEURAL PRN
Status: DISCONTINUED | OUTPATIENT
Start: 2019-01-14 | End: 2019-01-14 | Stop reason: HOSPADM

## 2019-01-14 RX ORDER — TRIAMCINOLONE ACETONIDE 40 MG/ML
INJECTION, SUSPENSION INTRA-ARTICULAR; INTRAMUSCULAR PRN
Status: DISCONTINUED | OUTPATIENT
Start: 2019-01-14 | End: 2019-01-14 | Stop reason: HOSPADM

## 2019-01-14 RX ORDER — BUPIVACAINE HYDROCHLORIDE 5 MG/ML
INJECTION, SOLUTION PERINEURAL PRN
Status: DISCONTINUED | OUTPATIENT
Start: 2019-01-14 | End: 2019-01-14 | Stop reason: HOSPADM

## 2019-01-14 RX ORDER — NALOXONE HYDROCHLORIDE 0.4 MG/ML
.1-.4 INJECTION, SOLUTION INTRAMUSCULAR; INTRAVENOUS; SUBCUTANEOUS
Status: DISCONTINUED | OUTPATIENT
Start: 2019-01-14 | End: 2019-01-15 | Stop reason: HOSPADM

## 2019-01-14 RX ORDER — LIDOCAINE 40 MG/G
CREAM TOPICAL
Status: DISCONTINUED | OUTPATIENT
Start: 2019-01-14 | End: 2019-01-15 | Stop reason: HOSPADM

## 2019-01-14 RX ORDER — FENTANYL CITRATE 50 UG/ML
25-50 INJECTION, SOLUTION INTRAMUSCULAR; INTRAVENOUS EVERY 5 MIN PRN
Status: DISCONTINUED | OUTPATIENT
Start: 2019-01-14 | End: 2019-01-15 | Stop reason: HOSPADM

## 2019-01-14 RX ORDER — SODIUM CHLORIDE, SODIUM LACTATE, POTASSIUM CHLORIDE, CALCIUM CHLORIDE 600; 310; 30; 20 MG/100ML; MG/100ML; MG/100ML; MG/100ML
500 INJECTION, SOLUTION INTRAVENOUS CONTINUOUS
Status: DISCONTINUED | OUTPATIENT
Start: 2019-01-14 | End: 2019-01-15 | Stop reason: HOSPADM

## 2019-01-14 ASSESSMENT — MIFFLIN-ST. JEOR: SCORE: 1601.69

## 2019-01-14 NOTE — DISCHARGE INSTRUCTIONS
Home Care Instructions after a Radio Frequency Ablation    A radiofrequency ablation refers to a procedure that destroys the functionality of the nerve using radiofrequency energy. There are two primary types of radiofrequency ablation: A medial branch neurotomy (ablation) affects the nerves carrying pain from the facet joints, while a lateral branch neurotomy (ablation) affects nerves that carry pain from the sacroiliac joints. The physician uses x-ray guidance (fluoroscopy) to direct a special (radiofrequency) needle alongside the medial or lateral branch nerves. A small amount of electrical current is often carefully passed through the needle to assure it is next to the target nerve and a safe distance from other nerves. This current should briefly recreate the usual pain and cause a muscle twitch in the neck or back. The targeted nerves will then be numbed to minimize pain while the lesion is being created. The radiofrequency waves are introduced to heat the tip of the needle and a heat lesion is created on the nerve to disrupt the nerve's ability to send pain signals. Please follow the aftercare instructions regarding your procedure.    Activity  -Rest today  -Do not work today  -Resume normal activity in 2-3 days  -No heavy lifting, turning or twisting for 24 hours  -DO NOT shower or soak in tub for 24 hours  -DO NOT remove bandaid for 24 hours    Pain  -You may experience soreness at the injection site for one or two days  -You may use an ice pack for 20 minutes every 2 hours for the first 24 hours, longer if needed for comfort, do not use heat  -You may use Tylenol (acetaminophen) every 4 hours or other pain medicines as     directed by your physician  -If other pain medications are prescribed by your physician, please follow dosing instructions carefully.    What to expect  You may experience numbness radiating into your legs or arms (depending on the procedure location). This numbness may last several  hours. Until sensation returns to normal, please use caution in walking, climbing stairs, and stepping out of your vehicle, etc. Relief of your initial symptoms can take up to 4 weeks to feel better, this is normal and due to the healing process. The procedure site may feel like a deep burn. Using ice will greatly minimize this discomfort. Do not use numbing creams or patches over your injection sites immediately following your procedure. Please keep injection sites covered, clean and dry for 24 hours.    DID YOU RECEIVE SEDATION TODAY?  Yes    Safety  Sedation medicine, if given, may remain active for many hours. It is important for the next 24 hours that you do not:  -Drive a car  -Operate machines or power tools  -Consume alcohol, including beer  -Sign any important papers or legal documents  -Please have a responsible adult with you for 24 hours following any sedation    DID YOU RECEIVE STEROIDS TODAY?  Yes    Common side effects of steroids:  Not everyone will experience corticosteroid side effects. If side effects are experienced, they will gradually subside in the 7-10 day period following an injection. Most common side effects include:  -Flushed face and/or chest  -Feeling of warmth, particularly in the face but could be an overall feeling of warmth  -Increased blood sugar in diabetic patients  -Menstrual irregularities my occur. If taking hormone-based birth control an alternate method of birth control is recommended  -Sleep disturbances and/or mood swings are possible  -Leg cramps      Please contact us if you have:  -Severe pain  -Fever more than 101.5 degrees Fahrenheit  -Signs of infection at the injection site (redness, swelling, or drainage)    If you have questions, please contact our office at [phone] between the hours of 7:00 am and 3:00 pm Monday through Friday. After office hours you can contact the on call provider by dialing [phone]. If you need immediate attention, we recommend that  you go to a hospital emergency room or dial 911.

## 2019-01-14 NOTE — OP NOTE
Patient: Rigoberto Holguin Age: 78 year old   MRN: [id number] Attending: Dr. Pugh     Date of Visit: January 14, 2019      PAIN MEDICINE CLINIC PROCEDURE NOTE    ATTENDING CLINICIAN:    Augusto Pugh MD    ASSISTANT CLINICIAN:  Emiliano Elizabeth MD    PREPROCEDURE DIAGNOSES:  1. Lumbar facet arthropathy   2. Chronic low back pain     POSTPROCEDURE DIAGNOSES:  1. Lumbar facet arthropathy   2. Chronic low back pain     PROCEDURE(S) PERFORMED:  1. Bilateral L3 and L4 medial branch nerve and L5 dorsal ramus nerve radiofrequency ablation.  2.  Fluoroscopic guidance for the above procedures    ANESTHESIA:  Local.    BLOOD LOSS:  Minimal.    DRAINS AND SPECIMENS:  None.    COMPLICATIONS:  None.    INDICATIONS:    Rigoberto Holguin is a 78 year old male with a history of low back pain secondary to lumar facet joint arthopathy . The patient previously had good response to diagnostic block of medial branch nerves. The patient stated that the patient was in their usual state of health and denied recent anticoagulant use or recent infections.  Therefore, the plan is to perform above mentioned procedure.     Procedure Details:    The patient was met in the procedure room, where the patient was identified by name, medical record number and date of birth.  All of the patient s last minute questions were answered. Written informed consent was obtained and saved in the electronic medical record, after the risks, benefits, and alternatives were discussed with the patient.      A formal time-out procedure was performed by Dr. Pugh, as per protocol, including patient name, title of procedure, and site of procedure, and all in the room concurred.  Routine monitors were applied.      The patient was placed in the prone position on the procedure room table.  All pressure points were checked and comfortably padded.  Routine monitors were placed.  Vital signs were stable.    A chlorhexidine prep was completed followed by sterile draping per standard  procedure.     We identified each lumbar vertebral body after first identifying the S1 vertebrae.  Fluoroscope was then obliqued towards the patient's right in order to identify the pedicles of the right L4, L5 and sacral ala.  The targets were recognized at the junction of the transverse process and the superior articular process (and sacral ala for L5 medial branch). Skin and subcutaneous tissues overlying this area were anesthetized with a 1% lidocaine.  Starting at L4,  under fluoroscopic guidance, we directed the radiofrequency 150 millimeter, 22-gauge RFA needle through the skin and subcutaneous tissues  until osseous contact was achieved.  At this point, the needle was walked off the junction of the transverse process and the superior articular process.The procedure  was then repeated for the L5 and sacral ala on the right. Lateral fluoroscopic image was obtained to check the correct needle depth.      At that point, the stylet was removed from the RF needle and an RFA probe was then inserted. Sensory testing was appropriately responsive.  Motor testing was initiated with appropriate multifidus responses.  There was no motor response in her lower extremities. Ablation was then carried out at 80 degrees Celsius for 90 seconds. Then RFA needle is rotated to 180 degree a ablation was then commenced at the above settings for a second lesion. Once the RF lesion was completed, 1 cubic centimeter of a solution consisting of 1 cubic centimeter of 40 mg per cubic centimeters triamcinolone and 7 cubic centimeters of 0.25% bupivacaine was injected through each RFA needle for a total of 1 cubic centimeters at each level.     The above procedures are then repeated on the left side.      Light pressure was held at the puncture site(s) to prevent ecchymosis and oozing.  The patient's skin was cleansed, and hemostasis was confirmed.  Band-aids were applied to the needle injection site(s).      Condition:    The patient  tolerated the procedure well and was monitored for approximately 15 minutes afterward in the post procedure area.  There were no immediate post procedure complications noted.  The patient was then discharged to home as per protocol.     Patient condition  Stable.

## 2019-01-17 ENCOUNTER — ANTICOAGULATION THERAPY VISIT (OUTPATIENT)
Dept: NURSING | Facility: CLINIC | Age: 79
End: 2019-01-17
Payer: MEDICARE

## 2019-01-17 ENCOUNTER — TELEPHONE (OUTPATIENT)
Dept: FAMILY MEDICINE | Facility: CLINIC | Age: 79
End: 2019-01-17

## 2019-01-17 DIAGNOSIS — Z86.718 HISTORY OF DVT OF LOWER EXTREMITY: ICD-10-CM

## 2019-01-17 LAB — INR POINT OF CARE: 1.5 (ref 0.86–1.14)

## 2019-01-17 PROCEDURE — 36416 COLLJ CAPILLARY BLOOD SPEC: CPT

## 2019-01-17 PROCEDURE — 99207 ZZC NO CHARGE NURSE ONLY: CPT

## 2019-01-17 PROCEDURE — 85610 PROTHROMBIN TIME: CPT | Mod: QW

## 2019-01-17 NOTE — PROGRESS NOTES
ANTICOAGULATION FOLLOW-UP CLINIC VISIT    Patient Name:  Rigoberto Holguin  Date:  2019  Contact Type:  Face to Face    SUBJECTIVE:     Patient Findings     Positives:   No Problem Findings    Comments:   Post ablation 19, took last lovenox injection today. Patient refuses to take any more Lovenox.  He did increase his dose. M-12.5 mg, 10mg Tues, wed =10 mg.  Will give another bump today since patient refuses to continue lovenox           OBJECTIVE    INR Protime   Date Value Ref Range Status   2019 1.5 (A) 0.86 - 1.14 Final       ASSESSMENT / PLAN  INR assessment SUB post procedure, had 3 doses   Recheck INR In: 4 DAYS    INR Location Clinic      Anticoagulation Summary  As of 2019    INR goal:   2.0-3.0   TTR:   76.5 % (3 y)   INR used for dosin.5! 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    Tiffanie Reed RN

## 2019-01-21 ENCOUNTER — ANTICOAGULATION THERAPY VISIT (OUTPATIENT)
Dept: NURSING | Facility: CLINIC | Age: 79
End: 2019-01-21
Payer: MEDICARE

## 2019-01-21 DIAGNOSIS — Z86.718 HISTORY OF DVT OF LOWER EXTREMITY: ICD-10-CM

## 2019-01-21 LAB — INR POINT OF CARE: 2 (ref 0.9–1.14)

## 2019-01-21 PROCEDURE — 85610 PROTHROMBIN TIME: CPT | Mod: QW

## 2019-01-21 PROCEDURE — 36416 COLLJ CAPILLARY BLOOD SPEC: CPT

## 2019-01-21 PROCEDURE — 99207 ZZC NO CHARGE NURSE ONLY: CPT

## 2019-01-21 NOTE — PROGRESS NOTES
ANTICOAGULATION FOLLOW-UP CLINIC VISIT    Patient Name:  Rigoberto Holguin  Date:  2019  Contact Type:  Face to Face    SUBJECTIVE:     Patient Findings     Positives:   No Problem Findings           OBJECTIVE    INR Protime   Date Value Ref Range Status   2019 2.0 (A) 1.14 Final       ASSESSMENT / PLAN  INR assessment THER    Recheck INR In: 4 WEEKS    INR Location Clinic      Anticoagulation Summary  As of 2019    INR goal:   2.0-3.0   TTR:   76.3 % (3 y)   INR used for dosin.0 (2019)   Warfarin maintenance plan:   10 mg (5 mg x 2) every Tue, Thu; 7.5 mg (5 mg x 1.5) all other days   Full warfarin instructions:   10 mg every Tue, Thu; 7.5 mg all other days   Weekly warfarin total:   57.5 mg   No change documented:   Tiffanie Reed RN   Plan last modified:   Aparna Vicente (2018)   Next INR check:   2019   Target end date:       Indications    Long-term (current) use of anticoagulants [Z79.01] [Z79.01]  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

## 2019-02-11 ENCOUNTER — ALLIED HEALTH/NURSE VISIT (OUTPATIENT)
Dept: NURSING | Facility: CLINIC | Age: 79
End: 2019-02-11
Payer: MEDICARE

## 2019-02-11 DIAGNOSIS — L57.0 AK (ACTINIC KERATOSIS): Primary | ICD-10-CM

## 2019-02-11 PROCEDURE — 96567 PDT DSTR PRMLG LES SKN: CPT

## 2019-02-11 NOTE — PATIENT INSTRUCTIONS
Photodynamic Therapy (PDT) Home Care Instructions  I will experience redness, swelling, pain and discomfort which will last 5-10 days. I may experience pinkness or redness that persists for 2-3 weeks but longer duration is possible in some individuals. Risks are infection, eye injury, blistering, reactivation of the cold sore virus, skin lightening, skin darkening or scarring. Multiple treatments may be required.   DAY OF TREATMENT  1) Wash treated area with mild cleanser.  Redness of the treated skin is expected and can vary significantly from person to person and treatment to treatment.  2) Apply SPF 50 before leaving your home/office and while driving to and from work.  3) Remain indoors if possible and avoid direct as well as indirect sunlight.  If your head or face were treated, wear a broad brimmed hat wile going to and from your car.  If your hands/arms were treated, were long sleeves and gloves.  4) Ice packs every 1-2 hours may be helpful as well.  5) In the evening, cleanse treated area gently.  Your skin will feel dry; keep treated area moisturized with a moisturizer.  DAY TWO  1) IYou may take a shower.  Men should NOT shave if their face was treated.  2) Cleanse treated area gently.  3) Apply SPF 50  4) Most discomfort usually subsides by Day 3.  5) You should avoid direct sunlight and try to remain indoors on Day 2.  The sensitivity to sunlight will significantly decrease after 48 hours.  6) You should soak the treated areas with as soft washcloth with cold water for 20 minutes every 4-6 hours.  Ice may be applied after the cold-water soaks, if this feels good.  The area should be patted dry and moisturized following the cold-water soaks.  7) Follow evening routine as you did on Day 1    DAYS 3- 7   1) Try to avoid direct sunlight and minimize incidental exposure for one week.  NO BEACHES!  Continue using SPF 50.  This is very important in protecting your newly rejuvenated skin.  2) You may begin  applying make-up once any crusting has healed.  The area may be slightly red for a few weeks.  3) The skin will feel dry and tightened.  Moisturize once to twice daily.  Please plan to be at your appointment for approximately 90 minutes.  Your treatments are scheduled as follows.      Who should I call with questions?    [facility]: [phone]     [facility]: [phone]    For urgent needs outside of business hours call the [facility] at [phone] and ask for the dermatology resident on call

## 2019-02-11 NOTE — NURSING NOTE
Photodynamic Therapy Intake:    If patient has a hx of HSV or VZV(shingles in treatment area) they have been given prophylaxis:  No      1.Close monitoring for more significant reaction, and avoid saran wrap if YES to any of the following:  New medications since seen by physician? No (If yes, contact supervising physician)  NSAIDs, ibuprofen, aspirin, naproxen, piroxicam: No  Antiarrhythmics (amiodarone, quinidine): No  Hydrochlorothiazide:  No    2.Will hold PDT for YES to any of the following:  Pregnancy/breastfeeding/unknown pregnancy: No  Sun burn: No  Tetracyclines such as doxycycline: No  Ciprofloxacin: No   Methotrexate: No    3.Will hold LEVULAN for YES to any of the following:   Treatment today is for acne:No     The sites to be treated were verified and discussed with patient, sites verified were temples and scalp .   Expected symptoms and/or complications of redness, peeling, stinging, and burning were reviewed.  Comfort measures including moisturizer, cool compresses, and sun protection measures were also reviewed with patient.  After review, patient verbalized understanding of procedure and consent form signed by patient(as per MD documentation) and patient agrees to proceed with treatment.     Applied 1 Levulan stick to the site/s.  Yes saran wrap applied to scalp and temples     MEDICATION: Levulan  DOSE: 1.5 mL  ROUTE: Topical  : [employer]  LOCATION GIVEN: scalp and temples   LOT NO: [id number]  EXP. DATE: 10/20  NDC: 92346-472-84    TREATMENT NUMBER(30 maximum treatments per site):       Photodynamic Therapy(PDT) Post Op Note    Patient successfully completed PDT treatment today. Patient tolerated treatment without complication.  Sites were cleansed with mild soap and water and SPF was applied after treatment.     Sun protection was reviewed with patient:  Patient brought personal hat    After care instructions were reviewed with patient. AVS printed with clinic contact  information and given to patient. Patient verbalized understanding and had no further questions or concerns at this time. Patient left clinic in good condition.

## 2019-02-19 ENCOUNTER — ANTICOAGULATION THERAPY VISIT (OUTPATIENT)
Dept: NURSING | Facility: CLINIC | Age: 79
End: 2019-02-19
Payer: MEDICARE

## 2019-02-19 DIAGNOSIS — Z86.718 HISTORY OF DVT OF LOWER EXTREMITY: ICD-10-CM

## 2019-02-19 LAB — INR POINT OF CARE: 2.6 (ref 0.86–1.14)

## 2019-02-19 PROCEDURE — 99207 ZZC NO CHARGE NURSE ONLY: CPT

## 2019-02-19 PROCEDURE — 36416 COLLJ CAPILLARY BLOOD SPEC: CPT

## 2019-02-19 PROCEDURE — 85610 PROTHROMBIN TIME: CPT | Mod: QW

## 2019-02-19 NOTE — PROGRESS NOTES
ANTICOAGULATION FOLLOW-UP CLINIC VISIT    Patient Name:  Rigoberto Holguin  Date:  2019  Contact Type:  Face to Face    SUBJECTIVE:     Patient Findings     Positives:   No Problem Findings           OBJECTIVE    INR Protime   Date Value Ref Range Status   2019 2.6 (A) 0.86 - 1.14 Final       ASSESSMENT / PLAN  INR assessment THER    Recheck INR In: 6 WEEKS    INR Location Clinic      Anticoagulation Summary  As of 2019    INR goal:   2.0-3.0   TTR:   76.9 % (3 y)   INR used for dosin.6 (2019)   Warfarin maintenance plan:   10 mg (5 mg x 2) every Tue, Thu; 7.5 mg (5 mg x 1.5) all other days   Full warfarin instructions:   10 mg every Tue, Thu; 7.5 mg all other days   Weekly warfarin total:   57.5 mg   No change documented:   Aparna Robison   Plan last modified:   Aparna Robison (2018)   Next INR check:   2019   Target end date:       Indications    Long-term (current) use of anticoagulants [Z79.01] [Z79.01]  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    APARNA ROBISON

## 2019-03-25 ENCOUNTER — ALLIED HEALTH/NURSE VISIT (OUTPATIENT)
Dept: NURSING | Facility: CLINIC | Age: 79
End: 2019-03-25
Payer: MEDICARE

## 2019-03-25 DIAGNOSIS — L71.9 ROSACEA: ICD-10-CM

## 2019-03-25 DIAGNOSIS — L57.0 ACTINIC KERATOSIS: ICD-10-CM

## 2019-03-25 PROCEDURE — 96567 PDT DSTR PRMLG LES SKN: CPT

## 2019-03-25 ASSESSMENT — PAIN SCALES - GENERAL: PAINLEVEL: NO PAIN (0)

## 2019-03-25 NOTE — NURSING NOTE
Photodynamic Therapy Intake:    If patient has a hx of HSV or VZV(shingles in treatment area) they have been given prophylaxis:  No      1.Close monitoring for more significant reaction, and avoid saran wrap if YES to any of the following:  New medications since seen by physician? No (If yes, contact supervising physician)  NSAIDs, ibuprofen, aspirin, naproxen, piroxicam: No  Antiarrhythmics (amiodarone, quinidine): No  Hydrochlorothiazide:  No    2.Will hold PDT for YES to any of the following:  Pregnancy/breastfeeding/unknown pregnancy: No  Sun burn: No  Tetracyclines such as doxycycline: No  Ciprofloxacin: No   Methotrexate: No    3.Will hold LEVULAN for YES to any of the following:   Treatment today is for acne:No     The sites to be treated were verified and discussed with patient, sites verified were scalp and temples.   Expected symptoms and/or complications of redness, peeling, stinging, and burning were reviewed.  Comfort measures including moisturizer, cool compresses, and sun protection measures were also reviewed with patient.  After review, patient verbalized understanding of procedure and consent form signed by patient(as per MD documentation) and patient agrees to proceed with treatment.     Applied 1 Levulan stick to the site/s.  Yes saran wrap applied to scalp/temples    MEDICATION: Levulan  DOSE: 1.5 mL  ROUTE: Topical  : [employer]  LOCATION GIVEN: scalp/temples  LOT NO: [id number]  EXP. DATE: 11/20  NDC: 61349-904-17    TREATMENT NUMBER(30 maximum treatments per site): scalp 8/ temples 3      Photodynamic Therapy(PDT) Post Op Note    Patient successfully completed PDT treatment today. Patient tolerated treatment without complication.  Sites were cleansed with mild soap and water and SPF was applied after treatment.     Sun protection was reviewed with patient:  Patient brought personal hat    After care instructions were reviewed with patient. AVS printed with clinic  contact information and given to patient. Patient verbalized understanding and had no further questions or concerns at this time. Patient left clinic in good condition.

## 2019-03-25 NOTE — PATIENT INSTRUCTIONS
Photodynamic Therapy (PDT) Home Care Instructions  I will experience redness, swelling, pain and discomfort which will last 5-10 days. I may experience pinkness or redness that persists for 2-3 weeks but longer duration is possible in some individuals. Risks are infection, eye injury, blistering, reactivation of the cold sore virus, skin lightening, skin darkening or scarring. Multiple treatments may be required.   DAY OF TREATMENT  1) Wash treated area with mild cleanser.  Redness of the treated skin is expected and can vary significantly from person to person and treatment to treatment.  2) Apply SPF 50 before leaving your home/office and while driving to and from work.  3) Remain indoors if possible and avoid direct as well as indirect sunlight.  If your head or face were treated, wear a broad brimmed hat wile going to and from your car.  If your hands/arms were treated, were long sleeves and gloves.  4) Ice packs every 1-2 hours may be helpful as well.  5) In the evening, cleanse treated area gently.  Your skin will feel dry; keep treated area moisturized with a moisturizer.  6) If you have a history of cold sores, take (1) Valtrex 500mg tablet before bedtime, which will be prescribed by your physician.  DAY TWO  1) If you have a history of cold sores, take (1) Valtrex 500 mg tablets in the morning and in the evening on days 2 and 3.  2) You may take a shower.  Men should NOT shave if their face was treated.  3) Cleanse treated area gently.  4) Apply SPF 50  5) Most discomfort usually subsides by Day 3.  6) You should avoid direct sunlight and try to remain indoors on Day 2.  The sensitivity to sunlight will significantly decrease after 48 hours.  7) You should soak the treated areas with as soft washcloth with cold water for 20 minutes every 4-6 hours.  Ice may be applied after the cold-water soaks, if this feels good.  The area should be patted dry and moisturized following the cold-water soaks.  8) Follow  evening routine as you did on Day 1    DAYS 3- 7   1) Try to avoid direct sunlight and minimize incidental exposure for one week.  NO BEACHES!  Continue using SPF 50.  This is very important in protecting your newly rejuvenated skin.  2) You may begin applying make-up once any crusting has healed.  The area may be slightly red for a few weeks.  3) The skin will feel dry and tightened.  Moisturize once to twice daily.  Please plan to be at your appointment for approximately 90 minutes.  Your treatments are scheduled as follows.  Who should I call with questions?    [facility]: [phone]     [facility]: [phone]    For urgent needs outside of business hours call the [facility] at [phone] and ask for the dermatology resident on call

## 2019-04-02 ENCOUNTER — ANTICOAGULATION THERAPY VISIT (OUTPATIENT)
Dept: NURSING | Facility: CLINIC | Age: 79
End: 2019-04-02
Payer: MEDICARE

## 2019-04-02 DIAGNOSIS — L71.9 ROSACEA: ICD-10-CM

## 2019-04-02 DIAGNOSIS — Z86.718 HISTORY OF DVT OF LOWER EXTREMITY: ICD-10-CM

## 2019-04-02 LAB — INR POINT OF CARE: 2.1 (ref 0.86–1.14)

## 2019-04-02 PROCEDURE — 85610 PROTHROMBIN TIME: CPT | Mod: QW

## 2019-04-02 PROCEDURE — 36416 COLLJ CAPILLARY BLOOD SPEC: CPT

## 2019-04-02 PROCEDURE — 99207 ZZC NO CHARGE NURSE ONLY: CPT

## 2019-04-02 NOTE — TELEPHONE ENCOUNTER
metroNIDAZOLE (METROCREAM) 0.75 % cream      Last Written Prescription Date:  03/21/17  Last Fill Quantity: 60g,   # refills: 3  Last Office Visit: 12/11/18  Future Office visit:    Next 5 appointments (look out 90 days)    Jun 04, 2019  1:00 PM CDT  [facility] Visit with Vickie [facility]laen MD  [facility] [address] [id number]  [phone]   Jun 25, 2019  9:00 AM CDT  Return Visit with Vincent Rucker MD, [facility]  [facility] ([address] [id number] [phone]         Thank You,  Sheri Peña, Pharmacy Tech  [facility]

## 2019-04-02 NOTE — PROGRESS NOTES
ANTICOAGULATION FOLLOW-UP CLINIC VISIT    Patient Name:  Rigoberto Holguin  Date:  2019  Contact Type:  Face to Face    SUBJECTIVE:     Patient Findings     Comments:   Denies any problems           OBJECTIVE    INR Protime   Date Value Ref Range Status   2019 2.1 (A) 0.86 - 1.14 Final       ASSESSMENT / PLAN  INR assessment THER    Recheck INR In: 6 WEEKS    INR Location Clinic      Anticoagulation Summary  As of 2019    INR goal:   2.0-3.0   TTR:   77.7 % (3.2 y)   INR used for dosin.1 (2019)   Warfarin maintenance plan:   10 mg (5 mg x 2) every Tue, Thu; 7.5 mg (5 mg x 1.5) all other days   Full warfarin instructions:   10 mg every Tue, Thu; 7.5 mg all other days   Weekly warfarin total:   57.5 mg   No change documented:   Aparna Robison   Plan last modified:   Aparna Robison (2018)   Next INR check:   2019   Target end date:       Indications    Long-term (current) use of anticoagulants [Z79.01] [Z79.01]  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

## 2019-04-18 PROBLEM — M25.511 ACUTE PAIN OF RIGHT SHOULDER: Status: RESOLVED | Noted: 2018-04-27 | Resolved: 2019-04-18

## 2019-05-17 ENCOUNTER — ANTICOAGULATION THERAPY VISIT (OUTPATIENT)
Dept: NURSING | Facility: CLINIC | Age: 79
End: 2019-05-17
Payer: MEDICARE

## 2019-05-17 DIAGNOSIS — Z86.718 HISTORY OF DVT OF LOWER EXTREMITY: ICD-10-CM

## 2019-05-17 LAB — INR POINT OF CARE: 2.7 (ref 0.86–1.14)

## 2019-05-17 PROCEDURE — 36416 COLLJ CAPILLARY BLOOD SPEC: CPT

## 2019-05-17 PROCEDURE — 99207 ZZC NO CHARGE NURSE ONLY: CPT

## 2019-05-17 PROCEDURE — 85610 PROTHROMBIN TIME: CPT | Mod: QW

## 2019-05-17 NOTE — PROGRESS NOTES
ANTICOAGULATION FOLLOW-UP CLINIC VISIT    Patient Name:  Rigoberto Holguin  Date:  2019  Contact Type:  Face to Face    SUBJECTIVE:  Patient Findings     Comments:   No problems        Clinical Outcomes     Comments:   No problems           OBJECTIVE    INR Protime   Date Value Ref Range Status   2019 2.7 (A) 0.86 - 1.14 Final       ASSESSMENT / PLAN  INR assessment THER    Recheck INR In: 6 WEEKS    INR Location Clinic      Anticoagulation Summary  As of 2019    INR goal:   2.0-3.0   TTR:   78.6 % (3.3 y)   INR used for dosin.7 (2019)   Warfarin maintenance plan:   10 mg (5 mg x 2) every Tue, Thu; 7.5 mg (5 mg x 1.5) all other days   Full warfarin instructions:   10 mg every Tue, Thu; 7.5 mg all other days   Weekly warfarin total:   57.5 mg   No change documented:   Aparna Robison   Plan last modified:   Aparna Robison (2018)   Next INR check:   2019   Target end date:       Indications    Long-term (current) use of anticoagulants [Z79.01] [Z79.01]  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

## 2019-06-04 ENCOUNTER — OFFICE VISIT (OUTPATIENT)
Dept: DERMATOLOGY | Facility: CLINIC | Age: 79
End: 2019-06-04
Payer: MEDICARE

## 2019-06-04 DIAGNOSIS — Z85.828 HISTORY OF NONMELANOMA SKIN CANCER: ICD-10-CM

## 2019-06-04 DIAGNOSIS — L55.9 SUNBURN: ICD-10-CM

## 2019-06-04 DIAGNOSIS — D48.5 NEOPLASM OF UNCERTAIN BEHAVIOR OF SKIN: ICD-10-CM

## 2019-06-04 DIAGNOSIS — L57.0 AK (ACTINIC KERATOSIS): Primary | ICD-10-CM

## 2019-06-04 PROCEDURE — 88305 TISSUE EXAM BY PATHOLOGIST: CPT | Mod: TC | Performed by: DERMATOLOGY

## 2019-06-04 PROCEDURE — 99213 OFFICE O/P EST LOW 20 MIN: CPT | Mod: 25 | Performed by: DERMATOLOGY

## 2019-06-04 PROCEDURE — 11102 TANGNTL BX SKIN SINGLE LES: CPT | Performed by: DERMATOLOGY

## 2019-06-04 PROCEDURE — 17000 DESTRUCT PREMALG LESION: CPT | Mod: 59 | Performed by: DERMATOLOGY

## 2019-06-04 PROCEDURE — 17003 DESTRUCT PREMALG LES 2-14: CPT | Performed by: DERMATOLOGY

## 2019-06-04 ASSESSMENT — PAIN SCALES - GENERAL: PAINLEVEL: MODERATE PAIN (4)

## 2019-06-04 NOTE — PATIENT INSTRUCTIONS
Cryotherapy    What is it?    Use of a very cold liquid, such as liquid nitrogen, to freeze and destroy abnormal skin cells that need to be removed    What should I expect?    Tenderness and redness    A small blister that might grow and fill with dark purple blood. There may be crusting.    More than one treatment may be needed if the lesions do not go away.    How do I care for the treated area?    Gently wash the area with your hands when bathing.    Use a thin layer of Vaseline to help with healing. You may use a Band-Aid.     The area should heal within 7-10 days and may leave behind a pink or lighter color.     Do not use an antibiotic or Neosporin ointment.     You may take acetaminophen (Tylenol) for pain.     Call your Doctor if you have:    Severe pain    Signs of infection (warmth, redness, cloudy yellow drainage, and or a bad smell)    Questions or concerns    Who should I call with questions?       [facility]: [phone]       [facility]: [phone]       For urgent needs outside of business hours call the [facility] at [phone]        and ask for the dermatology resident on call      Wound Care After a Biopsy    What is a skin biopsy?  A skin biopsy allows the doctor to examine a very small piece of tissue under the microscope to determine the diagnosis and the best treatment for the skin condition. A local anesthetic (numbing medicine)  is injected with a very small needle into the skin area to be tested. A small piece of skin is taken from the area. Sometimes a suture (stitch) is used.     What are the risks of a skin biopsy?  I will experience scar, bleeding, swelling, pain, crusting and redness. I may experience incomplete removal or recurrence. Risks of this procedure are excessive bleeding, bruising, infection, nerve damage, numbness, thick (hypertrophic or keloidal) scar and non-diagnostic biopsy.    How should I care  for my wound for the first 24 hours?    Keep the wound dry and covered for 24 hours    If it bleeds, hold direct pressure on the area for 15 minutes. If bleeding does not stop then go to the emergency room    Avoid strenuous exercise the first 1-2 days or as your doctor instructs you    How should I care for the wound after 24 hours?    After 24 hours, remove the bandage    You may bathe or shower as normal    If you had a scalp biopsy, you can shampoo as usual and can use shower water to clean the biopsy site daily    Clean the wound twice a day with gentle soap and water    Do not scrub, be gentle    Apply white petroleum/Vaseline after cleaning the wound with a cotton swab or a clean finger, and keep the site covered with a Bandaid /bandage. Bandages are not necessary with a scalp biopsy    If you are unable to cover the site with a Bandaid /bandage, re-apply ointment 2-3 times a day to keep the site moist. Moisture will help with healing    Avoid strenuous activity for first 1-2 days    Avoid lakes, rivers, pools, and oceans until the stitches are removed or the site is healed    How do I clean my wound?    Wash hands thoroughly with soap or use hand  before all wound care    Clean the wound with gentle soap and water    Apply white petroleum/Vaseline  to wound after it is clean    Replace the Bandaid /bandage to keep the wound covered for the first few days or as instructed by your doctor    If you had a scalp biopsy, warm shower water to the area on a daily basis should suffice    What should I use to clean my wound?     Cotton-tipped applicators (Qtips )    White petroleum jelly (Vaseline ). Use a clean new container and use Q-tips to apply.    Bandaids   as needed    Gentle soap     How should I care for my wound long term?    Do not get your wound dirty    Keep up with wound care for one week or until the area is healed.    A small scab will form and fall off by itself when the area is completely  healed. The area will be red and will become pink in color as it heals. Sun protection is very important for how your scar will turn out. Sunscreen with an SPF 30 or greater is recommended once the area is healed.    You should have some soreness but it should be mild and slowly go away over several days. Talk to your doctor about using tylenol for pain,    When should I call my doctor?  If you have increased:     Pain or swelling    Pus or drainage (clear or slightly yellow drainage is ok)    Temperature over 100F    Spreading redness or warmth around wound    When will I hear about my results?  The biopsy results can take 2-3 weeks to come back. The clinic will call you with the results, send you a [email or web address] message, or have you schedule a follow-up clinic or phone time to discuss the results. Contact our clinics if you do not hear from us in 3 weeks.     Who should I call with questions?    [facility]: [phone]     [facility]: [phone]    For urgent needs outside of business hours call the [facility] at [phone] and ask for the dermatology resident on call

## 2019-06-04 NOTE — PROGRESS NOTES
[facility] Dermatology Note      Dermatology Problem List:  1. 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 Actinic keratoses  -Previous Tx: script for efudex initiated but cost prohibitive, cryotherapy, PDT (X3 forehead/scalp early 2017) (X2 sessions 2015 with improvement) (1-2 treatments at outside facility)  3.Lesion, left ear, records from [facility] were reviewed  -s/p biopsy 06/2014 demonstrating ulceration with inflammation  -s/p biopsy 07/2014 demonstrating erosion and ulcer with hemorrhagic scale crust, impetiginized, [facility] was queried  4. Rosacea  -Current Tx: Metrocream with improvement.   5. Lesion to recheck on R upper arm. S/p bx (non-diagnostic) and cryotherapy x 1 (7/26/17)-resolved  6. Nodule, post auricular -resolved    Encounter Date: Jun 4, 2019    CC:  Chief Complaint   Patient presents with     Skin Check     no areas of concern hx NMSC     RECHECK     AK mid frontal scalp and post PDT X3 treatments on scalp and temples       History of Present Illness:  Mr. Rigoberto Holguin is a 78 year old male with a history of NMSC who presents for a skin check. The patient was last seen on 12/11/18 when an AK on the right mid frontal scalp was biopsied and multiple AKs on the scalp and temples were treated with cryo and PDT. The patient had 3 PDT treatments since his last visit. Today he reports no specific areas of concern. There is an AK on the mid frontal scalp to be rechecked. Thinks the PDT treatment went well.         Past Medical History:   Patient Active Problem List   Diagnosis     Encounter for long-term current use of medication     Malignant neoplasm of overlapping sites of bladder (H)     Other and unspecified coagulation defects     Nonallopathic lesion of lumbar  region     Stenosis, spinal, lumbar     CARDIOVASCULAR SCREENING; LDL GOAL LESS THAN 160     ACP (advance care planning)     Factor V Leiden mutation (H)     History of DVT of lower extremity     Postphlebitic syndrome     Chronic anticoagulation     Peripheral vascular disease (H)     Personal history of malignant neoplasm of bladder     Vitamin D deficiency     Long-term (current) use of anticoagulants [Z79.01]     Lumbar radicular pain     Hypothyroidism due to Hashimoto's thyroiditis     Status post lumbar surgery     Aftercare following surgery of the musculoskeletal system     Blepharoptosis     Lumbar facet arthropathy     Lumbosacral spondylosis without myelopathy     Past Medical History:   Diagnosis Date     Actinic keratosis      Basal cell carcinoma      Bladder cancer (H) 2003     DJD (degenerative joint disease), lumbar     S/P epidural x 2     DVT (deep venous thrombosis) (H) 2007    Factor V ( bilateral legs)      GERD (gastroesophageal reflux disease)      Hypothyroidism      Polio     Age 4, weakness of right arm and leg      S/P appendectomy      Past Surgical History:   Procedure Laterality Date     Appendectomy       DESTRUCTION OF PARAVERTEBRAL FACET LUMBAR / SACRAL ADDITIONAL Bilateral 1/14/2019    Procedure: Bilateral Medial Radiofrequency Ablation of the Lumbar 3-4-5;  Surgeon: Augusto Pugh MD;  [facility]     FORAMINOTOMY LUMBAR POSTERIOR TWO LEVELS Left 3/2/2017    Procedure: FORAMINOTOMY LUMBAR POSTERIOR TWO LEVELS;  Surgeon: Ricky Mclaughlin MD;  Location: [facility] OR     HC REPAIR ROTATOR CUFF,ACUTE Left 2007     INJECT PARAVERTEBRAL FACET JOINT LUMBAR / SACRAL THIRD Bilateral 11/21/2018    Procedure: Bilateral Lumbar Medial Branch Nerve Block Injection, Lumbar 3/4, Lumbar 4/5, Lumbar 5/Sacral 1;  Surgeon: Augusto Pugh MD;  [facility]     PHOTODYNAMIC THERAPY - (PDT)  2/13/2015     SURGICAL HISTORY OF -   2003    Urinary Bladder Cancer treatment     SURGICAL HISTORY OF -        Gall Bladder     Social History:  The patient is retired. He was a . He has 2 granddaughter. He plays golf.   Kept in chart for convenience.       Family History:  Reviewed and unchanged but kept in chart for clinician convenience  There is no family history of skin cancer.    Medications:  Current Outpatient Medications   Medication Sig Dispense Refill     calcium carbonate (TUMS) 500 MG chewable tablet Take 1 chew tab by mouth daily as needed Reported on 4/18/2017       cholecalciferol (VITAMIN D) 1000 UNIT tablet Take 1 tablet (1,000 Units) by mouth daily 100 tablet 0     levothyroxine (SYNTHROID/LEVOTHROID) 112 MCG tablet TAKE 1 TABLET EVERY MORNING BEFORE BREAKFAST 90 tablet 3     metroNIDAZOLE (METROCREAM) 0.75 % external cream every day, once daily to face 60 g 3     Multiple Vitamin (DAILY MULTIVITAMIN PO) Take 1 chew tab by mouth daily        Nutritional Supplements (JUICE PLUS FIBRE PO)        sildenafil (REVATIO) 20 MG tablet Take 2-5 tabs daily prn 30 tablet 11     timolol (TIMOPTIC) 0.5 % ophthalmic solution        warfarin (COUMADIN) 5 MG tablet TAKE 2 TABS (10MG) ON TUESDAY AND THURSDAY AND TAKE 1 AND 1/2 TABLETS (7.5MG) REST OF THE WEEK OR AS DIRECTED BY INR CLINIC 144 tablet 5     enoxaparin (LOVENOX) 100 MG/ML injection Inject 1 mL (100 mg) Subcutaneous 2 times daily (Patient not taking: Reported on 6/4/2019) 60 Syringe 0     Multiple Vitamins-Minerals (EYE VITAMINS PO) Take 1 tablet by mouth daily        No Known Allergies    Review of Systems:  -Const: Otherwise feeling well, in usual state of health.   -Skin: Ask per HPI     Physical exam:  There were no vitals taken for this visit.  -This is a well developed, well-nourished male in no acute distress, in a pleasant mood.    SKIN: Total skin excluding the undergarment areas was performed. The exam included the head/face, neck, both arms, chest, back, abdomen, both legs, digits and/or nails. Excluding ankles and feet. Declines genital  exam.   - There is no erythema, telangectasias, nodularity, or pigmentation on the sites of prior skin cancer.  - There are erythematous macules with overyling adherent scale on the scalp   - 5 mm brown papule on the left occipital scalp, crust-like   - Sunburn on the left and right forearm   - No other lesions of concern on areas examined.     Impression/Plan:  1.  History of NMSC  No evidence of reoccurrence      2.  Actinic keratoses: Scalp, s/p PDT and imrpoved  Cryotherapy procedure note: After verbal consent and discussion of risks and benefits including but no limited to dyspigmentation/scar, blister, and pain, 6 were treated with 1-2mm freeze border for 2 cycles with liquid nitrogen. Post cryotherapy instructions were provided.   S/p 3 PDT treatments     3. NUB, 5 mm brown papule on the left occipital scalp, crust-like, DDx SK rule out other.     Shave biopsy:  After discussion of benefits and risks including but not limited to bleeding/bruising, pain/swelling, infection, scar, incomplete removal, nerve damage/numbness, recurrence, and non-diagnostic biopsy, written consent, verbal consent and photographs were obtained. Time-out was performed. The area was cleaned with isopropyl alcohol. 0.5mL of 1% lidocaine with epinephrine was injected to obtain adequate anesthesia of the lesion on the left occ scalp. A shave biopsy was performed. Hemostasis was achieved with aluminium chloride. Vaseline and a sterile dressing were applied. The patient tolerated the procedure and no complications were noted. The patient was provided with verbal and written post care instructions.     4. Sunburn on the left and right forearm     Recommend sunscreens SPF #30 or greater         Follow up in 6-8 months with dermatologist in [place] (pt moving), earlier for new or changing lesions.     Staff Involved:  Scribe/Staff    Scribe Disclosure  I, Anne-Marie Powell am serving as a scribe to document services personally performed  by Dr. Vickie Whalen MD, based on data collection and the provider's statements to me.     Provider Disclosure:   The documentation recorded by the scribe accurately reflects the services I personally performed and the decisions made by me.    Vickie Whalen MD    Department of Dermatology  [facility]: Phone: [phone], Fax:[phone]  [facility]: Phone: [phone], Fax: [phone]

## 2019-06-04 NOTE — NURSING NOTE
Rigoberto Holguin's goals for this visit include:   Chief Complaint   Patient presents with     Skin Check     no areas of concern hx NMSC     RECHECK     AK mid frontal scalp and post PDT X3 treatments on scalp and temples       He requests these members of his care team be copied on today's visit information:     PCP: Jamie Meraz    Referring Provider:  No referring provider defined for this encounter.    There were no vitals taken for this visit.    Do you need any medication refills at today's visit? Shaina Mendoza LPN

## 2019-06-04 NOTE — LETTER
6/4/2019         RE: Rigoberto Holguin  [address] [facility] [address]        Dear Colleague,    Thank you for referring your patient, Rigoberto Holguin, to the [facility]. Please see a copy of my visit note below.    [facility] Dermatology Note      Dermatology Problem List:  1. 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 Actinic keratoses  -Previous Tx: script for efudex initiated but cost prohibitive, cryotherapy, PDT (X3 forehead/scalp early 2017) (X2 sessions 2015 with improvement) (1-2 treatments at outside facility)  3.Lesion, left ear, records from [facility] were reviewed  -s/p biopsy 06/2014 demonstrating ulceration with inflammation  -s/p biopsy 07/2014 demonstrating erosion and ulcer with hemorrhagic scale crust, impetiginized, [facility] was queried  4. Rosacea  -Current Tx: Metrocream with improvement.   5. Lesion to recheck on R upper arm. S/p bx (non-diagnostic) and cryotherapy x 1 (7/26/17)-resolved  6. Nodule, post auricular -resolved    Encounter Date: Jun 4, 2019    CC:  Chief Complaint   Patient presents with     Skin Check     no areas of concern hx NMSC     RECHECK     AK mid frontal scalp and post PDT X3 treatments on scalp and temples       History of Present Illness:  Mr. Rigoberto Holguin is a 78 year old male with a history of NMSC who presents for a skin check. The patient was last seen on 12/11/18 when an AK on the right mid frontal scalp was biopsied and multiple AKs on the scalp and temples were treated with cryo and PDT. The patient had 3 PDT treatments since his last visit. Today he reports no specific areas of concern. There is an AK on the mid frontal scalp to be rechecked. Thinks the PDT treatment went well.         Past Medical  History:   Patient Active Problem List   Diagnosis     Encounter for long-term current use of medication     Malignant neoplasm of overlapping sites of bladder (H)     Other and unspecified coagulation defects     Nonallopathic lesion of lumbar region     Stenosis, spinal, lumbar     CARDIOVASCULAR SCREENING; LDL GOAL LESS THAN 160     ACP (advance care planning)     Factor V Leiden mutation (H)     History of DVT of lower extremity     Postphlebitic syndrome     Chronic anticoagulation     Peripheral vascular disease (H)     Personal history of malignant neoplasm of bladder     Vitamin D deficiency     Long-term (current) use of anticoagulants [Z79.01]     Lumbar radicular pain     Hypothyroidism due to Hashimoto's thyroiditis     Status post lumbar surgery     Aftercare following surgery of the musculoskeletal system     Blepharoptosis     Lumbar facet arthropathy     Lumbosacral spondylosis without myelopathy     Past Medical History:   Diagnosis Date     Actinic keratosis      Basal cell carcinoma      Bladder cancer (H) 2003     DJD (degenerative joint disease), lumbar     S/P epidural x 2     DVT (deep venous thrombosis) (H) 2007    Factor V ( bilateral legs)      GERD (gastroesophageal reflux disease)      Hypothyroidism      Polio     Age 4, weakness of right arm and leg      S/P appendectomy      Past Surgical History:   Procedure Laterality Date     Appendectomy       DESTRUCTION OF PARAVERTEBRAL FACET LUMBAR / SACRAL ADDITIONAL Bilateral 1/14/2019    Procedure: Bilateral Medial Radiofrequency Ablation of the Lumbar 3-4-5;  Surgeon: Augusto Pugh MD;  [facility]     FORAMINOTOMY LUMBAR POSTERIOR TWO LEVELS Left 3/2/2017    Procedure: FORAMINOTOMY LUMBAR POSTERIOR TWO LEVELS;  Surgeon: Ricky Mclaughlin MD;  Location: [facility] OR     HC REPAIR ROTATOR CUFF,ACUTE Left 2007     INJECT PARAVERTEBRAL FACET JOINT LUMBAR / SACRAL THIRD Bilateral 11/21/2018    Procedure: Bilateral Lumbar Medial Branch Nerve  Block Injection, Lumbar 3/4, Lumbar 4/5, Lumbar 5/Sacral 1;  Surgeon: Augusto Pugh MD;  [facility]     PHOTODYNAMIC THERAPY - (PDT)  2/13/2015     SURGICAL HISTORY OF -   2003    Urinary Bladder Cancer treatment     SURGICAL HISTORY OF -       Gall Bladder     Social History:  The patient is retired. He was a . He has 2 granddaughter. He plays golf.   Kept in chart for convenience.       Family History:  Reviewed and unchanged but kept in chart for clinician convenience  There is no family history of skin cancer.    Medications:  Current Outpatient Medications   Medication Sig Dispense Refill     calcium carbonate (TUMS) 500 MG chewable tablet Take 1 chew tab by mouth daily as needed Reported on 4/18/2017       cholecalciferol (VITAMIN D) 1000 UNIT tablet Take 1 tablet (1,000 Units) by mouth daily 100 tablet 0     levothyroxine (SYNTHROID/LEVOTHROID) 112 MCG tablet TAKE 1 TABLET EVERY MORNING BEFORE BREAKFAST 90 tablet 3     metroNIDAZOLE (METROCREAM) 0.75 % external cream every day, once daily to face 60 g 3     Multiple Vitamin (DAILY MULTIVITAMIN PO) Take 1 chew tab by mouth daily        Nutritional Supplements (JUICE PLUS FIBRE PO)        sildenafil (REVATIO) 20 MG tablet Take 2-5 tabs daily prn 30 tablet 11     timolol (TIMOPTIC) 0.5 % ophthalmic solution        warfarin (COUMADIN) 5 MG tablet TAKE 2 TABS (10MG) ON TUESDAY AND THURSDAY AND TAKE 1 AND 1/2 TABLETS (7.5MG) REST OF THE WEEK OR AS DIRECTED BY INR CLINIC 144 tablet 5     enoxaparin (LOVENOX) 100 MG/ML injection Inject 1 mL (100 mg) Subcutaneous 2 times daily (Patient not taking: Reported on 6/4/2019) 60 Syringe 0     Multiple Vitamins-Minerals (EYE VITAMINS PO) Take 1 tablet by mouth daily        No Known Allergies    Review of Systems:  -Const: Otherwise feeling well, in usual state of health.   -Skin: Ask per HPI     Physical exam:  There were no vitals taken for this visit.  -This is a well developed, well-nourished male in no acute  distress, in a pleasant mood.    SKIN: Total skin excluding the undergarment areas was performed. The exam included the head/face, neck, both arms, chest, back, abdomen, both legs, digits and/or nails. Excluding ankles and feet. Declines genital exam.   - There is no erythema, telangectasias, nodularity, or pigmentation on the sites of prior skin cancer.  - There are erythematous macules with overyling adherent scale on the scalp   - 5 mm brown papule on the left occipital scalp, crust-like   - Sunburn on the left and right forearm   - No other lesions of concern on areas examined.     Impression/Plan:  1.  History of NMSC  No evidence of reoccurrence      2.  Actinic keratoses: Scalp, s/p PDT and imrpoved  Cryotherapy procedure note: After verbal consent and discussion of risks and benefits including but no limited to dyspigmentation/scar, blister, and pain, 6 were treated with 1-2mm freeze border for 2 cycles with liquid nitrogen. Post cryotherapy instructions were provided.   S/p 3 PDT treatments     3. NUB, 5 mm brown papule on the left occipital scalp, crust-like, DDx SK rule out other.     Shave biopsy:  After discussion of benefits and risks including but not limited to bleeding/bruising, pain/swelling, infection, scar, incomplete removal, nerve damage/numbness, recurrence, and non-diagnostic biopsy, written consent, verbal consent and photographs were obtained. Time-out was performed. The area was cleaned with isopropyl alcohol. 0.5mL of 1% lidocaine with epinephrine was injected to obtain adequate anesthesia of the lesion on the left occ scalp. A shave biopsy was performed. Hemostasis was achieved with aluminium chloride. Vaseline and a sterile dressing were applied. The patient tolerated the procedure and no complications were noted. The patient was provided with verbal and written post care instructions.     4. Sunburn on the left and right forearm     Recommend sunscreens SPF #30 or greater          Follow up in 6-8 months with dermatologist in [place] (pt moving), earlier for new or changing lesions.     Staff Involved:  Scribe/Staff    Scribe Disclosure  I, Anne-Marie Sherry, am serving as a scribe to document services personally performed by Dr. Vickie Whalen MD, based on data collection and the provider's statements to me.     Provider Disclosure:   The documentation recorded by the scribe accurately reflects the services I personally performed and the decisions made by me.    Vickie Whalen MD    Department of Dermatology  [facility]: Phone: [phone], Fax:[phone]  [facility]: Phone: [phone], Fax: [phone]                Again, thank you for allowing me to participate in the care of your patient.        Sincerely,        Vickie Whalen MD

## 2019-06-07 LAB — COPATH REPORT: NORMAL

## 2019-06-08 DIAGNOSIS — N52.9 ERECTILE DYSFUNCTION, UNSPECIFIED ERECTILE DYSFUNCTION TYPE: ICD-10-CM

## 2019-06-08 NOTE — TELEPHONE ENCOUNTER
"Requested Prescriptions   Pending Prescriptions Disp Refills     sildenafil (REVATIO) 20 MG tablet [Pharmacy Med Name: SILDENAFIL CITRATE 20MG TABS]  Last Written Prescription Date:  10/10/18  Last Fill Quantity: 30,  # refills: 11   Last office visit: 1/10/2019 with prescribing provider:  JOSELITO Meraz   Future Office Visit:   Next 5 appointments (look out 90 days)    Jun 25, 2019  9:00 AM CDT  Return [facility] with Vincent Rucker MD, [facility] [address]  [phone]          30 tablet 11     Sig: TAKE 2-5 TABLETS BY MOUTH DAILY AS NEEDED       Erectile Dysfuction Protocol Passed - 6/8/2019  9:32 AM        Passed - Absence of nitrates on medication list        Passed - Absence of Alpha Blockers on Med list        Passed - Recent (12 mo) or future (30 days) visit within the authorizing provider's specialty     Patient had office visit in the last 12 months or has a visit in the next 30 days with authorizing provider or within the authorizing provider's specialty.  See \"Patient Info\" tab in inbasket, or \"Choose Columns\" in Meds & Orders section of the refill encounter.              Passed - Medication is active on med list        Passed - Patient is age 18 or older          "

## 2019-06-10 ENCOUNTER — TELEPHONE (OUTPATIENT)
Dept: DERMATOLOGY | Facility: CLINIC | Age: 79
End: 2019-06-10

## 2019-06-10 NOTE — TELEPHONE ENCOUNTER
Notes recorded by Rhonda Rodrigues RN on 6/10/2019 at 2:55 PM CDT  Rigoberto notified of results.  He verbalized understanding and has no concerns with how his biopsy site is healing.  Rhonda Rodrigues RN    ------    Notes recorded by Vickie Vanessa MD on 6/10/2019 at 12:58 PM CDT  Harmless sk, keratosis   Dermatological path order and indications   Order: [id number]   Status:  Final result   Visible to patient:  Yes (MyChart) Dx:  Neoplasm of uncertain behavior of skin   Component 6d ago   Copath Report Patient Name: RIGOBERTO GARZA   MR#: [id number]   Specimen #: [id number]   Collected: 6/4/2019   Received: 6/4/2019   Reported: 6/7/2019 17:10   Ordering Phy(s): VICKIE VANESSA     For improved result formatting, select 'View Enhanced Report Format' under    Linked Documents section.     SPECIMEN(S):   Skin, left occipital scalp     FINAL DIAGNOSIS:   Skin, left occipital scalp:   - Macular seborrheic keratosis, inflamed - (see description)

## 2019-06-12 RX ORDER — SILDENAFIL CITRATE 20 MG/1
TABLET ORAL
Qty: 30 TABLET | Refills: 11 | Status: SHIPPED | OUTPATIENT
Start: 2019-06-12

## 2019-06-25 ENCOUNTER — OFFICE VISIT (OUTPATIENT)
Dept: UROLOGY | Facility: CLINIC | Age: 79
End: 2019-06-25
Payer: MEDICARE

## 2019-06-25 DIAGNOSIS — N40.1 BENIGN PROSTATIC HYPERPLASIA WITH LOWER URINARY TRACT SYMPTOMS, SYMPTOM DETAILS UNSPECIFIED: ICD-10-CM

## 2019-06-25 DIAGNOSIS — Z85.51 PERSONAL HISTORY OF MALIGNANT NEOPLASM OF BLADDER: Primary | ICD-10-CM

## 2019-06-25 PROCEDURE — 52000 CYSTOURETHROSCOPY: CPT | Performed by: UROLOGY

## 2019-06-25 RX ORDER — TAMSULOSIN HYDROCHLORIDE 0.4 MG/1
0.4 CAPSULE ORAL AT BEDTIME
Qty: 30 CAPSULE | Refills: 1 | Status: SHIPPED | OUTPATIENT
Start: 2019-06-25

## 2019-06-25 NOTE — PROGRESS NOTES
This 78 year old year old male is here for  yearly surveillance cystoscopy.  No recurrent disease for many years    Cysto: the anterior urethra is normal.      Prostatic Urethra mild trilobar enlargement.  The prostatic urethral length is 3.5  cm.     The bladder showed no evidence of recurrent disease.  There is trabeculation and cellule.    Follow up: will have patient come back in 1 year for bladder tumor recheck.    BPH: trial of flomax.  Side effects discussed.

## 2019-07-09 ENCOUNTER — ANTICOAGULATION THERAPY VISIT (OUTPATIENT)
Dept: NURSING | Facility: CLINIC | Age: 79
End: 2019-07-09
Payer: MEDICARE

## 2019-07-09 DIAGNOSIS — Z86.718 HISTORY OF DVT OF LOWER EXTREMITY: ICD-10-CM

## 2019-07-09 DIAGNOSIS — Z79.01 LONG TERM CURRENT USE OF ANTICOAGULANT THERAPY: ICD-10-CM

## 2019-07-09 LAB — INR POINT OF CARE: 2.8 (ref 0.86–1.14)

## 2019-07-09 PROCEDURE — 99207 ZZC NO CHARGE NURSE ONLY: CPT

## 2019-07-09 PROCEDURE — 85610 PROTHROMBIN TIME: CPT | Mod: QW

## 2019-07-09 PROCEDURE — 36416 COLLJ CAPILLARY BLOOD SPEC: CPT

## 2019-07-09 NOTE — PROGRESS NOTES
ANTICOAGULATION FOLLOW-UP CLINIC VISIT    Patient Name:  Rigoberto Holguin  Date:  2019  Contact Type:  Face to Face    SUBJECTIVE:  Patient Findings     Comments:   No problems. Is moving out of state. He will establish with provider in new state.        Clinical Outcomes     Comments:   No problems. Is moving out of state. He will establish with provider in new state.           OBJECTIVE    INR Protime   Date Value Ref Range Status   2019 2.8 (A) 0.86 - 1.14 Final       ASSESSMENT / PLAN  INR assessment THER    Recheck INR In: 6 WEEKS will establish care in [facility] state   INR Location Clinic      Anticoagulation Summary  As of 2019    INR goal:   2.0-3.0   TTR:   79.5 % (3.4 y)   INR used for dosin.8 (2019)   Warfarin maintenance plan:   10 mg (5 mg x 2) every Tue, Thu; 7.5 mg (5 mg x 1.5) all other days   Full warfarin instructions:   10 mg every Tue, Thu; 7.5 mg all other days   Weekly warfarin total:   57.5 mg   No change documented:   Aparna Vicente   Plan last modified:   Aparna Vicente (2018)   Next INR check:   2019   Target end date:       Indications    Long-term (current) use of anticoagulants [Z79.01] [Z79.01]  History of DVT of lower extremity [Z86.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    APARNA VICENTE

## 2019-08-02 DIAGNOSIS — N52.9 ERECTILE DYSFUNCTION, UNSPECIFIED ERECTILE DYSFUNCTION TYPE: ICD-10-CM

## 2019-08-02 NOTE — TELEPHONE ENCOUNTER
"Requested Prescriptions   Pending Prescriptions Disp Refills     sildenafil (REVATIO) 20 MG tablet [Pharmacy Med Name: SILDENAFIL CITRATE 20MG TABS]  Last Written Prescription Date:  10/10/18  Last Fill Quantity: 30,  # refills: 11   Last office visit: 1/10/2019 with prescribing provider:  JOSELITO Meraz   Future Office Visit:     30 tablet 11     Sig: TAKE 2-5 TABLETS BY MOUTH DAILY AS NEEDED       Erectile Dysfuction Protocol Failed - 8/2/2019  1:29 PM        Failed - Absence of Alpha Blockers on Med list        Passed - Absence of nitrates on medication list        Passed - Recent (12 mo) or future (30 days) visit within the authorizing provider's specialty     Patient had office visit in the last 12 months or has a visit in the next 30 days with authorizing provider or within the authorizing provider's specialty.  See \"Patient Info\" tab in inbasket, or \"Choose Columns\" in Meds & Orders section of the refill encounter.              Passed - Medication is active on med list        Passed - Patient is age 18 or older          "

## 2019-08-05 RX ORDER — SILDENAFIL CITRATE 20 MG/1
TABLET ORAL
Qty: 30 TABLET | Refills: 11 | OUTPATIENT
Start: 2019-08-05

## 2019-08-23 ENCOUNTER — ANTICOAGULATION THERAPY VISIT (OUTPATIENT)
Dept: NURSING | Facility: CLINIC | Age: 79
End: 2019-08-23

## 2019-08-23 ENCOUNTER — TELEPHONE (OUTPATIENT)
Dept: NURSING | Facility: CLINIC | Age: 79
End: 2019-08-23

## 2019-08-23 DIAGNOSIS — Z86.718 HISTORY OF DVT OF LOWER EXTREMITY: ICD-10-CM

## 2019-08-23 DIAGNOSIS — Z79.01 LONG TERM CURRENT USE OF ANTICOAGULANT THERAPY: ICD-10-CM

## 2019-08-23 NOTE — MR AVS SNAPSHOT
After Visit Summary   9/20/2018    Rigoberto Holguin    MRN: [id number]           Patient Information     Date Of Birth          1940        Visit Information        Provider Department      9/20/2018 1:00 PM Jamie Meraz PA-C [facility]        Today's Diagnoses     Chronic bilateral low back pain without sciatica    -  1    History of DVT of lower extremity        Chronic anticoagulation           Follow-ups after your visit        Additional Services     INR CLINIC REFERRAL       Your provider has referred you to INR Services.    Please be aware that coverage of these services is subject to the terms and limitations of your health insurance plan.  Call member services at your health plan with any benefit or coverage questions.    Indication for Anticoagulation: DVT (recurrent)  If nonstandard INR is desired, indicate goal range and explanation: 2-3  Expected Duration of Therapy: Lifetime                  Your next 10 appointments already scheduled     Oct 16, 2018  8:20 AM CDT   (Arrive by 8:05 AM)   New Patient Visit with TENA Lal [facility] ([facility])    [address] [facility] [address] [phone]            Oct 29, 2018  8:30 AM CDT   Anticoagulation Visit with BE ANTI COAG   [facility] ([facility]    [address] [id number] [phone]            Dec 10, 2018  8:30 AM CST   (Arrive by 8:15 AM)   Return Visit with Ricky Mclaughlin MD   [facility] ([facility])    [address] [facility] [address] [phone]            Dec 11, 2018  3:30 PM CST   [facility] Vickie Whalen MD   [facility]    [address] [place] [id number] [phone]            Jun 25, 2019  9:00 AM CDT  "  Return Visit with Vincent Rucker MD, [place] [facility]   [facility] ([facility])    [address] [place] [address]  [place] [id number] [phone]              Who to contact     Normal or non-critical lab and imaging results will be communicated to you by Hydrocisionhart, letter or phone within 4 business days after the clinic has received the results. If you do not hear from us within 7 days, please contact the clinic through MyChart or phone. If you have a critical or abnormal lab result, we will notify you by phone as soon as possible.  Submit refill requests through [employer]AME or call your pharmacy and they will forward the refill request to us. Please allow 3 business days for your refill to be completed.          If you need to speak with a  for additional information , please call: [phone]             Additional Information About Your Visit        [employer]AME Information     [employer] gives you secure access to your electronic health record. If you see a primary care provider, you can also send messages to your care team and make appointments. If you have questions, please call your primary care clinic.  If you do not have a primary care provider, please call [phone] and they will assist you.        Care EveryWhere ID     This is your Care EveryWhere ID. This could be used by other organizations to access your [place] medical records  [id number]        Your Vitals Were     Pulse Temperature Respirations Height Pulse Oximetry BMI (Body Mass Index)    86 98.1  F (36.7  C) (Tympanic) 16 5' 8\" (1.727 m) 100% 31.02 kg/m2       Blood Pressure from Last 3 Encounters:   09/20/18 104/67   09/18/18 124/64   09/10/18 116/74    Weight from Last 3 Encounters:   09/20/18 204 lb (92.5 kg)   09/18/18 201 lb (91.2 kg)   09/10/18 209 lb 1.6 oz (94.8 kg)              We Performed the Following     INR CLINIC REFERRAL          Where to get your medicines      These medications " were sent to [facility] - [place], [place] - [address]  [address] [facility] [address]     Phone:  [phone]     warfarin 5 MG tablet          Primary Care Provider Office Phone # Fax #    Jamie Meraz PA-C [phone] [address]  [place] [address]        Equal Access to Services     [facility]: Hadii aad ku hadasho Soomaali, waaxda luqadaha, qaybta kaalmada adeegyada, waxay idiin hayaan adeeg kharash la'aan . So [facility] [phone].    ATENCIÓN: Si habla español, tiene a thomas disposición servicios gratuitos de asistencia lingüística. [facility] [phone].    We comply with applicable federal civil rights laws and [place] laws. We do not discriminate on the basis of race, color, national origin, age, disability, sex, sexual orientation, or gender identity.            Thank you!     Thank you for choosing [facility]  for your care. Our goal is always to provide you with excellent care. Hearing back from our patients is one way we can continue to improve our services. Please take a few minutes to complete the written survey that you may receive in the mail after your visit with us. Thank you!             Your Updated Medication List - Protect others around you: Learn how to safely use, store and throw away your medicines at www.disposemymeds.org.          This list is accurate as of 9/20/18  1:54 PM.  Always use your most recent med list.                   Brand Name Dispense Instructions for use Diagnosis    cholecalciferol 1000 UNIT tablet    vitamin D3    100 tablet    Take 1 tablet (1,000 Units) by mouth daily    Vitamin D deficiency       EYE VITAMINS PO      Take 1 tablet by mouth daily        DAILY MULTIVITAMIN PO      Take 1 chew tab by mouth daily        JUICE PLUS FIBRE PO           levothyroxine 112 MCG tablet    SYNTHROID/LEVOTHROID    90 tablet    Take 1 tablet (112 mcg) by mouth every morning (before breakfast)     Hypothyroidism due to Hashimoto's thyroiditis       LUMIGAN 0.01 % Soln   Generic drug:  bimatoprost       Actinic keratosis       melatonin 5 MG tablet      Take 10 mg by mouth At Bedtime Reported on 2/21/2017        metroNIDAZOLE 0.75 % cream    METROCREAM    60 g    every day, once daily to face    Rosacea       sildenafil 20 MG tablet    REVATIO    30 tablet    Take 2-5 tabs daily prn    Erectile dysfunction, unspecified erectile dysfunction type       timolol 0.5 % ophthalmic solution    TIMOPTIC      Actinic keratosis       TUMS 500 MG chewable tablet   Generic drug:  calcium carbonate      Take 1 chew tab by mouth daily as needed Reported on 4/18/2017        warfarin 5 MG tablet    COUMADIN    135 tablet    10 mg(2 tabs) on Tue & Thu; 7.5 mg (1.5 tabs) all other days or as directed    History of DVT of lower extremity, Chronic anticoagulation          WDL

## 2019-08-23 NOTE — TELEPHONE ENCOUNTER
Patient last stated he was moving out of state and establishing care there.   Spoke with patient and he did move to [place].  Unable to provide INR monitoring out of state.  Patient is aware and stated he is establishing with new provider soon in [place]. Will remove from INR clinic.  Will send to PCP as a FYI.

## 2019-09-19 ENCOUNTER — TELEPHONE (OUTPATIENT)
Dept: ORTHOPEDICS | Facility: CLINIC | Age: 79
End: 2019-09-19

## 2019-09-19 NOTE — TELEPHONE ENCOUNTER
Faxed a ALAYNA to medical records for records for this patient. Fax confirmed.  Gloria Serrano Certified Medical Assistant

## 2019-11-05 ENCOUNTER — HEALTH MAINTENANCE LETTER (OUTPATIENT)
Age: 79
End: 2019-11-05

## 2020-02-16 ENCOUNTER — HEALTH MAINTENANCE LETTER (OUTPATIENT)
Age: 80
End: 2020-02-16

## 2020-11-22 ENCOUNTER — HEALTH MAINTENANCE LETTER (OUTPATIENT)
Age: 80
End: 2020-11-22

## 2021-04-10 ENCOUNTER — HEALTH MAINTENANCE LETTER (OUTPATIENT)
Age: 81
End: 2021-04-10

## 2021-09-19 ENCOUNTER — HEALTH MAINTENANCE LETTER (OUTPATIENT)
Age: 81
End: 2021-09-19

## 2022-05-01 ENCOUNTER — HEALTH MAINTENANCE LETTER (OUTPATIENT)
Age: 82
End: 2022-05-01

## 2022-11-21 ENCOUNTER — HEALTH MAINTENANCE LETTER (OUTPATIENT)
Age: 82
End: 2022-11-21

## 2023-06-02 ENCOUNTER — HEALTH MAINTENANCE LETTER (OUTPATIENT)
Age: 83
End: 2023-06-02

## (undated) DEVICE — SPONGE COTTONOID 1/2X1/2" 20-04S

## (undated) DEVICE — ESU CANNULA RF MONOPLOAR CVD 20GA 10X150MM 0406-630-225

## (undated) DEVICE — BUR MATCHSTICK 3MM ANSPACH L-8NS-G1

## (undated) DEVICE — SPONGE SURGIFOAM 100 1974

## (undated) DEVICE — PREP CHLORAPREP W/ORANGE TINT 10.5ML 260715

## (undated) DEVICE — SU MONOCRYL 4-0 PS-2 27" UND Y426H

## (undated) DEVICE — TRAY PAIN INJECTION 97A 640

## (undated) DEVICE — NDL BLUNT 17GA 1.5" 8881202330

## (undated) DEVICE — SYR EAR BULB 3OZ 0035830

## (undated) DEVICE — DRSG PRIMAPORE 03 1/8X6" 66000318

## (undated) DEVICE — WIPES FOLEY CARE SURESTEP PROVON DFC100

## (undated) DEVICE — NDL SPINAL 22GA 3.5" QUINCKE 405181

## (undated) DEVICE — DRAPE C-ARM W/STRAPS 42X72" 07-CA104

## (undated) DEVICE — DRAPE STERI TOWEL LG 1010

## (undated) DEVICE — SU VICRYL 2-0 CT-2 CR 8X18" J726D

## (undated) DEVICE — SU ETHILON 3-0 PS-1 18" 1663H

## (undated) DEVICE — LINEN TOWEL PACK X5 5464

## (undated) DEVICE — ESU GROUND PAD ADULT W/CORD E7507

## (undated) DEVICE — CATH TRAY FOLEY SURESTEP 16FR W/URNE MTR STLK LATEX A303316A

## (undated) DEVICE — ESU PENCIL W/COATED BLADE E2450H

## (undated) DEVICE — SOL ADH LIQUID BENZOIN SWAB 0.6ML C1544

## (undated) DEVICE — SUCTION MANIFOLD DORNOCH ULTRA CART UL-CL500

## (undated) DEVICE — DRAPE MICROSCOPE LEICA 54X150" AR8033650

## (undated) DEVICE — SU VICRYL 0 CT-1 CR 8X18" J740D

## (undated) DEVICE — GLOVE PROTEXIS POWDER FREE SMT 7.0  2D72PT70X

## (undated) DEVICE — PACK NEURO MINOR UMMC SNE32MNMU4

## (undated) DEVICE — DRAPE MAYO STAND 23X54 8337

## (undated) DEVICE — PREP CHLORAPREP 26ML TINTED ORANGE  260815

## (undated) DEVICE — PREP CHLORAPREP CLEAR 3ML 260400

## (undated) DEVICE — PREP SKIN SCRUB TRAY 4461A

## (undated) DEVICE — SU DERMABOND ADVANCED .7ML DNX12

## (undated) RX ORDER — BUPIVACAINE HYDROCHLORIDE AND EPINEPHRINE 2.5; 5 MG/ML; UG/ML
INJECTION, SOLUTION EPIDURAL; INFILTRATION; INTRACAUDAL; PERINEURAL
Status: DISPENSED
Start: 2017-03-02

## (undated) RX ORDER — BACITRACIN 50000 [IU]/1
INJECTION, POWDER, FOR SOLUTION INTRAMUSCULAR
Status: DISPENSED
Start: 2017-03-02

## (undated) RX ORDER — HYDROMORPHONE HYDROCHLORIDE 1 MG/ML
INJECTION, SOLUTION INTRAMUSCULAR; INTRAVENOUS; SUBCUTANEOUS
Status: DISPENSED
Start: 2017-03-02

## (undated) RX ORDER — CEFAZOLIN SODIUM 2 G/100ML
INJECTION, SOLUTION INTRAVENOUS
Status: DISPENSED
Start: 2017-03-02

## (undated) RX ORDER — OXYCODONE HYDROCHLORIDE 5 MG/1
TABLET ORAL
Status: DISPENSED
Start: 2017-03-02